# Patient Record
Sex: FEMALE | Race: BLACK OR AFRICAN AMERICAN | NOT HISPANIC OR LATINO | Employment: FULL TIME | URBAN - METROPOLITAN AREA
[De-identification: names, ages, dates, MRNs, and addresses within clinical notes are randomized per-mention and may not be internally consistent; named-entity substitution may affect disease eponyms.]

---

## 2017-12-18 ENCOUNTER — OFFICE VISIT (OUTPATIENT)
Dept: OBSTETRICS AND GYNECOLOGY | Facility: CLINIC | Age: 57
End: 2017-12-18
Payer: MEDICAID

## 2017-12-18 VITALS
SYSTOLIC BLOOD PRESSURE: 152 MMHG | DIASTOLIC BLOOD PRESSURE: 94 MMHG | HEIGHT: 67 IN | BODY MASS INDEX: 42.73 KG/M2 | WEIGHT: 272.25 LBS

## 2017-12-18 DIAGNOSIS — Z01.419 ENCOUNTER FOR GYNECOLOGICAL EXAMINATION WITHOUT ABNORMAL FINDING: Primary | ICD-10-CM

## 2017-12-18 DIAGNOSIS — L68.0 HIRSUTISM: ICD-10-CM

## 2017-12-18 DIAGNOSIS — N95.1 MENOPAUSE SYNDROME: ICD-10-CM

## 2017-12-18 PROCEDURE — 99203 OFFICE O/P NEW LOW 30 MIN: CPT | Mod: PBBFAC,PN | Performed by: OBSTETRICS & GYNECOLOGY

## 2017-12-18 PROCEDURE — 88175 CYTOPATH C/V AUTO FLUID REDO: CPT

## 2017-12-18 PROCEDURE — 99386 PREV VISIT NEW AGE 40-64: CPT | Mod: S$PBB,,, | Performed by: OBSTETRICS & GYNECOLOGY

## 2017-12-18 PROCEDURE — 99999 PR PBB SHADOW E&M-NEW PATIENT-LVL III: CPT | Mod: PBBFAC,,, | Performed by: OBSTETRICS & GYNECOLOGY

## 2017-12-18 RX ORDER — METFORMIN HYDROCHLORIDE 1000 MG/1
TABLET ORAL
Status: ON HOLD | COMMUNITY
Start: 2017-11-29 | End: 2019-09-28 | Stop reason: HOSPADM

## 2017-12-18 RX ORDER — ERGOCALCIFEROL 1.25 MG/1
CAPSULE ORAL
COMMUNITY
Start: 2017-11-28 | End: 2021-04-12 | Stop reason: SDUPTHER

## 2017-12-18 RX ORDER — GLYBURIDE 5 MG/1
TABLET ORAL
COMMUNITY
Start: 2017-11-29 | End: 2021-03-26

## 2017-12-18 RX ORDER — MELOXICAM 15 MG/1
15 TABLET ORAL DAILY
COMMUNITY
Start: 2017-11-30 | End: 2021-04-22

## 2017-12-18 RX ORDER — CARVEDILOL 12.5 MG/1
12.5 TABLET ORAL
COMMUNITY
Start: 2017-11-29 | End: 2022-01-05

## 2017-12-18 RX ORDER — PANTOPRAZOLE SODIUM 40 MG/1
TABLET, DELAYED RELEASE ORAL
COMMUNITY
Start: 2017-09-26 | End: 2019-02-10 | Stop reason: SDUPTHER

## 2017-12-18 RX ORDER — LISINOPRIL AND HYDROCHLOROTHIAZIDE 12.5; 2 MG/1; MG/1
TABLET ORAL
COMMUNITY
Start: 2017-11-29 | End: 2021-03-26

## 2017-12-18 NOTE — LETTER
December 18, 2017      Rut Morris NP  [address] [place] [address]           [facility]  [address]  [place] [address]  Phone: [phone]  Fax: [phone]          Patient: Holly Mcclelland   MR Number: [id number]   YOB: 1960   Date of Visit: 12/18/2017       Dear Rut Morris:    Thank you for referring Holly Mcclelland to me for evaluation. Attached you will find relevant portions of my assessment and plan of care.    If you have questions, please do not hesitate to call me. I look forward to following Holly Mcclelland along with you.    Sincerely,    Juan Francisco Alexis Jr., MD    Enclosure  CC:  No Recipients    If you would like to receive this communication electronically, please contact [email or web address] or [phone] to request more information on [employer] Link access.    For providers and/or their staff who would like to refer a patient to [facility], please contact us through our one-stop-shop provider referral line, [facility], at [phone].    If you feel you have received this communication in error or would no longer like to receive these types of communications, please e-mail [email or web address]

## 2017-12-18 NOTE — PROGRESS NOTES
12/18/2017    Testosteron Free 2.3       PT SEEN BY PCP AND REFERRED TO GYN FOR ANNUAL.  TOOK ESTRACE FOR 21 DAYS LAST MONTH AND HAD VAGINAL D/C AS WELL AS LBP. HAS SWEATS AT 5 AM. NOT SEVERE.  NOTED WEIGHT GAIN AND MORE HAIR GROWTH SINCE MENOPAUSE. SEEN BY DERM FOR FACIAL HAIR.    ROS:  GENERAL: No fever, chills, fatigability or weight loss.  VULVAR: No pain, no lesions and no itching.  VAGINAL: No relaxation, no itching, no discharge, no abnormal bleeding and no lesions.  ABDOMEN: No abdominal pain. Denies nausea. Denies vomiting. No diarrhea. No constipation  BREAST: Denies pain. No lumps. No discharge.  URINARY: No incontinence, no nocturia, no frequency and no dysuria.  CARDIOVASCULAR: No chest pain. No shortness of breath. No leg cramps.  NEUROLOGICAL: No headaches. No vision changes.  The remainder of the review of systems was negative.    PE:  General Appearance: obese And Well developed. Well nourished. In no acute distress.  Vulva: Lesions: No.  Urethral Meatus: Normal size. Normal location. No lesions. No prolapse.  Urethra: No masses. No tenderness. No prolapse. No scarring.  Bladder: No masses. No tenderness.  Vagina: Mucosa NI:yes discharge no, atrophy yes, cystocele no or rectocele no.  Cervix: Lesion: no  Stenotic: no Cervical motion tenderness: no  Uterus: Uterus size: 6 weeks. Support good. Uterus size: Normal  Adnexa: Masses: No Tenderness: No CDS Nodularity: No  Abdomen: obese No masses. No tenderness.  Breasts: No bilateral masses. No bilateral discharge. No bilateral tenderness. No bilateral fibrocystic changes.  Neck: No thyroid enlargement. No thyroid masses.  Skin: Rashes: No      PROCEDURES:    PLAN:     DIAGNOSIS:  1. Encounter for gynecological examination without abnormal finding    2. Menopause syndrome    3. Hirsutism        MEDICATIONS & ORDERS:  Orders Placed This Encounter    Testosterone, free    Liquid-based pap smear, screening   ESTRAVEN    Patient was counseled today on  the new ACS guidelines for cervical cytology screening as well as the current recommendations for breast cancer screening. She was counseled to follow up with her PCP for other routine health maintenance. Counseling session lasted approximately 10 minutes, and all her questions were answered.     Patient was counseled today on A.C.S. Pap guidelines and recommendations for yearly pelvic exams, mammograms and monthly self breast exams; to see her PCP for other health maintenance and the increased risks of CVD, MI, VTE, CVA , and Invasive Breast Cancer on Prempro and the increased risk of CVA with Premarin as reported by the W.H.I. studies; the benefits of HRT/ERT; her personal risks which include; alternative therapies for vasomotor symptoms (not FDA approved) including soy, black cohosh, Vit E and avoidance of triggers such as cigarette smoking, alcohol, humidity, stress and caffeine; alternative Rxs for treatment of menopause symptoms such as antidepressants or Clonidine. Counseling session lasted approximately minutes, and all her questions were completely answered.      FOLLOW-UP: With me in 12 month

## 2019-09-25 PROBLEM — E66.812 CLASS 2 OBESITY IN ADULT: Status: ACTIVE | Noted: 2019-09-25

## 2019-09-25 PROBLEM — K21.9 GERD (GASTROESOPHAGEAL REFLUX DISEASE): Status: ACTIVE | Noted: 2019-09-25

## 2019-09-25 PROBLEM — G45.9 TIA (TRANSIENT ISCHEMIC ATTACK): Status: ACTIVE | Noted: 2019-09-25

## 2019-09-25 PROBLEM — I10 HYPERTENSION, MALIGNANT: Status: ACTIVE | Noted: 2019-09-25

## 2019-09-25 PROBLEM — E66.9 CLASS 2 OBESITY IN ADULT: Status: ACTIVE | Noted: 2019-09-25

## 2019-09-25 PROBLEM — E11.49 TYPE 2 DIABETES MELLITUS WITH NEUROLOGIC COMPLICATION, WITHOUT LONG-TERM CURRENT USE OF INSULIN: Status: ACTIVE | Noted: 2019-09-25

## 2019-09-26 PROBLEM — I16.0 HYPERTENSIVE URGENCY: Status: ACTIVE | Noted: 2019-09-26

## 2019-09-26 PROBLEM — E66.01 CLASS 3 SEVERE OBESITY WITH BODY MASS INDEX (BMI) OF 40.0 TO 44.9 IN ADULT: Status: ACTIVE | Noted: 2019-09-26

## 2019-09-26 PROBLEM — E66.813 CLASS 3 SEVERE OBESITY WITH BODY MASS INDEX (BMI) OF 40.0 TO 44.9 IN ADULT: Status: ACTIVE | Noted: 2019-09-26

## 2019-10-23 ENCOUNTER — OFFICE VISIT (OUTPATIENT)
Dept: OBSTETRICS AND GYNECOLOGY | Facility: CLINIC | Age: 59
End: 2019-10-23
Payer: MEDICAID

## 2019-10-23 VITALS
HEIGHT: 67 IN | WEIGHT: 264.75 LBS | BODY MASS INDEX: 41.55 KG/M2 | SYSTOLIC BLOOD PRESSURE: 130 MMHG | DIASTOLIC BLOOD PRESSURE: 88 MMHG

## 2019-10-23 DIAGNOSIS — Z01.419 WELL WOMAN EXAM WITH ROUTINE GYNECOLOGICAL EXAM: Primary | ICD-10-CM

## 2019-10-23 PROCEDURE — 99396 PREV VISIT EST AGE 40-64: CPT | Mod: S$PBB,,, | Performed by: OBSTETRICS & GYNECOLOGY

## 2019-10-23 PROCEDURE — 99999 PR PBB SHADOW E&M-EST. PATIENT-LVL IV: CPT | Mod: PBBFAC,,, | Performed by: OBSTETRICS & GYNECOLOGY

## 2019-10-23 PROCEDURE — 99214 OFFICE O/P EST MOD 30 MIN: CPT | Mod: PBBFAC,PN | Performed by: OBSTETRICS & GYNECOLOGY

## 2019-10-23 PROCEDURE — 99396 PR PREVENTIVE VISIT,EST,40-64: ICD-10-PCS | Mod: S$PBB,,, | Performed by: OBSTETRICS & GYNECOLOGY

## 2019-10-23 PROCEDURE — 99999 PR PBB SHADOW E&M-EST. PATIENT-LVL IV: ICD-10-PCS | Mod: PBBFAC,,, | Performed by: OBSTETRICS & GYNECOLOGY

## 2019-10-23 RX ORDER — ASPIRIN 325 MG
50000 TABLET, DELAYED RELEASE (ENTERIC COATED) ORAL
Refills: 3 | COMMUNITY
Start: 2019-10-10 | End: 2022-02-28

## 2019-10-23 RX ORDER — METHOCARBAMOL 500 MG/1
500 TABLET, FILM COATED ORAL 3 TIMES DAILY PRN
Refills: 3 | COMMUNITY
Start: 2019-10-10 | End: 2021-03-26

## 2019-10-23 RX ORDER — ASPIRIN 81 MG/1
81 TABLET ORAL DAILY
Refills: 3 | Status: ON HOLD | COMMUNITY
Start: 2019-10-09 | End: 2022-02-10 | Stop reason: SDUPTHER

## 2019-10-23 RX ORDER — GABAPENTIN 100 MG/1
100 CAPSULE ORAL 2 TIMES DAILY
Refills: 3 | COMMUNITY
Start: 2019-09-29 | End: 2021-03-26

## 2019-10-23 RX ORDER — BUTALBITAL, ACETAMINOPHEN AND CAFFEINE 50; 325; 40 MG/1; MG/1; MG/1
1 TABLET ORAL 3 TIMES DAILY PRN
Refills: 1 | COMMUNITY
Start: 2019-10-10 | End: 2021-03-26

## 2019-10-23 NOTE — PROGRESS NOTES
History & Physical  Gynecology      SUBJECTIVE:     Chief Complaint: Well Woman       History of Present Illness:  Ms. Mcclelland is a 58 yr old female who presents for annual, well woman exam. Complaints: No GYN complaints. Postmenopausal. Denies vaginal bleeding. No hx abnormal paps. Last pap was . Currently not sexually active. Denies hx of STIs. Denies fam hx of breast, ovarian or colon cancer. Feels safe at home, wears seatbelts, exercises infrequently.        Review of patient's allergies indicates:  No Known Allergies    Past Medical History:   Diagnosis Date    Diabetes mellitus     Hypertension     Sleep apnea      Past Surgical History:   Procedure Laterality Date    BREAST BIOPSY Left      OB History        1    Para   1    Term   1            AB        Living           SAB        TAB        Ectopic        Multiple        Live Births                   Family History   Problem Relation Age of Onset    Diabetes Maternal Grandmother     Diabetes Mother     Diabetes Sister     Breast cancer Neg Hx     Colon cancer Neg Hx     Ovarian cancer Neg Hx      Social History     Tobacco Use    Smoking status: Never Smoker    Smokeless tobacco: Never Used   Substance Use Topics    Alcohol use: No    Drug use: No       Current Outpatient Medications   Medication Sig    amLODIPine (NORVASC) 10 MG tablet Take 1 tablet (10 mg total) by mouth once daily.    aspirin (ECOTRIN) 81 MG EC tablet Take 81 mg by mouth once daily.    atorvastatin (LIPITOR) 40 MG tablet Take 1 tablet (40 mg total) by mouth once daily.    butalbital-acetaminophen-caffeine -40 mg (FIORICET, ESGIC) -40 mg per tablet Take 1 tablet by mouth 3 (three) times daily as needed.    carvedilol (COREG) 12.5 MG tablet     cholecalciferol, vitamin D3, 50,000 unit capsule Take 50,000 Units by mouth every 7 days.    clopidogrel (PLAVIX) 75 mg tablet Take 1 tablet (75 mg total) by mouth once daily.    furosemide  (LASIX) 20 MG tablet furosemide 20 mg tablet    gabapentin (NEURONTIN) 100 MG capsule Take 100 mg by mouth 2 (two) times daily.    glyBURIDE (DIABETA) 5 MG tablet     hydrALAZINE (APRESOLINE) 25 MG tablet Take 1 tablet (25 mg total) by mouth every 12 (twelve) hours.    meloxicam (MOBIC) 15 MG tablet     metFORMIN (GLUCOPHAGE) 1000 MG tablet Take 1 tablet (1,000 mg total) by mouth 2 (two) times daily with meals.    metoclopramide HCl (REGLAN) 10 MG tablet metoclopramide 10 mg tablet    pantoprazole (PROTONIX) 40 MG tablet pantoprazole 40 mg tablet,delayed release    pregabalin (LYRICA) 50 MG capsule Take 1 capsule (50 mg total) by mouth 2 (two) times daily.    SITagliptin (JANUVIA) 100 MG Tab Take 1 tablet (100 mg total) by mouth once daily.    chlordiazepoxide-clidinium 5-2.5 mg (LIBRAX) 5-2.5 mg Cap Take 1 capsule by mouth 3 (three) times daily. (Patient not taking: Reported on 10/23/2019)    [id number]-algal oil (METANX, ALGAL OIL,) 3 mg-35 mg-2 mg -90.314 mg Cap Metanx (algal oil) 3 mg-35 mg-2 mg-90.314 mg capsule   Take 1 capsule twice a day by oral route.    lisinopril-hydrochlorothiazide (PRINZIDE,ZESTORETIC) 20-12.5 mg per tablet     methocarbamol (ROBAXIN) 500 MG Tab Take 500 mg by mouth 3 (three) times daily as needed.    ondansetron (ZOFRAN-ODT) 4 MG TbDL Take 1 tablet (4 mg total) by mouth every 6 (six) hours as needed.    ondansetron (ZOFRAN-ODT) 4 MG TbDL Take 1 tablet (4 mg total) by mouth every 6 (six) hours as needed.    VITAMIN D2 50,000 unit capsule      No current facility-administered medications for this visit.          Review of Systems:  Review of Systems   Constitutional: Negative for activity change, fatigue, fever and unexpected weight change.   Respiratory: Negative for cough and shortness of breath.    Cardiovascular: Negative for chest pain and palpitations.   Gastrointestinal: Negative for abdominal pain, constipation, diarrhea and nausea.   Endocrine: Negative  for hot flashes.   Genitourinary: Negative for dyspareunia, dysuria, menorrhagia, menstrual problem, pelvic pain and vaginal discharge.   Musculoskeletal: Negative for back pain.   Integumentary:  Negative for nipple discharge.   Neurological: Negative for headaches.   Psychiatric/Behavioral: The patient is not nervous/anxious.    Breast: Negative for nipple discharge       OBJECTIVE:     Physical Exam:  Physical Exam   Constitutional: She is oriented to person, place, and time. She appears well-developed and well-nourished.   HENT:   Head: Normocephalic and atraumatic.   Neck: Normal range of motion. Neck supple.   Cardiovascular: Normal rate, regular rhythm, normal heart sounds and intact distal pulses.   Pulmonary/Chest: Effort normal and breath sounds normal. Right breast exhibits no inverted nipple, no mass, no nipple discharge, no skin change and no tenderness. Left breast exhibits no inverted nipple, no mass, no nipple discharge, no skin change and no tenderness.   Abdominal: Soft. Bowel sounds are normal. There is no tenderness. There is no guarding.   Genitourinary: There is no rash, tenderness, lesion or injury on the right labia. There is no rash, tenderness, lesion or injury on the left labia. Uterus is not deviated, not enlarged, not fixed and not tender. Cervix exhibits no motion tenderness, no discharge and no friability. Right adnexum displays no mass, no tenderness and no fullness. Left adnexum displays no mass, no tenderness and no fullness. No erythema, tenderness or bleeding in the vagina. No foreign body in the vagina. No signs of injury around the vagina. No vaginal discharge found.   Neurological: She is alert and oriented to person, place, and time.   Skin: Skin is warm and dry.   Psychiatric: She has a normal mood and affect.   Vitals reviewed.        ASSESSMENT:       ICD-10-CM ICD-9-CM    1. Well woman exam with routine gynecological exam Z01.419 V72.31           Plan:      Holly was seen  today for well woman.    Diagnoses and all orders for this visit:    Well woman exam with routine gynecological exam    Last pap 2017. Next pap due next year  Mammogram today  Colonoscopy UTD  DEXA age 64    No orders of the defined types were placed in this encounter.      Follow up in about 1 year (around 10/23/2020), or if symptoms worsen or fail to improve, for annual.    Counseling time: 15 minutes    Jefry Dinero

## 2020-03-17 ENCOUNTER — CLINICAL SUPPORT (OUTPATIENT)
Dept: DIABETES | Facility: CLINIC | Age: 60
End: 2020-03-17
Payer: MEDICAID

## 2020-03-17 VITALS — BODY MASS INDEX: 41.44 KG/M2 | HEIGHT: 67 IN | WEIGHT: 264 LBS

## 2020-03-17 DIAGNOSIS — E11.9 DIABETES MELLITUS WITHOUT COMPLICATION: ICD-10-CM

## 2020-03-17 DIAGNOSIS — E11.40 TYPE 2 DIABETES MELLITUS WITH DIABETIC NEUROPATHY, WITHOUT LONG-TERM CURRENT USE OF INSULIN: Primary | ICD-10-CM

## 2020-03-17 PROCEDURE — G0108 DIAB MANAGE TRN  PER INDIV: HCPCS | Mod: PBBFAC,PN | Performed by: DIETITIAN, REGISTERED

## 2020-03-17 PROCEDURE — 99999 PR PBB SHADOW E&M-EST. PATIENT-LVL IV: CPT | Mod: PBBFAC,,, | Performed by: DIETITIAN, REGISTERED

## 2020-03-17 PROCEDURE — 99999 PR PBB SHADOW E&M-EST. PATIENT-LVL IV: ICD-10-PCS | Mod: PBBFAC,,, | Performed by: DIETITIAN, REGISTERED

## 2020-03-17 PROCEDURE — 99214 OFFICE O/P EST MOD 30 MIN: CPT | Mod: PBBFAC,PN | Performed by: DIETITIAN, REGISTERED

## 2020-03-17 RX ORDER — GLIPIZIDE 10 MG/1
TABLET ORAL
COMMUNITY
Start: 2020-03-08 | End: 2021-03-26 | Stop reason: SDUPTHER

## 2020-03-17 NOTE — PROGRESS NOTES
Diabetes Education  Author: Arpita Sanchez RD, CDE  Date: 3/17/2020    Diabetes Care Management Summary  Diabetes Education Record Assessment/Progress: Initial  Current Diabetes Risk Level: Moderate     Last A1c:   Lab Results   Component Value Date    HGBA1C 9.6 (H) 02/03/2020     Last visit with Diabetes Educator: Last Education Visit: Not Found      Diabetes Type  Diabetes Type : Type II    Diabetes History  Diabetes Diagnosis: 5-10 years  Current Treatment: Oral Medication, Diet  Reviewed Problem List with Patient: Yes    Health Maintenance was reviewed today with patient. Discussed with patient importance of routine eye exams, foot exams/foot care, blood work (i.e.: A1c, microalbumin, and lipid), dental visits, yearly flu vaccine, and pneumonia vaccine as indicated by PCP. Patient verbalized understanding.     Health Maintenance Topics with due status: Not Due       Topic Last Completion Date    Pap Smear with HPV Cotest 12/18/2017    High Dose Statin 10/23/2019    Mammogram 11/13/2019    Lipid Panel 02/03/2020    Hemoglobin A1c 02/03/2020     Health Maintenance Due   Topic Date Due    Hepatitis C Screening  1960    Foot Exam  11/08/1969    Eye Exam  11/08/1969    TETANUS VACCINE  11/08/1977    Pneumococcal Vaccine (Medium Risk) (1 of 1 - PPSV23) 11/08/1978    Colonoscopy  11/08/2009       Nutrition  Meal Planning: skipping meals  What type of sweetener do you use?: sugar  What type of beverages do you drink?: regular soda/tea, diet soda/tea, juice, water, sport drinks  Meal Plan 24 Hour Recall - Breakfast: 2 boiled eggs and grapefruit juice  Meal Plan 24 Hour Recall - Lunch: skipped  Meal Plan 24 Hour Recall - Dinner: meatballs and spaghetti  Meal Plan 24 Hour Recall - Snack: chips, sweets, tends to snack after dinner while relaxing in the living room    Monitoring   Self Monitoring : not checking bloodsugar, interested in a sensor but is not on insulin nor testing 4 times a day so insurance will  not cover supplies  Blood Glucose Logs: No  Do you use a personal continuous glucose monitor?: No  In the last month, how often have you had a low blood sugar reaction?: once  What are your symptoms of low blood sugar?: gets shaky  How do you treat low blood sugar?: eat something  Can you tell when your blood sugar is too high?: sometimes  How do you treat high blood sugar?: take medication    Exercise   Exercise Type: walking(at work)  Intensity: Low  Frequency: 3-5 Times per week    Current Diabetes Treatment   Current Treatment: Oral Medication, Diet    Social History  Preferred Learning Method: Face to Face  Primary Support: Self  Educational Level: High School  Occupation: Screwpulping for [employer]  Smoking Status: Never a Smoker  Alcohol Use: Never            DDS-2 Score  ( > 3 = SIGNIFICANT DISTRESS): 2                   Barriers to Change  Barriers to Change: Financial  Learning Challenges : None    Readiness to Learn   Readiness to Learn : Acceptance    Cultural Influences  Cultural Influences: No    Diabetes Education Assessment/Progress  Diabetes Disease Process (diabetes disease process and treatment options): Comprehends Key Points, Discussion, Individual Session, Written Materials Provided  Nutrition (Incorporating nutritional management into one's lifestyle): Comprehends Key Points, Discussion, Individual Session, Written Materials Provided  Physical Activity (incorporating physical activity into one's lifestyle): Comprehends Key Points, Discussion, Individual Session, Written Materials Provided  Medications (states correct name, dose, onset, peak, duration, side effects & timing of meds): Comprehends Key Points, Discussion, Individual Session, Written Materials Provided  Monitoring (monitoring blood glucose/other parameters & using results): Comprehends Key Points, Discussion, Individual Session, Written Materials Provided  Acute Complications (preventing, detecting, and treating  acute complications): Comprehends Key Points, Discussion, Individual Session, Written Materials Provided  Chronic Complications (preventing, detecting, and treating chronic complications): Comprehends Key Points, Discussion, Individual Session, Written Materials Provided  Clinical (diabetes, other pertinent medical history, and relevant comorbidities reviewed during visit): Comprehends Key Points, Discussion, Individual Session, Written Materials Provided  Cognitive (knowledge of self-management skills, functional health literacy): Comprehends Key Points, Discussion, Individual Session, Written Materials Provided  Psychosocial (emotional response to diabetes): Comprehends Key Points, Discussion, Individual Session, Written Materials Provided  Diabetes Distress and Support Systems: Comprehends Key Points, Discussion, Individual Session, Written Materials Provided  Behavioral (readiness for change, lifestyle practices, self-care behaviors): Comprehends Key Points, Discussion, Individual Session, Written Materials Provided    Patient educated on what is DM, T1DM, T2DM, risk factors, managing DM, DM diet, carbohydrate counting, meal planning, reading a food label, healthy snack options, benefits of physical activity, diabetes care schedule, foot care guidelines, diabetes and retinopathy screening, s/s hypo and hyperglycemia, long/short term complication of uncontrolled DM, importance of compliance with treatment plan, how to use a glucometer, reviewed understanding diabetes distress, medications for treating DM, their mechanism of action and possible side effects, reviewed current level and goal level for HgbA1c, blood glucose, microalbumin, and lipids. Patient provided with written literature, diabetes management resources and support, DM Management program contact information.    Goals  Patient has selected/evaluated goals during today's session: Yes, selected  Healthy Eating: Set  Start Date: 03/17/20  Target Date:  04/17/20         Diabetes Care Plan/Intervention  Education Plan/Intervention: Individual Follow-Up DSMT    Diabetes Meal Plan  Restrictions: Low Fat, Low Sodium, Restricted Carbohydrate  Calories: 1800  Carbohydrate Per Meal: 30-45g  Carbohydrate Per Snack : 15-20g  Fat: 50  Protein: 135    Today's Self-Management Care Plan was developed with the patient's input and is based on barriers identified during today's assessment.    The long and short-term goals in the care plan were written with the patient/caregiver's input. The patient has agreed to work toward these goals to improve her overall diabetes control.      The patient received a copy of today's self-management plan and verbalized understanding of the care plan, goals, and all of today's instructions.      The patient was encouraged to communicate with her physician and care team regarding her condition(s) and treatment.  I provided the patient with my contact information today and encouraged her to contact me via phone or patient portal as needed.     Education Units of Time   Time Spent: 60 min

## 2020-09-16 DIAGNOSIS — E04.1 THYROID NODULE: ICD-10-CM

## 2020-09-16 DIAGNOSIS — E11.9 DIABETES MELLITUS WITHOUT COMPLICATION: Primary | ICD-10-CM

## 2020-09-28 ENCOUNTER — NUTRITION (OUTPATIENT)
Dept: DIABETES | Facility: CLINIC | Age: 60
End: 2020-09-28
Payer: MEDICAID

## 2020-09-28 VITALS — WEIGHT: 263.88 LBS | BODY MASS INDEX: 41.42 KG/M2 | HEIGHT: 67 IN

## 2020-09-28 DIAGNOSIS — E11.9 DIABETES MELLITUS WITHOUT COMPLICATION: ICD-10-CM

## 2020-09-28 DIAGNOSIS — E11.40 TYPE 2 DIABETES MELLITUS WITH DIABETIC NEUROPATHY, WITHOUT LONG-TERM CURRENT USE OF INSULIN: Primary | ICD-10-CM

## 2020-09-28 PROCEDURE — G0108 DIAB MANAGE TRN  PER INDIV: HCPCS | Mod: PBBFAC,PN | Performed by: DIETITIAN, REGISTERED

## 2020-09-28 PROCEDURE — 99214 OFFICE O/P EST MOD 30 MIN: CPT | Mod: PBBFAC,PN | Performed by: DIETITIAN, REGISTERED

## 2020-09-28 PROCEDURE — 99999 PR PBB SHADOW E&M-EST. PATIENT-LVL IV: CPT | Mod: PBBFAC,,, | Performed by: DIETITIAN, REGISTERED

## 2020-09-28 PROCEDURE — 99999 PR PBB SHADOW E&M-EST. PATIENT-LVL IV: ICD-10-PCS | Mod: PBBFAC,,, | Performed by: DIETITIAN, REGISTERED

## 2020-09-28 RX ORDER — PEN NEEDLE, DIABETIC 31 GX5/16"
NEEDLE, DISPOSABLE MISCELLANEOUS
COMMUNITY
Start: 2020-09-16 | End: 2021-03-26

## 2020-09-28 RX ORDER — INSULIN GLARGINE 100 [IU]/ML
INJECTION, SOLUTION SUBCUTANEOUS
COMMUNITY
Start: 2020-09-16 | End: 2021-03-26

## 2020-09-28 NOTE — PATIENT INSTRUCTIONS
Diabetes: Considering an Insulin Pump     An insulin pump is about the size of a pager or cell phone. It can be easily worn beneath your clothing.     Youve been told that you have diabetes. You has to be given a hormone called insulin. Insulin helps to give your cells the energy they need. Insulin is most often given by shot, using a needle and syringe. Most people with diabetes need several shots each day. A device called an insulin pump can also be used to deliver insulin. A pump may help reduce the number of shots you need. An insulin pump can also give you more choices about your treatment plan. Many patients find that an insulin pump helps improve blood sugar management. But pumps do have some drawbacks. Your healthcare provider can help you decide if an insulin pump is a good choice.  How an insulin pump works  An insulin pump is a small device about the size of a pager or cell phone. Insulin is delivered from the pump to the body through thin plastic tubing. The tubing is attached to a soft, flexible tube called a cannula. The cannula is placed under the skin. The pump can deliver a steady dose of insulin. This is called a basal dose. It acts like the bodys natural release of insulin. The pump can also deliver a single dose either before meals or to correct high blood sugar. This is called a bolus dose. The pump is worn all the time, day and night. It is easily hidden under loose clothing or clipped to a waistband or belt. But it can be disconnected for short periods (for example, while bathing or showering).  Advantages of an insulin pump  Some of the advantages of an insulin pump include:  · Reduces the number of shots needed (good if child is afraid of needles)  · Acts more like the bodys natural release of insulin than with shots  · Allows for both quick and around-the-clock delivery of insulin  · Gives child more freedom in what or when he or she eats than with shots  · May lead to fewer extreme  highs and lows than with shots  · May improve A1C number  Disadvantages of an insulin pump  There are also some disadvantages of the pump. These include:  · May not improve blood sugar numbers.  · Meal boluses must still be remembered and given.  · Frequent blood sugar checks are still needed.  · Pump may be visible to others (puts diabetes out there for people to see).  · May be an ongoing expense and may not be covered by health insurance.  · Needs special training for patient  · May increase risk of getting infections  · May increase risk of diabetic ketoacidosis (DKA) if the pump or infusion set malfunctions or if the catheter comes out and insulin is no longer being delivered  See Understanding the Risk of Diabetic Ketoacidosis (DKA).  Deciding if your child is ready  To help determine whether a pump is right for you, consider these things:  · You must be interested in the pump and willing to wear it.  · Counting carbs will be very important (even more so than with shots).  · Blood sugar checks may need to be done more often than with shots.  · Ketones may need to be checked more often than with shots. Your healthcare provider will teach you about checking for ketones.  · Youll need to keep even more careful records of your blood sugar readings.  Understanding the risk of diabetic ketoacidosis (DKA)  Diabetic ketoacidosis (DKA) is a serious condition that can happen if you dont get needed insulin. It can result in a coma and sometimes even death. If the insulin pump stops working, DKA could develop soon after. Monitoring your blood sugar closely is the only way to be sure that you are getting needed insulin. Be sure to discuss the risks of DKA with your healthcare provider. And learn what to do if the pump fails for any reason. Always be prepared to give yourself an insulin shot right away if needed.   Resources  For more information about diabetes, visit these websites:  · American Diabetes  Association www.diabetes.org  · Children with Diabetes www.childrenwithdiabetes.com  · Juvenile Diabetes Research Foundation www.jdrf.org  · American Association of Diabetes Educators www.aadenet.org  · American Association of Clinical Endocrinologists www.aace.com  · National Diabetes Information Clearinghouse www.diabetes.niddk.nih.gov  NOTE:  This sheet does not give all the information you need to care for your diabetes. Ask your healthcare provider for more information.   Date Last Reviewed: 7/1/2016  © 4795-7489 [employer]. [address] [place] [address]. All rights reserved. This information is not intended as a substitute for professional medical care. Always follow your healthcare professional's instructions.

## 2020-09-28 NOTE — PROGRESS NOTES
Diabetes Education  Author: Arpita Sanchez RD, CDE  Date: 9/28/2020    Diabetes Care Management Summary  Diabetes Education Record Assessment/Progress: Comprehensive/Group  Current Diabetes Risk Level: Moderate     Last A1c:   Lab Results   Component Value Date    HGBA1C 9.7 (H) 09/07/2020     Last visit with Diabetes Educator: Last Education Visit: Not Found      Diabetes Type  Diabetes Type : Type II    Diabetes History  Diabetes Diagnosis: >10 years  Current Treatment: Oral Medication, Diet, Insulin(glipizide, glyburide, januvia, metformin, lantus)  Reviewed Problem List with Patient: Yes    Health Maintenance was reviewed today with patient. Discussed with patient importance of routine eye exams, foot exams/foot care, blood work (i.e.: A1c, microalbumin, and lipid), dental visits, yearly flu vaccine, and pneumonia vaccine as indicated by PCP. Patient verbalized understanding.     Health Maintenance Topics with due status: Not Due       Topic Last Completion Date    Cervical Cancer Screening 12/18/2017    Mammogram 11/13/2019    Lipid Panel 09/07/2020    Hemoglobin A1c 09/07/2020     Health Maintenance Due   Topic Date Due    Hepatitis C Screening  1960    Foot Exam  11/08/1969    Eye Exam  11/08/1969    TETANUS VACCINE  11/08/1977    Pneumococcal Vaccine (Medium Risk) (1 of 1 - PPSV23) 11/08/1978    High Dose Statin  11/08/1980    Shingles Vaccine (1 of 2) 11/08/2009    Colorectal Cancer Screening  11/08/2009    Influenza Vaccine (1) 08/01/2020       Nutrition  Meal Planning: skipping meals, water, 3 meals per day, diet drinks, eats out seldom, snacks between meal  What type of sweetener do you use?: sugar  What type of beverages do you drink?: regular soda/tea, diet soda/tea, juice, water, sport drinks  Meal Plan 24 Hour Recall - Breakfast: 2 boiled eggs and grapefruit juice  Meal Plan 24 Hour Recall - Lunch: skipped  Meal Plan 24 Hour Recall - Dinner: meatballs and spaghetti  Meal Plan 24 Hour  Recall - Snack: chips, sweets, tends to snack after dinner while relaxing in the living room    Monitoring   Self Monitoring : BS: 232, 252, 271, 276, 175, 232, 358, 323, 304, 318, 263, 436, 179, 199, 274  Blood Glucose Logs: No  Do you use a personal continuous glucose monitor?: No(interested in CGM therapy)  In the last month, how often have you had a low blood sugar reaction?: never  What are your symptoms of low blood sugar?: shaky  How do you treat low blood sugar?: juice  Can you tell when your blood sugar is too high?: sometimes  How do you treat high blood sugar?: take medication    Exercise   Exercise Type: none  Frequency: Never    Current Diabetes Treatment   Current Treatment: Oral Medication, Diet, Insulin(glipizide, glyburide, januvia, metformin, lantus)    Social History  Preferred Learning Method: Face to Face  Primary Support: Self  Educational Level: High School  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[employer]  Smoking Status: Never a Smoker  Alcohol Use: Never                                Barriers to Change  Barriers to Change: Financial  Learning Challenges : None    Readiness to Learn   Readiness to Learn : Acceptance    Cultural Influences  Cultural Influences: No    Diabetes Education Assessment/Progress  Diabetes Disease Process (diabetes disease process and treatment options): Comprehends Key Points, Discussion, Individual Session  Nutrition (Incorporating nutritional management into one's lifestyle): Comprehends Key Points, Discussion, Individual Session, Instructed  Physical Activity (incorporating physical activity into one's lifestyle): Comprehends Key Points, Discussion, Individual Session  Medications (states correct name, dose, onset, peak, duration, side effects & timing of meds): Comprehends Key Points, Discussion, Individual Session, Written Materials Provided, Demonstration, Instructed, Needs Review  Monitoring (monitoring blood glucose/other parameters & using results):  Comprehends Key Points, Discussion, Individual Session, Written Materials Provided, Demonstration, Instructed, Needs Review  Acute Complications (preventing, detecting, and treating acute complications): Comprehends Key Points, Discussion, Individual Session, Written Materials Provided  Chronic Complications (preventing, detecting, and treating chronic complications): Comprehends Key Points, Discussion, Individual Session, Written Materials Provided  Clinical (diabetes, other pertinent medical history, and relevant comorbidities reviewed during visit): Comprehends Key Points, Discussion, Individual Session, Written Materials Provided  Cognitive (knowledge of self-management skills, functional health literacy): Comprehends Key Points, Discussion, Individual Session, Written Materials Provided  Psychosocial (emotional response to diabetes): Comprehends Key Points, Discussion, Individual Session, Written Materials Provided  Diabetes Distress and Support Systems: Comprehends Key Points, Discussion, Individual Session, Written Materials Provided  Behavioral (readiness for change, lifestyle practices, self-care behaviors): Comprehends Key Points, Discussion, Individual Session, Written Materials Provided  Insulin Pump Education and CGM therapy  Discussed Medtronic 630G and 670G Pumps and the Guardian Sensor; Tandem T-Slim and possibility of connecting with Dexcom G6; and OmniPod /OmniPod Dash, and Freestyle Ashly.   Patient educated on insulin pump therapy, the purpose of insulin pump therapy, advantages and disadvantages of insulin pump therapy and/vs multiple daily injections, what a basal rate and bolus dose are, when to change infusion site and tubing, demonstrated how to prepare the syringe/cartridge and tubing (except for OmniPod) for use in the pump  Discussed ease of usage, infusion sets, communication with the sensor, basal and bolus, carbohydrate to insulin ration, correction factors, approved areas to insert  set, carbohydrate counting, error messages, how to disconnect and reconnect the cartridge and tubing   Patient instructed that based on current insulin regimen she would be changing infusion set daily.    Goals  Patient has selected/evaluated goals during today's session: Yes, selected  Healthy Eating: % Met  Met Percentage : 50%         Diabetes Care Plan/Intervention  Education Plan/Intervention: Individual Follow-Up DSMT    Diabetes Meal Plan  Restrictions: Low Fat, Low Sodium, Restricted Carbohydrate  Calories: 1800  Carbohydrate Per Meal: 30-45g  Carbohydrate Per Snack : 15-20g  Fat: 50  Protein: 135    Today's Self-Management Care Plan was developed with the patient's input and is based on barriers identified during today's assessment.    The long and short-term goals in the care plan were written with the patient/caregiver's input. The patient has agreed to work toward these goals to improve her overall diabetes control.      The patient received a copy of today's self-management plan and verbalized understanding of the care plan, goals, and all of today's instructions.      The patient was encouraged to communicate with her physician and care team regarding her condition(s) and treatment.  I provided the patient with my contact information today and encouraged her to contact me via phone or patient portal as needed.     Education Units of Time   Time Spent: 60 min

## 2021-01-21 ENCOUNTER — OFFICE VISIT (OUTPATIENT)
Dept: OBSTETRICS AND GYNECOLOGY | Facility: CLINIC | Age: 61
End: 2021-01-21
Payer: MEDICAID

## 2021-01-21 VITALS
SYSTOLIC BLOOD PRESSURE: 138 MMHG | WEIGHT: 261.56 LBS | BODY MASS INDEX: 40.97 KG/M2 | DIASTOLIC BLOOD PRESSURE: 88 MMHG

## 2021-01-21 DIAGNOSIS — N95.0 POSTMENOPAUSAL BLEEDING: ICD-10-CM

## 2021-01-21 DIAGNOSIS — Z01.419 ENCOUNTER FOR WELL WOMAN EXAM WITH ROUTINE GYNECOLOGICAL EXAM: Primary | ICD-10-CM

## 2021-01-21 PROCEDURE — 99214 OFFICE O/P EST MOD 30 MIN: CPT | Mod: PBBFAC,PN | Performed by: NURSE PRACTITIONER

## 2021-01-21 PROCEDURE — 87624 HPV HI-RISK TYP POOLED RSLT: CPT

## 2021-01-21 PROCEDURE — 88175 CYTOPATH C/V AUTO FLUID REDO: CPT

## 2021-01-21 PROCEDURE — 99396 PREV VISIT EST AGE 40-64: CPT | Mod: S$PBB,,, | Performed by: NURSE PRACTITIONER

## 2021-01-21 PROCEDURE — 99396 PR PREVENTIVE VISIT,EST,40-64: ICD-10-PCS | Mod: S$PBB,,, | Performed by: NURSE PRACTITIONER

## 2021-01-21 PROCEDURE — 99999 PR PBB SHADOW E&M-EST. PATIENT-LVL IV: ICD-10-PCS | Mod: PBBFAC,,, | Performed by: NURSE PRACTITIONER

## 2021-01-21 PROCEDURE — 99999 PR PBB SHADOW E&M-EST. PATIENT-LVL IV: CPT | Mod: PBBFAC,,, | Performed by: NURSE PRACTITIONER

## 2021-01-28 LAB
HPV HR 12 DNA SPEC QL NAA+PROBE: NEGATIVE
HPV16 AG SPEC QL: NEGATIVE
HPV18 DNA SPEC QL NAA+PROBE: NEGATIVE

## 2021-02-03 ENCOUNTER — OFFICE VISIT (OUTPATIENT)
Dept: PODIATRY | Facility: CLINIC | Age: 61
End: 2021-02-03
Payer: MEDICAID

## 2021-02-03 VITALS
HEART RATE: 76 BPM | HEIGHT: 67 IN | SYSTOLIC BLOOD PRESSURE: 156 MMHG | DIASTOLIC BLOOD PRESSURE: 70 MMHG | BODY MASS INDEX: 41.02 KG/M2 | WEIGHT: 261.38 LBS

## 2021-02-03 DIAGNOSIS — E66.01 CLASS 3 SEVERE OBESITY WITH SERIOUS COMORBIDITY AND BODY MASS INDEX (BMI) OF 40.0 TO 44.9 IN ADULT, UNSPECIFIED OBESITY TYPE: ICD-10-CM

## 2021-02-03 DIAGNOSIS — B35.1 ONYCHOMYCOSIS DUE TO DERMATOPHYTE: ICD-10-CM

## 2021-02-03 DIAGNOSIS — L85.3 DRY SKIN: ICD-10-CM

## 2021-02-03 DIAGNOSIS — E11.43 TYPE 2 DIABETES MELLITUS WITH DIABETIC AUTONOMIC NEUROPATHY, WITHOUT LONG-TERM CURRENT USE OF INSULIN: Primary | ICD-10-CM

## 2021-02-03 DIAGNOSIS — L84 CORN OR CALLUS: ICD-10-CM

## 2021-02-03 DIAGNOSIS — L60.2 ONYCHOGRYPHOSIS: ICD-10-CM

## 2021-02-03 DIAGNOSIS — L60.0 ONYCHOCRYPTOSIS: ICD-10-CM

## 2021-02-03 DIAGNOSIS — E11.9 DIABETES MELLITUS WITHOUT COMPLICATION: ICD-10-CM

## 2021-02-03 PROCEDURE — 99215 OFFICE O/P EST HI 40 MIN: CPT | Mod: PBBFAC,PN,25 | Performed by: PODIATRIST

## 2021-02-03 PROCEDURE — 99203 PR OFFICE/OUTPT VISIT, NEW, LEVL III, 30-44 MIN: ICD-10-PCS | Mod: 25,S$PBB,, | Performed by: PODIATRIST

## 2021-02-03 PROCEDURE — 11721 DEBRIDE NAIL 6 OR MORE: CPT | Mod: Q9,PBBFAC,PN | Performed by: PODIATRIST

## 2021-02-03 PROCEDURE — 99203 OFFICE O/P NEW LOW 30 MIN: CPT | Mod: 25,S$PBB,, | Performed by: PODIATRIST

## 2021-02-03 PROCEDURE — 11721: ICD-10-PCS | Mod: S$PBB,,, | Performed by: PODIATRIST

## 2021-02-03 PROCEDURE — 99999 PR PBB SHADOW E&M-EST. PATIENT-LVL V: CPT | Mod: PBBFAC,,, | Performed by: PODIATRIST

## 2021-02-03 PROCEDURE — 99999 PR PBB SHADOW E&M-EST. PATIENT-LVL V: ICD-10-PCS | Mod: PBBFAC,,, | Performed by: PODIATRIST

## 2021-02-14 PROBLEM — L60.2 ONYCHOGRYPHOSIS: Status: ACTIVE | Noted: 2021-02-14

## 2021-02-14 PROBLEM — L60.0 ONYCHOCRYPTOSIS: Status: ACTIVE | Noted: 2021-02-14

## 2021-02-14 PROBLEM — B35.1 ONYCHOMYCOSIS DUE TO DERMATOPHYTE: Status: ACTIVE | Noted: 2021-02-14

## 2021-02-18 ENCOUNTER — PROCEDURE VISIT (OUTPATIENT)
Dept: OBSTETRICS AND GYNECOLOGY | Facility: CLINIC | Age: 61
End: 2021-02-18
Payer: MEDICAID

## 2021-02-18 VITALS — DIASTOLIC BLOOD PRESSURE: 84 MMHG | SYSTOLIC BLOOD PRESSURE: 142 MMHG

## 2021-02-18 DIAGNOSIS — N95.0 POSTMENOPAUSAL BLEEDING: Primary | ICD-10-CM

## 2021-02-18 LAB
FINAL PATHOLOGIC DIAGNOSIS: NORMAL
Lab: NORMAL

## 2021-02-18 PROCEDURE — 58100 BIOPSY OF UTERUS LINING: CPT | Mod: PBBFAC,PN | Performed by: NURSE PRACTITIONER

## 2021-02-18 PROCEDURE — 88305 TISSUE EXAM BY PATHOLOGIST: ICD-10-PCS | Mod: 26,,, | Performed by: PATHOLOGY

## 2021-02-18 PROCEDURE — 58100 BIOPSY (GYNECOLOGICAL): ICD-10-PCS | Mod: S$PBB,,, | Performed by: NURSE PRACTITIONER

## 2021-02-18 PROCEDURE — 58100 BIOPSY OF UTERUS LINING: CPT | Mod: S$PBB,,, | Performed by: NURSE PRACTITIONER

## 2021-02-18 PROCEDURE — 88305 TISSUE EXAM BY PATHOLOGIST: CPT | Mod: 26,,, | Performed by: PATHOLOGY

## 2021-02-18 PROCEDURE — 88305 TISSUE EXAM BY PATHOLOGIST: CPT | Performed by: PATHOLOGY

## 2021-02-22 LAB
FINAL PATHOLOGIC DIAGNOSIS: NORMAL
GROSS: NORMAL

## 2021-02-25 ENCOUNTER — TELEPHONE (OUTPATIENT)
Dept: OBSTETRICS AND GYNECOLOGY | Facility: CLINIC | Age: 61
End: 2021-02-25

## 2021-02-25 DIAGNOSIS — N95.0 POSTMENOPAUSAL BLEEDING: Primary | ICD-10-CM

## 2021-02-25 RX ORDER — MEDROXYPROGESTERONE ACETATE 5 MG/1
5 TABLET ORAL DAILY
Qty: 30 TABLET | Refills: 11 | Status: SHIPPED | OUTPATIENT
Start: 2021-02-25 | End: 2022-06-02

## 2021-03-24 ENCOUNTER — TELEPHONE (OUTPATIENT)
Dept: OBSTETRICS AND GYNECOLOGY | Facility: CLINIC | Age: 61
End: 2021-03-24

## 2021-03-26 ENCOUNTER — OFFICE VISIT (OUTPATIENT)
Dept: OBSTETRICS AND GYNECOLOGY | Facility: CLINIC | Age: 61
End: 2021-03-26
Payer: MEDICAID

## 2021-03-26 ENCOUNTER — OFFICE VISIT (OUTPATIENT)
Dept: DIABETES | Facility: CLINIC | Age: 61
End: 2021-03-26
Payer: MEDICAID

## 2021-03-26 VITALS
BODY MASS INDEX: 40.7 KG/M2 | DIASTOLIC BLOOD PRESSURE: 72 MMHG | HEART RATE: 73 BPM | WEIGHT: 259.31 LBS | SYSTOLIC BLOOD PRESSURE: 144 MMHG | OXYGEN SATURATION: 97 % | HEIGHT: 67 IN

## 2021-03-26 VITALS — DIASTOLIC BLOOD PRESSURE: 78 MMHG | BODY MASS INDEX: 40.64 KG/M2 | SYSTOLIC BLOOD PRESSURE: 144 MMHG | WEIGHT: 259.5 LBS

## 2021-03-26 DIAGNOSIS — I10 ESSENTIAL HYPERTENSION: ICD-10-CM

## 2021-03-26 DIAGNOSIS — E66.01 CLASS 3 SEVERE OBESITY DUE TO EXCESS CALORIES WITH SERIOUS COMORBIDITY AND BODY MASS INDEX (BMI) OF 40.0 TO 44.9 IN ADULT: ICD-10-CM

## 2021-03-26 DIAGNOSIS — G45.9 TIA (TRANSIENT ISCHEMIC ATTACK): ICD-10-CM

## 2021-03-26 DIAGNOSIS — N95.0 PMB (POSTMENOPAUSAL BLEEDING): ICD-10-CM

## 2021-03-26 DIAGNOSIS — E78.5 DYSLIPIDEMIA, GOAL LDL BELOW 70: ICD-10-CM

## 2021-03-26 DIAGNOSIS — Z79.4 TYPE 2 DIABETES MELLITUS WITH DIABETIC POLYNEUROPATHY, WITH LONG-TERM CURRENT USE OF INSULIN: ICD-10-CM

## 2021-03-26 DIAGNOSIS — E11.649 TYPE 2 DIABETES MELLITUS WITH HYPOGLYCEMIA WITHOUT COMA, WITH LONG-TERM CURRENT USE OF INSULIN: ICD-10-CM

## 2021-03-26 DIAGNOSIS — Z79.4 TYPE 2 DIABETES MELLITUS WITH HYPERGLYCEMIA, WITH LONG-TERM CURRENT USE OF INSULIN: Primary | ICD-10-CM

## 2021-03-26 DIAGNOSIS — Z71.9 HEALTH EDUCATION/COUNSELING: ICD-10-CM

## 2021-03-26 DIAGNOSIS — Z91.199 NONCOMPLIANCE WITH DIABETES TREATMENT: ICD-10-CM

## 2021-03-26 DIAGNOSIS — E11.42 TYPE 2 DIABETES MELLITUS WITH DIABETIC POLYNEUROPATHY, WITH LONG-TERM CURRENT USE OF INSULIN: ICD-10-CM

## 2021-03-26 DIAGNOSIS — Z79.4 TYPE 2 DIABETES MELLITUS WITH HYPOGLYCEMIA WITHOUT COMA, WITH LONG-TERM CURRENT USE OF INSULIN: ICD-10-CM

## 2021-03-26 DIAGNOSIS — N76.0 ACUTE VAGINITIS: Primary | ICD-10-CM

## 2021-03-26 DIAGNOSIS — E11.65 TYPE 2 DIABETES MELLITUS WITH HYPERGLYCEMIA, WITH LONG-TERM CURRENT USE OF INSULIN: Primary | ICD-10-CM

## 2021-03-26 PROBLEM — I16.0 HYPERTENSIVE URGENCY: Status: RESOLVED | Noted: 2019-09-26 | Resolved: 2021-03-26

## 2021-03-26 PROBLEM — E66.812 CLASS 2 OBESITY IN ADULT: Status: RESOLVED | Noted: 2019-09-25 | Resolved: 2021-03-26

## 2021-03-26 PROBLEM — E66.9 CLASS 2 OBESITY IN ADULT: Status: RESOLVED | Noted: 2019-09-25 | Resolved: 2021-03-26

## 2021-03-26 PROCEDURE — 99213 OFFICE O/P EST LOW 20 MIN: CPT | Mod: S$PBB,,, | Performed by: NURSE PRACTITIONER

## 2021-03-26 PROCEDURE — 99215 OFFICE O/P EST HI 40 MIN: CPT | Mod: PBBFAC,PN | Performed by: NURSE PRACTITIONER

## 2021-03-26 PROCEDURE — 99215 PR OFFICE/OUTPT VISIT, EST, LEVL V, 40-54 MIN: ICD-10-PCS | Mod: S$PBB,,, | Performed by: NURSE PRACTITIONER

## 2021-03-26 PROCEDURE — 99213 PR OFFICE/OUTPT VISIT, EST, LEVL III, 20-29 MIN: ICD-10-PCS | Mod: S$PBB,,, | Performed by: NURSE PRACTITIONER

## 2021-03-26 PROCEDURE — 99215 OFFICE O/P EST HI 40 MIN: CPT | Mod: S$PBB,,, | Performed by: NURSE PRACTITIONER

## 2021-03-26 PROCEDURE — 99999 PR PBB SHADOW E&M-EST. PATIENT-LVL II: ICD-10-PCS | Mod: PBBFAC,,, | Performed by: NURSE PRACTITIONER

## 2021-03-26 PROCEDURE — 99999 PR PBB SHADOW E&M-EST. PATIENT-LVL II: CPT | Mod: PBBFAC,,, | Performed by: NURSE PRACTITIONER

## 2021-03-26 PROCEDURE — 87481 CANDIDA DNA AMP PROBE: CPT | Mod: 59 | Performed by: NURSE PRACTITIONER

## 2021-03-26 PROCEDURE — 87661 TRICHOMONAS VAGINALIS AMPLIF: CPT | Performed by: NURSE PRACTITIONER

## 2021-03-26 PROCEDURE — 99999 PR PBB SHADOW E&M-EST. PATIENT-LVL V: CPT | Mod: PBBFAC,,, | Performed by: NURSE PRACTITIONER

## 2021-03-26 PROCEDURE — 99999 PR PBB SHADOW E&M-EST. PATIENT-LVL V: ICD-10-PCS | Mod: PBBFAC,,, | Performed by: NURSE PRACTITIONER

## 2021-03-26 PROCEDURE — 99212 OFFICE O/P EST SF 10 MIN: CPT | Mod: PBBFAC,27,PN | Performed by: NURSE PRACTITIONER

## 2021-03-26 RX ORDER — FLUCONAZOLE 150 MG/1
150 TABLET ORAL ONCE
Qty: 1 TABLET | Refills: 1 | Status: SHIPPED | OUTPATIENT
Start: 2021-03-26 | End: 2021-03-26

## 2021-03-26 RX ORDER — GLIPIZIDE 10 MG/1
5 TABLET ORAL
Qty: 90 TABLET | Refills: 1
Start: 2021-03-26 | End: 2021-06-21 | Stop reason: SDUPTHER

## 2021-03-26 RX ORDER — DULAGLUTIDE 0.75 MG/.5ML
0.75 INJECTION, SOLUTION SUBCUTANEOUS WEEKLY
Qty: 4 PEN | Refills: 0 | Status: SHIPPED | OUTPATIENT
Start: 2021-03-26 | End: 2021-05-31

## 2021-03-26 RX ORDER — METRONIDAZOLE 500 MG/1
500 TABLET ORAL 2 TIMES DAILY
Qty: 14 TABLET | Refills: 0 | Status: SHIPPED | OUTPATIENT
Start: 2021-03-26 | End: 2021-04-02

## 2021-03-26 RX ORDER — LISINOPRIL AND HYDROCHLOROTHIAZIDE 20; 25 MG/1; MG/1
1 TABLET ORAL DAILY
Qty: 90 TABLET | Refills: 1 | Status: SHIPPED | OUTPATIENT
Start: 2021-03-26 | End: 2021-09-26

## 2021-03-27 ENCOUNTER — PATIENT MESSAGE (OUTPATIENT)
Dept: ADMINISTRATIVE | Facility: OTHER | Age: 61
End: 2021-03-27

## 2021-03-29 LAB
BACTERIAL VAGINOSIS DNA: NEGATIVE
CANDIDA GLABRATA DNA: NEGATIVE
CANDIDA KRUSEI DNA: NEGATIVE
CANDIDA RRNA VAG QL PROBE: NEGATIVE
T VAGINALIS RRNA GENITAL QL PROBE: NEGATIVE

## 2021-03-31 ENCOUNTER — PATIENT MESSAGE (OUTPATIENT)
Dept: ADMINISTRATIVE | Facility: OTHER | Age: 61
End: 2021-03-31

## 2021-03-31 ENCOUNTER — TELEPHONE (OUTPATIENT)
Dept: OBSTETRICS AND GYNECOLOGY | Facility: CLINIC | Age: 61
End: 2021-03-31

## 2021-04-12 ENCOUNTER — OFFICE VISIT (OUTPATIENT)
Dept: CARDIOLOGY | Facility: CLINIC | Age: 61
End: 2021-04-12
Payer: MEDICAID

## 2021-04-12 VITALS
HEART RATE: 82 BPM | HEIGHT: 67 IN | SYSTOLIC BLOOD PRESSURE: 134 MMHG | OXYGEN SATURATION: 92 % | WEIGHT: 252.88 LBS | BODY MASS INDEX: 39.69 KG/M2 | DIASTOLIC BLOOD PRESSURE: 60 MMHG

## 2021-04-12 DIAGNOSIS — I50.32 HYPERTENSIVE HEART DISEASE WITH CHRONIC DIASTOLIC CONGESTIVE HEART FAILURE: Primary | ICD-10-CM

## 2021-04-12 DIAGNOSIS — R00.2 PALPITATIONS: ICD-10-CM

## 2021-04-12 DIAGNOSIS — R01.1 HEART MURMUR: ICD-10-CM

## 2021-04-12 DIAGNOSIS — E11.42 TYPE 2 DIABETES MELLITUS WITH DIABETIC POLYNEUROPATHY, WITH LONG-TERM CURRENT USE OF INSULIN: ICD-10-CM

## 2021-04-12 DIAGNOSIS — G45.9 TIA (TRANSIENT ISCHEMIC ATTACK): ICD-10-CM

## 2021-04-12 DIAGNOSIS — I10 ESSENTIAL HYPERTENSION: ICD-10-CM

## 2021-04-12 DIAGNOSIS — I11.0 HYPERTENSIVE HEART DISEASE WITH CHRONIC DIASTOLIC CONGESTIVE HEART FAILURE: Primary | ICD-10-CM

## 2021-04-12 DIAGNOSIS — E78.5 DYSLIPIDEMIA, GOAL LDL BELOW 70: ICD-10-CM

## 2021-04-12 DIAGNOSIS — Z79.4 TYPE 2 DIABETES MELLITUS WITH DIABETIC POLYNEUROPATHY, WITH LONG-TERM CURRENT USE OF INSULIN: ICD-10-CM

## 2021-04-12 DIAGNOSIS — K21.9 GASTROESOPHAGEAL REFLUX DISEASE, UNSPECIFIED WHETHER ESOPHAGITIS PRESENT: ICD-10-CM

## 2021-04-12 PROCEDURE — 93010 ELECTROCARDIOGRAM REPORT: CPT | Mod: S$PBB,,, | Performed by: INTERNAL MEDICINE

## 2021-04-12 PROCEDURE — 93010 EKG 12-LEAD: ICD-10-PCS | Mod: S$PBB,,, | Performed by: INTERNAL MEDICINE

## 2021-04-12 PROCEDURE — 99205 PR OFFICE/OUTPT VISIT, NEW, LEVL V, 60-74 MIN: ICD-10-PCS | Mod: S$PBB,25,, | Performed by: INTERNAL MEDICINE

## 2021-04-12 PROCEDURE — 99999 PR PBB SHADOW E&M-EST. PATIENT-LVL IV: CPT | Mod: PBBFAC,,, | Performed by: INTERNAL MEDICINE

## 2021-04-12 PROCEDURE — 99999 PR PBB SHADOW E&M-EST. PATIENT-LVL IV: ICD-10-PCS | Mod: PBBFAC,,, | Performed by: INTERNAL MEDICINE

## 2021-04-12 PROCEDURE — 93005 ELECTROCARDIOGRAM TRACING: CPT | Mod: PBBFAC,PN | Performed by: INTERNAL MEDICINE

## 2021-04-12 PROCEDURE — 99214 OFFICE O/P EST MOD 30 MIN: CPT | Mod: PBBFAC,PN,25 | Performed by: INTERNAL MEDICINE

## 2021-04-12 PROCEDURE — 99205 OFFICE O/P NEW HI 60 MIN: CPT | Mod: S$PBB,25,, | Performed by: INTERNAL MEDICINE

## 2021-04-12 RX ORDER — HYDRALAZINE HYDROCHLORIDE 50 MG/1
50 TABLET, FILM COATED ORAL 2 TIMES DAILY
COMMUNITY
Start: 2021-03-24 | End: 2021-08-16 | Stop reason: DRUGHIGH

## 2021-04-12 RX ORDER — IBUPROFEN 800 MG/1
TABLET ORAL
COMMUNITY
End: 2021-04-22

## 2021-04-19 ENCOUNTER — CLINICAL SUPPORT (OUTPATIENT)
Dept: DIABETES | Facility: CLINIC | Age: 61
End: 2021-04-19
Payer: MEDICAID

## 2021-04-19 VITALS — WEIGHT: 252.88 LBS | HEIGHT: 67 IN | BODY MASS INDEX: 39.69 KG/M2

## 2021-04-19 DIAGNOSIS — E11.65 TYPE 2 DIABETES MELLITUS WITH HYPERGLYCEMIA, WITH LONG-TERM CURRENT USE OF INSULIN: ICD-10-CM

## 2021-04-19 DIAGNOSIS — Z79.4 TYPE 2 DIABETES MELLITUS WITH HYPERGLYCEMIA, WITH LONG-TERM CURRENT USE OF INSULIN: ICD-10-CM

## 2021-04-19 PROCEDURE — 99999 PR PBB SHADOW E&M-EST. PATIENT-LVL III: ICD-10-PCS | Mod: PBBFAC,,, | Performed by: DIETITIAN, REGISTERED

## 2021-04-19 PROCEDURE — 99213 OFFICE O/P EST LOW 20 MIN: CPT | Mod: PBBFAC,PN | Performed by: DIETITIAN, REGISTERED

## 2021-04-19 PROCEDURE — G0108 DIAB MANAGE TRN  PER INDIV: HCPCS | Mod: PBBFAC,PN | Performed by: DIETITIAN, REGISTERED

## 2021-04-19 PROCEDURE — 99999 PR PBB SHADOW E&M-EST. PATIENT-LVL III: CPT | Mod: PBBFAC,,, | Performed by: DIETITIAN, REGISTERED

## 2021-04-22 ENCOUNTER — TELEPHONE (OUTPATIENT)
Dept: CARDIOLOGY | Facility: CLINIC | Age: 61
End: 2021-04-22

## 2021-04-22 ENCOUNTER — OFFICE VISIT (OUTPATIENT)
Dept: OBSTETRICS AND GYNECOLOGY | Facility: CLINIC | Age: 61
End: 2021-04-22
Payer: MEDICAID

## 2021-04-22 VITALS
SYSTOLIC BLOOD PRESSURE: 132 MMHG | WEIGHT: 253.75 LBS | DIASTOLIC BLOOD PRESSURE: 62 MMHG | BODY MASS INDEX: 39.83 KG/M2 | HEIGHT: 67 IN

## 2021-04-22 DIAGNOSIS — E11.42 TYPE 2 DIABETES MELLITUS WITH DIABETIC POLYNEUROPATHY, WITH LONG-TERM CURRENT USE OF INSULIN: ICD-10-CM

## 2021-04-22 DIAGNOSIS — Z79.4 TYPE 2 DIABETES MELLITUS WITH DIABETIC POLYNEUROPATHY, WITH LONG-TERM CURRENT USE OF INSULIN: ICD-10-CM

## 2021-04-22 DIAGNOSIS — N95.0 POSTMENOPAUSAL BLEEDING: Primary | ICD-10-CM

## 2021-04-22 DIAGNOSIS — G45.9 TIA (TRANSIENT ISCHEMIC ATTACK): ICD-10-CM

## 2021-04-22 PROCEDURE — 99213 OFFICE O/P EST LOW 20 MIN: CPT | Mod: S$PBB,,, | Performed by: STUDENT IN AN ORGANIZED HEALTH CARE EDUCATION/TRAINING PROGRAM

## 2021-04-22 PROCEDURE — 99999 PR PBB SHADOW E&M-EST. PATIENT-LVL III: ICD-10-PCS | Mod: PBBFAC,,, | Performed by: STUDENT IN AN ORGANIZED HEALTH CARE EDUCATION/TRAINING PROGRAM

## 2021-04-22 PROCEDURE — 99999 PR PBB SHADOW E&M-EST. PATIENT-LVL III: CPT | Mod: PBBFAC,,, | Performed by: STUDENT IN AN ORGANIZED HEALTH CARE EDUCATION/TRAINING PROGRAM

## 2021-04-22 PROCEDURE — 99213 OFFICE O/P EST LOW 20 MIN: CPT | Mod: PBBFAC,PN | Performed by: STUDENT IN AN ORGANIZED HEALTH CARE EDUCATION/TRAINING PROGRAM

## 2021-04-22 PROCEDURE — 99213 PR OFFICE/OUTPT VISIT, EST, LEVL III, 20-29 MIN: ICD-10-PCS | Mod: S$PBB,,, | Performed by: STUDENT IN AN ORGANIZED HEALTH CARE EDUCATION/TRAINING PROGRAM

## 2021-04-26 ENCOUNTER — PATIENT MESSAGE (OUTPATIENT)
Dept: RESEARCH | Facility: HOSPITAL | Age: 61
End: 2021-04-26

## 2021-04-26 ENCOUNTER — TELEPHONE (OUTPATIENT)
Dept: CARDIOLOGY | Facility: CLINIC | Age: 61
End: 2021-04-26

## 2021-05-03 ENCOUNTER — OFFICE VISIT (OUTPATIENT)
Dept: PODIATRY | Facility: CLINIC | Age: 61
End: 2021-05-03
Payer: MEDICAID

## 2021-05-03 VITALS — BODY MASS INDEX: 39.74 KG/M2 | HEIGHT: 67 IN

## 2021-05-03 DIAGNOSIS — E11.42 TYPE 2 DIABETES MELLITUS WITH DIABETIC POLYNEUROPATHY, WITH LONG-TERM CURRENT USE OF INSULIN: ICD-10-CM

## 2021-05-03 DIAGNOSIS — L60.0 ONYCHOCRYPTOSIS: Primary | ICD-10-CM

## 2021-05-03 DIAGNOSIS — Z79.4 TYPE 2 DIABETES MELLITUS WITH DIABETIC POLYNEUROPATHY, WITH LONG-TERM CURRENT USE OF INSULIN: ICD-10-CM

## 2021-05-03 DIAGNOSIS — E66.01 MORBID OBESITY: ICD-10-CM

## 2021-05-03 DIAGNOSIS — B35.1 ONYCHOMYCOSIS DUE TO DERMATOPHYTE: ICD-10-CM

## 2021-05-03 PROCEDURE — 11721 DM NAIL DEBRIDEMENT: ICD-10-PCS | Mod: S$PBB,,, | Performed by: PODIATRIST

## 2021-05-03 PROCEDURE — 99213 OFFICE O/P EST LOW 20 MIN: CPT | Mod: 25,S$PBB,, | Performed by: PODIATRIST

## 2021-05-03 PROCEDURE — 11721 DEBRIDE NAIL 6 OR MORE: CPT | Mod: PBBFAC,PN | Performed by: PODIATRIST

## 2021-05-03 PROCEDURE — 99999 PR PBB SHADOW E&M-EST. PATIENT-LVL III: CPT | Mod: PBBFAC,,, | Performed by: PODIATRIST

## 2021-05-03 PROCEDURE — 99999 PR PBB SHADOW E&M-EST. PATIENT-LVL III: ICD-10-PCS | Mod: PBBFAC,,, | Performed by: PODIATRIST

## 2021-05-03 PROCEDURE — 99213 OFFICE O/P EST LOW 20 MIN: CPT | Mod: PBBFAC,PN,25 | Performed by: PODIATRIST

## 2021-05-03 PROCEDURE — 99213 PR OFFICE/OUTPT VISIT, EST, LEVL III, 20-29 MIN: ICD-10-PCS | Mod: 25,S$PBB,, | Performed by: PODIATRIST

## 2021-05-04 PROBLEM — E66.01 MORBID OBESITY: Status: ACTIVE | Noted: 2021-05-04

## 2021-05-30 DIAGNOSIS — E11.65 TYPE 2 DIABETES MELLITUS WITH HYPERGLYCEMIA, WITH LONG-TERM CURRENT USE OF INSULIN: ICD-10-CM

## 2021-05-30 DIAGNOSIS — Z79.4 TYPE 2 DIABETES MELLITUS WITH HYPERGLYCEMIA, WITH LONG-TERM CURRENT USE OF INSULIN: ICD-10-CM

## 2021-05-31 RX ORDER — DULAGLUTIDE 0.75 MG/.5ML
INJECTION, SOLUTION SUBCUTANEOUS
Refills: 0 | OUTPATIENT
Start: 2021-05-31

## 2021-05-31 RX ORDER — DULAGLUTIDE 1.5 MG/.5ML
1.5 INJECTION, SOLUTION SUBCUTANEOUS
Qty: 4 PEN | Refills: 4 | Status: SHIPPED | OUTPATIENT
Start: 2021-05-31 | End: 2021-06-21

## 2021-06-14 ENCOUNTER — NUTRITION (OUTPATIENT)
Dept: DIABETES | Facility: CLINIC | Age: 61
End: 2021-06-14
Payer: MEDICAID

## 2021-06-14 DIAGNOSIS — Z79.4 TYPE 2 DIABETES MELLITUS WITH HYPERGLYCEMIA, WITH LONG-TERM CURRENT USE OF INSULIN: Primary | ICD-10-CM

## 2021-06-14 DIAGNOSIS — E11.65 TYPE 2 DIABETES MELLITUS WITH HYPERGLYCEMIA, WITH LONG-TERM CURRENT USE OF INSULIN: Primary | ICD-10-CM

## 2021-06-14 PROCEDURE — G0108 DIAB MANAGE TRN  PER INDIV: HCPCS | Mod: PBBFAC,PN | Performed by: DIETITIAN, REGISTERED

## 2021-06-15 ENCOUNTER — TELEPHONE (OUTPATIENT)
Dept: DIABETES | Facility: CLINIC | Age: 61
End: 2021-06-15

## 2021-06-16 DIAGNOSIS — M54.2 NECK PAIN: Primary | ICD-10-CM

## 2021-06-21 ENCOUNTER — OFFICE VISIT (OUTPATIENT)
Dept: DIABETES | Facility: CLINIC | Age: 61
End: 2021-06-21
Payer: MEDICAID

## 2021-06-21 VITALS
HEIGHT: 67 IN | WEIGHT: 249.63 LBS | SYSTOLIC BLOOD PRESSURE: 121 MMHG | BODY MASS INDEX: 39.18 KG/M2 | TEMPERATURE: 99 F | HEART RATE: 96 BPM | OXYGEN SATURATION: 98 % | DIASTOLIC BLOOD PRESSURE: 68 MMHG

## 2021-06-21 DIAGNOSIS — Z79.4 TYPE 2 DIABETES MELLITUS WITH DIABETIC POLYNEUROPATHY, WITH LONG-TERM CURRENT USE OF INSULIN: ICD-10-CM

## 2021-06-21 DIAGNOSIS — I10 ESSENTIAL HYPERTENSION: ICD-10-CM

## 2021-06-21 DIAGNOSIS — E11.42 TYPE 2 DIABETES MELLITUS WITH DIABETIC POLYNEUROPATHY, WITH LONG-TERM CURRENT USE OF INSULIN: ICD-10-CM

## 2021-06-21 DIAGNOSIS — E11.65 TYPE 2 DIABETES MELLITUS WITH HYPERGLYCEMIA, WITH LONG-TERM CURRENT USE OF INSULIN: Primary | ICD-10-CM

## 2021-06-21 DIAGNOSIS — Z79.4 TYPE 2 DIABETES MELLITUS WITH HYPERGLYCEMIA, WITH LONG-TERM CURRENT USE OF INSULIN: Primary | ICD-10-CM

## 2021-06-21 DIAGNOSIS — G45.9 TIA (TRANSIENT ISCHEMIC ATTACK): ICD-10-CM

## 2021-06-21 DIAGNOSIS — Z71.9 HEALTH EDUCATION/COUNSELING: ICD-10-CM

## 2021-06-21 DIAGNOSIS — E66.01 CLASS 2 SEVERE OBESITY DUE TO EXCESS CALORIES WITH SERIOUS COMORBIDITY AND BODY MASS INDEX (BMI) OF 39.0 TO 39.9 IN ADULT: ICD-10-CM

## 2021-06-21 DIAGNOSIS — E78.5 DYSLIPIDEMIA, GOAL LDL BELOW 70: ICD-10-CM

## 2021-06-21 PROBLEM — E66.812 CLASS 2 SEVERE OBESITY DUE TO EXCESS CALORIES WITH SERIOUS COMORBIDITY AND BODY MASS INDEX (BMI) OF 39.0 TO 39.9 IN ADULT: Status: ACTIVE | Noted: 2019-09-26

## 2021-06-21 PROCEDURE — 99214 PR OFFICE/OUTPT VISIT, EST, LEVL IV, 30-39 MIN: ICD-10-PCS | Mod: S$PBB,,, | Performed by: NURSE PRACTITIONER

## 2021-06-21 PROCEDURE — 99999 PR PBB SHADOW E&M-EST. PATIENT-LVL IV: ICD-10-PCS | Mod: PBBFAC,,, | Performed by: NURSE PRACTITIONER

## 2021-06-21 PROCEDURE — 99999 PR PBB SHADOW E&M-EST. PATIENT-LVL IV: CPT | Mod: PBBFAC,,, | Performed by: NURSE PRACTITIONER

## 2021-06-21 PROCEDURE — 99214 OFFICE O/P EST MOD 30 MIN: CPT | Mod: S$PBB,,, | Performed by: NURSE PRACTITIONER

## 2021-06-21 PROCEDURE — 99214 OFFICE O/P EST MOD 30 MIN: CPT | Mod: PBBFAC,PN | Performed by: NURSE PRACTITIONER

## 2021-06-21 RX ORDER — METFORMIN HYDROCHLORIDE 1000 MG/1
1000 TABLET ORAL 2 TIMES DAILY WITH MEALS
Qty: 180 TABLET | Refills: 3 | Status: SHIPPED | OUTPATIENT
Start: 2021-06-21 | End: 2021-11-10 | Stop reason: SDUPTHER

## 2021-06-21 RX ORDER — DULAGLUTIDE 4.5 MG/.5ML
4.5 INJECTION, SOLUTION SUBCUTANEOUS
Qty: 4 PEN | Refills: 4 | Status: SHIPPED | OUTPATIENT
Start: 2021-06-21 | End: 2021-11-10 | Stop reason: SDUPTHER

## 2021-06-21 RX ORDER — DAPAGLIFLOZIN 5 MG/1
5 TABLET, FILM COATED ORAL DAILY
Qty: 30 TABLET | Refills: 1 | Status: SHIPPED | OUTPATIENT
Start: 2021-06-21 | End: 2021-08-18

## 2021-06-21 RX ORDER — GLIPIZIDE 10 MG/1
5 TABLET ORAL 2 TIMES DAILY WITH MEALS
Start: 2021-06-21 | End: 2021-11-10

## 2021-06-21 RX ORDER — DULAGLUTIDE 3 MG/.5ML
3 INJECTION, SOLUTION SUBCUTANEOUS
Qty: 4 PEN | Refills: 0 | Status: SHIPPED | OUTPATIENT
Start: 2021-06-21 | End: 2021-08-16 | Stop reason: DRUGHIGH

## 2021-06-21 RX ORDER — DIAZEPAM 5 MG/1
5 TABLET ORAL EVERY 6 HOURS PRN
Status: ON HOLD | COMMUNITY
End: 2021-11-18 | Stop reason: SDUPTHER

## 2021-06-21 RX ORDER — TRAMADOL HYDROCHLORIDE 100 MG/1
100 TABLET, EXTENDED RELEASE ORAL DAILY
COMMUNITY
End: 2022-01-05

## 2021-08-02 ENCOUNTER — TELEPHONE (OUTPATIENT)
Dept: PODIATRY | Facility: CLINIC | Age: 61
End: 2021-08-02

## 2021-08-04 ENCOUNTER — TELEPHONE (OUTPATIENT)
Dept: DIABETES | Facility: CLINIC | Age: 61
End: 2021-08-04

## 2021-08-09 ENCOUNTER — PROCEDURE VISIT (OUTPATIENT)
Dept: DIABETES | Facility: CLINIC | Age: 61
End: 2021-08-09
Payer: MEDICAID

## 2021-08-09 DIAGNOSIS — Z79.4 TYPE 2 DIABETES MELLITUS WITH HYPERGLYCEMIA, WITH LONG-TERM CURRENT USE OF INSULIN: Primary | ICD-10-CM

## 2021-08-09 DIAGNOSIS — E11.65 TYPE 2 DIABETES MELLITUS WITH HYPERGLYCEMIA, WITH LONG-TERM CURRENT USE OF INSULIN: Primary | ICD-10-CM

## 2021-08-16 ENCOUNTER — OFFICE VISIT (OUTPATIENT)
Dept: CARDIOLOGY | Facility: CLINIC | Age: 61
End: 2021-08-16
Payer: MEDICAID

## 2021-08-16 VITALS
HEART RATE: 94 BPM | DIASTOLIC BLOOD PRESSURE: 59 MMHG | OXYGEN SATURATION: 98 % | HEIGHT: 67 IN | WEIGHT: 237.88 LBS | SYSTOLIC BLOOD PRESSURE: 126 MMHG | BODY MASS INDEX: 37.34 KG/M2

## 2021-08-16 DIAGNOSIS — I50.32 HYPERTENSIVE HEART DISEASE WITH CHRONIC DIASTOLIC CONGESTIVE HEART FAILURE: ICD-10-CM

## 2021-08-16 DIAGNOSIS — I11.0 HYPERTENSIVE HEART DISEASE WITH CHRONIC DIASTOLIC CONGESTIVE HEART FAILURE: ICD-10-CM

## 2021-08-16 DIAGNOSIS — I10 ESSENTIAL HYPERTENSION: Primary | ICD-10-CM

## 2021-08-16 DIAGNOSIS — E78.5 DYSLIPIDEMIA, GOAL LDL BELOW 70: ICD-10-CM

## 2021-08-16 DIAGNOSIS — G45.9 TIA (TRANSIENT ISCHEMIC ATTACK): ICD-10-CM

## 2021-08-16 DIAGNOSIS — E11.42 TYPE 2 DIABETES MELLITUS WITH DIABETIC POLYNEUROPATHY, WITH LONG-TERM CURRENT USE OF INSULIN: ICD-10-CM

## 2021-08-16 DIAGNOSIS — R00.2 PALPITATIONS: ICD-10-CM

## 2021-08-16 DIAGNOSIS — Z79.4 TYPE 2 DIABETES MELLITUS WITH DIABETIC POLYNEUROPATHY, WITH LONG-TERM CURRENT USE OF INSULIN: ICD-10-CM

## 2021-08-16 PROCEDURE — 99214 PR OFFICE/OUTPT VISIT, EST, LEVL IV, 30-39 MIN: ICD-10-PCS | Mod: S$PBB,25,, | Performed by: INTERNAL MEDICINE

## 2021-08-16 PROCEDURE — 99214 OFFICE O/P EST MOD 30 MIN: CPT | Mod: PBBFAC,PN | Performed by: INTERNAL MEDICINE

## 2021-08-16 PROCEDURE — 93010 ELECTROCARDIOGRAM REPORT: CPT | Mod: S$PBB,,, | Performed by: INTERNAL MEDICINE

## 2021-08-16 PROCEDURE — 93005 ELECTROCARDIOGRAM TRACING: CPT | Mod: PBBFAC,PN | Performed by: INTERNAL MEDICINE

## 2021-08-16 PROCEDURE — 99999 PR PBB SHADOW E&M-EST. PATIENT-LVL IV: CPT | Mod: PBBFAC,,, | Performed by: INTERNAL MEDICINE

## 2021-08-16 PROCEDURE — 99999 PR PBB SHADOW E&M-EST. PATIENT-LVL IV: ICD-10-PCS | Mod: PBBFAC,,, | Performed by: INTERNAL MEDICINE

## 2021-08-16 PROCEDURE — 93010 EKG 12-LEAD: ICD-10-PCS | Mod: S$PBB,,, | Performed by: INTERNAL MEDICINE

## 2021-08-16 PROCEDURE — 99214 OFFICE O/P EST MOD 30 MIN: CPT | Mod: S$PBB,25,, | Performed by: INTERNAL MEDICINE

## 2021-08-16 RX ORDER — HYDRALAZINE HYDROCHLORIDE 25 MG/1
25 TABLET, FILM COATED ORAL 3 TIMES DAILY
Qty: 90 TABLET | Refills: 11 | Status: SHIPPED | OUTPATIENT
Start: 2021-08-16 | End: 2022-05-04 | Stop reason: SDUPTHER

## 2021-08-17 ENCOUNTER — NUTRITION (OUTPATIENT)
Dept: DIABETES | Facility: CLINIC | Age: 61
End: 2021-08-17
Payer: MEDICAID

## 2021-08-17 DIAGNOSIS — Z79.4 TYPE 2 DIABETES MELLITUS WITH DIABETIC POLYNEUROPATHY, WITH LONG-TERM CURRENT USE OF INSULIN: ICD-10-CM

## 2021-08-17 DIAGNOSIS — E11.65 TYPE 2 DIABETES MELLITUS WITH HYPERGLYCEMIA, WITH LONG-TERM CURRENT USE OF INSULIN: Primary | ICD-10-CM

## 2021-08-17 DIAGNOSIS — E11.42 TYPE 2 DIABETES MELLITUS WITH DIABETIC POLYNEUROPATHY, WITH LONG-TERM CURRENT USE OF INSULIN: ICD-10-CM

## 2021-08-17 DIAGNOSIS — Z79.4 TYPE 2 DIABETES MELLITUS WITH HYPERGLYCEMIA, WITH LONG-TERM CURRENT USE OF INSULIN: Primary | ICD-10-CM

## 2021-08-17 PROCEDURE — G0108 DIAB MANAGE TRN  PER INDIV: HCPCS | Mod: PBBFAC,PN | Performed by: DIETITIAN, REGISTERED

## 2021-08-17 PROCEDURE — 95251 PR GLUCOSE MONITOR, 72 HOUR, PHYS INTERP: ICD-10-PCS | Mod: ,,, | Performed by: NURSE PRACTITIONER

## 2021-08-17 PROCEDURE — 95251 CONT GLUC MNTR ANALYSIS I&R: CPT | Mod: ,,, | Performed by: NURSE PRACTITIONER

## 2021-08-17 PROCEDURE — 95250 CONT GLUC MNTR PHYS/QHP EQP: CPT | Mod: PBBFAC,PN | Performed by: DIETITIAN, REGISTERED

## 2021-08-18 ENCOUNTER — TELEPHONE (OUTPATIENT)
Dept: DIABETES | Facility: CLINIC | Age: 61
End: 2021-08-18

## 2021-08-18 DIAGNOSIS — Z79.4 TYPE 2 DIABETES MELLITUS WITH HYPERGLYCEMIA, WITH LONG-TERM CURRENT USE OF INSULIN: Primary | ICD-10-CM

## 2021-08-18 DIAGNOSIS — E11.65 TYPE 2 DIABETES MELLITUS WITH HYPERGLYCEMIA, WITH LONG-TERM CURRENT USE OF INSULIN: Primary | ICD-10-CM

## 2021-08-18 RX ORDER — DAPAGLIFLOZIN 10 MG/1
10 TABLET, FILM COATED ORAL DAILY
Qty: 30 TABLET | Refills: 5 | Status: SHIPPED | OUTPATIENT
Start: 2021-08-18 | End: 2021-11-10

## 2021-09-24 ENCOUNTER — TELEPHONE (OUTPATIENT)
Dept: DIABETES | Facility: CLINIC | Age: 61
End: 2021-09-24

## 2021-11-10 ENCOUNTER — OFFICE VISIT (OUTPATIENT)
Dept: DIABETES | Facility: CLINIC | Age: 61
End: 2021-11-10
Payer: MEDICAID

## 2021-11-10 VITALS
OXYGEN SATURATION: 97 % | WEIGHT: 227 LBS | SYSTOLIC BLOOD PRESSURE: 132 MMHG | BODY MASS INDEX: 35.63 KG/M2 | TEMPERATURE: 99 F | HEIGHT: 67 IN | HEART RATE: 84 BPM | DIASTOLIC BLOOD PRESSURE: 76 MMHG

## 2021-11-10 DIAGNOSIS — E11.649 TYPE 2 DIABETES MELLITUS WITH HYPOGLYCEMIA WITHOUT COMA, WITHOUT LONG-TERM CURRENT USE OF INSULIN: ICD-10-CM

## 2021-11-10 DIAGNOSIS — Z79.4 TYPE 2 DIABETES MELLITUS WITH DIABETIC POLYNEUROPATHY, WITH LONG-TERM CURRENT USE OF INSULIN: ICD-10-CM

## 2021-11-10 DIAGNOSIS — I10 ESSENTIAL HYPERTENSION: ICD-10-CM

## 2021-11-10 DIAGNOSIS — B37.31 YEAST VAGINITIS: ICD-10-CM

## 2021-11-10 DIAGNOSIS — Z79.4 TYPE 2 DIABETES MELLITUS WITH HYPERGLYCEMIA, WITH LONG-TERM CURRENT USE OF INSULIN: Primary | ICD-10-CM

## 2021-11-10 DIAGNOSIS — Z71.9 HEALTH EDUCATION/COUNSELING: ICD-10-CM

## 2021-11-10 DIAGNOSIS — E11.65 TYPE 2 DIABETES MELLITUS WITH HYPERGLYCEMIA, WITH LONG-TERM CURRENT USE OF INSULIN: Primary | ICD-10-CM

## 2021-11-10 DIAGNOSIS — G45.9 TIA (TRANSIENT ISCHEMIC ATTACK): ICD-10-CM

## 2021-11-10 DIAGNOSIS — E78.5 DYSLIPIDEMIA, GOAL LDL BELOW 70: ICD-10-CM

## 2021-11-10 DIAGNOSIS — E11.42 TYPE 2 DIABETES MELLITUS WITH DIABETIC POLYNEUROPATHY, WITH LONG-TERM CURRENT USE OF INSULIN: ICD-10-CM

## 2021-11-10 DIAGNOSIS — E66.01 CLASS 2 SEVERE OBESITY DUE TO EXCESS CALORIES WITH SERIOUS COMORBIDITY AND BODY MASS INDEX (BMI) OF 35.0 TO 35.9 IN ADULT: ICD-10-CM

## 2021-11-10 PROCEDURE — 99214 PR OFFICE/OUTPT VISIT, EST, LEVL IV, 30-39 MIN: ICD-10-PCS | Mod: S$PBB,,, | Performed by: NURSE PRACTITIONER

## 2021-11-10 PROCEDURE — 99214 OFFICE O/P EST MOD 30 MIN: CPT | Mod: PBBFAC,PN | Performed by: NURSE PRACTITIONER

## 2021-11-10 PROCEDURE — 99214 OFFICE O/P EST MOD 30 MIN: CPT | Mod: S$PBB,,, | Performed by: NURSE PRACTITIONER

## 2021-11-10 PROCEDURE — 99999 PR PBB SHADOW E&M-EST. PATIENT-LVL IV: CPT | Mod: PBBFAC,,, | Performed by: NURSE PRACTITIONER

## 2021-11-10 PROCEDURE — 99999 PR PBB SHADOW E&M-EST. PATIENT-LVL IV: ICD-10-PCS | Mod: PBBFAC,,, | Performed by: NURSE PRACTITIONER

## 2021-11-10 RX ORDER — METFORMIN HYDROCHLORIDE 1000 MG/1
1000 TABLET ORAL 2 TIMES DAILY WITH MEALS
Qty: 180 TABLET | Refills: 1 | Status: SHIPPED | OUTPATIENT
Start: 2021-11-10 | End: 2022-03-10 | Stop reason: SDUPTHER

## 2021-11-10 RX ORDER — ATORVASTATIN CALCIUM 20 MG/1
20 TABLET, FILM COATED ORAL NIGHTLY
Qty: 90 TABLET | Refills: 1 | Status: SHIPPED | OUTPATIENT
Start: 2021-11-10 | End: 2022-01-05

## 2021-11-10 RX ORDER — DULAGLUTIDE 4.5 MG/.5ML
4.5 INJECTION, SOLUTION SUBCUTANEOUS
Qty: 4 PEN | Refills: 6 | Status: SHIPPED | OUTPATIENT
Start: 2021-11-10 | End: 2021-11-23

## 2021-11-10 RX ORDER — FLUCONAZOLE 150 MG/1
150 TABLET ORAL ONCE
Qty: 2 TABLET | Refills: 1 | Status: SHIPPED | OUTPATIENT
Start: 2021-11-10 | End: 2021-11-10

## 2021-11-10 RX ORDER — DAPAGLIFLOZIN 5 MG/1
5 TABLET, FILM COATED ORAL DAILY
Qty: 90 TABLET | Refills: 1 | Status: SHIPPED | OUTPATIENT
Start: 2021-11-10 | End: 2022-03-10 | Stop reason: SDUPTHER

## 2021-11-15 ENCOUNTER — OFFICE VISIT (OUTPATIENT)
Dept: PODIATRY | Facility: CLINIC | Age: 61
End: 2021-11-15
Payer: MEDICAID

## 2021-11-15 VITALS
DIASTOLIC BLOOD PRESSURE: 72 MMHG | BODY MASS INDEX: 34.46 KG/M2 | HEART RATE: 96 BPM | SYSTOLIC BLOOD PRESSURE: 152 MMHG | WEIGHT: 220 LBS

## 2021-11-15 DIAGNOSIS — B35.1 ONYCHOMYCOSIS DUE TO DERMATOPHYTE: ICD-10-CM

## 2021-11-15 DIAGNOSIS — R29.898 RIGHT LEG WEAKNESS: ICD-10-CM

## 2021-11-15 DIAGNOSIS — L60.0 ONYCHOCRYPTOSIS: ICD-10-CM

## 2021-11-15 DIAGNOSIS — Z79.4 TYPE 2 DIABETES MELLITUS WITH DIABETIC POLYNEUROPATHY, WITH LONG-TERM CURRENT USE OF INSULIN: Primary | ICD-10-CM

## 2021-11-15 DIAGNOSIS — E11.42 TYPE 2 DIABETES MELLITUS WITH DIABETIC POLYNEUROPATHY, WITH LONG-TERM CURRENT USE OF INSULIN: Primary | ICD-10-CM

## 2021-11-15 DIAGNOSIS — R25.2 CRAMPS OF LEFT LOWER EXTREMITY: ICD-10-CM

## 2021-11-15 DIAGNOSIS — M79.674 PAIN OF TOES OF BOTH FEET: ICD-10-CM

## 2021-11-15 DIAGNOSIS — M79.675 PAIN OF TOES OF BOTH FEET: ICD-10-CM

## 2021-11-15 PROCEDURE — 99214 OFFICE O/P EST MOD 30 MIN: CPT | Mod: 25,S$PBB,, | Performed by: PODIATRIST

## 2021-11-15 PROCEDURE — 99999 PR PBB SHADOW E&M-EST. PATIENT-LVL IV: CPT | Mod: PBBFAC,,, | Performed by: PODIATRIST

## 2021-11-15 PROCEDURE — 11719 TRIM NAIL(S) ANY NUMBER: CPT | Mod: Q9,PBBFAC,PN | Performed by: PODIATRIST

## 2021-11-15 PROCEDURE — 99214 PR OFFICE/OUTPT VISIT, EST, LEVL IV, 30-39 MIN: ICD-10-PCS | Mod: 25,S$PBB,, | Performed by: PODIATRIST

## 2021-11-15 PROCEDURE — 99214 OFFICE O/P EST MOD 30 MIN: CPT | Mod: PBBFAC,PN | Performed by: PODIATRIST

## 2021-11-15 PROCEDURE — 11721 DIABETIC NAIL DEBRIDEMENT: ICD-10-PCS | Mod: S$PBB,,, | Performed by: PODIATRIST

## 2021-11-15 PROCEDURE — 11721 DEBRIDE NAIL 6 OR MORE: CPT | Mod: PBBFAC,PN | Performed by: PODIATRIST

## 2021-11-15 PROCEDURE — 99999 PR PBB SHADOW E&M-EST. PATIENT-LVL IV: ICD-10-PCS | Mod: PBBFAC,,, | Performed by: PODIATRIST

## 2021-11-16 PROBLEM — R29.898 RIGHT LEG WEAKNESS: Status: ACTIVE | Noted: 2021-11-16

## 2021-11-26 ENCOUNTER — NUTRITION (OUTPATIENT)
Dept: DIABETES | Facility: CLINIC | Age: 61
End: 2021-11-26
Payer: MEDICAID

## 2021-11-26 DIAGNOSIS — Z79.4 TYPE 2 DIABETES MELLITUS WITH HYPERGLYCEMIA, WITH LONG-TERM CURRENT USE OF INSULIN: Primary | ICD-10-CM

## 2021-11-26 DIAGNOSIS — E11.65 TYPE 2 DIABETES MELLITUS WITH HYPERGLYCEMIA, WITH LONG-TERM CURRENT USE OF INSULIN: Primary | ICD-10-CM

## 2021-11-26 PROCEDURE — G0108 DIAB MANAGE TRN  PER INDIV: HCPCS | Mod: PBBFAC,PN | Performed by: DIETITIAN, REGISTERED

## 2021-11-30 LAB
LEFT EYE DM RETINOPATHY: NEGATIVE
RIGHT EYE DM RETINOPATHY: NEGATIVE

## 2021-12-02 PROBLEM — E11.49 TYPE 2 DIABETES MELLITUS WITH NEUROLOGIC COMPLICATION, WITHOUT LONG-TERM CURRENT USE OF INSULIN: Status: ACTIVE | Noted: 2021-03-26

## 2021-12-02 PROBLEM — G47.33 OBSTRUCTIVE SLEEP APNEA: Status: ACTIVE | Noted: 2021-12-02

## 2021-12-02 PROBLEM — Z86.73 HISTORY OF CEREBROVASCULAR ACCIDENT (CVA) GREATER THAN EIGHT WEEKS IN THE PAST: Status: ACTIVE | Noted: 2021-12-02

## 2021-12-23 ENCOUNTER — PATIENT OUTREACH (OUTPATIENT)
Dept: ADMINISTRATIVE | Facility: HOSPITAL | Age: 61
End: 2021-12-23
Payer: MEDICAID

## 2022-01-05 PROBLEM — M48.02 SPINAL STENOSIS OF CERVICAL REGION: Status: ACTIVE | Noted: 2022-01-05

## 2022-01-06 ENCOUNTER — TELEPHONE (OUTPATIENT)
Dept: NEUROSURGERY | Facility: CLINIC | Age: 62
End: 2022-01-06
Payer: MEDICAID

## 2022-01-06 NOTE — TELEPHONE ENCOUNTER
S/w PT. oFFERED APPT. aCCEPTED DATE/TIME/PLACE      ----- Message from Colleen Rockwell sent at 1/6/2022  9:52 AM CST -----  Regarding: appt  Contact: pt @ [phone]  Pt calling to schedule an appt with Dr. Arroyo, has a referral in epic. Please call.

## 2022-01-11 ENCOUNTER — TELEPHONE (OUTPATIENT)
Dept: NEUROSURGERY | Facility: CLINIC | Age: 62
End: 2022-01-11
Payer: MEDICAID

## 2022-01-19 ENCOUNTER — OFFICE VISIT (OUTPATIENT)
Dept: NEUROSURGERY | Facility: CLINIC | Age: 62
End: 2022-01-19
Payer: MEDICAID

## 2022-01-19 VITALS
SYSTOLIC BLOOD PRESSURE: 129 MMHG | DIASTOLIC BLOOD PRESSURE: 66 MMHG | HEART RATE: 101 BPM | WEIGHT: 207.88 LBS | BODY MASS INDEX: 32.56 KG/M2

## 2022-01-19 DIAGNOSIS — M48.02 SPINAL STENOSIS OF CERVICAL REGION: ICD-10-CM

## 2022-01-19 DIAGNOSIS — M54.2 CERVICALGIA: ICD-10-CM

## 2022-01-19 PROCEDURE — 3078F PR MOST RECENT DIASTOLIC BLOOD PRESSURE < 80 MM HG: ICD-10-PCS | Mod: CPTII,,, | Performed by: NEUROLOGICAL SURGERY

## 2022-01-19 PROCEDURE — 99214 OFFICE O/P EST MOD 30 MIN: CPT | Mod: PBBFAC | Performed by: NEUROLOGICAL SURGERY

## 2022-01-19 PROCEDURE — 1160F RVW MEDS BY RX/DR IN RCRD: CPT | Mod: CPTII,,, | Performed by: NEUROLOGICAL SURGERY

## 2022-01-19 PROCEDURE — 3074F SYST BP LT 130 MM HG: CPT | Mod: CPTII,,, | Performed by: NEUROLOGICAL SURGERY

## 2022-01-19 PROCEDURE — 99204 PR OFFICE/OUTPT VISIT, NEW, LEVL IV, 45-59 MIN: ICD-10-PCS | Mod: S$PBB,,, | Performed by: NEUROLOGICAL SURGERY

## 2022-01-19 PROCEDURE — 4010F ACE/ARB THERAPY RXD/TAKEN: CPT | Mod: CPTII,,, | Performed by: NEUROLOGICAL SURGERY

## 2022-01-19 PROCEDURE — 1159F MED LIST DOCD IN RCRD: CPT | Mod: CPTII,,, | Performed by: NEUROLOGICAL SURGERY

## 2022-01-19 PROCEDURE — 3008F PR BODY MASS INDEX (BMI) DOCUMENTED: ICD-10-PCS | Mod: CPTII,,, | Performed by: NEUROLOGICAL SURGERY

## 2022-01-19 PROCEDURE — 4010F PR ACE/ARB THEARPY RXD/TAKEN: ICD-10-PCS | Mod: CPTII,,, | Performed by: NEUROLOGICAL SURGERY

## 2022-01-19 PROCEDURE — 3074F PR MOST RECENT SYSTOLIC BLOOD PRESSURE < 130 MM HG: ICD-10-PCS | Mod: CPTII,,, | Performed by: NEUROLOGICAL SURGERY

## 2022-01-19 PROCEDURE — 1160F PR REVIEW ALL MEDS BY PRESCRIBER/CLIN PHARMACIST DOCUMENTED: ICD-10-PCS | Mod: CPTII,,, | Performed by: NEUROLOGICAL SURGERY

## 2022-01-19 PROCEDURE — 1159F PR MEDICATION LIST DOCUMENTED IN MEDICAL RECORD: ICD-10-PCS | Mod: CPTII,,, | Performed by: NEUROLOGICAL SURGERY

## 2022-01-19 PROCEDURE — 99999 PR PBB SHADOW E&M-EST. PATIENT-LVL IV: ICD-10-PCS | Mod: PBBFAC,,, | Performed by: NEUROLOGICAL SURGERY

## 2022-01-19 PROCEDURE — 3008F BODY MASS INDEX DOCD: CPT | Mod: CPTII,,, | Performed by: NEUROLOGICAL SURGERY

## 2022-01-19 PROCEDURE — 3078F DIAST BP <80 MM HG: CPT | Mod: CPTII,,, | Performed by: NEUROLOGICAL SURGERY

## 2022-01-19 PROCEDURE — 99999 PR PBB SHADOW E&M-EST. PATIENT-LVL IV: CPT | Mod: PBBFAC,,, | Performed by: NEUROLOGICAL SURGERY

## 2022-01-19 PROCEDURE — 99204 OFFICE O/P NEW MOD 45 MIN: CPT | Mod: S$PBB,,, | Performed by: NEUROLOGICAL SURGERY

## 2022-01-19 NOTE — PATIENT INSTRUCTIONS
I have personally reviewed the MRI cervical with the pt which shows cervical spondylosis, most prominent at C3-4, as above. This results in severe left-sided spinal canal stenosis and increased cord signal, concerning for myelomalacia and/or cord edema.    I recommend C3-4 ACDF. I have discussed the risks/benefits, indications, and alternatives for the proposed procedure in detail. I have answered all of their questions and the pt wishes to proceed with surgery. We will schedule the pt on the next available date.

## 2022-01-19 NOTE — H&P (VIEW-ONLY)
Subjective:   I, Emmie Armstrong, attest that this documentation has been prepared under the direction and in the presence of Damian Arroyo MD.     Patient ID: Holly Mcclelland is a 61 y.o. female     Chief Complaint: No chief complaint on file.        HPI  Ms. Holly Mcclelland is a 61 y.o. woman with DM, CHF, HLD, HTN, and hx of stroke, who was referred to me by Dr. Braga, presenting today for establishing care. This is a pt who developed right leg weakness last month and was seen in the ED to r/o stroke. Work up was negative for stroke. She had MRI lumbar spine done and found to have mild central canal spinal stenosis, bulging at L3-L4. The pt reports of chronic neck pain that has gradually worsened in the last few weeks. The pain is so severe that it is interfering with her work. 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 States her right foot tends to drag when she walks. The pt also reports of pain and spasms to her bilateral hands.      Review of Systems   Constitutional: Positive for activity change. Negative for appetite change, fatigue, fever and unexpected weight change.   HENT: Negative for facial swelling.    Eyes: Negative.    Respiratory: Negative.    Cardiovascular: Negative.    Gastrointestinal: Negative for diarrhea, nausea and vomiting.   Endocrine: Negative.    Genitourinary: Negative.    Musculoskeletal: Positive for arthralgias, myalgias and neck pain. Negative for back pain and joint swelling.   Neurological: Positive for weakness. Negative for dizziness, seizures, numbness and headaches.   Psychiatric/Behavioral: Negative.       Past Medical History:   Diagnosis Date    CHF (congestive heart failure)     Diabetes mellitus     Heart murmur     Hyperlipidemia     Hypertension     Sleep apnea     wears CPAP    Stroke        Objective:      Vitals:    01/19/22 0905   BP: 129/66   Pulse: 101      Physical  Exam  Constitutional:       General: She is not in acute distress.     Appearance: Normal appearance.   HENT:      Head: Normocephalic and atraumatic.   Pulmonary:      Effort: Pulmonary effort is normal.   Musculoskeletal:      Cervical back: Neck supple.   Neurological:      Mental Status: She is alert and oriented to person, place, and time.      GCS: GCS eye subscore is 4. GCS verbal subscore is 5. GCS motor subscore is 6.      Cranial Nerves: No cranial nerve deficit.      Comments: 3-4/5 strength in the BLE            IMAGING:  MRI Cervical Spine WO Contrast (12/27/2021): Cervical spondylosis, most prominent at C3-4, as above. This results in severe left-sided spinal canal stenosis and increased cord signal, concerning for myelomalacia and/or cord edema.      I have personally reviewed the images with the pt.      I, Dr. Damian Arroyo, personally performed the services described in this documentation. All medical record entries made by the scribe, Emmie Armstrong, were at my direction and in my presence.  I have reviewed the chart and agree that the record reflects my personal performance and is accurate and complete. Damian Arroyo MD. 01/19/2022    Assessment:       1. Spinal stenosis of cervical region    2. Cervicalgia         Plan:   I have personally reviewed the MRI cervical with the pt which shows cervical spondylosis, most prominent at C3-4, as above. This results in severe left-sided spinal canal stenosis and increased cord signal, concerning for myelomalacia and/or cord edema.    I recommend C3-4 ACDF. I have discussed the risks/benefits, indications, and alternatives for the proposed procedure in detail. I have answered all of their questions and the pt wishes to proceed with surgery. We will schedule the pt on the next available date.

## 2022-01-20 ENCOUNTER — TELEPHONE (OUTPATIENT)
Dept: NEUROSURGERY | Facility: CLINIC | Age: 62
End: 2022-01-20
Payer: MEDICAID

## 2022-01-20 DIAGNOSIS — M54.2 CERVICALGIA: ICD-10-CM

## 2022-01-20 DIAGNOSIS — M48.02 SPINAL STENOSIS OF CERVICAL REGION: Primary | ICD-10-CM

## 2022-01-26 ENCOUNTER — TELEPHONE (OUTPATIENT)
Dept: NEUROSURGERY | Facility: CLINIC | Age: 62
End: 2022-01-26
Payer: MEDICAID

## 2022-01-26 NOTE — TELEPHONE ENCOUNTER
Called pt back. Needs a letter for work. Will write it and send thru the portal. Also gave her Pre-op appt info. She v/u & thx.

## 2022-01-27 ENCOUNTER — ANESTHESIA EVENT (OUTPATIENT)
Dept: SURGERY | Facility: HOSPITAL | Age: 62
End: 2022-01-27
Payer: MEDICAID

## 2022-01-27 ENCOUNTER — TELEPHONE (OUTPATIENT)
Dept: CARDIOLOGY | Facility: CLINIC | Age: 62
End: 2022-01-27
Payer: MEDICAID

## 2022-01-27 NOTE — PRE-PROCEDURE INSTRUCTIONS
Spoke with Ms. Barrera, looks like patient will be due for follow up with Dr. Kahn. Explained to patient she should see him prior to surgery to make sure it is safe to proceed. Patient states she understands and is agreeable. Will see her Monday for pre-op

## 2022-01-27 NOTE — TELEPHONE ENCOUNTER
Returned call to patient. Scheduled appointment with Dr Kahn.    ----- Message from Dayana Zambrano sent at 1/27/2022 12:55 PM CST -----  Regarding: Pt inquiry  Pt was told she needed to schedule and Appt with Dr Kahn before upcoming procedure with  Dr. Arroyo     [phone] (home)

## 2022-01-28 ENCOUNTER — PATIENT MESSAGE (OUTPATIENT)
Dept: NEUROSURGERY | Facility: CLINIC | Age: 62
End: 2022-01-28
Payer: MEDICAID

## 2022-01-31 ENCOUNTER — HOSPITAL ENCOUNTER (OUTPATIENT)
Dept: PREADMISSION TESTING | Facility: HOSPITAL | Age: 62
Discharge: HOME | End: 2022-01-31
Attending: INTERNAL MEDICINE
Payer: MEDICAID

## 2022-01-31 ENCOUNTER — TELEPHONE (OUTPATIENT)
Dept: NEUROSURGERY | Facility: HOSPITAL | Age: 62
End: 2022-01-31
Payer: MEDICAID

## 2022-01-31 ENCOUNTER — OFFICE VISIT (OUTPATIENT)
Dept: CARDIOLOGY | Facility: CLINIC | Age: 62
End: 2022-01-31
Payer: MEDICAID

## 2022-01-31 VITALS
TEMPERATURE: 99 F | RESPIRATION RATE: 18 BRPM | DIASTOLIC BLOOD PRESSURE: 73 MMHG | HEIGHT: 67 IN | WEIGHT: 206.38 LBS | SYSTOLIC BLOOD PRESSURE: 119 MMHG | HEART RATE: 90 BPM | OXYGEN SATURATION: 100 % | BODY MASS INDEX: 32.39 KG/M2

## 2022-01-31 VITALS
HEART RATE: 95 BPM | BODY MASS INDEX: 32.3 KG/M2 | OXYGEN SATURATION: 98 % | DIASTOLIC BLOOD PRESSURE: 59 MMHG | SYSTOLIC BLOOD PRESSURE: 114 MMHG | HEIGHT: 67 IN | WEIGHT: 205.81 LBS

## 2022-01-31 DIAGNOSIS — R29.898 RIGHT LEG WEAKNESS: ICD-10-CM

## 2022-01-31 DIAGNOSIS — L60.2 ONYCHOGRYPHOSIS: ICD-10-CM

## 2022-01-31 DIAGNOSIS — I10 ESSENTIAL HYPERTENSION: ICD-10-CM

## 2022-01-31 DIAGNOSIS — E11.40 TYPE 2 DIABETES MELLITUS WITH DIABETIC NEUROPATHY, WITHOUT LONG-TERM CURRENT USE OF INSULIN: ICD-10-CM

## 2022-01-31 DIAGNOSIS — Z01.812 PRE-OPERATIVE LABORATORY EXAMINATION: ICD-10-CM

## 2022-01-31 DIAGNOSIS — E78.5 DYSLIPIDEMIA, GOAL LDL BELOW 70: ICD-10-CM

## 2022-01-31 DIAGNOSIS — Z01.818 PREOPERATIVE TESTING: Primary | ICD-10-CM

## 2022-01-31 DIAGNOSIS — M48.02 SPINAL STENOSIS OF CERVICAL REGION: Primary | ICD-10-CM

## 2022-01-31 DIAGNOSIS — G47.33 OBSTRUCTIVE SLEEP APNEA: ICD-10-CM

## 2022-01-31 LAB
APTT BLDCRRT: 25.6 SEC (ref 21–32)
INR PPP: 1 (ref 0.8–1.2)
PROTHROMBIN TIME: 10.5 SEC (ref 9–12.5)

## 2022-01-31 PROCEDURE — 1160F RVW MEDS BY RX/DR IN RCRD: CPT | Mod: CPTII,,, | Performed by: INTERNAL MEDICINE

## 2022-01-31 PROCEDURE — 3074F PR MOST RECENT SYSTOLIC BLOOD PRESSURE < 130 MM HG: ICD-10-PCS | Mod: CPTII,,, | Performed by: INTERNAL MEDICINE

## 2022-01-31 PROCEDURE — 4010F PR ACE/ARB THEARPY RXD/TAKEN: ICD-10-PCS | Mod: CPTII,,, | Performed by: INTERNAL MEDICINE

## 2022-01-31 PROCEDURE — 1160F PR REVIEW ALL MEDS BY PRESCRIBER/CLIN PHARMACIST DOCUMENTED: ICD-10-PCS | Mod: CPTII,,, | Performed by: INTERNAL MEDICINE

## 2022-01-31 PROCEDURE — 3008F PR BODY MASS INDEX (BMI) DOCUMENTED: ICD-10-PCS | Mod: CPTII,,, | Performed by: INTERNAL MEDICINE

## 2022-01-31 PROCEDURE — 99214 OFFICE O/P EST MOD 30 MIN: CPT | Mod: PBBFAC,PN | Performed by: INTERNAL MEDICINE

## 2022-01-31 PROCEDURE — 3078F PR MOST RECENT DIASTOLIC BLOOD PRESSURE < 80 MM HG: ICD-10-PCS | Mod: CPTII,,, | Performed by: INTERNAL MEDICINE

## 2022-01-31 PROCEDURE — 99999 PR PBB SHADOW E&M-EST. PATIENT-LVL IV: CPT | Mod: PBBFAC,,, | Performed by: INTERNAL MEDICINE

## 2022-01-31 PROCEDURE — 99999 PR PBB SHADOW E&M-EST. PATIENT-LVL IV: ICD-10-PCS | Mod: PBBFAC,,, | Performed by: INTERNAL MEDICINE

## 2022-01-31 PROCEDURE — 85730 THROMBOPLASTIN TIME PARTIAL: CPT | Performed by: INTERNAL MEDICINE

## 2022-01-31 PROCEDURE — 36415 COLL VENOUS BLD VENIPUNCTURE: CPT | Performed by: INTERNAL MEDICINE

## 2022-01-31 PROCEDURE — 1159F PR MEDICATION LIST DOCUMENTED IN MEDICAL RECORD: ICD-10-PCS | Mod: CPTII,,, | Performed by: INTERNAL MEDICINE

## 2022-01-31 PROCEDURE — 1159F MED LIST DOCD IN RCRD: CPT | Mod: CPTII,,, | Performed by: INTERNAL MEDICINE

## 2022-01-31 PROCEDURE — 3008F BODY MASS INDEX DOCD: CPT | Mod: CPTII,,, | Performed by: INTERNAL MEDICINE

## 2022-01-31 PROCEDURE — 3074F SYST BP LT 130 MM HG: CPT | Mod: CPTII,,, | Performed by: INTERNAL MEDICINE

## 2022-01-31 PROCEDURE — 85610 PROTHROMBIN TIME: CPT | Performed by: INTERNAL MEDICINE

## 2022-01-31 PROCEDURE — 99214 PR OFFICE/OUTPT VISIT, EST, LEVL IV, 30-39 MIN: ICD-10-PCS | Mod: S$PBB,,, | Performed by: INTERNAL MEDICINE

## 2022-01-31 PROCEDURE — 3078F DIAST BP <80 MM HG: CPT | Mod: CPTII,,, | Performed by: INTERNAL MEDICINE

## 2022-01-31 PROCEDURE — 4010F ACE/ARB THERAPY RXD/TAKEN: CPT | Mod: CPTII,,, | Performed by: INTERNAL MEDICINE

## 2022-01-31 PROCEDURE — 99214 OFFICE O/P EST MOD 30 MIN: CPT | Mod: S$PBB,,, | Performed by: INTERNAL MEDICINE

## 2022-01-31 RX ORDER — DIAZEPAM 10 MG/1
10 TABLET ORAL 2 TIMES DAILY PRN
Status: ON HOLD | COMMUNITY
Start: 2022-01-07 | End: 2022-02-10 | Stop reason: HOSPADM

## 2022-01-31 RX ORDER — FLUCONAZOLE 150 MG/1
150 TABLET ORAL ONCE
COMMUNITY
Start: 2022-01-18 | End: 2022-08-08

## 2022-01-31 NOTE — DISCHARGE INSTRUCTIONS
Your procedure  is scheduled for __2/7/22________.    Call [phone] between 2pm and 5pm on 2/4/22_______to find out your arrival time for the day of surgery.    You may use the main entrance to the hospital on the [facility] side, or the entrance that is next to the garage.     You may have one visitors.  Visiting hours for non-COVID-19 patients expanded to 24/7 (still restricted to one visitor)   Youth visitation changed from age 18 to age 12.      You will be going to the Same Day Surgery Unit on the 2nd floor of the hospital.    Important instructions:   Do not eat or drink after 12 midnight, including water.  It is okay to brush your teeth.  Do not have gum, candy or mints.    SEE MEDICATION SHEET.   TAKE MEDICATIONS AS DIRECTED WITH SIPS OF WATER.      Do not take any diabetic medication on the morning of surgery unless instructed to do so by your doctor or pre op nurse.    STOP taking Aspirin, Ibuprofen,  Advil, Motrin, Mobic(meloxicam), Aleve (naproxen), Fish oil, and Vitamin E for at least 7 days before your surgery.     You may take Tylenol if needed which is not a blood thinner.     Please shower the night before and the morning of your surgery.       Follow any Prep Instructions given by your surgeon.      Use Hibiclens soap as instructed by your pre op nurse.   Please place clean linens on your bed the night before surgery. Please wear fresh clean clothing after each shower.     No shaving of procedural area at least 4-5 days before surgery due to increased risk of skin irritation and/or possible infection.     Contact lenses and removable denture work may not be worn during your procedure.     You may wear deodorant only. If you are having breast surgery, do not wear deodorant on the operative side.     Do not wear powder, body lotion, perfume/cologne or make-up.     Do not wear any jewelry or have any metal on your body.     You will be asked to remove any dentures or partials for the  procedure.     If you are going home on the same day of surgery, you must arrange for a family member or a friend to drive you home.  Public transportation is prohibited.  You will not be able to drive home if you were given anesthesia or sedation.     Patients who want to have their Post-op prescriptions filled from our in-house [facility], bring a Credit/Debit Card   or cash with you. A co-pay may be required.  The pharmacy closes at 5:30 pm.     Children under 18 years of age require a parent/guardian present the entire time that they are here.     Wear loose fitting clothes allowing for bandages.     Please leave money and valuables home.       You may bring your cell phone.     Call the doctor if fever or illness should occur before your surgery.    Call [phone] to contact us here if needed.

## 2022-01-31 NOTE — PROGRESS NOTES
Subjective:      Patient ID: Holly Mcclelland is a 61 y.o. female.    Chief Complaint: Pre-op Exam (Cervical spine surgery - Discectomy with fusion C3 - 4 )    HPI:  Pt is scheduled for anterior cervical fusion.    Pt has weakness in right leg and chronic neck pain.    Pt is able to walk around the house and in stores.    Pt is a  and has been having neck pain with work and is having to walk slower and has trouble getting up.      Review of Systems   Cardiovascular: Negative for chest pain, claudication, dyspnea on exertion, irregular heartbeat, leg swelling, near-syncope, orthopnea, palpitations and syncope.        Past Medical History:   Diagnosis Date    CHF (congestive heart failure)     Diabetes mellitus     Heart murmur     Hyperlipidemia     Hypertension     Sleep apnea     wears CPAP    Stroke         Past Surgical History:   Procedure Laterality Date    BREAST BIOPSY Left 2015       Family History   Problem Relation Age of Onset    Diabetes Maternal Grandmother     Diabetes Mother     Hypertension Mother     Heart disease Mother     Diabetes Sister     Diabetes Father     Breast cancer Neg Hx     Colon cancer Neg Hx     Ovarian cancer Neg Hx        Social History     Socioeconomic History    Marital status:    Tobacco Use    Smoking status: Never Smoker    Smokeless tobacco: Never Used   Substance and Sexual Activity    Alcohol use: No    Drug use: No    Sexual activity: Not Currently       Current Outpatient Medications on File Prior to Visit   Medication Sig Dispense Refill    amLODIPine (NORVASC) 10 MG tablet Take 1 tablet (10 mg total) by mouth once daily. 30 tablet 11    aspirin (ECOTRIN) 81 MG EC tablet Take 81 mg by mouth once daily.  3    cholecalciferol, vitamin D3, 50,000 unit capsule Take 50,000 Units by mouth every 7 days.  3    clopidogrel (PLAVIX) 75 mg tablet Take 1 tablet (75 mg total) by mouth once daily. 30 tablet 11    dapagliflozin  "(FARXIGA) 5 mg Tab tablet Take 1 tablet (5 mg total) by mouth once daily. 90 tablet 1    diazePAM (VALIUM) 10 MG Tab Take 10 mg by mouth 2 (two) times daily as needed.      fluconazole (DIFLUCAN) 150 MG Tab Take 150 mg by mouth once.      furosemide (LASIX) 20 MG tablet Take 20 mg by mouth daily as needed.       hydrALAZINE (APRESOLINE) 25 MG tablet Take 1 tablet (25 mg total) by mouth 3 (three) times daily. 90 tablet 11    lisinopriL-hydrochlorothiazide (PRINZIDE,ZESTORETIC) 20-25 mg Tab Take 1 tablet by mouth once daily 90 tablet 2    medroxyPROGESTERone (PROVERA) 5 MG tablet Take 1 tablet (5 mg total) by mouth once daily. 30 tablet 11    metFORMIN (GLUCOPHAGE) 1000 MG tablet Take 1 tablet (1,000 mg total) by mouth 2 (two) times daily with meals. 180 tablet 1    ONETOUCH DELICA PLUS LANCET 33 gauge Misc USE 1  TO CHECK GLUCOSE 4 TIMES DAILY 100 each 0    ONETOUCH ULTRA TEST Strp USE 1 STRIP TO CHECK GLUCOSE 4 TIMES DAILY 100 each 0    TRULICITY 4.5 mg/0.5 mL pen injector INJECT 4.5 MG SUBCUTANEOUSLY ONCE A WEEK 4 pen 6    [DISCONTINUED] diazePAM (VALIUM) 5 MG tablet Take 1 tablet (5 mg total) by mouth every 12 (twelve) hours as needed for Anxiety. (Patient taking differently: Take 10 mg by mouth every 12 (twelve) hours as needed for Anxiety.) 60 tablet 0     No current facility-administered medications on file prior to visit.       Review of patient's allergies indicates:   Allergen Reactions    Victoza [liraglutide] Other (See Comments)     Throat closed ? But not seen in the ER      Objective:     Vitals:    01/31/22 1328   BP: (!) 114/59   BP Location: Right arm   Patient Position: Sitting   BP Method: Large (Automatic)   Pulse: 95   SpO2: 98%   Weight: 93.3 kg (205 lb 12.8 oz)   Height: 5' 7" (1.702 m)        Physical Exam  Vitals reviewed.   Constitutional:       Appearance: She is well-developed and well-nourished.   Eyes:      General: No scleral icterus.  Neck:      Vascular: No carotid bruit " or JVD.   Cardiovascular:      Rate and Rhythm: Normal rate and regular rhythm.      Heart sounds: No murmur heard.  No gallop.    Pulmonary:      Breath sounds: Normal breath sounds.   Musculoskeletal:         General: No edema.      Right lower leg: No edema.      Left lower leg: No edema.   Skin:     General: Skin is warm and dry.   Neurological:      Mental Status: She is alert and oriented to person, place, and time.   Psychiatric:         Mood and Affect: Mood and affect normal.         Behavior: Behavior normal.         Thought Content: Thought content normal.         Judgment: Judgment normal.              Note pt has lost 51 lbs over the past 9 months since beginning Farxiga        ECG 1/28/22: NSR, WNL, reviewed by [facility] Outpatient Visit on 01/31/2022   Component Date Value Ref Range Status    Prothrombin Time 01/31/2022 10.5  9.0 - 12.5 sec Final    INR 01/31/2022 1.0  0.8 - 1.2 Final    aPTT 01/31/2022 25.6  21.0 - 32.0 sec Final   Lab Visit on 01/28/2022   Component Date Value Ref Range Status    WBC 01/28/2022 7.39  3.90 - 12.70 K/uL Final    RBC 01/28/2022 4.22  4.00 - 5.40 M/uL Final    Hemoglobin 01/28/2022 11.6* 12.0 - 16.0 g/dL Final    Hematocrit 01/28/2022 38.3  37.0 - 48.5 % Final    MCV 01/28/2022 91  82 - 98 fL Final    MCH 01/28/2022 27.5  27.0 - 31.0 pg Final    MCHC 01/28/2022 30.3* 32.0 - 36.0 g/dL Final    RDW 01/28/2022 11.9  11.5 - 14.5 % Final    Platelets 01/28/2022 306  150 - 450 K/uL Final    MPV 01/28/2022 10.3  9.2 - 12.9 fL Final    Immature Granulocytes 01/28/2022 0.4  0.0 - 0.5 % Final    Gran # (ANC) 01/28/2022 5.0  1.8 - 7.7 K/uL Final    Immature Grans (Abs) 01/28/2022 0.03  0.00 - 0.04 K/uL Final    Lymph # 01/28/2022 1.8  1.0 - 4.8 K/uL Final    Mono # 01/28/2022 0.5  0.3 - 1.0 K/uL Final    Eos # 01/28/2022 0.1  0.0 - 0.5 K/uL Final    Baso # 01/28/2022 0.01  0.00 - 0.20 K/uL Final    nRBC 01/28/2022 0  0 /100 WBC Final    Gran %  01/28/2022 67.5  38.0 - 73.0 % Final    Lymph % 01/28/2022 24.2  18.0 - 48.0 % Final    Mono % 01/28/2022 7.0  4.0 - 15.0 % Final    Eosinophil % 01/28/2022 0.8  0.0 - 8.0 % Final    Basophil % 01/28/2022 0.1  0.0 - 1.9 % Final    Differential Method 01/28/2022 Automated   Final    Sodium 01/28/2022 141  136 - 145 mmol/L Final    Potassium 01/28/2022 3.8  3.5 - 5.1 mmol/L Final    Chloride 01/28/2022 100  95 - 110 mmol/L Final    CO2 01/28/2022 29  23 - 29 mmol/L Final    Glucose 01/28/2022 145* 70 - 110 mg/dL Final    BUN 01/28/2022 18  8 - 23 mg/dL Final    Creatinine 01/28/2022 1.3  0.5 - 1.4 mg/dL Final    Calcium 01/28/2022 10.5  8.7 - 10.5 mg/dL Final    Total Protein 01/28/2022 7.7  6.0 - 8.4 g/dL Final    Albumin 01/28/2022 3.6  3.5 - 5.2 g/dL Final    Total Bilirubin 01/28/2022 0.6  0.1 - 1.0 mg/dL Final    Alkaline Phosphatase 01/28/2022 52* 55 - 135 U/L Final    AST 01/28/2022 9* 10 - 40 U/L Final    ALT 01/28/2022 14  10 - 44 U/L Final    Anion Gap 01/28/2022 12  8 - 16 mmol/L Final    eGFR if  01/28/2022 50.8* >60 mL/min/1.73 m^2 Final    eGFR if non  01/28/2022 44.1* >60 mL/min/1.73 m^2 Final    Specimen UA 01/28/2022 Urine, Clean Catch   Final    Color, UA 01/28/2022 Yellow  Yellow, Straw, Priti Final    Appearance, UA 01/28/2022 Clear  Clear Final    pH, UA 01/28/2022 6.0  5.0 - 8.0 Final    Specific Gravity, UA 01/28/2022 1.025  1.005 - 1.030 Final    Protein, UA 01/28/2022 Negative  Negative Final    Glucose, UA 01/28/2022 3+* Negative Final    Ketones, UA 01/28/2022 Negative  Negative Final    Bilirubin (UA) 01/28/2022 Negative  Negative Final    Occult Blood UA 01/28/2022 Negative  Negative Final    Nitrite, UA 01/28/2022 Negative  Negative Final    Urobilinogen, UA 01/28/2022 Negative  Negative EU/dL Final    Leukocytes, UA 01/28/2022 Trace* Negative Final    WBC, UA 01/28/2022 5  0 - 5 /hpf Final    Bacteria 01/28/2022 Few*  None-Occ /hpf Final    Yeast, UA 01/28/2022 None  None Final    Squam Epithel, UA 01/28/2022 5  /hpf Final    Microscopic Comment 01/28/2022 SEE COMMENT   Final   Patient Outreach on 12/23/2021   Component Date Value Ref Range Status    Left Eye DM Retinopathy 11/30/2021 Negative   Final    Right Eye DM Retinopathy 11/30/2021 Negative   Final   Admission on 11/15/2021, Discharged on 11/18/2021   Component Date Value Ref Range Status    WBC 11/15/2021 8.34  3.90 - 12.70 K/uL Final    RBC 11/15/2021 4.58  4.00 - 5.40 M/uL Final    Hemoglobin 11/15/2021 13.2  12.0 - 16.0 g/dL Final    Hematocrit 11/15/2021 42.2  37.0 - 48.5 % Final    MCV 11/15/2021 92  82 - 98 fL Final    MCH 11/15/2021 28.8  27.0 - 31.0 pg Final    MCHC 11/15/2021 31.3* 32.0 - 36.0 g/dL Final    RDW 11/15/2021 12.1  11.5 - 14.5 % Final    Platelets 11/15/2021 293  150 - 450 K/uL Final    MPV 11/15/2021 10.4  9.2 - 12.9 fL Final    Immature Granulocytes 11/15/2021 0.1  0.0 - 0.5 % Final    Gran # (ANC) 11/15/2021 5.4  1.8 - 7.7 K/uL Final    Immature Grans (Abs) 11/15/2021 0.01  0.00 - 0.04 K/uL Final    Lymph # 11/15/2021 2.4  1.0 - 4.8 K/uL Final    Mono # 11/15/2021 0.5  0.3 - 1.0 K/uL Final    Eos # 11/15/2021 0.1  0.0 - 0.5 K/uL Final    Baso # 11/15/2021 0.01  0.00 - 0.20 K/uL Final    nRBC 11/15/2021 0  0 /100 WBC Final    Gran % 11/15/2021 64.7  38.0 - 73.0 % Final    Lymph % 11/15/2021 28.7  18.0 - 48.0 % Final    Mono % 11/15/2021 5.6  4.0 - 15.0 % Final    Eosinophil % 11/15/2021 0.8  0.0 - 8.0 % Final    Basophil % 11/15/2021 0.1  0.0 - 1.9 % Final    Differential Method 11/15/2021 Automated   Final    Sodium 11/15/2021 142  136 - 145 mmol/L Final    Potassium 11/15/2021 3.7  3.5 - 5.1 mmol/L Final    Chloride 11/15/2021 101  95 - 110 mmol/L Final    CO2 11/15/2021 29  23 - 29 mmol/L Final    Glucose 11/15/2021 162* 70 - 110 mg/dL Final    BUN 11/15/2021 19  8 - 23 mg/dL Final    Creatinine 11/15/2021  1.3  0.5 - 1.4 mg/dL Final    Calcium 11/15/2021 11.0* 8.7 - 10.5 mg/dL Final    Total Protein 11/15/2021 7.8  6.0 - 8.4 g/dL Final    Albumin 11/15/2021 3.8  3.5 - 5.2 g/dL Final    Total Bilirubin 11/15/2021 0.4  0.1 - 1.0 mg/dL Final    Alkaline Phosphatase 11/15/2021 48* 55 - 135 U/L Final    AST 11/15/2021 15  10 - 40 U/L Final    ALT 11/15/2021 13  10 - 44 U/L Final    Anion Gap 11/15/2021 12  8 - 16 mmol/L Final    eGFR if  11/15/2021 50.8* >60 mL/min/1.73 m^2 Final    eGFR if non  11/15/2021 44.1* >60 mL/min/1.73 m^2 Final    TSH 11/15/2021 0.863  0.400 - 4.000 uIU/mL Final    Cholesterol 11/15/2021 99* 120 - 199 mg/dL Final    Triglycerides 11/15/2021 80  30 - 150 mg/dL Final    HDL 11/15/2021 35* 40 - 75 mg/dL Final    LDL Cholesterol 11/15/2021 48.0* 63.0 - 159.0 mg/dL Final    HDL/Cholesterol Ratio 11/15/2021 35.4  20.0 - 50.0 % Final    Total Cholesterol/HDL Ratio 11/15/2021 2.8  2.0 - 5.0 Final    Non-HDL Cholesterol 11/15/2021 64  mg/dL Final    POC Rapid COVID 11/15/2021 Negative  Negative Final     Acceptable 11/15/2021 Yes   Final    POCT Glucose 11/15/2021 113* 70 - 110 mg/dL Final    POCT Glucose 11/16/2021 111* 70 - 110 mg/dL Final    BSA 11/16/2021 2.17  m2 Final    AORTIC VALVE CUSP SEPERATION 11/16/2021 1.91  cm Final    PV PEAK VELOCITY 11/16/2021 1.54  cm/s Final    LVIDd 11/16/2021 4.05  3.5 - 6.0 cm Final    IVS 11/16/2021 1.36* 0.6 - 1.1 cm Final    Posterior Wall 11/16/2021 1.18* 0.6 - 1.1 cm Final    Ao root annulus 11/16/2021 2.89  cm Final    LVIDs 11/16/2021 2.27  2.1 - 4.0 cm Final    FS 11/16/2021 44  28 - 44 % Final    LV mass 11/16/2021 183.44  g Final    LA size 11/16/2021 3.12  cm Final    RVDD 11/16/2021 2.27  cm Final    Left Ventricle Relative Wall Thick* 11/16/2021 0.58  cm Final    AV mean gradient 11/16/2021 10  mmHg Final    AV valve area 11/16/2021 2.18  cm2 Final    AV Velocity Ratio  11/16/2021 0.81   Final    AV index (prosthetic) 11/16/2021 0.69   Final    MV valve area p 1/2 method 11/16/2021 3.34  cm2 Final    E/A ratio 11/16/2021 0.80   Final    E wave deceleration time 11/16/2021 211.02  msec Final    LVOT diameter 11/16/2021 2.01  cm Final    LVOT area 11/16/2021 3.2  cm2 Final    LVOT peak deniz 11/16/2021 1.72  m/s Final    LVOT peak VTI 11/16/2021 29.49  cm Final    Ao peak deniz 11/16/2021 2.12  m/s Final    Ao VTI 11/16/2021 42.95  cm Final    LVOT stroke volume 11/16/2021 93.53  cm3 Final    AV peak gradient 11/16/2021 18  mmHg Final    MV Peak E Deniz 11/16/2021 0.70  m/s Final    TR Max Deniz 11/16/2021 2.84  m/s Final    MV stenosis pressure 1/2 time 11/16/2021 65.82  ms Final    MV Peak A Deniz 11/16/2021 0.88  m/s Final    LV Systolic Volume 11/16/2021 17.52  mL Final    LV Systolic Volume Index 11/16/2021 8.3  mL/m2 Final    LV Diastolic Volume 11/16/2021 72.09  mL Final    LV Diastolic Volume Index 11/16/2021 34.33  mL/m2 Final    LV Mass Index 11/16/2021 87  g/m2 Final    Triscuspid Valve Regurgitation Pea* 11/16/2021 32  mmHg Final    Right Atrial Pressure (from IVC) 11/16/2021 3  mmHg Final    EF 11/16/2021 70  % Final    TV rest pulmonary artery pressure 11/16/2021 35  mmHg Final    POCT Glucose 11/16/2021 137* 70 - 110 mg/dL Final    POCT Glucose 11/16/2021 125* 70 - 110 mg/dL Final    POCT Glucose 11/16/2021 168* 70 - 110 mg/dL Final    WBC 11/17/2021 5.54  3.90 - 12.70 K/uL Final    RBC 11/17/2021 4.29  4.00 - 5.40 M/uL Final    Hemoglobin 11/17/2021 12.0  12.0 - 16.0 g/dL Final    Hematocrit 11/17/2021 38.8  37.0 - 48.5 % Final    MCV 11/17/2021 90  82 - 98 fL Final    MCH 11/17/2021 28.0  27.0 - 31.0 pg Final    MCHC 11/17/2021 30.9* 32.0 - 36.0 g/dL Final    RDW 11/17/2021 11.8  11.5 - 14.5 % Final    Platelets 11/17/2021 235  150 - 450 K/uL Final    MPV 11/17/2021 10.7  9.2 - 12.9 fL Final    Immature Granulocytes 11/17/2021 0.2  0.0 -  0.5 % Final    Gran # (ANC) 11/17/2021 3.2  1.8 - 7.7 K/uL Final    Immature Grans (Abs) 11/17/2021 0.01  0.00 - 0.04 K/uL Final    Lymph # 11/17/2021 1.7  1.0 - 4.8 K/uL Final    Mono # 11/17/2021 0.5  0.3 - 1.0 K/uL Final    Eos # 11/17/2021 0.1  0.0 - 0.5 K/uL Final    Baso # 11/17/2021 0.01  0.00 - 0.20 K/uL Final    nRBC 11/17/2021 0  0 /100 WBC Final    Gran % 11/17/2021 58.5  38.0 - 73.0 % Final    Lymph % 11/17/2021 31.2  18.0 - 48.0 % Final    Mono % 11/17/2021 8.5  4.0 - 15.0 % Final    Eosinophil % 11/17/2021 1.4  0.0 - 8.0 % Final    Basophil % 11/17/2021 0.2  0.0 - 1.9 % Final    Differential Method 11/17/2021 Automated   Final    Sodium 11/17/2021 141  136 - 145 mmol/L Final    Potassium 11/17/2021 4.3  3.5 - 5.1 mmol/L Final    Chloride 11/17/2021 105  95 - 110 mmol/L Final    CO2 11/17/2021 26  23 - 29 mmol/L Final    Glucose 11/17/2021 108  70 - 110 mg/dL Final    BUN 11/17/2021 19  8 - 23 mg/dL Final    Creatinine 11/17/2021 1.0  0.5 - 1.4 mg/dL Final    Calcium 11/17/2021 10.3  8.7 - 10.5 mg/dL Final    Total Protein 11/17/2021 6.5  6.0 - 8.4 g/dL Final    Albumin 11/17/2021 3.4* 3.5 - 5.2 g/dL Final    Total Bilirubin 11/17/2021 0.7  0.1 - 1.0 mg/dL Final    Alkaline Phosphatase 11/17/2021 42* 55 - 135 U/L Final    AST 11/17/2021 15  10 - 40 U/L Final    ALT 11/17/2021 12  10 - 44 U/L Final    Anion Gap 11/17/2021 10  8 - 16 mmol/L Final    eGFR if African American 11/17/2021 >60.0  >60 mL/min/1.73 m^2 Final    eGFR if non African American 11/17/2021 >60.0  >60 mL/min/1.73 m^2 Final    POCT Glucose 11/17/2021 103  70 - 110 mg/dL Final    POCT Glucose 11/17/2021 139* 70 - 110 mg/dL Final    POCT Glucose 11/17/2021 191* 70 - 110 mg/dL Final    POCT Glucose 11/17/2021 199* 70 - 110 mg/dL Final    WBC 11/18/2021 5.66  3.90 - 12.70 K/uL Final    RBC 11/18/2021 3.96* 4.00 - 5.40 M/uL Final    Hemoglobin 11/18/2021 11.2* 12.0 - 16.0 g/dL Final    Hematocrit  11/18/2021 36.0* 37.0 - 48.5 % Final    MCV 11/18/2021 91  82 - 98 fL Final    MCH 11/18/2021 28.3  27.0 - 31.0 pg Final    MCHC 11/18/2021 31.1* 32.0 - 36.0 g/dL Final    RDW 11/18/2021 11.8  11.5 - 14.5 % Final    Platelets 11/18/2021 226  150 - 450 K/uL Final    MPV 11/18/2021 10.3  9.2 - 12.9 fL Final    Immature Granulocytes 11/18/2021 0.4  0.0 - 0.5 % Final    Gran # (ANC) 11/18/2021 3.4  1.8 - 7.7 K/uL Final    Immature Grans (Abs) 11/18/2021 0.02  0.00 - 0.04 K/uL Final    Lymph # 11/18/2021 1.8  1.0 - 4.8 K/uL Final    Mono # 11/18/2021 0.4  0.3 - 1.0 K/uL Final    Eos # 11/18/2021 0.1  0.0 - 0.5 K/uL Final    Baso # 11/18/2021 0.01  0.00 - 0.20 K/uL Final    nRBC 11/18/2021 0  0 /100 WBC Final    Gran % 11/18/2021 59.1  38.0 - 73.0 % Final    Lymph % 11/18/2021 31.6  18.0 - 48.0 % Final    Mono % 11/18/2021 7.6  4.0 - 15.0 % Final    Eosinophil % 11/18/2021 1.1  0.0 - 8.0 % Final    Basophil % 11/18/2021 0.2  0.0 - 1.9 % Final    Differential Method 11/18/2021 Automated   Final    Sodium 11/18/2021 140  136 - 145 mmol/L Final    Potassium 11/18/2021 4.3  3.5 - 5.1 mmol/L Final    Chloride 11/18/2021 106  95 - 110 mmol/L Final    CO2 11/18/2021 27  23 - 29 mmol/L Final    Glucose 11/18/2021 127* 70 - 110 mg/dL Final    BUN 11/18/2021 19  8 - 23 mg/dL Final    Creatinine 11/18/2021 1.1  0.5 - 1.4 mg/dL Final    Calcium 11/18/2021 9.9  8.7 - 10.5 mg/dL Final    Total Protein 11/18/2021 6.4  6.0 - 8.4 g/dL Final    Albumin 11/18/2021 3.4* 3.5 - 5.2 g/dL Final    Total Bilirubin 11/18/2021 0.6  0.1 - 1.0 mg/dL Final    Alkaline Phosphatase 11/18/2021 43* 55 - 135 U/L Final    AST 11/18/2021 11  10 - 40 U/L Final    ALT 11/18/2021 10  10 - 44 U/L Final    Anion Gap 11/18/2021 7* 8 - 16 mmol/L Final    eGFR if African American 11/18/2021 >60.0  >60 mL/min/1.73 m^2 Final    eGFR if non  11/18/2021 53.9* >60 mL/min/1.73 m^2 Final    POCT Glucose 11/18/2021 114*  70 - 110 mg/dL Final    POCT Glucose 11/18/2021 125* 70 - 110 mg/dL Final    POCT Glucose 11/18/2021 127* 70 - 110 mg/dL Final   Lab Visit on 11/09/2021   Component Date Value Ref Range Status    Sodium 11/09/2021 145  136 - 145 mmol/L Final    Potassium 11/09/2021 3.9  3.5 - 5.1 mmol/L Final    Chloride 11/09/2021 107  95 - 110 mmol/L Final    CO2 11/09/2021 26  23 - 29 mmol/L Final    Glucose 11/09/2021 74  70 - 110 mg/dL Final    BUN 11/09/2021 16  8 - 23 mg/dL Final    Creatinine 11/09/2021 1.1  0.5 - 1.4 mg/dL Final    Calcium 11/09/2021 10.5  8.7 - 10.5 mg/dL Final    Total Protein 11/09/2021 7.0  6.0 - 8.4 g/dL Final    Albumin 11/09/2021 3.6  3.5 - 5.2 g/dL Final    Total Bilirubin 11/09/2021 0.4  0.1 - 1.0 mg/dL Final    Alkaline Phosphatase 11/09/2021 47* 55 - 135 U/L Final    AST 11/09/2021 16  10 - 40 U/L Final    ALT 11/09/2021 16  10 - 44 U/L Final    Anion Gap 11/09/2021 12  8 - 16 mmol/L Final    eGFR if African American 11/09/2021 >60.0  >60 mL/min/1.73 m^2 Final    eGFR if non African American 11/09/2021 53.9* >60 mL/min/1.73 m^2 Final    Cholesterol 11/09/2021 118* 120 - 199 mg/dL Final    Triglycerides 11/09/2021 59  30 - 150 mg/dL Final    HDL 11/09/2021 38* 40 - 75 mg/dL Final    LDL Cholesterol 11/09/2021 68.2  63.0 - 159.0 mg/dL Final    HDL/Cholesterol Ratio 11/09/2021 32.2  20.0 - 50.0 % Final    Total Cholesterol/HDL Ratio 11/09/2021 3.1  2.0 - 5.0 Final    Non-HDL Cholesterol 11/09/2021 80  mg/dL Final    Hemoglobin A1C 11/09/2021 6.0* 4.0 - 5.6 % Final    Estimated Avg Glucose 11/09/2021 126  68 - 131 mg/dL Final   Lab Visit on 08/02/2021   Component Date Value Ref Range Status    Sodium 08/02/2021 140  136 - 145 mmol/L Final    Potassium 08/02/2021 4.4  3.5 - 5.1 mmol/L Final    Chloride 08/02/2021 104  95 - 110 mmol/L Final    CO2 08/02/2021 28  23 - 29 mmol/L Final    Glucose 08/02/2021 260* 70 - 110 mg/dL Final    BUN 08/02/2021 18  8 - 23 mg/dL  Final    Creatinine 08/02/2021 1.2  0.5 - 1.4 mg/dL Final    Calcium 08/02/2021 10.6* 8.7 - 10.5 mg/dL Final    Anion Gap 08/02/2021 8  8 - 16 mmol/L Final    eGFR if  08/02/2021 56.4* >60 mL/min/1.73 m^2 Final    eGFR if non African American 08/02/2021 48.9* >60 mL/min/1.73 m^2 Final   (      Details    Reading Physician Reading Date Result Priority   Jan Baez MD  [phone]  [phone] 6/2/2020 Routine     Narrative & Impression  EXAMINATION:  NM MYOCARDIAL PERFUSION SPECT MULTI PHARM     CLINICAL HISTORY:  r07.9;e71.51;  Chest pain, unspecified     TECHNIQUE:  SPECT images were acquired after the injection of 10.2 mCi of Tc-99m tetrofosmin at rest and 29 mCi during a cardiac stress. The clinical stress and ECG portion of the study are to be read separately.     COMPARISON:  None.     FINDINGS:  The quality of the study is degraded by the patient's body habitus including significant anterior wall attenuation, more apparent on the rest component of the exam.     Stress SPECT images demonstrate homogenous distribution of the tracer throughout the left ventricle.  Diminished counts in the anterior walls likely related to attenuation artifact.  On the resting images, there is matched homogenous distribution of the tracer throughout the left ventricle.     The gated post-stress images reveal normal wall motion and thickening with an estimated LVEF of 63 %. The LV cavity is not dilated with an end-diastolic volume of 139 ml (normal is less than 140 mL) and an end-systolic volume of 52 ml (normal is less than 70 mL).   Volume measurements may be underestimated and ejection fractions overestimated in patients with small hearts.     Impression:     1. No definite evidence of ischemia.  2. The global left ventricular systolic function is normal with an LV ejection fraction of 63 % and no evidence of LV dilatation. Wall motion is normal.        Electronically signed by: Jan Baez  Date:                                             06/02/2020  Time:                                           15:47             Exam Ended: 06/02/20 13:38             Status: Final result       MyChart Results Release    [facility] Status: Active  Results Release       PACS Images for [facility] Viewer     Show images for CT Head Without Contrast    CT Head Without Contrast  Order: [id number]  · Status: Final result     · Visible to patient: Yes (seen)      · Next appt: 02/04/2022 at 08:20 AM in Pre-Admission Testing (COVID TESTING, [facility] PRE-ADMIT)      · 0 Result Notes      Details    Reading Physician Reading Date Result Priority   Martín Manzano MD  [phone]  [phone] 11/15/2021 STAT     Narrative & Impression  EXAMINATION:  CT HEAD WITHOUT CONTRAST     CLINICAL HISTORY:  Neuro deficit, acute, stroke suspected;     TECHNIQUE:  Low dose axial images were obtained through the head.  Coronal and sagittal reformations were also performed. Contrast was not administered.     COMPARISON:  MRI 09/25/2019     FINDINGS:  There is generalized cerebral volume loss.  There is hypoattenuation in a periventricular fashion, likely sequela of chronic microvascular ischemic change.There are scattered punctate foci of low attenuation within the bilateral basal ganglia, may reflect sequela of remote infarcts.  There is no evidence of acute major vascular territory infarct, hemorrhage, or mass.  There is no hydrocephalus.  There are no abnormal extra-axial fluid collections.  The paranasal sinuses and mastoid air cells are clear, and there is no evidence of calvarial fracture.  The visualized soft tissues are unremarkable.     Impression:     1. Sequela of chronic microvascular ischemic change and senescent change.  Several punctate foci of low attenuation are noted within the basal ganglia bilaterally suggesting remote infarcts however could mask focus of acute ischemia.  Correlation with current symptomatology is recommended,  further evaluation if warranted.        Electronically signed by: Martín Manzano MD  Date:                                            11/15/2021  Time:                                           16:45               Exam Ended: 11/15/21 16:38 Last Resulted: 11/15/21 16:45      ·  Order Details      ·  View Encounter      ·  Lab and Collection Details      ·  Routing     ·  Result History              Result Care Coordination        Patient Communication     Add Comments                Details    Reading Physician Reading Date Result Priority   Aaron Brewster MD  [phone]  [phone] 11/16/2021 Routine     Narrative & Impression  EXAMINATION:  US CAROTID BILATERAL     CLINICAL HISTORY:  TIA;     TECHNIQUE:  Grayscale and color Doppler ultrasound examination of the carotid and vertebral artery systems bilaterally.  Stenosis estimates are per the NASCET measurement criteria.  Note technologist notes a tortuous vessels and patient inability to hold still limits evaluation.     COMPARISON:  None.     FINDINGS:  Right:     Internal Carotid Artery (ICA):     Peak systolic velocity 58 cm/sec     End diastolic velocity 18 cm/sec     ICA/CCA peak systolic ratio: 0.7     ICA/CCA end diastolic ratio: 1.0     Plaque formation: No significant     Vertebral artery: Antegrade flow and normal waveform.     Left:     Internal Carotid Artery (ICA):     Peak systolic velocity 57 cm/sec     End diastolic velocity 20.4 cm/sec     ICA/CCA peak systolic ratio: 0.8     ICA/CCA end diastolic ratio: 1.0     Plaque formation: No significant     Vertebral artery: Antegrade flow and normal waveform.     Impression:     No evidence of a hemodynamically significant carotid bifurcation stenosis.  Limited examination.        Electronically signed by: Aaron Brewster MD  Date:                                            11/16/2021  Time:                                           08:39             Exam Ended: 11/16/21 08:23 Last Resulted:  11/16/21 08:39    Steve as an Unsuccessful Attempt      ·  Order Details    ·  View Encounter    ·  Lab and Collection Details    ·  Routing   ·  Result History            Result Care Coordination                ECG 12 lead  Order: [id number]  · Status: Final result   · Visible to patient: Yes (seen)    · Next appt: 02/04/2022 at 08:20 AM in Pre-Admission Testing (COVID TESTING, [facility] PRE-ADMIT)    · Dx: Stroke    · 0 Result Notes           Narrative  Performed by: GEMUSE  Test Reason : I63.9,     Vent. Rate : 097 BPM     Atrial Rate : 097 BPM      P-R Int : 132 ms          QRS Dur : 082 ms       QT Int : 354 ms       P-R-T Axes : 060 052 025 degrees      QTc Int : 449 ms     Normal sinus rhythm   Possible Left atrial enlargement   Borderline Abnormal ECG   When compared with ECG of 16-AUG-2021 10:22,   Nonspecific T wave abnormality, improved in Inferior leads   Nonspecific T wave abnormality no longer evident in Lateral leads   Confirmed by KRISTOFER ORO MD ([id number]) on 11/16/2021 9:53:55 AM     Referred By: AAAREFERR    SELF           Confirmed By:KRISTOFER ORO MD           TTE) complete  Order# [id number]  Reading physician: Aguilar Fox MD Ordering physician: Rut Morris NP Study date: 11/16/21       Reason for Exam  Priority: Routine  Dx: Stroke [I63.9 (ICD-10-CM)]     Result Image Hyperlink     Show images for Echo    Summary    · The left ventricle is normal in size with concentric hypertrophy and normal systolic function.  · The estimated ejection fraction is 70%.  · Grade I left ventricular diastolic dysfunction.  · Normal right ventricular size with normal right ventricular systolic function.  · Normal central venous pressure (3 mmHg).  · The estimated PA systolic pressure is 35 mmHg.         Vitals    Height Weight               Assessment:     1. Spinal stenosis of cervical region    2. Dyslipidemia, goal LDL below 70    3. Obstructive sleep apnea    4. Onychogryphosis    5. Essential hypertension    6. Type  2 diabetes mellitus with diabetic neuropathy, without long-term current use of insulin    7. Right leg weakness    8. BMI 32.0-32.9,adult      Plan:   Holly was seen today for pre-op exam.    Diagnoses and all orders for this visit:    Spinal stenosis of cervical region    Dyslipidemia, goal LDL below 70    Obstructive sleep apnea    Onychogryphosis    Essential hypertension    Type 2 diabetes mellitus with diabetic neuropathy, without long-term current use of insulin    Right leg weakness    BMI 32.0-32.9,adult     Pt is medically stable for cervical spine surgery.    OK to hold the ASA and Plavix for a week prior to surgery    Same meds    RTC 6 months    Follow up in about 6 months (around 7/31/2022).

## 2022-01-31 NOTE — PRE-PROCEDURE INSTRUCTIONS
PT called to inform me that her surgery was pushed back to 2/9/22 and we reviewed her medications to be held and pushed back the dates on her aspirin to 2/2 and Plavix to 2/4/22

## 2022-01-31 NOTE — TELEPHONE ENCOUNTER
Spoke with patient. Informed of new surgery date: 2/9/22. Patient verbalized understanding. All questions answered.         Nell Marshall PA-C  [facility]  Department of Neurosurgery  [phone]

## 2022-01-31 NOTE — ANESTHESIA PREPROCEDURE EVALUATION
01/31/2022  Holly Mcclelland is a 61 y.o., female   To undergo Procedure(s) (LRB):  DISCECTOMY, SPINE, CERVICAL, ANTERIOR APPROACH, WITH FUSION C3-4 Marcus PRADHAN notified (N/A)     Denies CP/SOB/GERD/MI/CVA/URI symptoms.  METS > 4  NPO > 8    Past Medical History:  Past Medical History:   Diagnosis Date    CHF (congestive heart failure)     Diabetes mellitus     Heart murmur     Hyperlipidemia     Hypertension     Sleep apnea     wears CPAP    Stroke        Past Surgical History:  Past Surgical History:   Procedure Laterality Date    BREAST BIOPSY Left 2015       Social History:  Social History     Socioeconomic History    Marital status:    Tobacco Use    Smoking status: Never Smoker    Smokeless tobacco: Never Used   Substance and Sexual Activity    Alcohol use: No    Drug use: No    Sexual activity: Not Currently       Medications:  No current facility-administered medications on file prior to encounter.     Current Outpatient Medications on File Prior to Encounter   Medication Sig Dispense Refill    hydrALAZINE (APRESOLINE) 25 MG tablet Take 1 tablet (25 mg total) by mouth 3 (three) times daily. 90 tablet 11    medroxyPROGESTERone (PROVERA) 5 MG tablet Take 1 tablet (5 mg total) by mouth once daily. 30 tablet 11    metFORMIN (GLUCOPHAGE) 1000 MG tablet Take 1 tablet (1,000 mg total) by mouth 2 (two) times daily with meals. 180 tablet 1    amLODIPine (NORVASC) 10 MG tablet Take 1 tablet (10 mg total) by mouth once daily. 30 tablet 11    aspirin (ECOTRIN) 81 MG EC tablet Take 81 mg by mouth once daily.  3    cholecalciferol, vitamin D3, 50,000 unit capsule Take 50,000 Units by mouth every 7 days.  3    clopidogrel (PLAVIX) 75 mg tablet Take 1 tablet (75 mg total) by mouth once daily. 30 tablet 11    dapagliflozin (FARXIGA) 5 mg Tab tablet Take 1 tablet (5 mg total) by mouth once  daily. 90 tablet 1    furosemide (LASIX) 20 MG tablet Take 20 mg by mouth daily as needed.       lisinopriL-hydrochlorothiazide (PRINZIDE,ZESTORETIC) 20-25 mg Tab Take 1 tablet by mouth once daily 90 tablet 2    ONETOUCH DELICA PLUS LANCET 33 gauge Misc USE 1  TO CHECK GLUCOSE 4 TIMES DAILY 100 each 0    ONETOUCH ULTRA TEST Strp USE 1 STRIP TO CHECK GLUCOSE 4 TIMES DAILY 100 each 0    TRULICITY 4.5 mg/0.5 mL pen injector INJECT 4.5 MG SUBCUTANEOUSLY ONCE A WEEK 4 pen 6       Allergies:  Review of patient's allergies indicates:   Allergen Reactions    Victoza [liraglutide] Other (See Comments)     Throat closed ? But not seen in the ER        Active Problems:  Patient Active Problem List   Diagnosis    TIA (transient ischemic attack)    GERD (gastroesophageal reflux disease)    Class 2 severe obesity due to excess calories with serious comorbidity and body mass index (BMI) of 35.0 to 35.9 in adult    Onychogryphosis    Onychocryptosis    Onychomycosis due to dermatophyte    Type 2 diabetes mellitus with neurologic complication, without long-term current use of insulin    Essential hypertension    Dyslipidemia, goal LDL below 70    Hypertensive heart disease with chronic diastolic congestive heart failure    Palpitations    Heart murmur    BMI 37.0-37.9, adult    Right leg weakness    History of cerebrovascular accident (CVA) greater than eight weeks in the past    Obstructive sleep apnea    Spinal stenosis of cervical region    BMI 32.0-32.9,adult       Diagnostic Studies:  Results for SARANYA WEST ESTEVAN (MRN [id number]) as of 2/9/2022 11:02   Ref. Range 1/28/2022 08:00   WBC Latest Ref Range: 3.90 - 12.70 K/uL 7.39   RBC Latest Ref Range: 4.00 - 5.40 M/uL 4.22   Hemoglobin Latest Ref Range: 12.0 - 16.0 g/dL 11.6 (L)   Hematocrit Latest Ref Range: 37.0 - 48.5 % 38.3   MCV Latest Ref Range: 82 - 98 fL 91   MCH Latest Ref Range: 27.0 - 31.0 pg 27.5   MCHC Latest Ref Range: 32.0 - 36.0 g/dL 30.3 (L)    RDW Latest Ref Range: 11.5 - 14.5 % 11.9   Platelets Latest Ref Range: 150 - 450 K/uL 306   MPV Latest Ref Range: 9.2 - 12.9 fL 10.3   Gran % Latest Ref Range: 38.0 - 73.0 % 67.5   Lymph % Latest Ref Range: 18.0 - 48.0 % 24.2   Mono % Latest Ref Range: 4.0 - 15.0 % 7.0   Eosinophil % Latest Ref Range: 0.0 - 8.0 % 0.8   Basophil % Latest Ref Range: 0.0 - 1.9 % 0.1   Immature Granulocytes Latest Ref Range: 0.0 - 0.5 % 0.4   Gran # (ANC) Latest Ref Range: 1.8 - 7.7 K/uL 5.0   Lymph # Latest Ref Range: 1.0 - 4.8 K/uL 1.8   Mono # Latest Ref Range: 0.3 - 1.0 K/uL 0.5   Eos # Latest Ref Range: 0.0 - 0.5 K/uL 0.1   Baso # Latest Ref Range: 0.00 - 0.20 K/uL 0.01   Immature Grans (Abs) Latest Ref Range: 0.00 - 0.04 K/uL 0.03   nRBC Latest Ref Range: 0 /100 WBC 0   Differential Method Unknown Automated   Results for SARANYA WEST (MRN [id number]) as of 2/9/2022 11:02   Ref. Range 1/28/2022 08:00   Sodium Latest Ref Range: 136 - 145 mmol/L 141   Potassium Latest Ref Range: 3.5 - 5.1 mmol/L 3.8   Chloride Latest Ref Range: 95 - 110 mmol/L 100   CO2 Latest Ref Range: 23 - 29 mmol/L 29   Anion Gap Latest Ref Range: 8 - 16 mmol/L 12   BUN Latest Ref Range: 8 - 23 mg/dL 18   Creatinine Latest Ref Range: 0.5 - 1.4 mg/dL 1.3   eGFR if non African American Latest Ref Range: >60 mL/min/1.73 m^2 44.1 (A)   eGFR if African American Latest Ref Range: >60 mL/min/1.73 m^2 50.8 (A)   Glucose Latest Ref Range: 70 - 110 mg/dL 145 (H)   Calcium Latest Ref Range: 8.7 - 10.5 mg/dL 10.5   Alkaline Phosphatase Latest Ref Range: 55 - 135 U/L 52 (L)   PROTEIN TOTAL Latest Ref Range: 6.0 - 8.4 g/dL 7.7   Albumin Latest Ref Range: 3.5 - 5.2 g/dL 3.6   BILIRUBIN TOTAL Latest Ref Range: 0.1 - 1.0 mg/dL 0.6   AST Latest Ref Range: 10 - 40 U/L 9 (L)   ALT Latest Ref Range: 10 - 44 U/L 14     EKG (1/28/22):  NSR    TTE (11/16/21):  · The left ventricle is normal in size with concentric hypertrophy and normal systolic function.  · The estimated ejection  fraction is 70%.  · Grade I left ventricular diastolic dysfunction.  · Normal right ventricular size with normal right ventricular systolic function.  · Normal central venous pressure (3 mmHg).  · The estimated PA systolic pressure is 35 mmHg.    24 Hour Vitals:  Temp:  [36.7 °C (98.1 °F)] 36.7 °C (98.1 °F)  Pulse:  [88] 88  Resp:  [18] 18  SpO2:  [97 %] 97 %  BP: (148)/(76) 148/76   See Nursing Charting For Additional Vitals    Anesthesia Evaluation    I have reviewed the Patient Summary Reports.    I have reviewed the Nursing Notes. I have reviewed the NPO Status.   I have reviewed the Medications.     Review of Systems  Anesthesia Hx:  No problems with previous Anesthesia  Denies Family Hx of Anesthesia complications.   Denies Personal Hx of Anesthesia complications.   Social:  No Alcohol Use, Non-Smoker    Hematology/Oncology:     Oncology Normal    -- Anemia:   EENT/Dental:EENT/Dental Normal   Cardiovascular:   Exercise tolerance: good Hypertension Valvular problems/Murmurs  Denies Angina. CHF hyperlipidemia        Functional Capacity good / => 4 METS    Pulmonary:   Sleep Apnea Not using due to recall   Renal/:  Renal/ Normal     Hepatic/GI:   Denies GERD.    Musculoskeletal:   Spinal stenosis of cervical region Spine Disorders: cervical    Neurological:   TIA, Denies CVA.    Endocrine:   Diabetes, type 2    Dermatological:  Skin Normal    Psych:  Psychiatric Normal           Physical Exam  General:  Obesity    Airway/Jaw/Neck:   MP2, TMD > 3FB, intact     Chest/Lungs:  Chest/Lungs Clear    Heart/Vascular:  Heart Findings: Normal            Anesthesia Plan  Type of Anesthesia, risks & benefits discussed:  Anesthesia Type:  general    Patient's Preference:   Plan Factors:          Intra-op Monitoring Plan: standard ASA monitors and arterial line  Intra-op Monitoring Plan Comments:   Post Op Pain Control Plan: multimodal analgesia, IV/PO Opioids PRN and per primary service following discharge from PACU  Post  "Op Pain Control Plan Comments:     Induction:   IV  Beta Blocker:  Patient is not currently on a Beta-Blocker (No further documentation required).       Informed Consent: Patient understands risks and agrees with Anesthesia plan.  Questions answered. Anesthesia consent signed with patient.  ASA Score: 3     Day of Surgery Review of History & Physical:        Anesthesia Plan Notes: Followed by Dr. Kahn- [facility] Dyslipidemia, TAWNYA, HTN, DM  " Pt is medically stable for cervical spine surgery."    GA with OETT  Standard ASA monitors, A-line  Recovery in PACU  PONV: 3          Ready For Surgery From Anesthesia Perspective.       "

## 2022-02-07 ENCOUNTER — ANESTHESIA (OUTPATIENT)
Dept: SURGERY | Facility: HOSPITAL | Age: 62
End: 2022-02-07
Payer: MEDICAID

## 2022-02-08 ENCOUNTER — HOSPITAL ENCOUNTER (OUTPATIENT)
Dept: PREADMISSION TESTING | Facility: HOSPITAL | Age: 62
Discharge: HOME | End: 2022-02-08
Attending: NEUROLOGICAL SURGERY
Payer: MEDICAID

## 2022-02-08 DIAGNOSIS — Z11.52 ENCOUNTER FOR PREOPERATIVE SCREENING LABORATORY TESTING FOR COVID-19 VIRUS: ICD-10-CM

## 2022-02-08 DIAGNOSIS — Z01.818 PREOPERATIVE TESTING: Primary | ICD-10-CM

## 2022-02-08 DIAGNOSIS — Z01.812 ENCOUNTER FOR PREOPERATIVE SCREENING LABORATORY TESTING FOR COVID-19 VIRUS: ICD-10-CM

## 2022-02-08 LAB
ABO + RH BLD: NORMAL
BLD GP AB SCN CELLS X3 SERPL QL: NORMAL
SARS-COV-2 RDRP RESP QL NAA+PROBE: NEGATIVE

## 2022-02-08 PROCEDURE — U0002 COVID-19 LAB TEST NON-CDC: HCPCS | Performed by: NEUROLOGICAL SURGERY

## 2022-02-08 PROCEDURE — 86920 COMPATIBILITY TEST SPIN: CPT | Performed by: NEUROLOGICAL SURGERY

## 2022-02-08 PROCEDURE — 36415 COLL VENOUS BLD VENIPUNCTURE: CPT | Performed by: NEUROLOGICAL SURGERY

## 2022-02-08 PROCEDURE — 86901 BLOOD TYPING SEROLOGIC RH(D): CPT | Performed by: NEUROLOGICAL SURGERY

## 2022-02-09 ENCOUNTER — HOSPITAL ENCOUNTER (OUTPATIENT)
Facility: HOSPITAL | Age: 62
Discharge: HOME | End: 2022-02-10
Attending: NEUROLOGICAL SURGERY | Admitting: NEUROLOGICAL SURGERY
Payer: MEDICAID

## 2022-02-09 DIAGNOSIS — Z98.1 S/P CERVICAL SPINAL FUSION: Primary | ICD-10-CM

## 2022-02-09 DIAGNOSIS — M48.02 SPINAL STENOSIS OF CERVICAL REGION: ICD-10-CM

## 2022-02-09 DIAGNOSIS — Z11.52 ENCOUNTER FOR PREOPERATIVE SCREENING LABORATORY TESTING FOR COVID-19 VIRUS: ICD-10-CM

## 2022-02-09 DIAGNOSIS — Z01.812 ENCOUNTER FOR PREOPERATIVE SCREENING LABORATORY TESTING FOR COVID-19 VIRUS: ICD-10-CM

## 2022-02-09 DIAGNOSIS — E11.40 TYPE 2 DIABETES MELLITUS WITH DIABETIC NEUROPATHY, WITHOUT LONG-TERM CURRENT USE OF INSULIN: ICD-10-CM

## 2022-02-09 DIAGNOSIS — M54.12 RADICULOPATHY, CERVICAL: ICD-10-CM

## 2022-02-09 LAB — POCT GLUCOSE: 108 MG/DL (ref 70–110)

## 2022-02-09 PROCEDURE — 63600175 PHARM REV CODE 636 W HCPCS: Performed by: ANESTHESIOLOGY

## 2022-02-09 PROCEDURE — 82962 GLUCOSE BLOOD TEST: CPT | Performed by: NEUROLOGICAL SURGERY

## 2022-02-09 PROCEDURE — 20930 SP BONE ALGRFT MORSEL ADD-ON: CPT | Mod: ,,, | Performed by: NEUROLOGICAL SURGERY

## 2022-02-09 PROCEDURE — 22551 PR ARTHRODESIS ANT INTERBODY INC DISCECTOMY, CERVICAL BELOW C2: ICD-10-PCS | Mod: ,,, | Performed by: NEUROLOGICAL SURGERY

## 2022-02-09 PROCEDURE — 37000009 HC ANESTHESIA EA ADD 15 MINS: Performed by: NEUROLOGICAL SURGERY

## 2022-02-09 PROCEDURE — 71000033 HC RECOVERY, INTIAL HOUR: Performed by: NEUROLOGICAL SURGERY

## 2022-02-09 PROCEDURE — 36620 ARTERIAL: ICD-10-PCS | Mod: 59,,, | Performed by: ANESTHESIOLOGY

## 2022-02-09 PROCEDURE — C1713 ANCHOR/SCREW BN/BN,TIS/BN: HCPCS | Performed by: NEUROLOGICAL SURGERY

## 2022-02-09 PROCEDURE — 36620 INSERTION CATHETER ARTERY: CPT | Performed by: ANESTHESIOLOGY

## 2022-02-09 PROCEDURE — 22551 ARTHRD ANT NTRBDY CERVICAL: CPT | Mod: ,,, | Performed by: NEUROLOGICAL SURGERY

## 2022-02-09 PROCEDURE — 63600175 PHARM REV CODE 636 W HCPCS: Performed by: STUDENT IN AN ORGANIZED HEALTH CARE EDUCATION/TRAINING PROGRAM

## 2022-02-09 PROCEDURE — D9220A PRA ANESTHESIA: ICD-10-PCS | Mod: CRNA,,, | Performed by: STUDENT IN AN ORGANIZED HEALTH CARE EDUCATION/TRAINING PROGRAM

## 2022-02-09 PROCEDURE — 00670 ANES XTNSV SP&SPI CORD PX: CPT | Performed by: NEUROLOGICAL SURGERY

## 2022-02-09 PROCEDURE — 22845 INSERT SPINE FIXATION DEVICE: CPT | Mod: AS,59,, | Performed by: PHYSICIAN ASSISTANT

## 2022-02-09 PROCEDURE — 22845 PR ANTERIOR INSTRUMENTATION 2-3 VERTEBRAL SEGMENTS: ICD-10-PCS | Mod: 59,,, | Performed by: NEUROLOGICAL SURGERY

## 2022-02-09 PROCEDURE — 22551 PR ARTHRODESIS ANT INTERBODY INC DISCECTOMY, CERVICAL BELOW C2: ICD-10-PCS | Mod: AS,,, | Performed by: PHYSICIAN ASSISTANT

## 2022-02-09 PROCEDURE — 22551 ARTHRD ANT NTRBDY CERVICAL: CPT | Mod: AS,,, | Performed by: PHYSICIAN ASSISTANT

## 2022-02-09 PROCEDURE — 71000039 HC RECOVERY, EACH ADD'L HOUR: Performed by: NEUROLOGICAL SURGERY

## 2022-02-09 PROCEDURE — D9220A PRA ANESTHESIA: ICD-10-PCS | Mod: ANES,,, | Performed by: ANESTHESIOLOGY

## 2022-02-09 PROCEDURE — C1889 IMPLANT/INSERT DEVICE, NOC: HCPCS | Performed by: NEUROLOGICAL SURGERY

## 2022-02-09 PROCEDURE — 25000003 PHARM REV CODE 250: Performed by: ANESTHESIOLOGY

## 2022-02-09 PROCEDURE — 22845 INSERT SPINE FIXATION DEVICE: CPT | Mod: 59,,, | Performed by: NEUROLOGICAL SURGERY

## 2022-02-09 PROCEDURE — C1751 CATH, INF, PER/CENT/MIDLINE: HCPCS | Performed by: ANESTHESIOLOGY

## 2022-02-09 PROCEDURE — 36000710: Performed by: NEUROLOGICAL SURGERY

## 2022-02-09 PROCEDURE — 25000003 PHARM REV CODE 250: Performed by: NURSE ANESTHETIST, CERTIFIED REGISTERED

## 2022-02-09 PROCEDURE — 25000003 PHARM REV CODE 250: Performed by: STUDENT IN AN ORGANIZED HEALTH CARE EDUCATION/TRAINING PROGRAM

## 2022-02-09 PROCEDURE — 25000003 PHARM REV CODE 250: Performed by: NEUROLOGICAL SURGERY

## 2022-02-09 PROCEDURE — 22853 PR INSERT BIOMECH DEV W/INTERBODY ARTHRODESIS, EA CONTIGUOUS DEFECT: ICD-10-PCS | Mod: AS,,, | Performed by: PHYSICIAN ASSISTANT

## 2022-02-09 PROCEDURE — 27201423 OPTIME MED/SURG SUP & DEVICES STERILE SUPPLY: Performed by: NEUROLOGICAL SURGERY

## 2022-02-09 PROCEDURE — 36000711: Performed by: NEUROLOGICAL SURGERY

## 2022-02-09 PROCEDURE — D9220A PRA ANESTHESIA: Mod: CRNA,,, | Performed by: STUDENT IN AN ORGANIZED HEALTH CARE EDUCATION/TRAINING PROGRAM

## 2022-02-09 PROCEDURE — 63600175 PHARM REV CODE 636 W HCPCS: Performed by: NEUROLOGICAL SURGERY

## 2022-02-09 PROCEDURE — 22845 PR ANTERIOR INSTRUMENTATION 2-3 VERTEBRAL SEGMENTS: ICD-10-PCS | Mod: AS,59,, | Performed by: PHYSICIAN ASSISTANT

## 2022-02-09 PROCEDURE — 20930 PR ALLOGRAFT FOR SPINE SURGERY ONLY MORSELIZED: ICD-10-PCS | Mod: ,,, | Performed by: NEUROLOGICAL SURGERY

## 2022-02-09 PROCEDURE — 76937 US GUIDE VASCULAR ACCESS: CPT | Mod: 26,,, | Performed by: ANESTHESIOLOGY

## 2022-02-09 PROCEDURE — 25000003 PHARM REV CODE 250: Performed by: PHYSICIAN ASSISTANT

## 2022-02-09 PROCEDURE — 22853 PR INSERT BIOMECH DEV W/INTERBODY ARTHRODESIS, EA CONTIGUOUS DEFECT: ICD-10-PCS | Mod: ,,, | Performed by: NEUROLOGICAL SURGERY

## 2022-02-09 PROCEDURE — 37000008 HC ANESTHESIA 1ST 15 MINUTES: Performed by: NEUROLOGICAL SURGERY

## 2022-02-09 PROCEDURE — 63600175 PHARM REV CODE 636 W HCPCS: Performed by: PHYSICIAN ASSISTANT

## 2022-02-09 PROCEDURE — 76937 ARTERIAL: ICD-10-PCS | Mod: 26,,, | Performed by: ANESTHESIOLOGY

## 2022-02-09 PROCEDURE — 22853 INSJ BIOMECHANICAL DEVICE: CPT | Mod: ,,, | Performed by: NEUROLOGICAL SURGERY

## 2022-02-09 PROCEDURE — 22853 INSJ BIOMECHANICAL DEVICE: CPT | Mod: AS,,, | Performed by: PHYSICIAN ASSISTANT

## 2022-02-09 PROCEDURE — D9220A PRA ANESTHESIA: Mod: ANES,,, | Performed by: ANESTHESIOLOGY

## 2022-02-09 DEVICE — MATRIX TEMPEST ALLOGRAFT 1CC: Type: IMPLANTABLE DEVICE | Site: SPINE CERVICAL | Status: FUNCTIONAL

## 2022-02-09 DEVICE — SCREW ZAVATION SD VAR 4.0X16MM: Type: IMPLANTABLE DEVICE | Site: SPINE CERVICAL | Status: FUNCTIONAL

## 2022-02-09 DEVICE — IMPLANTABLE DEVICE: Type: IMPLANTABLE DEVICE | Site: SPINE CERVICAL | Status: FUNCTIONAL

## 2022-02-09 DEVICE — PLATE ZAVATION CERV LVL 1 12MM: Type: IMPLANTABLE DEVICE | Site: SPINE CERVICAL | Status: FUNCTIONAL

## 2022-02-09 RX ORDER — PROCHLORPERAZINE EDISYLATE 5 MG/ML
5 INJECTION INTRAMUSCULAR; INTRAVENOUS EVERY 6 HOURS PRN
Status: DISCONTINUED | OUTPATIENT
Start: 2022-02-09 | End: 2022-02-10 | Stop reason: HOSPADM

## 2022-02-09 RX ORDER — ACETAMINOPHEN 325 MG/1
650 TABLET ORAL EVERY 6 HOURS PRN
Status: DISCONTINUED | OUTPATIENT
Start: 2022-02-09 | End: 2022-02-10 | Stop reason: HOSPADM

## 2022-02-09 RX ORDER — BUPIVACAINE HYDROCHLORIDE 5 MG/ML
INJECTION, SOLUTION PERINEURAL
Status: DISCONTINUED | OUTPATIENT
Start: 2022-02-09 | End: 2022-02-09 | Stop reason: HOSPADM

## 2022-02-09 RX ORDER — KETAMINE HYDROCHLORIDE 100 MG/ML
INJECTION, SOLUTION INTRAMUSCULAR; INTRAVENOUS
Status: DISCONTINUED | OUTPATIENT
Start: 2022-02-09 | End: 2022-02-09

## 2022-02-09 RX ORDER — SODIUM CHLORIDE 9 MG/ML
INJECTION, SOLUTION INTRAVENOUS CONTINUOUS
Status: DISCONTINUED | OUTPATIENT
Start: 2022-02-09 | End: 2022-02-09

## 2022-02-09 RX ORDER — METHOCARBAMOL 750 MG/1
750 TABLET, FILM COATED ORAL 3 TIMES DAILY PRN
Status: DISCONTINUED | OUTPATIENT
Start: 2022-02-09 | End: 2022-02-10 | Stop reason: HOSPADM

## 2022-02-09 RX ORDER — MUPIROCIN 20 MG/G
OINTMENT TOPICAL
Status: DISCONTINUED | OUTPATIENT
Start: 2022-02-09 | End: 2022-02-09 | Stop reason: SDUPTHER

## 2022-02-09 RX ORDER — LABETALOL HYDROCHLORIDE 5 MG/ML
10 INJECTION, SOLUTION INTRAVENOUS EVERY 4 HOURS PRN
Status: DISCONTINUED | OUTPATIENT
Start: 2022-02-09 | End: 2022-02-10 | Stop reason: HOSPADM

## 2022-02-09 RX ORDER — MEDROXYPROGESTERONE ACETATE 2.5 MG/1
5 TABLET ORAL DAILY
Status: DISCONTINUED | OUTPATIENT
Start: 2022-02-10 | End: 2022-02-10 | Stop reason: HOSPADM

## 2022-02-09 RX ORDER — MIDAZOLAM HYDROCHLORIDE 1 MG/ML
INJECTION, SOLUTION INTRAMUSCULAR; INTRAVENOUS
Status: DISCONTINUED | OUTPATIENT
Start: 2022-02-09 | End: 2022-02-09

## 2022-02-09 RX ORDER — SODIUM CHLORIDE 0.9 % (FLUSH) 0.9 %
10 SYRINGE (ML) INJECTION
Status: DISCONTINUED | OUTPATIENT
Start: 2022-02-09 | End: 2022-02-09 | Stop reason: HOSPADM

## 2022-02-09 RX ORDER — IBUPROFEN 200 MG
16 TABLET ORAL
Status: DISCONTINUED | OUTPATIENT
Start: 2022-02-09 | End: 2022-02-10 | Stop reason: HOSPADM

## 2022-02-09 RX ORDER — HYDROMORPHONE HYDROCHLORIDE 2 MG/ML
0.2 INJECTION, SOLUTION INTRAMUSCULAR; INTRAVENOUS; SUBCUTANEOUS EVERY 5 MIN PRN
Status: DISCONTINUED | OUTPATIENT
Start: 2022-02-09 | End: 2022-02-09 | Stop reason: HOSPADM

## 2022-02-09 RX ORDER — ACETAMINOPHEN 500 MG
1000 TABLET ORAL
Status: COMPLETED | OUTPATIENT
Start: 2022-02-09 | End: 2022-02-09

## 2022-02-09 RX ORDER — CEFAZOLIN SODIUM 2 G/50ML
2 SOLUTION INTRAVENOUS
Status: COMPLETED | OUTPATIENT
Start: 2022-02-09 | End: 2022-02-09

## 2022-02-09 RX ORDER — SODIUM CHLORIDE 9 MG/ML
INJECTION, SOLUTION INTRAVENOUS CONTINUOUS
Status: DISCONTINUED | OUTPATIENT
Start: 2022-02-10 | End: 2022-02-10 | Stop reason: HOSPADM

## 2022-02-09 RX ORDER — IBUPROFEN 200 MG
24 TABLET ORAL
Status: DISCONTINUED | OUTPATIENT
Start: 2022-02-09 | End: 2022-02-10 | Stop reason: HOSPADM

## 2022-02-09 RX ORDER — PROPOFOL 10 MG/ML
VIAL (ML) INTRAVENOUS CONTINUOUS PRN
Status: DISCONTINUED | OUTPATIENT
Start: 2022-02-09 | End: 2022-02-09

## 2022-02-09 RX ORDER — SCOLOPAMINE TRANSDERMAL SYSTEM 1 MG/1
PATCH, EXTENDED RELEASE TRANSDERMAL
Status: DISCONTINUED | OUTPATIENT
Start: 2022-02-09 | End: 2022-02-09

## 2022-02-09 RX ORDER — AMLODIPINE BESYLATE 5 MG/1
10 TABLET ORAL DAILY
Status: DISCONTINUED | OUTPATIENT
Start: 2022-02-10 | End: 2022-02-10 | Stop reason: HOSPADM

## 2022-02-09 RX ORDER — DEXAMETHASONE SODIUM PHOSPHATE 4 MG/ML
INJECTION, SOLUTION INTRA-ARTICULAR; INTRALESIONAL; INTRAMUSCULAR; INTRAVENOUS; SOFT TISSUE
Status: DISCONTINUED | OUTPATIENT
Start: 2022-02-09 | End: 2022-02-09

## 2022-02-09 RX ORDER — ONDANSETRON 2 MG/ML
4 INJECTION INTRAMUSCULAR; INTRAVENOUS DAILY PRN
Status: DISCONTINUED | OUTPATIENT
Start: 2022-02-09 | End: 2022-02-09 | Stop reason: HOSPADM

## 2022-02-09 RX ORDER — HYDRALAZINE HYDROCHLORIDE 20 MG/ML
10 INJECTION INTRAMUSCULAR; INTRAVENOUS EVERY 4 HOURS PRN
Status: DISCONTINUED | OUTPATIENT
Start: 2022-02-09 | End: 2022-02-10 | Stop reason: HOSPADM

## 2022-02-09 RX ORDER — VANCOMYCIN HYDROCHLORIDE 1 G/20ML
INJECTION, POWDER, LYOPHILIZED, FOR SOLUTION INTRAVENOUS
Status: DISCONTINUED | OUTPATIENT
Start: 2022-02-09 | End: 2022-02-09 | Stop reason: HOSPADM

## 2022-02-09 RX ORDER — BISACODYL 10 MG
10 SUPPOSITORY, RECTAL RECTAL DAILY PRN
Status: DISCONTINUED | OUTPATIENT
Start: 2022-02-09 | End: 2022-02-10 | Stop reason: HOSPADM

## 2022-02-09 RX ORDER — MAG HYDROX/ALUMINUM HYD/SIMETH 200-200-20
30 SUSPENSION, ORAL (FINAL DOSE FORM) ORAL EVERY 4 HOURS PRN
Status: DISCONTINUED | OUTPATIENT
Start: 2022-02-09 | End: 2022-02-10 | Stop reason: HOSPADM

## 2022-02-09 RX ORDER — MUPIROCIN 20 MG/G
1 OINTMENT TOPICAL
Status: COMPLETED | OUTPATIENT
Start: 2022-02-09 | End: 2022-02-09

## 2022-02-09 RX ORDER — LISINOPRIL 20 MG/1
20 TABLET ORAL DAILY
Status: DISCONTINUED | OUTPATIENT
Start: 2022-02-10 | End: 2022-02-10 | Stop reason: HOSPADM

## 2022-02-09 RX ORDER — PROPOFOL 10 MG/ML
VIAL (ML) INTRAVENOUS
Status: DISCONTINUED | OUTPATIENT
Start: 2022-02-09 | End: 2022-02-09

## 2022-02-09 RX ORDER — HALOPERIDOL 5 MG/ML
0.5 INJECTION INTRAMUSCULAR EVERY 10 MIN PRN
Status: DISCONTINUED | OUTPATIENT
Start: 2022-02-09 | End: 2022-02-09 | Stop reason: HOSPADM

## 2022-02-09 RX ORDER — LIDOCAINE HYDROCHLORIDE AND EPINEPHRINE 20; 10 MG/ML; UG/ML
INJECTION, SOLUTION INFILTRATION; PERINEURAL
Status: DISCONTINUED | OUTPATIENT
Start: 2022-02-09 | End: 2022-02-09 | Stop reason: HOSPADM

## 2022-02-09 RX ORDER — HYDRALAZINE HYDROCHLORIDE 25 MG/1
25 TABLET, FILM COATED ORAL 3 TIMES DAILY
Status: DISCONTINUED | OUTPATIENT
Start: 2022-02-10 | End: 2022-02-10 | Stop reason: HOSPADM

## 2022-02-09 RX ORDER — FENTANYL CITRATE 50 UG/ML
INJECTION, SOLUTION INTRAMUSCULAR; INTRAVENOUS
Status: DISCONTINUED | OUTPATIENT
Start: 2022-02-09 | End: 2022-02-09

## 2022-02-09 RX ORDER — HYDROCODONE BITARTRATE AND ACETAMINOPHEN 10; 325 MG/1; MG/1
1 TABLET ORAL EVERY 4 HOURS PRN
Status: DISCONTINUED | OUTPATIENT
Start: 2022-02-09 | End: 2022-02-10 | Stop reason: HOSPADM

## 2022-02-09 RX ORDER — INSULIN ASPART 100 [IU]/ML
1-10 INJECTION, SOLUTION INTRAVENOUS; SUBCUTANEOUS
Status: DISCONTINUED | OUTPATIENT
Start: 2022-02-09 | End: 2022-02-10 | Stop reason: HOSPADM

## 2022-02-09 RX ORDER — HYDROMORPHONE HYDROCHLORIDE 2 MG/ML
0.5 INJECTION, SOLUTION INTRAMUSCULAR; INTRAVENOUS; SUBCUTANEOUS
Status: DISCONTINUED | OUTPATIENT
Start: 2022-02-09 | End: 2022-02-10 | Stop reason: HOSPADM

## 2022-02-09 RX ORDER — SODIUM CHLORIDE, SODIUM LACTATE, POTASSIUM CHLORIDE, CALCIUM CHLORIDE 600; 310; 30; 20 MG/100ML; MG/100ML; MG/100ML; MG/100ML
INJECTION, SOLUTION INTRAVENOUS CONTINUOUS
Status: DISCONTINUED | OUTPATIENT
Start: 2022-02-09 | End: 2022-02-09

## 2022-02-09 RX ORDER — AMOXICILLIN 250 MG
2 CAPSULE ORAL NIGHTLY PRN
Status: DISCONTINUED | OUTPATIENT
Start: 2022-02-09 | End: 2022-02-10 | Stop reason: HOSPADM

## 2022-02-09 RX ORDER — POLYETHYLENE GLYCOL 3350 17 G/17G
17 POWDER, FOR SOLUTION ORAL DAILY
Status: DISCONTINUED | OUTPATIENT
Start: 2022-02-10 | End: 2022-02-10 | Stop reason: HOSPADM

## 2022-02-09 RX ORDER — METOPROLOL TARTRATE 1 MG/ML
INJECTION, SOLUTION INTRAVENOUS
Status: DISCONTINUED | OUTPATIENT
Start: 2022-02-09 | End: 2022-02-09

## 2022-02-09 RX ORDER — GLUCAGON 1 MG
1 KIT INJECTION
Status: DISCONTINUED | OUTPATIENT
Start: 2022-02-09 | End: 2022-02-10 | Stop reason: HOSPADM

## 2022-02-09 RX ORDER — CEFAZOLIN SODIUM 2 G/50ML
2 SOLUTION INTRAVENOUS
Status: COMPLETED | OUTPATIENT
Start: 2022-02-10 | End: 2022-02-10

## 2022-02-09 RX ORDER — SUCCINYLCHOLINE CHLORIDE 20 MG/ML
INJECTION INTRAMUSCULAR; INTRAVENOUS
Status: DISCONTINUED | OUTPATIENT
Start: 2022-02-09 | End: 2022-02-09

## 2022-02-09 RX ORDER — LIDOCAINE HYDROCHLORIDE 20 MG/ML
INJECTION INTRAVENOUS
Status: DISCONTINUED | OUTPATIENT
Start: 2022-02-09 | End: 2022-02-09

## 2022-02-09 RX ORDER — FUROSEMIDE 20 MG/1
20 TABLET ORAL DAILY PRN
Status: DISCONTINUED | OUTPATIENT
Start: 2022-02-09 | End: 2022-02-10 | Stop reason: HOSPADM

## 2022-02-09 RX ORDER — HEPARIN SODIUM 5000 [USP'U]/ML
5000 INJECTION, SOLUTION INTRAVENOUS; SUBCUTANEOUS EVERY 8 HOURS
Status: DISCONTINUED | OUTPATIENT
Start: 2022-02-10 | End: 2022-02-10 | Stop reason: HOSPADM

## 2022-02-09 RX ORDER — METHYLPREDNISOLONE ACETATE 40 MG/ML
INJECTION, SUSPENSION INTRA-ARTICULAR; INTRALESIONAL; INTRAMUSCULAR; SOFT TISSUE
Status: DISCONTINUED | OUTPATIENT
Start: 2022-02-09 | End: 2022-02-09 | Stop reason: HOSPADM

## 2022-02-09 RX ORDER — HYDROCODONE BITARTRATE AND ACETAMINOPHEN 5; 325 MG/1; MG/1
1 TABLET ORAL EVERY 4 HOURS PRN
Status: DISCONTINUED | OUTPATIENT
Start: 2022-02-09 | End: 2022-02-10 | Stop reason: HOSPADM

## 2022-02-09 RX ORDER — HYDROCHLOROTHIAZIDE 25 MG/1
25 TABLET ORAL DAILY
Status: DISCONTINUED | OUTPATIENT
Start: 2022-02-10 | End: 2022-02-10 | Stop reason: HOSPADM

## 2022-02-09 RX ORDER — ONDANSETRON 2 MG/ML
8 INJECTION INTRAMUSCULAR; INTRAVENOUS EVERY 6 HOURS PRN
Status: DISCONTINUED | OUTPATIENT
Start: 2022-02-09 | End: 2022-02-10 | Stop reason: HOSPADM

## 2022-02-09 RX ORDER — MUPIROCIN 20 MG/G
OINTMENT TOPICAL 2 TIMES DAILY
Status: DISCONTINUED | OUTPATIENT
Start: 2022-02-10 | End: 2022-02-10 | Stop reason: HOSPADM

## 2022-02-09 RX ORDER — HYDRALAZINE HYDROCHLORIDE 20 MG/ML
10 INJECTION INTRAMUSCULAR; INTRAVENOUS ONCE
Status: COMPLETED | OUTPATIENT
Start: 2022-02-09 | End: 2022-02-09

## 2022-02-09 RX ORDER — DOCUSATE SODIUM 100 MG/1
100 CAPSULE, LIQUID FILLED ORAL DAILY
Status: DISCONTINUED | OUTPATIENT
Start: 2022-02-10 | End: 2022-02-10 | Stop reason: HOSPADM

## 2022-02-09 RX ADMIN — SODIUM CHLORIDE, SODIUM LACTATE, POTASSIUM CHLORIDE, AND CALCIUM CHLORIDE: .6; .31; .03; .02 INJECTION, SOLUTION INTRAVENOUS at 10:02

## 2022-02-09 RX ADMIN — MUPIROCIN 1 G: 20 OINTMENT TOPICAL at 11:02

## 2022-02-09 RX ADMIN — HYDROMORPHONE HYDROCHLORIDE 0.2 MG: 2 INJECTION INTRAMUSCULAR; INTRAVENOUS; SUBCUTANEOUS at 05:02

## 2022-02-09 RX ADMIN — FENTANYL CITRATE 50 MCG: 50 INJECTION, SOLUTION INTRAMUSCULAR; INTRAVENOUS at 01:02

## 2022-02-09 RX ADMIN — HYDROMORPHONE HYDROCHLORIDE 0.2 MG: 2 INJECTION INTRAMUSCULAR; INTRAVENOUS; SUBCUTANEOUS at 08:02

## 2022-02-09 RX ADMIN — LIDOCAINE HYDROCHLORIDE 100 MG: 20 INJECTION, SOLUTION INTRAVENOUS at 01:02

## 2022-02-09 RX ADMIN — GLYCOPYRROLATE 0.2 MG: 0.2 INJECTION, SOLUTION INTRAMUSCULAR; INTRAVITREAL at 01:02

## 2022-02-09 RX ADMIN — PROPOFOL 50 MG: 10 INJECTION, EMULSION INTRAVENOUS at 02:02

## 2022-02-09 RX ADMIN — MIDAZOLAM HYDROCHLORIDE 2 MG: 1 INJECTION, SOLUTION INTRAMUSCULAR; INTRAVENOUS at 01:02

## 2022-02-09 RX ADMIN — PROPOFOL 175 MCG/KG/MIN: 10 INJECTION, EMULSION INTRAVENOUS at 02:02

## 2022-02-09 RX ADMIN — PROPOFOL 40 MG: 10 INJECTION, EMULSION INTRAVENOUS at 02:02

## 2022-02-09 RX ADMIN — SCOPOLAMINE 1 PATCH: 1 PATCH, EXTENDED RELEASE TRANSDERMAL at 12:02

## 2022-02-09 RX ADMIN — METOPROLOL TARTRATE 5 MG: 5 INJECTION, SOLUTION INTRAVENOUS at 04:02

## 2022-02-09 RX ADMIN — ACETAMINOPHEN 1000 MG: 500 TABLET ORAL at 11:02

## 2022-02-09 RX ADMIN — PROPOFOL 125 MCG/KG/MIN: 10 INJECTION, EMULSION INTRAVENOUS at 02:02

## 2022-02-09 RX ADMIN — PROPOFOL 150 MG: 10 INJECTION, EMULSION INTRAVENOUS at 01:02

## 2022-02-09 RX ADMIN — HYDROMORPHONE HYDROCHLORIDE 0.2 MG: 2 INJECTION INTRAMUSCULAR; INTRAVENOUS; SUBCUTANEOUS at 06:02

## 2022-02-09 RX ADMIN — PHENYLEPHRINE HYDROCHLORIDE 20 MCG/MIN: 10 INJECTION INTRAVENOUS at 02:02

## 2022-02-09 RX ADMIN — HYDRALAZINE HYDROCHLORIDE 10 MG: 20 INJECTION, SOLUTION INTRAMUSCULAR; INTRAVENOUS at 05:02

## 2022-02-09 RX ADMIN — SUCCINYLCHOLINE CHLORIDE 100 MG: 20 INJECTION, SOLUTION INTRAMUSCULAR; INTRAVENOUS at 01:02

## 2022-02-09 RX ADMIN — KETAMINE HYDROCHLORIDE 20 MG: 100 INJECTION, SOLUTION, CONCENTRATE INTRAMUSCULAR; INTRAVENOUS at 02:02

## 2022-02-09 RX ADMIN — SODIUM CHLORIDE, SODIUM LACTATE, POTASSIUM CHLORIDE, AND CALCIUM CHLORIDE: .6; .31; .03; .02 INJECTION, SOLUTION INTRAVENOUS at 02:02

## 2022-02-09 RX ADMIN — KETAMINE HYDROCHLORIDE 20 MG: 100 INJECTION, SOLUTION, CONCENTRATE INTRAMUSCULAR; INTRAVENOUS at 01:02

## 2022-02-09 RX ADMIN — KETAMINE HYDROCHLORIDE 30 MG: 100 INJECTION, SOLUTION, CONCENTRATE INTRAMUSCULAR; INTRAVENOUS at 02:02

## 2022-02-09 RX ADMIN — DEXAMETHASONE SODIUM PHOSPHATE 12 MG: 4 INJECTION, SOLUTION INTRAMUSCULAR; INTRAVENOUS at 02:02

## 2022-02-09 RX ADMIN — REMIFENTANIL HYDROCHLORIDE 0.2 MCG/KG/MIN: 1 INJECTION, POWDER, LYOPHILIZED, FOR SOLUTION INTRAVENOUS at 02:02

## 2022-02-09 RX ADMIN — CEFAZOLIN SODIUM 2 G: 2 SOLUTION INTRAVENOUS at 02:02

## 2022-02-09 NOTE — OP NOTE
DATE OF PROCEDURE:  2/9/2022     SURGEON:  Damian Arroyo M.D.,Ph.D.     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  Cervical myelopathy.      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  Cervical myelopathy.      PROCEDURES:  1.  Open anterior cervical approach.  2.  C3-4 diskectomy.  3.  Placement of interbody PEEK cage at C3-4 measuring 8 mm in height.  4.  Allograft at C3-4  5.  Placement of anterior cervical plate with associated fusion.  6.  Neuromonitoring with SSEPs, MEPs and EMG.     INDICATIONS IN DETAIL:  Ms. Holly Mcclelland is a 61 y.o. woman with C3-4 HNP and cervical myelopathy with a history of  DM, CHF, HLD, HTN, and stroke, who was the presents today for a C3-4 ACDF. This is a pt who developed right leg weakness last month and was seen in the ED to r/o stroke. Work up was negative for stroke. She had MRI lumbar spine done and found to have mild central canal spinal stenosis, bulging at L3-L4. The pt reports of chronic neck pain that has gradually worsened in the last few weeks. The pain is so severe that it is interfering with her work. 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 States her right foot tends to drag when she walks. The pt also reports of pain and spasms to her bilateral hands.  MRI Cervical Spine WO Contrast (12/27/2021): Cervical spondylosis, most prominent at C3-4, as above. This results in severe left-sided spinal canal stenosis and increased cord signal, concerning for myelomalacia and/or cord edema. I have recommended C3-4 ACDF. I have discussed the risks/benefits, indications, and alternatives for the proposed procedure in detail. I have answered all of their questions and the pt wishes to proceed with surgery.      PROCEDURE IN DETAIL:    The patient was seen in the pretreatment area and the risks, benefits and alternatives  were discussed.  The patient wished to proceed. The patient was brought to the Operating Room and general anesthetic was administered.  All proper lines were placed.  The patient was placed in the supine position with a bolster between her shoulder blades and her neck in slight extension.  The AP and lateral fluoroscopy was performed and the C3-4 levels were marked on the patient's neck.  The patient's neck was cleaned, prepped and draped in the usual fashion.  A line was drawn from approximately within a crease on in the neck on the right side.  After the patient was prepped, a lidocaine-bupivacaine mix was infiltrated under the skin and an incision of approximately 4 cm was made.  This was carried down to the level of platysma using Bovie cautery.  The platysma was then dissected and cut sharply using Metzenbaum scissors.  We then dissected the subplatysmal plane and placed a Weitlaner retractor.  We then dissected along the medial border of the sternocleidomastoid and cut sharply the anterior cervical.  Once   this was performed, we dissected more medially and were able to see the carotid sheath.  We dissected medial to this and was able to palpate anterior aspect of the cervical column.  A [employer] handheld retractor was then used to retract the esophagus and trachea medially and allow exposure of the anterior cervical area.  Then, using a Kittner, blunt dissection was then made to expose the anterior cervical column.  The C3-4 was identified.  This was verified by placing a bayoneted needle in the interspace and performing a lateral fluoroscopy.  Once this area was verified, the deep fascia was cut and we were able to see from C3-4  Trimline retractors were placed.  The osteophytes were drilled off the anterior aspect of C3 and C4.  A distracting pin was placed in either one and distraction was made across the level.  The annulus was cut and diskectomy was performed using curettes and rongeurs in the usual  fashion.  Once we removed the majority of disk, we then drilled the osteophytes posteriorly, dissected the PLL and removed it decompressing the thecal sac.  Once this was complete, we had completely removed the disc entirely.  A  PEEK cage was then placed with demineralized bone matrix at its center.  Once this was in place, the wound was irrigated copiously.  All bleeding points were coagulated.  The Trimline retractors were then removed. An anterior cervical plate measuring 12 was then obtained and placed over C3-4 . We placed 2 screws in each level at to secure this in place.  AP and lateral fluoroscopy was performed verifying excellent placement of the hardware.  The wound was irrigated copiously.  All bleeding points were coagulated.  The platysmal layer was closed.  The skin was then closed using a 4-0 Monocryl suture in a subcuticular fashion.  A clean dressing was placed.  The patient was then awakened by the   Anesthesia staff.  At the point, the patient was awake and following commands,   she was extubated.     EBL was less than 25 mL.     No specimen was sent from the surgery.     There were no intraprocedural complications.     All counts were correct at the end of surgery.     Dr. Damian Arroyo was present during the entire procedure.

## 2022-02-09 NOTE — ANESTHESIA PROCEDURE NOTES
Intubation    Date/Time: 2/9/2022 2:01 PM  Performed by: Gavi Winchester CRNA  Authorized by: Jan Hernandez MD     Intubation:     Induction:  Intravenous    Intubated:  Postinduction    Mask Ventilation:  Easy with oral airway    Attempts:  1    Attempted By:  Student (SRNA)    Method of Intubation:  Video laryngoscopy    Blade:  Ricketts 3    Laryngeal View Grade: Grade I - full view of cords      Difficult Airway Encountered?: No      Complications:  None    Airway Device:  Oral endotracheal tube    Airway Device Size:  7.0    Style/Cuff Inflation:  Cuffed (inflated to minimal occlusive pressure)    Tube secured:  22    Secured at:  The lips    Placement Verified By:  Capnometry    Complicating Factors:  None    Findings Post-Intubation:  BS equal bilateral and atraumatic/condition of teeth unchanged

## 2022-02-09 NOTE — BRIEF OP NOTE
[facility] - Surgery  Brief Operative Note    SUMMARY     Surgery Date: 2/9/2022     Surgeon(s) and Role:     * Damian Arroyo MD - Primary    Assisting Surgeon: Nell Marshall PA-C     Pre-op Diagnosis:  Spinal stenosis of cervical region [M48.02]  Cervicalgia [M54.2]    Post-op Diagnosis:  Post-Op Diagnosis Codes:     * Spinal stenosis of cervical region [M48.02]     * Cervicalgia [M54.2]    Procedure(s) (LRB):  DISCECTOMY, SPINE, CERVICAL, ANTERIOR APPROACH, WITH FUSION C3-4 Marcus PRADHAN notified (N/A)    Anesthesia: General    Operative Findings: C3-4 ACDF     Estimated Blood Loss: 50cc           Specimens:   Specimen (24h ago, onward)            None          [id number]

## 2022-02-09 NOTE — TRANSFER OF CARE
Anesthesia Transfer of Care Note    Patient: Holly Mcclelland    Procedure(s) Performed: Procedure(s) (LRB):  DISCECTOMY, SPINE, CERVICAL, ANTERIOR APPROACH, WITH FUSION C3-4 Marcus PRADHAN notified (N/A)    Patient location: PACU    Anesthesia Type: general    Transport from OR: Transported from OR on room air with adequate spontaneous ventilation    Post pain: adequate analgesia    Post assessment: no apparent anesthetic complications and tolerated procedure well    Post vital signs: stable    Level of consciousness: awake, alert and oriented    Nausea/Vomiting: no nausea/vomiting    Complications: none    Transfer of care protocol was followed      Last vitals:   Visit Vitals  BP (!) 173/95   Pulse 96   Temp 36.7 °C (98.1 °F) (Oral)   Resp (!) 23   Wt 93.3 kg (205 lb 12 oz)   SpO2 100%   Breastfeeding No   BMI 32.23 kg/m²

## 2022-02-09 NOTE — PROGRESS NOTES
Certification of Assistant at Surgery       Surgery Date: 2/9/2022     Participating Surgeons:  Surgeon(s) and Role:     * Damian Arroyo MD - Primary       Nell Marshall PA-C - Assisting       Procedures:  Procedure(s) (LRB):  DISCECTOMY, SPINE, CERVICAL, ANTERIOR APPROACH, WITH FUSION C3-4 Marcus PRADHAN notified (N/A)    Assistant Surgeon's Certification of Necessity:  I understand that section 1842 (b) (6) (d) of the Social Security Act generally prohibits Medicare Part B reasonable charge payment for the services of assistants at surgery in teaching hospitals when qualified residents are available to furnish such services. I certify that the services for which payment is claimed were medically necessary, and that no qualified resident was available to perform the services. I further understand that these services are subject to post-payment review by the Medicare carrier.      Nell Marshall PA-C    02/09/2022  4:32 PM

## 2022-02-09 NOTE — ANESTHESIA PROCEDURE NOTES
Arterial    Diagnosis: Cervical Stenosis  Doctor requesting consult: Cruz    Patient location during procedure: pre-op  Procedure start time: 2/9/2022 1:06 PM  Timeout: 2/9/2022 1:05 PM  Procedure end time: 2/9/2022 1:12 PM    Staffing  Authorizing Provider: Jan Hernandez MD  Performing Provider: Jan Hernandez MD    Anesthesiologist was present at the time of the procedure.    Preanesthetic Checklist  Completed: patient identified, IV checked, site marked, risks and benefits discussed, surgical consent, monitors and equipment checked, pre-op evaluation, timeout performed and anesthesia consent givenArterial  Skin Prep: chlorhexidine gluconate  Local Infiltration: lidocaine  Orientation: right  Location: radial    Catheter Size: 20 G  Catheter placement by Ultrasound guidance. Heme positive aspiration all ports.  Vessel Caliber: medium, patent, compressibility normal  Vascular Doppler:  not done  Needle advanced into vessel with real time Ultrasound guidance.  Guidewire confirmed in vessel.  Sterile sheath used.  Image recorded and saved.Insertion Attempts: 1  Assessment  Dressing: secured with tape and tegaderm

## 2022-02-10 VITALS
RESPIRATION RATE: 18 BRPM | WEIGHT: 205.81 LBS | TEMPERATURE: 99 F | DIASTOLIC BLOOD PRESSURE: 82 MMHG | OXYGEN SATURATION: 99 % | HEART RATE: 114 BPM | SYSTOLIC BLOOD PRESSURE: 174 MMHG | HEIGHT: 67 IN | BODY MASS INDEX: 32.3 KG/M2

## 2022-02-10 LAB
ESTIMATED AVG GLUCOSE: 143 MG/DL (ref 68–131)
HBA1C MFR BLD: 6.6 % (ref 4–5.6)
POCT GLUCOSE: 169 MG/DL (ref 70–110)

## 2022-02-10 PROCEDURE — 83036 HEMOGLOBIN GLYCOSYLATED A1C: CPT | Performed by: PHYSICIAN ASSISTANT

## 2022-02-10 PROCEDURE — 36415 COLL VENOUS BLD VENIPUNCTURE: CPT | Performed by: PHYSICIAN ASSISTANT

## 2022-02-10 PROCEDURE — 97165 OT EVAL LOW COMPLEX 30 MIN: CPT

## 2022-02-10 PROCEDURE — 63600175 PHARM REV CODE 636 W HCPCS: Performed by: PHYSICIAN ASSISTANT

## 2022-02-10 PROCEDURE — 25000003 PHARM REV CODE 250: Performed by: PHYSICIAN ASSISTANT

## 2022-02-10 RX ORDER — HYDROCODONE BITARTRATE AND ACETAMINOPHEN 10; 325 MG/1; MG/1
1 TABLET ORAL EVERY 4 HOURS PRN
Qty: 42 TABLET | Refills: 0 | Status: SHIPPED | OUTPATIENT
Start: 2022-02-10 | End: 2022-02-10

## 2022-02-10 RX ORDER — CLOPIDOGREL BISULFATE 75 MG/1
75 TABLET ORAL DAILY
Qty: 30 TABLET | Refills: 0
Start: 2022-02-14 | End: 2022-03-23

## 2022-02-10 RX ORDER — HYDROCODONE BITARTRATE AND ACETAMINOPHEN 10; 325 MG/1; MG/1
1 TABLET ORAL EVERY 4 HOURS PRN
Qty: 42 TABLET | Refills: 0 | Status: SHIPPED | OUTPATIENT
Start: 2022-02-10 | End: 2023-03-01

## 2022-02-10 RX ORDER — METHOCARBAMOL 750 MG/1
750 TABLET, FILM COATED ORAL EVERY 8 HOURS PRN
Qty: 60 TABLET | Refills: 0 | Status: SHIPPED | OUTPATIENT
Start: 2022-02-10 | End: 2022-11-09

## 2022-02-10 RX ORDER — ASPIRIN 81 MG/1
81 TABLET ORAL DAILY
Refills: 3
Start: 2022-02-14 | End: 2022-02-28

## 2022-02-10 RX ORDER — METHOCARBAMOL 750 MG/1
750 TABLET, FILM COATED ORAL EVERY 8 HOURS PRN
Qty: 60 TABLET | Refills: 0 | Status: SHIPPED | OUTPATIENT
Start: 2022-02-10 | End: 2022-02-10

## 2022-02-10 RX ADMIN — SODIUM CHLORIDE: 0.9 INJECTION, SOLUTION INTRAVENOUS at 12:02

## 2022-02-10 RX ADMIN — HYDRALAZINE HYDROCHLORIDE 25 MG: 25 TABLET, FILM COATED ORAL at 08:02

## 2022-02-10 RX ADMIN — DOCUSATE SODIUM 100 MG: 100 CAPSULE ORAL at 08:02

## 2022-02-10 RX ADMIN — HYDROCHLOROTHIAZIDE 25 MG: 25 TABLET ORAL at 08:02

## 2022-02-10 RX ADMIN — HYDRALAZINE HYDROCHLORIDE 10 MG: 20 INJECTION, SOLUTION INTRAMUSCULAR; INTRAVENOUS at 06:02

## 2022-02-10 RX ADMIN — AMLODIPINE BESYLATE 10 MG: 5 TABLET ORAL at 08:02

## 2022-02-10 RX ADMIN — CEFAZOLIN SODIUM 2 G: 2 SOLUTION INTRAVENOUS at 12:02

## 2022-02-10 RX ADMIN — POLYETHYLENE GLYCOL 3350 17 G: 17 POWDER, FOR SOLUTION ORAL at 08:02

## 2022-02-10 RX ADMIN — LISINOPRIL 20 MG: 20 TABLET ORAL at 08:02

## 2022-02-10 RX ADMIN — MEDROXYPROGESTERONE ACETATE 5 MG: 2.5 TABLET ORAL at 08:02

## 2022-02-10 RX ADMIN — MUPIROCIN: 20 OINTMENT TOPICAL at 12:02

## 2022-02-10 RX ADMIN — CEFAZOLIN SODIUM 2 G: 2 SOLUTION INTRAVENOUS at 08:02

## 2022-02-10 RX ADMIN — HYDROCODONE BITARTRATE AND ACETAMINOPHEN 1 TABLET: 10; 325 TABLET ORAL at 12:02

## 2022-02-10 RX ADMIN — HYDROCODONE BITARTRATE AND ACETAMINOPHEN 1 TABLET: 10; 325 TABLET ORAL at 06:02

## 2022-02-10 RX ADMIN — MUPIROCIN: 20 OINTMENT TOPICAL at 08:02

## 2022-02-10 NOTE — PLAN OF CARE
Chart check complete, pt AAOx4, able to verbalize needs.  Pt back from surgery, dressing to anterior neck intact, clean and dry, no drainage noted.  Pt able to ambulate to the restroom and void without difficulty, no dizziness noted while standing/ambulating.  No difficulty swallowing or eating, pt denies N/V.  Prn pain medication given for pain to face.  IV infusing fluids and antibiotics as ordered.  No acute distress noted, bed in low position, wheels locked, call light in reach for assistance, will continue to monitor.        Problem: Adult Inpatient Plan of Care  Goal: Plan of Care Review  Outcome: Ongoing, Progressing     Problem: Adult Inpatient Plan of Care  Goal: Patient-Specific Goal (Individualized)  Outcome: Ongoing, Progressing     Problem: Adult Inpatient Plan of Care  Goal: Absence of Hospital-Acquired Illness or Injury  Outcome: Ongoing, Progressing     Problem: Adult Inpatient Plan of Care  Goal: Optimal Comfort and Wellbeing  Outcome: Ongoing, Progressing     Problem: Diabetes Comorbidity  Goal: Blood Glucose Level Within Targeted Range  Outcome: Ongoing, Progressing

## 2022-02-10 NOTE — PLAN OF CARE
Problem: Occupational Therapy Goal  Goal: Occupational Therapy Goal  Outcome: Adequate for Care Transition     Initial OT eval completed. Pt completed dressing, grooming, and toileting ADLs with MOD I and ~500 ft with MOD I using no AD. No further OT services needed. OT to sign off. Thank you.

## 2022-02-10 NOTE — PLAN OF CARE
[facility] - Med Surg [place]  Discharge Final Note    Patient clear for discharge from case management stand point. Follow up appointments added to avs. Patient stated her brother in law will be helping her home at discharge.     Primary Care Provider: Rut Morris NP    Expected Discharge Date: 2/10/2022    Final Discharge Note (most recent)       Final Note - 02/10/22 1140          Final Note    Assessment Type Final Discharge Note (P)      Anticipated Discharge Disposition Home or Self Care (P)      What phone number can be called within the next 1-3 days to see how you are doing after discharge? [phone] (P)      Hospital Resources/Appts/Education Provided Provided patient/caregiver with written discharge plan information;Appointments scheduled and added to AVS (P)         Post-Acute Status    Discharge Delays None known at this time (P)                      Important Message from Medicare             Contact Info       Nell Marshall PA-C   Specialty: Neurosurgery    [address]  [address]  [place] [address]   Phone: [phone]       Next Steps: Follow up on 2/22/2022    Instructions: wound check at 11am. Can be virtual if desired

## 2022-02-10 NOTE — PT/OT/SLP EVAL
Occupational Therapy   Evaluation and Discharge Note    Name: Holly Mcclelland  MRN: [id number]  Recent Surgery: Procedure(s) (LRB):  DISCECTOMY, SPINE, CERVICAL, ANTERIOR APPROACH, WITH FUSION C3-4 Marcus PRADHAN notified (N/A) 1 Day Post-Op    Recommendations:     Discharge Recommendations: home  Discharge Equipment Recommendations:  none  Barriers to discharge:  None    Assessment:     Holly Mcclelland is a 61 y.o. female post op day #1 cervical fusion. Pt completed dressing, grooming, and toileting ADLs with MOD I and ~500 ft with MOD I using no AD.     Plan:     During this hospitalization, patient does not require further acute OT services.  Please re-consult if situation changes.    · Plan of Care Reviewed with: patient    Subjective     Chief Complaint: none reported   Patient/Family Comments/goals: agreeable to end of POC     Occupational Profile:  Living Environment: pt lives alone in a raised SS home with 7 DAVIN and B/L HR.   Previous level of function: independent   Roles and Routines: drives, likes to garden   Equipment Used at home:  none  Assistance upon Discharge: daughter and 2 sisters live few blocks away     Pain/Comfort:  · Pain Rating 1:  (c/o some neck pain)    Patients cultural, spiritual, Confucianist conflicts given the current situation: no    Objective:     Communicated with: ROSALBA Camejo, prior to session.  Patient found transferring from w/c with transporter to bed independently with peripheral IV upon OT entry to room.    General Precautions: Standard, fall,diabetic   Orthopedic Precautions:spinal precautions   Braces: N/A  Respiratory Status: Room air     Occupational Performance:    Bed Mobility:    · Patient completed Scooting with independence    Functional Mobility/Transfers:  · Patient completed Sit <> Stand Transfer with independence  with  no assistive device   · Patient completed Bed <> Chair Transfer using Step Transfer technique with independence with no assistive device  · Patient  completed Toilet Transfer Step Transfer technique with independence with  no AD  · Functional Mobility: pt completed in-room, bathroom, and ~500 ft in the hallway with MOD I using no AD.     Activities of Daily Living:  · Grooming: modified independence standing at the sink 2x: first for brushing teeth, mouthwash, and washing face; 2nd to wash hands after toileting  · Upper Body Dressing: independence to don back gown while seated EOB  · Lower Body Dressing: independence to adjust B/L socks  · Toileting: independence on the toilet    Cognitive/Visual Perceptual:  Cognitive/Psychosocial Skills:     -       Oriented to: Person, Place, Time and Situation   -       Follows Commands/attention:Follows multistep  commands  -       Communication: clear/fluent  -       Memory: No Deficits noted  -       Safety awareness/insight to disability: intact   -       Mood/Affect/Coping skills/emotional control: Cooperative and Pleasant  Visual/Perceptual:      -Intact  R/L discrimination      Physical Exam:  Balance:    -       seated: MOD I; standing: MOD I  Postural examination/scapula alignment:    -       No postural abnormalities identified  Skin integrity: dressing to neck intact  Edema:  no BUE edema noted  Dominant hand:    -       R  Upper Extremity Range of Motion:     -       Right Upper Extremity: WFL  -       Left Upper Extremity: WFL  Upper Extremity Strength:    -       Right Upper Extremity: WFL  -       Left Upper Extremity: WFL   Strength:    -       Right Upper Extremity: WFL  -       Left Upper Extremity: WFL  Fine Motor Coordination:    -       Intact  Left hand, manipulation of objects and Right hand, manipulation of objects  Gross motor coordination:   WFL    AMPAC 6 Click ADL:  AMPAC Total Score: 24    Treatment & Education:  · Pt educated on OT role/end of POC.   · Importance of OOB activity daily; encouraged OOB to the chair for a goal of 6 hours/day but broken up into ~2 hour increments  · Edu to avoid  bending/twisting of the neck, lifting >10 lbs, and overhead reaching; handout provided    · Safety during functional mobility   · Edu to inform nurse to check before ambulating in the hallway with either staff or family assistance   · Multiple self-care tasks/functional mobility completed- assistance level noted above   · All questions/concerns answered within OT scope of practice     Education:    Patient left up in chair with all lines intact, call button in reach and all needs met/within reach    GOALS:   Multidisciplinary Problems     Occupational Therapy Goals        Problem: Occupational Therapy Goal    Goal Priority Disciplines Outcome Interventions   Occupational Therapy Goal     OT, PT/OT Adequate for Care Transition                    History:     Past Medical History:   Diagnosis Date    CHF (congestive heart failure)     Diabetes mellitus     Heart murmur     Hyperlipidemia     Hypertension     Sleep apnea     wears CPAP    Stroke        Past Surgical History:   Procedure Laterality Date    BREAST BIOPSY Left 2015       Time Tracking:     OT Date of Treatment: 02/10/22  OT Start Time: 1017  OT Stop Time: 1033  OT Total Time (min): 16 min    Billable Minutes:Evaluation 16 min    2/10/2022

## 2022-02-10 NOTE — PROGRESS NOTES
[facility] [place]  Neurosurgery  Progress Note    Subjective:     History of Present Illness: Ms. Holly Mcclelland is a 61 y.o. woman with DM, CHF, HLD, HTN, and hx of stroke, who was referred to me by Dr. Braga, presenting today for establishing care. This is a pt who developed right leg weakness last month and was seen in the ED to r/o stroke. Work up was negative for stroke. She had MRI lumbar spine done and found to have mild central canal spinal stenosis, bulging at L3-L4. The pt reports of chronic neck pain that has gradually worsened in the last few weeks. The pain is so severe that it is interfering with her work. 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 States her right foot tends to drag when she walks. The pt also reports of pain and spasms to her bilateral hands. Admitted to [facility] on 2/9/22 for C3-4 ACDF with Dr. Arroyo.       Post-Op Info:  Procedure(s) (LRB):  DISCECTOMY, SPINE, CERVICAL, ANTERIOR APPROACH, WITH FUSION C3-4 Marcus PRADHAN notified (N/A)   1 Day Post-Op     Interval History: She feels like her right leg is stronger compared to preop. ambulating, tolerating solid diet, pain well controlled, urinating, and agreeable with discharge home. Patient has not passed gas since surgery. Long discussion about the importance of a proper bowel regimen at home and when to call neurosurgery for prescription assistance with bowel movements.     Medications:  Continuous Infusions:   sodium chloride 0.9% 75 mL/hr at 02/10/22 0014     Scheduled Meds:   amLODIPine  10 mg Oral Daily    docusate sodium  100 mg Oral Daily    heparin (porcine)  5,000 Units Subcutaneous Q8H    hydrALAZINE  25 mg Oral TID    hydroCHLOROthiazide  25 mg Oral Daily    lisinopriL  20 mg Oral Daily    medroxyPROGESTERone  5 mg Oral Daily    mupirocin   Nasal BID    polyethylene glycol  17 g Oral Daily     PRN Meds:acetaminophen,  aluminum-magnesium hydroxide-simethicone, bisacodyL, dextrose 10%, dextrose 10%, furosemide, glucagon (human recombinant), glucose, glucose, hydrALAZINE, HYDROcodone-acetaminophen, HYDROcodone-acetaminophen, HYDROmorphone, insulin aspart U-100, labetalol, methocarbamoL, ondansetron, prochlorperazine, senna-docusate 8.6-50 mg     Review of Systems  Objective:     Weight: 93.3 kg (205 lb 12.8 oz)  Body mass index is 32.23 kg/m².  Vital Signs (Most Recent):  Temp: 98.9 °F (37.2 °C) (02/10/22 0659)  Pulse: (!) 114 (02/10/22 0659)  Resp: 16 (02/10/22 0659)  BP: (!) 174/82 (02/10/22 0659)  SpO2: 98 % (02/10/22 0659) Vital Signs (24h Range):  Temp:  [97.6 °F (36.4 °C)-98.9 °F (37.2 °C)] 98.9 °F (37.2 °C)  Pulse:  [] 114  Resp:  [16-23] 16  SpO2:  [96 %-100 %] 98 %  BP: (159-190)/(81-95) 174/82  Arterial Line BP: (174-186)/(72-90) 180/72     Date 02/10/22 0700 - 02/11/22 0659   Shift 8581-9555 6924-5589 8415-7893 24 Hour Total   INTAKE   P.O. 240   240   Shift Total(mL/kg) 240(2.6)   240(2.6)   OUTPUT   Shift Total(mL/kg)       Weight (kg) 93.3 93.3 93.3 93.3       Physical Exam    Neurosurgery Physical Exam   General: well developed, well nourished, no distress  Neurologic: Alert and oriented. Thought content appropriate.  GCS: Motor: 6/Verbal: 5/Eyes: 4 GCS Total: 15  Cranial nerves: II-XII grossly intact  Neck: supple, without obvious masses or lesions  Skin: grossly intact in all 4 extremities without obvious rashes or lesions    Motor Strength: No focal numbness or weakness  Strength  Deltoids Triceps Biceps   Hand    Upper: R 5/5 5/5 5/5   5/5    L 5/5 5/5 5/5 5/5     Iliopsoas   Tibialis  anterior Gastro- cnemius EHL   Lower: R 5/5 5/5 5/5 5/5    L 5/5 5/5 5/5 5/5   Sensory: intact to light touch B/L UE and LE    Incision: closed with steri strips and covered in telfa and tegaderm. No staining to dressing. Soft to palpation. No obvious swelling.       Significant Labs:  No results for input(s): GLU,  NA, K, CL, CO2, BUN, CREATININE, CALCIUM, MG in the last 48 hours.  No results for input(s): WBC, HGB, HCT, PLT in the last 48 hours.  No results for input(s): LABPT, INR, APTT in the last 48 hours.  Microbiology Results (last 7 days)     ** No results found for the last 168 hours. **            Significant Diagnostics:   I have personally reviewed imaging and agree with the findings.     Cervical xrays  Satisfactory placement of hardware       Assessment/Plan:     S/P cervical spinal fusion  Holly Mcclelland is a  61 y.o.  female who presented to neurosurgery with cervical radiculopathy. She is s/p C3-4 ACDF with Dr. Arroyo, POD#1.      -Post-op xrays show satisfactory placement of hardware  -Neurologically improved compared to pre-op   -OT/OOB x 6hours per day; can be divided into 2 hour intervals in the chair  ---OT recommending home  -TEDs/SCDs/IS  -Diabetic diet  -Norco prn for pain with IV dilaudid for breakthrough   -Robaxin for muscle spasms  -No cervical collar needed. Avoid extreme bending/twisting of the neck and no lifting >10lbs     Dispo: Discharge instructions printed and reviewed with patient. All questions answered. Prescriptions sent to home pharmacy at patient's request. Discharged home.              Nell Marshall PA-C  Neurosurgery  [facility] - Med Surg [place]

## 2022-02-10 NOTE — HOSPITAL COURSE
2/9: C3-4 ACDF with Dr. Arroyo. Patient tolerated procedure well and was kept overnight for observation. No collar needed.   2/10: ambulating, tolerating solid diet, pain well controlled, urinating, and agreeable with discharge home. Patient has not passed gas since surgery. Long discussion about the importance of a proper bowel regimen at home and when to call neurosurgery for prescription assistance with bowel movements. OT will see patient prior to discharge. Xrays show satisfactory placement of hardware.   Discharge instructions printed and reviewed with patient. All questions answered. Prescriptions sent to home pharmacy at patient's request.. Discharged home.

## 2022-02-10 NOTE — NURSING
Pt arrived to the floor via bed to room 427, transferred to bed with assist x3.  Pt AAOx4, verbalized pain to neck and right cheek 8/10.  Dressing to anterior neck intact, no drainage noted.  IV to left forearm infusing fluids and antibiotics as ordered.  Diet ordered, pt tolerated liquids and pudding without difficulty.  PRN pain medication given for pain.  Pt able to ambulate to the restroom and void without difficulty.  Pt oriented to room and staff, all questions answered. No acute distress noted, pt free from falls or injury.  Bed in low position, wheels locked, call light in reach for assistance, will continue to monitor.

## 2022-02-10 NOTE — ASSESSMENT & PLAN NOTE
Holly Mcclelland is a  61 y.o.  female who presented to neurosurgery with cervical radiculopathy. She is s/p C3-4 ACDF with Dr. Arroyo, POD#1.      -Post-op xrays show satisfactory placement of hardware  -Neurologically improved compared to pre-op   -OT/OOB x 6hours per day; can be divided into 2 hour intervals in the chair  ---OT recommending home  -TEDs/SCDs/IS  -Diabetic diet  -Norco prn for pain with IV dilaudid for breakthrough   -Robaxin for muscle spasms  -No cervical collar needed. Avoid extreme bending/twisting of the neck and no lifting >10lbs     Dispo: Discharge instructions printed and reviewed with patient. All questions answered. Prescriptions sent to home pharmacy at patient's request. Discharged home.

## 2022-02-10 NOTE — HPI
Ms. Holly Mcclelland is a 61 y.o. woman with DM, CHF, HLD, HTN, and hx of stroke, who was referred to me by Dr. Braga, presenting today for establishing care. This is a pt who developed right leg weakness last month and was seen in the ED to r/o stroke. Work up was negative for stroke. She had MRI lumbar spine done and found to have mild central canal spinal stenosis, bulging at L3-L4. The pt reports of chronic neck pain that has gradually worsened in the last few weeks. The pain is so severe that it is interfering with her work. 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 States her right foot tends to drag when she walks. The pt also reports of pain and spasms to her bilateral hands. Admitted to [facility] on 2/9/22 for C3-4 ACDF with Dr. Arroyo.

## 2022-02-10 NOTE — SUBJECTIVE & OBJECTIVE
Interval History: ambulating, tolerating solid diet, pain well controlled, urinating, and agreeable with discharge home. Patient has not passed gas since surgery. Long discussion about the importance of a proper bowel regimen at home and when to call neurosurgery for prescription assistance with bowel movements.     Medications:  Continuous Infusions:   sodium chloride 0.9% 75 mL/hr at 02/10/22 0014     Scheduled Meds:   amLODIPine  10 mg Oral Daily    docusate sodium  100 mg Oral Daily    heparin (porcine)  5,000 Units Subcutaneous Q8H    hydrALAZINE  25 mg Oral TID    hydroCHLOROthiazide  25 mg Oral Daily    lisinopriL  20 mg Oral Daily    medroxyPROGESTERone  5 mg Oral Daily    mupirocin   Nasal BID    polyethylene glycol  17 g Oral Daily     PRN Meds:acetaminophen, aluminum-magnesium hydroxide-simethicone, bisacodyL, dextrose 10%, dextrose 10%, furosemide, glucagon (human recombinant), glucose, glucose, hydrALAZINE, HYDROcodone-acetaminophen, HYDROcodone-acetaminophen, HYDROmorphone, insulin aspart U-100, labetalol, methocarbamoL, ondansetron, prochlorperazine, senna-docusate 8.6-50 mg     Review of Systems  Objective:     Weight: 93.3 kg (205 lb 12.8 oz)  Body mass index is 32.23 kg/m².  Vital Signs (Most Recent):  Temp: 98.9 °F (37.2 °C) (02/10/22 0659)  Pulse: (!) 114 (02/10/22 0659)  Resp: 16 (02/10/22 0659)  BP: (!) 174/82 (02/10/22 0659)  SpO2: 98 % (02/10/22 0659) Vital Signs (24h Range):  Temp:  [97.6 °F (36.4 °C)-98.9 °F (37.2 °C)] 98.9 °F (37.2 °C)  Pulse:  [] 114  Resp:  [16-23] 16  SpO2:  [96 %-100 %] 98 %  BP: (159-190)/(81-95) 174/82  Arterial Line BP: (174-186)/(72-90) 180/72     Date 02/10/22 0700 - 02/11/22 0659   Shift 0243-7309 2465-4838 9839-4568 24 Hour Total   INTAKE   P.O. 240   240   Shift Total(mL/kg) 240(2.6)   240(2.6)   OUTPUT   Shift Total(mL/kg)       Weight (kg) 93.3 93.3 93.3 93.3       Physical Exam    Neurosurgery Physical Exam   General: well developed, well  nourished, no distress  Neurologic: Alert and oriented. Thought content appropriate.  GCS: Motor: 6/Verbal: 5/Eyes: 4 GCS Total: 15  Cranial nerves: II-XII grossly intact  Neck: supple, without obvious masses or lesions  Skin: grossly intact in all 4 extremities without obvious rashes or lesions    Motor Strength: No focal numbness or weakness  Strength  Deltoids Triceps Biceps   Hand    Upper: R 5/5 5/5 5/5   5/5    L 5/5 5/5 5/5   5/5     Iliopsoas   Tibialis  anterior Gastro- cnemius EHL   Lower: R 5/5   5/5 5/5 5/5    L 5/5   5/5 5/5 5/5   Sensory: intact to light touch B/L UE and LE    Incision: closed with steri strips and covered in telfa and tegaderm. No staining to dressing. Soft to palpation. No obvious swelling.       Significant Labs:  No results for input(s): GLU, NA, K, CL, CO2, BUN, CREATININE, CALCIUM, MG in the last 48 hours.  No results for input(s): WBC, HGB, HCT, PLT in the last 48 hours.  No results for input(s): LABPT, INR, APTT in the last 48 hours.  Microbiology Results (last 7 days)     ** No results found for the last 168 hours. **            Significant Diagnostics:   I have personally reviewed imaging and agree with the findings.     Cervical xrays  Satisfactory placement of hardware

## 2022-02-10 NOTE — PLAN OF CARE
[facility] - Med Surg [place]  Initial Discharge Assessment       Primary Care Provider: Rut Morris NP    Admission Diagnosis: Spinal stenosis of cervical region [M48.02]  Cervicalgia [M54.2]  Radiculopathy, cervical [M54.12]    Admission Date: 2/9/2022  Expected Discharge Date: 2/10/2022    Discharge Barriers Identified: (P) None    Payor: MEDICAID / Plan: [employer] [facility] [employer] / Product Type: Managed Medicaid /     Extended Emergency Contact Information  Primary Emergency Contact: Francisco Cardenas  Address: [address]           [place] [address] [facility]  Home Phone: [phone]  Mobile Phone: [phone]  Relation: Sister  Secondary Emergency Contact: Dee Mcclelland  Address: [address]           [address] [address] [facility]  Home Phone: [phone]  Mobile Phone: [phone]  Relation: Daughter  Preferred language: English   needed? No    Discharge Plan A: (P) Home  Discharge Plan B: (P) Home      [address] [place] [address] [facility]  [address]  [facility] [address]  Phone: [phone] Fax: [phone]      Initial Assessment (most recent)       Adult Discharge Assessment - 02/10/22 1137          Discharge Assessment    Assessment Type Discharge Planning Assessment (P)      Confirmed/corrected address, phone number and insurance Yes (P)      Confirmed Demographics Correct on Facesheet (P)      Source of Information patient (P)      Does patient/caregiver understand observation status No (P)      Was observation education provided? No (P)      Communicated YINKA with patient/caregiver Yes (P)      Reason For Admission Cervical spinal fusion (P)      Lives With alone (P)      Facility Arrived From: Home (P)      Do you expect to return to your current living situation? Yes (P)      Do you have help at home or someone to help you manage your care at home? Yes (P)      Who are  your caregiver(s) and their phone number(s)? Francisco Cardenas (Sister)   [phone] (P)      Prior to hospitilization cognitive status: Alert/Oriented (P)      Current cognitive status: Alert/Oriented (P)      Walking or Climbing Stairs Difficulty none (P)      Dressing/Bathing Difficulty none (P)      Equipment Currently Used at Home none (P)      Readmission within 30 days? No (P)      Patient currently being followed by outpatient case management? No (P)      Do you currently have service(s) that help you manage your care at home? No (P)      Do you take prescription medications? Yes (P)      Do you have prescription coverage? Yes (P)      Coverage YISEL (P)      Do you have any problems affording any of your prescribed medications? No (P)      Is the patient taking medications as prescribed? yes (P)      Who is going to help you get home at discharge? Pt stated her brother in law will be helping home (P)      How do you get to doctors appointments? family or friend will provide (P)      Are you on dialysis? No (P)      Do you take coumadin? No (P)      Discharge Plan A Home (P)      Discharge Plan B Home (P)      DME Needed Upon Discharge  none (P)      Discharge Plan discussed with: Patient (P)      Discharge Barriers Identified None (P)

## 2022-02-11 NOTE — DISCHARGE SUMMARY
[facility] [place] [facility] Surg [place]  Neurosurgery  Discharge Summary      Patient Name: Holly Mcclelland  MRN: [id number]  Admission Date: 2/9/2022  Hospital Length of Stay: 0 days  Discharge Date and Time: 2/10/2022  1:13 PM  Attending Physician: Damian Arroyo MD   Discharging Provider: Nell Marshall PA-C  Primary Care Provider: Rut Morris NP    HPI:   Ms. Holly Mcclelland is a 61 y.o. woman with DM, CHF, HLD, HTN, and hx of stroke, who was referred to me by Dr. Braga, presenting today for establishing care. This is a pt who developed right leg weakness last month and was seen in the ED to r/o stroke. Work up was negative for stroke. She had MRI lumbar spine done and found to have mild central canal spinal stenosis, bulging at L3-L4. The pt reports of chronic neck pain that has gradually worsened in the last few weeks. The pain is so severe that it is interfering with her work. 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 States her right foot tends to drag when she walks. The pt also reports of pain and spasms to her bilateral hands. Admitted to [facility] on 2/9/22 for C3-4 ACDF with Dr. Arroyo.       Procedure(s) (LRB):  DISCECTOMY, SPINE, CERVICAL, ANTERIOR APPROACH, WITH FUSION C3-4 Marcus PRADHAN notified (N/A)     Hospital Course: 2/9: C3-4 ACDF with Dr. Arroyo. Patient tolerated procedure well and was kept overnight for observation. No collar needed.   2/10: ambulating, tolerating solid diet, pain well controlled, urinating, and agreeable with discharge home. Patient has not passed gas since surgery. Long discussion about the importance of a proper bowel regimen at home and when to call neurosurgery for prescription assistance with bowel movements. OT will see patient prior to discharge. Xrays show satisfactory placement of hardware.   Discharge instructions printed and reviewed with patient. All questions answered. Prescriptions sent to  home pharmacy at patient's request.. Discharged home.         Goals of Care Treatment Preferences:  Code Status: Full Code        Pending Diagnostic Studies:     None        Final Active Diagnoses:    Diagnosis Date Noted POA    S/P cervical spinal fusion [Z98.1] 02/09/2022 Not Applicable      Problems Resolved During this Admission:      Discharged Condition: stable     Disposition: Home or Self Care    Follow Up:   Follow-up Information     Nell Marshall PA-C On 2/22/2022.    Specialty: Neurosurgery  Why: wound check at 11am. Can be virtual if desired   Contact information:  [address] [facility] [address]  [address] [place] [id number]  [phone]                       Patient Instructions:      Diet Adult Regular     Lifting restrictions     Other restrictions (specify):     No driving until:     Notify your health care provider if you experience any of the following:  temperature >100.4     Notify your health care provider if you experience any of the following:  persistent nausea and vomiting or diarrhea     Notify your health care provider if you experience any of the following:  severe uncontrolled pain     Notify your health care provider if you experience any of the following:  redness, tenderness, or signs of infection (pain, swelling, redness, odor or green/yellow discharge around incision site)     Notify your health care provider if you experience any of the following:  difficulty breathing or increased cough     Notify your health care provider if you experience any of the following:  severe persistent headache     Notify your health care provider if you experience any of the following:  worsening rash     Notify your health care provider if you experience any of the following:  persistent dizziness, light-headedness, or visual disturbances     Notify your health care provider if you experience any of the following:  increased confusion or weakness     Remove dressing in 48 hours     Shower on day dressing  removed (No bath)     Medications:  Reconciled Home Medications:      Medication List      START taking these medications    HYDROcodone-acetaminophen  mg per tablet  Commonly known as: NORCO  Take 1 tablet by mouth every 4 (four) hours as needed (7-10/10 pain).     methocarbamoL 750 MG Tab  Commonly known as: ROBAXIN  Take 1 tablet (750 mg total) by mouth every 8 (eight) hours as needed (muscle spasms).        CHANGE how you take these medications    aspirin 81 MG EC tablet  Commonly known as: ECOTRIN  Take 1 tablet (81 mg total) by mouth once daily.  Start taking on: February 14, 2022  What changed: These instructions start on February 14, 2022. If you are unsure what to do until then, ask your doctor or other care provider.     clopidogreL 75 mg tablet  Commonly known as: PLAVIX  Take 1 tablet (75 mg total) by mouth once daily.  Start taking on: February 14, 2022  What changed: These instructions start on February 14, 2022. If you are unsure what to do until then, ask your doctor or other care provider.        CONTINUE taking these medications    amLODIPine 10 MG tablet  Commonly known as: NORVASC  Take 1 tablet (10 mg total) by mouth once daily.     cholecalciferol (vitamin D3) 1,250 mcg (50,000 unit) capsule  Take 50,000 Units by mouth every 7 days.     dapagliflozin 5 mg Tab tablet  Commonly known as: FARXIGA  Take 1 tablet (5 mg total) by mouth once daily.     fluconazole 150 MG Tab  Commonly known as: DIFLUCAN  Take 150 mg by mouth once.     furosemide 20 MG tablet  Commonly known as: LASIX  Take 20 mg by mouth daily as needed.     hydrALAZINE 25 MG tablet  Commonly known as: APRESOLINE  Take 1 tablet (25 mg total) by mouth 3 (three) times daily.     lisinopriL-hydrochlorothiazide 20-25 mg Tab  Commonly known as: PRINZIDE,ZESTORETIC  Take 1 tablet by mouth once daily     medroxyPROGESTERone 5 MG tablet  Commonly known as: PROVERA  Take 1 tablet (5 mg total) by mouth once daily.     metFORMIN 1000 MG  tablet  Commonly known as: GLUCOPHAGE  Take 1 tablet (1,000 mg total) by mouth 2 (two) times daily with meals.     ONETOUCH DELICA PLUS LANCET 33 gauge Misc  Generic drug: lancets  USE 1  TO CHECK GLUCOSE 4 TIMES DAILY     ONETOUCH ULTRA TEST Strp  Generic drug: blood sugar diagnostic  USE 1 STRIP TO CHECK GLUCOSE 4 TIMES DAILY     TRULICITY 4.5 mg/0.5 mL pen injector  Generic drug: dulaglutide  INJECT 4.5 MG SUBCUTANEOUSLY ONCE A WEEK        STOP taking these medications    diazePAM 10 MG Tab  Commonly known as: VALIUM            Nell Marshall PA-C  Neurosurgery  [facility] - [place] - Med Surg [place]

## 2022-02-12 LAB
BLD PROD TYP BPU: NORMAL
BLD PROD TYP BPU: NORMAL
BLOOD UNIT EXPIRATION DATE: NORMAL
BLOOD UNIT EXPIRATION DATE: NORMAL
BLOOD UNIT TYPE CODE: 600
BLOOD UNIT TYPE CODE: 600
BLOOD UNIT TYPE: NORMAL
BLOOD UNIT TYPE: NORMAL
CODING SYSTEM: NORMAL
CODING SYSTEM: NORMAL
DISPENSE STATUS: NORMAL
DISPENSE STATUS: NORMAL
NUM UNITS TRANS PACKED RBC: NORMAL
NUM UNITS TRANS PACKED RBC: NORMAL

## 2022-02-14 NOTE — ANESTHESIA POSTPROCEDURE EVALUATION
Anesthesia Post Evaluation    Patient: Holly Mcclelland    Procedure(s) Performed: Procedure(s) (LRB):  DISCECTOMY, SPINE, CERVICAL, ANTERIOR APPROACH, WITH FUSION C3-4 Marcus PRADHAN notified (N/A)    Final Anesthesia Type: general      Patient location during evaluation: PACU  Patient participation: Yes- Able to Participate  Level of consciousness: awake and alert, oriented and awake  Post-procedure vital signs: reviewed and stable  Airway patency: patent    PONV status at discharge: No PONV  Anesthetic complications: no      Cardiovascular status: blood pressure returned to baseline  Respiratory status: unassisted, spontaneous ventilation and room air  Hydration status: euvolemic  Follow-up not needed.          Vitals Value Taken Time   /82 02/10/22 0701   Temp 37.2 °C (98.9 °F) 02/10/22 0701   Pulse 114 02/10/22 0701   Resp 18 02/10/22 0701   SpO2 99 % 02/10/22 0701         Event Time   Out of Recovery 23:47:05         Pain/Mary Score: No data recorded

## 2022-02-22 ENCOUNTER — OFFICE VISIT (OUTPATIENT)
Dept: NEUROSURGERY | Facility: CLINIC | Age: 62
End: 2022-02-22
Payer: MEDICAID

## 2022-02-22 VITALS
WEIGHT: 198.63 LBS | HEIGHT: 67 IN | DIASTOLIC BLOOD PRESSURE: 68 MMHG | BODY MASS INDEX: 31.18 KG/M2 | SYSTOLIC BLOOD PRESSURE: 139 MMHG | HEART RATE: 107 BPM | TEMPERATURE: 98 F

## 2022-02-22 DIAGNOSIS — Z98.1 S/P CERVICAL SPINAL FUSION: Primary | ICD-10-CM

## 2022-02-22 PROCEDURE — 1160F PR REVIEW ALL MEDS BY PRESCRIBER/CLIN PHARMACIST DOCUMENTED: ICD-10-PCS | Mod: CPTII,,, | Performed by: PHYSICIAN ASSISTANT

## 2022-02-22 PROCEDURE — 1160F RVW MEDS BY RX/DR IN RCRD: CPT | Mod: CPTII,,, | Performed by: PHYSICIAN ASSISTANT

## 2022-02-22 PROCEDURE — 4010F ACE/ARB THERAPY RXD/TAKEN: CPT | Mod: CPTII,,, | Performed by: PHYSICIAN ASSISTANT

## 2022-02-22 PROCEDURE — 99024 PR POST-OP FOLLOW-UP VISIT: ICD-10-PCS | Mod: ,,, | Performed by: PHYSICIAN ASSISTANT

## 2022-02-22 PROCEDURE — 99999 PR PBB SHADOW E&M-EST. PATIENT-LVL V: CPT | Mod: PBBFAC,,, | Performed by: PHYSICIAN ASSISTANT

## 2022-02-22 PROCEDURE — 99024 POSTOP FOLLOW-UP VISIT: CPT | Mod: ,,, | Performed by: PHYSICIAN ASSISTANT

## 2022-02-22 PROCEDURE — 1159F MED LIST DOCD IN RCRD: CPT | Mod: CPTII,,, | Performed by: PHYSICIAN ASSISTANT

## 2022-02-22 PROCEDURE — 3075F SYST BP GE 130 - 139MM HG: CPT | Mod: CPTII,,, | Performed by: PHYSICIAN ASSISTANT

## 2022-02-22 PROCEDURE — 3075F PR MOST RECENT SYSTOLIC BLOOD PRESS GE 130-139MM HG: ICD-10-PCS | Mod: CPTII,,, | Performed by: PHYSICIAN ASSISTANT

## 2022-02-22 PROCEDURE — 3008F PR BODY MASS INDEX (BMI) DOCUMENTED: ICD-10-PCS | Mod: CPTII,,, | Performed by: PHYSICIAN ASSISTANT

## 2022-02-22 PROCEDURE — 99215 OFFICE O/P EST HI 40 MIN: CPT | Mod: PBBFAC | Performed by: PHYSICIAN ASSISTANT

## 2022-02-22 PROCEDURE — 3044F HG A1C LEVEL LT 7.0%: CPT | Mod: CPTII,,, | Performed by: PHYSICIAN ASSISTANT

## 2022-02-22 PROCEDURE — 1159F PR MEDICATION LIST DOCUMENTED IN MEDICAL RECORD: ICD-10-PCS | Mod: CPTII,,, | Performed by: PHYSICIAN ASSISTANT

## 2022-02-22 PROCEDURE — 99999 PR PBB SHADOW E&M-EST. PATIENT-LVL V: ICD-10-PCS | Mod: PBBFAC,,, | Performed by: PHYSICIAN ASSISTANT

## 2022-02-22 PROCEDURE — 3078F DIAST BP <80 MM HG: CPT | Mod: CPTII,,, | Performed by: PHYSICIAN ASSISTANT

## 2022-02-22 PROCEDURE — 4010F PR ACE/ARB THEARPY RXD/TAKEN: ICD-10-PCS | Mod: CPTII,,, | Performed by: PHYSICIAN ASSISTANT

## 2022-02-22 PROCEDURE — 3078F PR MOST RECENT DIASTOLIC BLOOD PRESSURE < 80 MM HG: ICD-10-PCS | Mod: CPTII,,, | Performed by: PHYSICIAN ASSISTANT

## 2022-02-22 PROCEDURE — 3008F BODY MASS INDEX DOCD: CPT | Mod: CPTII,,, | Performed by: PHYSICIAN ASSISTANT

## 2022-02-22 PROCEDURE — 3044F PR MOST RECENT HEMOGLOBIN A1C LEVEL <7.0%: ICD-10-PCS | Mod: CPTII,,, | Performed by: PHYSICIAN ASSISTANT

## 2022-02-22 NOTE — PROGRESS NOTES
Wound Check   Neurosurgery     Holly Mcclelland is a 61 y.o. female who presents to clinic today for 2 week wound check, s/p C3-4 ACDF with Dr. Arroyo.  Denies shortness of breath, leg swelling, fevers, chills, night sweats or N/V. Further denies wound drainage or swelling. Pt no longer requires pain medication. She is recovering well and reports much fewer spasms in her left hand, improving numbness in her bilateral hands, and less dragging of her right leg. She removed her steri strips prematurely due to confusion over post-op instructions. Her incision is healing very well but 1mm of the lateral edge has failed to fully heal at the skin edge. She reports minor pain at this location. Denies drainage and fevers.       Physical Exam:   General: well developed, well nourished, no distress  Neurologic: Alert and oriented. Thought content appropriate.   GCS: Motor: 6/Verbal: 5/Eyes: 4 GCS Total: 15   Mental Status: Awake, Alert, Oriented x3   Cranial nerves: face symmetric, tongue midline, pupils equal, round, reactive to light with accomodation, EOMI.   Motor Strength: moves all extremities with good strength and tone   Sensation: response to light touch throughout  No gait disturbances     Incision is clean, dry and intact with no signs of erythema, swelling or purulent drainage. All skin edges are completely approximated with the exception of 1mm at the lateral edge of the incision. No obvious dehiscence, but is not fully approximated.       Vitals:    02/22/22 1056   BP: 139/68   Pulse: 107   Temp: 98.1 °F (36.7 °C)             Assessment/Plan:   Holly Mcclelland is a 61 y.o. female who presents for 2 week wound check, s/p C3-4 ACDF with Dr. Arroyo. She is very happy with the surgery and reports improvement in all pre-op symptoms.     -Incision is healing very well over all but shows 1mm of delayed healing, likely due to premature removal of steri strips. No signs of infection. I will schedule a virtual visit in one week  to ensure the wound is healing properly. The patient is asked to call sooner if she notices any negative changes to the wound so we can initiate antibiotics.     -Keep incision open to air   -Avoid anti-inflammatories for anther 6 weeks. These include ibuprofen, excedrin, mobic, celebrex, aleve, naproxen, etc. Tylenol may be taken for mild-moderate pain.   -OK to resume home blood thinner, if applicable.   -Call if you need a refill of pain medication  -Can shower and get incision wet, just pat dry and no vigorous scrubbing. Do not submerge incision for another 4 weeks.   -No lifting more than 10 lbs or excessive bending/twisting.   -Can drive after 4 weeks when no longer taking narcotics   -FU in 1 week for virtual wound check   -Follow up with Dr. Arroyo in 4 weeks with new xrays  -Encouraged patient to call if they have any questions or concerns prior to next follow up appt        Nell Marshall PA-C  [facility]  Department of Neurosurgery  [phone]

## 2022-02-22 NOTE — PATIENT INSTRUCTIONS
-Keep incision open to air   -Avoid anti-inflammatories for anther 6 weeks. These include ibuprofen, excedrin, mobic, celebrex, aleve, naproxen, etc. Tylenol may be taken for mild-moderate pain.   -OK to resume home blood thinner, if applicable.   -Call if you need a refill of pain medication  -Can shower and get incision wet, just pat dry and no vigorous scrubbing. Do not submerge incision for another 4 weeks.   -No lifting more than 10 lbs or excessive bending/twisting.   -Can drive after 4 weeks when no longer taking narcotics   -Follow up with Dr. Arroyo in 4 weeks with new xrays  -Please call with any questions or concerns prior to your next appointment.

## 2022-03-03 ENCOUNTER — PATIENT MESSAGE (OUTPATIENT)
Dept: NEUROSURGERY | Facility: CLINIC | Age: 62
End: 2022-03-03

## 2022-03-03 ENCOUNTER — OFFICE VISIT (OUTPATIENT)
Dept: NEUROSURGERY | Facility: CLINIC | Age: 62
End: 2022-03-03
Payer: MEDICAID

## 2022-03-03 DIAGNOSIS — Z98.1 S/P CERVICAL SPINAL FUSION: Primary | ICD-10-CM

## 2022-03-03 PROCEDURE — 3044F PR MOST RECENT HEMOGLOBIN A1C LEVEL <7.0%: ICD-10-PCS | Mod: CPTII,95,, | Performed by: PHYSICIAN ASSISTANT

## 2022-03-03 PROCEDURE — 4010F ACE/ARB THERAPY RXD/TAKEN: CPT | Mod: CPTII,95,, | Performed by: PHYSICIAN ASSISTANT

## 2022-03-03 PROCEDURE — 4010F PR ACE/ARB THEARPY RXD/TAKEN: ICD-10-PCS | Mod: CPTII,95,, | Performed by: PHYSICIAN ASSISTANT

## 2022-03-03 PROCEDURE — 99024 PR POST-OP FOLLOW-UP VISIT: ICD-10-PCS | Mod: 95,,, | Performed by: PHYSICIAN ASSISTANT

## 2022-03-03 PROCEDURE — 99024 POSTOP FOLLOW-UP VISIT: CPT | Mod: 95,,, | Performed by: PHYSICIAN ASSISTANT

## 2022-03-03 PROCEDURE — 1160F PR REVIEW ALL MEDS BY PRESCRIBER/CLIN PHARMACIST DOCUMENTED: ICD-10-PCS | Mod: CPTII,95,, | Performed by: PHYSICIAN ASSISTANT

## 2022-03-03 PROCEDURE — 3044F HG A1C LEVEL LT 7.0%: CPT | Mod: CPTII,95,, | Performed by: PHYSICIAN ASSISTANT

## 2022-03-03 PROCEDURE — 1159F PR MEDICATION LIST DOCUMENTED IN MEDICAL RECORD: ICD-10-PCS | Mod: CPTII,95,, | Performed by: PHYSICIAN ASSISTANT

## 2022-03-03 PROCEDURE — 1160F RVW MEDS BY RX/DR IN RCRD: CPT | Mod: CPTII,95,, | Performed by: PHYSICIAN ASSISTANT

## 2022-03-03 PROCEDURE — 1159F MED LIST DOCD IN RCRD: CPT | Mod: CPTII,95,, | Performed by: PHYSICIAN ASSISTANT

## 2022-03-03 NOTE — PATIENT INSTRUCTIONS
Send picture of your incision through the patient portal     -Keep incision open to air   -Avoid anti-inflammatories for anther 5 weeks. These include ibuprofen, excedrin, mobic, celebrex, aleve, naproxen, etc. Tylenol may be taken for mild-moderate pain.   -OK to resume home blood thinner, if applicable.   -Call if you need a refill of pain medication  -Can shower and get incision wet, just pat dry and no vigorous scrubbing. Do not submerge incision for another 4 weeks.   -No lifting more than 10 lbs or excessive bending/twisting.   -Can drive after 3 weeks when no longer taking narcotics   -Follow up with Dr. Arroyo in 3 weeks with new xrays   What Is The Reason For Today's Visit?: History of Melanoma Year Excised?: 1998

## 2022-03-03 NOTE — PROGRESS NOTES
The patient location is: home  The chief complaint leading to consultation is: wound check    Visit type: audio only    Face to Face time with patient: 5 min  10 minutes of total time spent on the encounter, which includes face to face time and non-face to face time preparing to see the patient (eg, review of tests), Obtaining and/or reviewing separately obtained history, Documenting clinical information in the electronic or other health record, Independently interpreting results (not separately reported) and communicating results to the patient/family/caregiver, or Care coordination (not separately reported).         Each patient to whom he or she provides medical services by telemedicine is:  (1) informed of the relationship between the physician and patient and the respective role of any other health care provider with respect to management of the patient; and (2) notified that he or she may decline to receive medical services by telemedicine and may withdraw from such care at any time.    Notes:   Wound Check   Neurosurgery     Interval history 3/3/22  Patient presents via virtual visit for repeat wound check. Technical errors prohibited the use of a video visit. Audio visit completed and patient will send a picture of the incision after the visit.     She reports that the 1mm area of separation is resolving. Skin edges are no longer white and are similar to her skin color. Skin edges are approximated. Denies drainage, redness, fever, and swelling. The area is mildly tender.     She continues to report improved leg function since surgery but notes some waxing and waning of leg strength, though never below pre-op strength. Advised that this is within the range of normal after surgery.       HPI 2/22/22  Holly Mcclelland is a 61 y.o. female who presents to clinic today for 2 week wound check, s/p C3-4 ACDF with Dr. Arroyo.  Denies shortness of breath, leg swelling, fevers, chills, night sweats or N/V. Further denies  wound drainage or swelling. Pt no longer requires pain medication. She is recovering well and reports much fewer spasms in her left hand, improving numbness in her bilateral hands, and less dragging of her right leg. She removed her steri strips prematurely due to confusion over post-op instructions. Her incision is healing very well but 1mm of the lateral edge has failed to fully heal at the skin edge. She reports minor pain at this location. Denies drainage and fevers.       Physical Exam:   General: no distress  Neurologic: Alert and oriented. Thought content appropriate.   Mental Status: Awake, Alert, Oriented x3       There were no vitals filed for this visit.          Assessment/Plan:   Holly Mcclelland is a 61 y.o. female who presents via virtual visit for 3 week wound check, s/p C3-4 ACDF with Dr. Arroyo. She is very happy with the surgery and reports continued improvement in all pre-op symptoms.     -Patient reports the 1mm area of delayed healing is now healing as expected. Denies separation of the skin edges, redness, swelling, drainage, and fever. She will send a picture of the incision through the portal since video portion of the visit was malfunctioning.     -Keep incision open to air   -Avoid anti-inflammatories for anther 5 weeks. These include ibuprofen, excedrin, mobic, celebrex, aleve, naproxen, etc. Tylenol may be taken for mild-moderate pain.   -OK to resume home blood thinner, if applicable.   -Call if you need a refill of pain medication  -Can shower and get incision wet, just pat dry and no vigorous scrubbing. Do not submerge incision for another 4 weeks.   -No lifting more than 10 lbs or excessive bending/twisting.   -Can drive after 3 weeks when no longer taking narcotics   -Follow up with Dr. Arroyo in 3 weeks with new xrays  -Encouraged patient to call if they have any questions or concerns prior to next follow up appt        Nell Marshall PA-C  [facility] Department of  Neurosurgery  [phone]

## 2022-03-04 ENCOUNTER — TELEPHONE (OUTPATIENT)
Dept: DIABETES | Facility: CLINIC | Age: 62
End: 2022-03-04
Payer: MEDICAID

## 2022-03-04 NOTE — TELEPHONE ENCOUNTER
----- Message from Em Diamond NP sent at 3/4/2022  8:15 AM CST -----  Please call lab results to patient.  Please let her know that her A1c resulted at 6.4%.  This is excellent diabetes control.  Keep up the good work I am very proud of her.  Her thyroid levels within normal limits  Blood sugar was 105 on lab work.  Kidney and liver function within normal limits.  Her cholesterol is at goal  Her CBC was unremarkable.  We will review all of her lab work at her upcoming visit next week.  Please let me know if she has any questions or concerns

## 2022-03-10 ENCOUNTER — OFFICE VISIT (OUTPATIENT)
Dept: DIABETES | Facility: CLINIC | Age: 62
End: 2022-03-10
Payer: MEDICAID

## 2022-03-10 VITALS
BODY MASS INDEX: 31.94 KG/M2 | HEIGHT: 67 IN | TEMPERATURE: 98 F | SYSTOLIC BLOOD PRESSURE: 118 MMHG | DIASTOLIC BLOOD PRESSURE: 70 MMHG | HEART RATE: 88 BPM | OXYGEN SATURATION: 98 % | WEIGHT: 203.5 LBS

## 2022-03-10 DIAGNOSIS — Z79.4 TYPE 2 DIABETES MELLITUS WITH HYPERGLYCEMIA, WITH LONG-TERM CURRENT USE OF INSULIN: ICD-10-CM

## 2022-03-10 DIAGNOSIS — G45.9 TIA (TRANSIENT ISCHEMIC ATTACK): ICD-10-CM

## 2022-03-10 DIAGNOSIS — E66.09 CLASS 1 OBESITY DUE TO EXCESS CALORIES WITH SERIOUS COMORBIDITY AND BODY MASS INDEX (BMI) OF 31.0 TO 31.9 IN ADULT: ICD-10-CM

## 2022-03-10 DIAGNOSIS — E11.65 TYPE 2 DIABETES MELLITUS WITH HYPERGLYCEMIA, WITH LONG-TERM CURRENT USE OF INSULIN: ICD-10-CM

## 2022-03-10 DIAGNOSIS — E11.42 TYPE 2 DIABETES MELLITUS WITH DIABETIC POLYNEUROPATHY, WITHOUT LONG-TERM CURRENT USE OF INSULIN: Primary | ICD-10-CM

## 2022-03-10 DIAGNOSIS — E78.5 DYSLIPIDEMIA, GOAL LDL BELOW 70: ICD-10-CM

## 2022-03-10 DIAGNOSIS — I10 ESSENTIAL HYPERTENSION: ICD-10-CM

## 2022-03-10 PROBLEM — E66.811 CLASS 1 OBESITY DUE TO EXCESS CALORIES WITH SERIOUS COMORBIDITY AND BODY MASS INDEX (BMI) OF 31.0 TO 31.9 IN ADULT: Status: ACTIVE | Noted: 2019-09-26

## 2022-03-10 PROCEDURE — 3008F BODY MASS INDEX DOCD: CPT | Mod: CPTII,,, | Performed by: NURSE PRACTITIONER

## 2022-03-10 PROCEDURE — 99214 OFFICE O/P EST MOD 30 MIN: CPT | Mod: PBBFAC,PN | Performed by: NURSE PRACTITIONER

## 2022-03-10 PROCEDURE — 99214 OFFICE O/P EST MOD 30 MIN: CPT | Mod: S$PBB,,, | Performed by: NURSE PRACTITIONER

## 2022-03-10 PROCEDURE — 3061F NEG MICROALBUMINURIA REV: CPT | Mod: CPTII,,, | Performed by: NURSE PRACTITIONER

## 2022-03-10 PROCEDURE — 1160F RVW MEDS BY RX/DR IN RCRD: CPT | Mod: CPTII,,, | Performed by: NURSE PRACTITIONER

## 2022-03-10 PROCEDURE — 1159F PR MEDICATION LIST DOCUMENTED IN MEDICAL RECORD: ICD-10-PCS | Mod: CPTII,,, | Performed by: NURSE PRACTITIONER

## 2022-03-10 PROCEDURE — 3066F NEPHROPATHY DOC TX: CPT | Mod: CPTII,,, | Performed by: NURSE PRACTITIONER

## 2022-03-10 PROCEDURE — 3074F SYST BP LT 130 MM HG: CPT | Mod: CPTII,,, | Performed by: NURSE PRACTITIONER

## 2022-03-10 PROCEDURE — 1159F MED LIST DOCD IN RCRD: CPT | Mod: CPTII,,, | Performed by: NURSE PRACTITIONER

## 2022-03-10 PROCEDURE — 3061F PR NEG MICROALBUMINURIA RESULT DOCUMENTED/REVIEW: ICD-10-PCS | Mod: CPTII,,, | Performed by: NURSE PRACTITIONER

## 2022-03-10 PROCEDURE — 3044F HG A1C LEVEL LT 7.0%: CPT | Mod: CPTII,,, | Performed by: NURSE PRACTITIONER

## 2022-03-10 PROCEDURE — 3008F PR BODY MASS INDEX (BMI) DOCUMENTED: ICD-10-PCS | Mod: CPTII,,, | Performed by: NURSE PRACTITIONER

## 2022-03-10 PROCEDURE — 3078F PR MOST RECENT DIASTOLIC BLOOD PRESSURE < 80 MM HG: ICD-10-PCS | Mod: CPTII,,, | Performed by: NURSE PRACTITIONER

## 2022-03-10 PROCEDURE — 3074F PR MOST RECENT SYSTOLIC BLOOD PRESSURE < 130 MM HG: ICD-10-PCS | Mod: CPTII,,, | Performed by: NURSE PRACTITIONER

## 2022-03-10 PROCEDURE — 3044F PR MOST RECENT HEMOGLOBIN A1C LEVEL <7.0%: ICD-10-PCS | Mod: CPTII,,, | Performed by: NURSE PRACTITIONER

## 2022-03-10 PROCEDURE — 3066F PR DOCUMENTATION OF TREATMENT FOR NEPHROPATHY: ICD-10-PCS | Mod: CPTII,,, | Performed by: NURSE PRACTITIONER

## 2022-03-10 PROCEDURE — 99214 PR OFFICE/OUTPT VISIT, EST, LEVL IV, 30-39 MIN: ICD-10-PCS | Mod: S$PBB,,, | Performed by: NURSE PRACTITIONER

## 2022-03-10 PROCEDURE — 4010F ACE/ARB THERAPY RXD/TAKEN: CPT | Mod: CPTII,,, | Performed by: NURSE PRACTITIONER

## 2022-03-10 PROCEDURE — 4010F PR ACE/ARB THEARPY RXD/TAKEN: ICD-10-PCS | Mod: CPTII,,, | Performed by: NURSE PRACTITIONER

## 2022-03-10 PROCEDURE — 1160F PR REVIEW ALL MEDS BY PRESCRIBER/CLIN PHARMACIST DOCUMENTED: ICD-10-PCS | Mod: CPTII,,, | Performed by: NURSE PRACTITIONER

## 2022-03-10 PROCEDURE — 99999 PR PBB SHADOW E&M-EST. PATIENT-LVL IV: CPT | Mod: PBBFAC,,, | Performed by: NURSE PRACTITIONER

## 2022-03-10 PROCEDURE — 3078F DIAST BP <80 MM HG: CPT | Mod: CPTII,,, | Performed by: NURSE PRACTITIONER

## 2022-03-10 PROCEDURE — 99999 PR PBB SHADOW E&M-EST. PATIENT-LVL IV: ICD-10-PCS | Mod: PBBFAC,,, | Performed by: NURSE PRACTITIONER

## 2022-03-10 RX ORDER — METFORMIN HYDROCHLORIDE 1000 MG/1
1000 TABLET ORAL 2 TIMES DAILY WITH MEALS
Qty: 180 TABLET | Refills: 2 | Status: SHIPPED | OUTPATIENT
Start: 2022-03-10 | End: 2022-05-04 | Stop reason: SDUPTHER

## 2022-03-10 RX ORDER — ROSUVASTATIN CALCIUM 10 MG/1
10 TABLET, COATED ORAL NIGHTLY
Qty: 90 TABLET | Refills: 2 | Status: SHIPPED | OUTPATIENT
Start: 2022-03-10 | End: 2022-05-04 | Stop reason: SDUPTHER

## 2022-03-10 RX ORDER — DULAGLUTIDE 4.5 MG/.5ML
INJECTION, SOLUTION SUBCUTANEOUS
Qty: 4 PEN | Refills: 9 | Status: SHIPPED | OUTPATIENT
Start: 2022-03-10 | End: 2022-05-04 | Stop reason: SDUPTHER

## 2022-03-10 RX ORDER — DAPAGLIFLOZIN 5 MG/1
5 TABLET, FILM COATED ORAL DAILY
Qty: 90 TABLET | Refills: 2 | Status: SHIPPED | OUTPATIENT
Start: 2022-03-10 | End: 2022-05-04 | Stop reason: SDUPTHER

## 2022-03-10 NOTE — ASSESSMENT & PLAN NOTE
Body mass index is 31.87 kg/m².  Increases insulin resistance.   Discussed DM diet and exercise.   Encouraged continued weight loss

## 2022-03-10 NOTE — ASSESSMENT & PLAN NOTE
Controlled   Hypoglycemia has resolved  Encouraged patient to continue to lose weight        -- Medication Changes:     Continue Farxiga 5 mg daily     Continue Trulicity 4.5 mg weekly     Continue Metformin XR 1000 mg 2 times per day         -- Reviewed goals of therapy are to get the best control we can without hypoglycemia.  -- Reviewed patient's current insulin regimen. Clarified proper insulin dose and timing in relation to meals, etc. Insulin injection sites and proper rotation instructed.    -- Advised frequent self blood glucose monitoring.  Patient encouraged to document glucose results and bring them to every clinic visit.- daily at alternating times   -- Hypoglycemia precautions discussed. Instructed on precautions before driving.    -- Call for Bg repeatedly < 70 or > 180.   -- Close adherence to lifestyle changes recommended.   -- Periodic follow ups for eye evaluations, foot care and dental care suggested.      Regarding polyneuropathy:  Optimize BG readings.   See above.     Educated patient to check feet daily for any foreign objects and/or wounds. Discussed with patient the importance of wearing appropriate footwear at all times, not to walk barefoot ever, and to check shoes before putting them on feet. Instructed patient to keep feet dry by regularly changing shoes and socks and drying feet after baths and exercises. Also, instructed patient to report any new lesions, discolorations, or swelling to a healthcare professional.

## 2022-03-10 NOTE — PROGRESS NOTES
CC:   Chief Complaint   Patient presents with    Diabetes Mellitus       HPI: Holly Mcclelland is a 61 y.o. female presents for a follow up visit today for the management of T2DM.     She was diagnosed with Type 2 diabetes around [id number] on routine lab work. She was initially started on Metformin. She started insulin therapy in 2020.     Family hx of diabetes: mother, grandmother, aunts, uncles - strong family hx, sister who is blind and has amputations.   Hospitalized for diabetes: denies      No personal or FH of thyroid cancer or personal of pancreatic cancer or pancreatitis.     Works in retail     She also reports that she has been off for CPAP machine due to a recall--she is not sleeping well and does report fatigue--this may also be contributing to her poor glycemic control    Our last visit was in November of 2021  At that visit we stop the glipizide as it was causing her hypoglycemia--glycemia has resolved  We cut back on her Farxiga to 5 mg daily to see if that would help with her yeast  Infection symptoms.  --yeast infections have improved  We continued her Trulicity 4.5 mg weekly and continued her metformin a 1000 mg twice a day  Her A1c is down to 6.4%  15# weight loss since last visit   She had neck surgery in February and is doing well  She discontinued the Lipitor due to extreme nausea  She knows that the Lipitor was the source of the nausea  We did discuss that she needs be on statin therapy due to her history of TIA-- LDL needs to be less than 70 - closer to 50    DIABETES COMPLICATIONS: peripheral neuropathy and cerebrovascular disease   Hx of TIA in 2019       Diabetes Management Status    ASA:  Yes - 81 mg ASA + plavix     Statin:not taking  -- she stopped the Lipitor-- she reports that it was causing her nausea    ACE/ARB: Taking-Lisinopril    Screening or Prevention Patient's value Goal Complete/Controlled?   HgA1C Testing and Control   Lab Results   Component Value Date    HGBA1C 6.4 (H)  03/03/2022      Annually/Less than 8% No   Lipid profile : 03/03/2022 Annually Yes   LDL control Lab Results   Component Value Date    LDLCALC 88.2 03/03/2022    Annually/Less than 100 mg/dl  Yes   Nephropathy screening Lab Results   Component Value Date    LABMICR 15.0 03/08/2022     Lab Results   Component Value Date    PROTEINUA Negative 01/28/2022    Annually Yes   Blood pressure BP Readings from Last 1 Encounters:   03/10/22 118/70    Less than 140/90 No   Dilated retinal exam : 11/30/2021-external- Dr. Ball  Annually No   Foot exam   : 11/15/2021 Annually No       CURRENT A1C:    Hemoglobin A1C   Date Value Ref Range Status   03/03/2022 6.4 (H) 4.0 - 5.6 % Final     Comment:     ADA Screening Guidelines:  5.7-6.4%  Consistent with prediabetes  >or=6.5%  Consistent with diabetes    High levels of fetal hemoglobin interfere with the HbA1C  assay. Heterozygous hemoglobin variants (HbS, HgC, etc)do  not significantly interfere with this assay.   However, presence of multiple variants may affect accuracy.     02/10/2022 6.6 (H) 4.0 - 5.6 % Final     Comment:     ADA Screening Guidelines:  5.7-6.4%  Consistent with prediabetes  >or=6.5%  Consistent with diabetes    High levels of fetal hemoglobin interfere with the HbA1C  assay. Heterozygous hemoglobin variants (HbS, HgC, etc)do  not significantly interfere with this assay.   However, presence of multiple variants may affect accuracy.     11/09/2021 6.0 (H) 4.0 - 5.6 % Final     Comment:     ADA Screening Guidelines:  5.7-6.4%  Consistent with prediabetes  >or=6.5%  Consistent with diabetes    High levels of fetal hemoglobin interfere with the HbA1C  assay. Heterozygous hemoglobin variants (HbS, HgC, etc)do  not significantly interfere with this assay.   However, presence of multiple variants may affect accuracy.         GOAL A1C: 7% without hypoglycemia    DM MEDICATIONS USED IN THE PAST:  Metformin, Lantus, glyburide, glipizide, Januvia  Victoza- throat closed    Claytonulicity   Farxiga       CURRENT DIABETES MEDICATIONS:  Metformin 1000 mg b.i.d.,  Trulicity 4.5 mg weekly  (Thursdays), Farxiga 5 mg daily (yeast infections have improved)   Insulin: N/A   Missed doses: rarely        BLOOD GLUCOSE MONITORING:  She checks her BG 1-2 times per day   Per oral recall: 100-110's   107, 105   BG was 100 this AM   High for her would be 150       HYPOGLYCEMIA: denies   Carries mints         MEALS: eating 2 meals per day   Avoids bread   Trying to stay away from potatoes   BF:  Banana and yogurt or bagel with peanut butter or she skips   Lunch: leftovers usually- trying to stay away from fast food - sometimes skipping   Dinner: home cooked   1 banana a couple times per week   Apple    Oranges   Snack: yogurt   Drinks: water   Green tea- it has sugar   occ juice   occ coke when BG is low       CURRENT EXERCISE:  None         Review of Systems  Review of Systems   Constitutional: Negative for appetite change, fatigue and unexpected weight change.   HENT: Negative for trouble swallowing.    Eyes: Negative for visual disturbance.   Respiratory: Negative for shortness of breath.         + sleep apnea- off CPAP machine due to machine being recalled    Cardiovascular: Negative for chest pain.   Gastrointestinal: Negative for nausea.   Endocrine: Negative for polydipsia, polyphagia and polyuria.   Genitourinary:        No Nocturia    Musculoskeletal: Positive for neck pain (  recent surgery ).   Skin: Negative for wound.   Neurological: Negative for numbness.       Physical Exam   Physical Exam  Vitals and nursing note reviewed.   Constitutional:       Appearance: She is well-developed. She is obese.   HENT:      Head: Normocephalic and atraumatic.      Right Ear: External ear normal.      Left Ear: External ear normal.      Nose: Nose normal.   Neck:      Thyroid: No thyromegaly.      Trachea: No tracheal deviation.   Cardiovascular:      Rate and Rhythm: Normal rate and regular rhythm.       Heart sounds: No murmur heard.  Pulmonary:      Effort: Pulmonary effort is normal. No respiratory distress.      Breath sounds: Normal breath sounds.   Abdominal:      Palpations: Abdomen is soft.      Tenderness: There is no abdominal tenderness.      Hernia: No hernia is present.   Musculoskeletal:      Cervical back: Normal range of motion and neck supple.   Skin:     General: Skin is warm and dry.      Capillary Refill: Capillary refill takes less than 2 seconds.      Findings: No rash.      Comments: Injection sites are normal appearing. No lipo hypertropthy or atrophy     Neurological:      Mental Status: She is alert and oriented to person, place, and time.      Cranial Nerves: No cranial nerve deficit.   Psychiatric:         Behavior: Behavior normal.         Judgment: Judgment normal.         FOOT EXAMINATION: appropriate footwear         Lab Results   Component Value Date    TSH 0.760 03/03/2022           Type 2 diabetes mellitus with diabetic polyneuropathy, without long-term current use of insulin  Controlled   Hypoglycemia has resolved  Encouraged patient to continue to lose weight        -- Medication Changes:     Continue Farxiga 5 mg daily     Continue Trulicity 4.5 mg weekly     Continue Metformin XR 1000 mg 2 times per day         -- Reviewed goals of therapy are to get the best control we can without hypoglycemia.  -- Reviewed patient's current insulin regimen. Clarified proper insulin dose and timing in relation to meals, etc. Insulin injection sites and proper rotation instructed.    -- Advised frequent self blood glucose monitoring.  Patient encouraged to document glucose results and bring them to every clinic visit.- daily at alternating times   -- Hypoglycemia precautions discussed. Instructed on precautions before driving.    -- Call for Bg repeatedly < 70 or > 180.   -- Close adherence to lifestyle changes recommended.   -- Periodic follow ups for eye evaluations, foot care and dental care  suggested.      Regarding polyneuropathy:  Optimize BG readings.   See above.     Educated patient to check feet daily for any foreign objects and/or wounds. Discussed with patient the importance of wearing appropriate footwear at all times, not to walk barefoot ever, and to check shoes before putting them on feet. Instructed patient to keep feet dry by regularly changing shoes and socks and drying feet after baths and exercises. Also, instructed patient to report any new lesions, discolorations, or swelling to a healthcare professional.        Essential hypertension  BP goal is < 140/90.   Tolerating ACEi  Controlled   Blood pressure goals discussed with patient      Class 1 obesity due to excess calories with serious comorbidity and body mass index (BMI) of 31.0 to 31.9 in adult  Body mass index is 31.87 kg/m².  Increases insulin resistance.   Discussed DM diet and exercise.   Encouraged continued weight loss    TIA (transient ischemic attack)  Optimize BG readings.   See above.       Dyslipidemia, goal LDL below 70  LDL goal is <70  She stopped the Lipitor due to nausea  Start Crestor 10 mg nightly  Discussed that she needs to be on a statin therapy if possible to reduce CV risks--given her history  Lipids with RTC         Follow up in about 6 months (around 9/10/2022).  With labs prior       Orders Placed This Encounter   Procedures    Hemoglobin A1C     Standing Status:   Future     Standing Expiration Date:   9/10/2023    Comprehensive Metabolic Panel     Standing Status:   Future     Standing Expiration Date:   9/10/2023    Lipid Panel     Standing Status:   Future     Standing Expiration Date:   9/10/2023       Recommendations were discussed with the patient in detail  The patient verbalized understanding and agrees with the plan outlined as above.     This note was partly generated with [employer] voice recognition software. I apologize for any possible typographical errors.

## 2022-03-23 ENCOUNTER — OFFICE VISIT (OUTPATIENT)
Dept: NEUROSURGERY | Facility: CLINIC | Age: 62
End: 2022-03-23
Payer: MEDICAID

## 2022-03-23 ENCOUNTER — HOSPITAL ENCOUNTER (OUTPATIENT)
Dept: RADIOLOGY | Facility: HOSPITAL | Age: 62
Discharge: HOME | End: 2022-03-23
Attending: PHYSICIAN ASSISTANT
Payer: MEDICAID

## 2022-03-23 VITALS
OXYGEN SATURATION: 99 % | DIASTOLIC BLOOD PRESSURE: 70 MMHG | WEIGHT: 199.5 LBS | SYSTOLIC BLOOD PRESSURE: 123 MMHG | HEART RATE: 93 BPM | HEIGHT: 67 IN | BODY MASS INDEX: 31.31 KG/M2 | TEMPERATURE: 99 F

## 2022-03-23 DIAGNOSIS — Z98.1 S/P CERVICAL SPINAL FUSION: ICD-10-CM

## 2022-03-23 DIAGNOSIS — M54.2 CERVICALGIA: ICD-10-CM

## 2022-03-23 DIAGNOSIS — M48.02 SPINAL STENOSIS OF CERVICAL REGION: ICD-10-CM

## 2022-03-23 DIAGNOSIS — Z98.1 S/P CERVICAL SPINAL FUSION: Primary | ICD-10-CM

## 2022-03-23 PROCEDURE — 3078F DIAST BP <80 MM HG: CPT | Mod: CPTII,,, | Performed by: NEUROLOGICAL SURGERY

## 2022-03-23 PROCEDURE — 72040 XR CERVICAL SPINE AP LATERAL: ICD-10-PCS | Mod: 26,,, | Performed by: RADIOLOGY

## 2022-03-23 PROCEDURE — 3074F SYST BP LT 130 MM HG: CPT | Mod: CPTII,,, | Performed by: NEUROLOGICAL SURGERY

## 2022-03-23 PROCEDURE — 4010F PR ACE/ARB THEARPY RXD/TAKEN: ICD-10-PCS | Mod: CPTII,,, | Performed by: NEUROLOGICAL SURGERY

## 2022-03-23 PROCEDURE — 3044F HG A1C LEVEL LT 7.0%: CPT | Mod: CPTII,,, | Performed by: NEUROLOGICAL SURGERY

## 2022-03-23 PROCEDURE — 99215 OFFICE O/P EST HI 40 MIN: CPT | Mod: PBBFAC | Performed by: NEUROLOGICAL SURGERY

## 2022-03-23 PROCEDURE — 99024 POSTOP FOLLOW-UP VISIT: CPT | Mod: ,,, | Performed by: NEUROLOGICAL SURGERY

## 2022-03-23 PROCEDURE — 72040 X-RAY EXAM NECK SPINE 2-3 VW: CPT | Mod: 26,,, | Performed by: RADIOLOGY

## 2022-03-23 PROCEDURE — 3008F BODY MASS INDEX DOCD: CPT | Mod: CPTII,,, | Performed by: NEUROLOGICAL SURGERY

## 2022-03-23 PROCEDURE — 3066F PR DOCUMENTATION OF TREATMENT FOR NEPHROPATHY: ICD-10-PCS | Mod: CPTII,,, | Performed by: NEUROLOGICAL SURGERY

## 2022-03-23 PROCEDURE — 4010F ACE/ARB THERAPY RXD/TAKEN: CPT | Mod: CPTII,,, | Performed by: NEUROLOGICAL SURGERY

## 2022-03-23 PROCEDURE — 3008F PR BODY MASS INDEX (BMI) DOCUMENTED: ICD-10-PCS | Mod: CPTII,,, | Performed by: NEUROLOGICAL SURGERY

## 2022-03-23 PROCEDURE — 99999 PR PBB SHADOW E&M-EST. PATIENT-LVL V: CPT | Mod: PBBFAC,,, | Performed by: NEUROLOGICAL SURGERY

## 2022-03-23 PROCEDURE — 72040 X-RAY EXAM NECK SPINE 2-3 VW: CPT | Mod: TC,FY

## 2022-03-23 PROCEDURE — 3061F NEG MICROALBUMINURIA REV: CPT | Mod: CPTII,,, | Performed by: NEUROLOGICAL SURGERY

## 2022-03-23 PROCEDURE — 3074F PR MOST RECENT SYSTOLIC BLOOD PRESSURE < 130 MM HG: ICD-10-PCS | Mod: CPTII,,, | Performed by: NEUROLOGICAL SURGERY

## 2022-03-23 PROCEDURE — 1160F RVW MEDS BY RX/DR IN RCRD: CPT | Mod: CPTII,,, | Performed by: NEUROLOGICAL SURGERY

## 2022-03-23 PROCEDURE — 1160F PR REVIEW ALL MEDS BY PRESCRIBER/CLIN PHARMACIST DOCUMENTED: ICD-10-PCS | Mod: CPTII,,, | Performed by: NEUROLOGICAL SURGERY

## 2022-03-23 PROCEDURE — 3061F PR NEG MICROALBUMINURIA RESULT DOCUMENTED/REVIEW: ICD-10-PCS | Mod: CPTII,,, | Performed by: NEUROLOGICAL SURGERY

## 2022-03-23 PROCEDURE — 3078F PR MOST RECENT DIASTOLIC BLOOD PRESSURE < 80 MM HG: ICD-10-PCS | Mod: CPTII,,, | Performed by: NEUROLOGICAL SURGERY

## 2022-03-23 PROCEDURE — 3066F NEPHROPATHY DOC TX: CPT | Mod: CPTII,,, | Performed by: NEUROLOGICAL SURGERY

## 2022-03-23 PROCEDURE — 3044F PR MOST RECENT HEMOGLOBIN A1C LEVEL <7.0%: ICD-10-PCS | Mod: CPTII,,, | Performed by: NEUROLOGICAL SURGERY

## 2022-03-23 PROCEDURE — 99999 PR PBB SHADOW E&M-EST. PATIENT-LVL V: ICD-10-PCS | Mod: PBBFAC,,, | Performed by: NEUROLOGICAL SURGERY

## 2022-03-23 PROCEDURE — 1159F PR MEDICATION LIST DOCUMENTED IN MEDICAL RECORD: ICD-10-PCS | Mod: CPTII,,, | Performed by: NEUROLOGICAL SURGERY

## 2022-03-23 PROCEDURE — 1159F MED LIST DOCD IN RCRD: CPT | Mod: CPTII,,, | Performed by: NEUROLOGICAL SURGERY

## 2022-03-23 PROCEDURE — 99024 PR POST-OP FOLLOW-UP VISIT: ICD-10-PCS | Mod: ,,, | Performed by: NEUROLOGICAL SURGERY

## 2022-03-23 NOTE — PATIENT INSTRUCTIONS
I have personally reviewed the XR cervical with the pt which shows clearing of prevertebral soft tissue swelling. Postop anterior body fusion and disc spacer placement C3-C4 stable. No hardware failure. Rotoscoliosis cervicothoracic junction. Degenerative disc spondylosis remainder cervical spine stable. Straightening usual lordotic curve.    I will refer to physical therapy.     I will schedule the patient for 6 month follow up with CT cervical spine.

## 2022-03-23 NOTE — PROGRESS NOTES
Subjective:   I, Emmie Armstrong, attest that this documentation has been prepared under the direction and in the presence of Damian Arroyo MD.     Patient ID: Holly Mcclelland is a 61 y.o. female     Chief Complaint: No chief complaint on file.        HPI  Ms. Holly Mcclelland is a 61 y.o. woman with C3-4 HNP and cervical myelopathy with a history of  DM, CHF, HLD, HTN, and stroke, who presents today for 6 week post-op C3-4 ACDF done on 2/9/2022.   This is a pt who developed right leg weakness last month and was seen in the ED to r/o stroke. Work up was negative for stroke. She had MRI lumbar spine done and found to have mild central canal spinal stenosis, bulging at L3-L4. The pt reported of chronic neck pain that had gradually worsened in the last few weeks. The pain was so severe that it was interfering with her work. 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 States her right foot tends to drag when she walks. The pt also reports of pain and spasms to her bilateral hands.      Today the pt reports that every now and then she would have some dragging to her right foot. Otherwise pt has been doing well in the interim and denies any new medical problems or concerns at this time.     Review of Systems   Constitutional: Negative for activity change, appetite change, fatigue, fever and unexpected weight change.   HENT: Negative for facial swelling.    Eyes: Negative.    Respiratory: Negative.    Cardiovascular: Negative.    Gastrointestinal: Negative for diarrhea, nausea and vomiting.   Endocrine: Negative.    Genitourinary: Negative.    Musculoskeletal: Negative for back pain, joint swelling, myalgias and neck pain.   Neurological: Positive for weakness (R leg). Negative for dizziness, seizures, numbness and headaches.   Psychiatric/Behavioral: Negative.       Past Medical History:   Diagnosis Date    CHF (congestive heart failure)      Diabetes mellitus     Heart murmur     Hyperlipidemia     Hypertension     Sleep apnea     wears CPAP    Stroke        Objective:      Vitals:    03/23/22 1301   BP: 123/70   Pulse: 93   Temp: 99.1 °F (37.3 °C)      Physical Exam  Constitutional:       General: She is not in acute distress.     Appearance: Normal appearance.   HENT:      Head: Normocephalic and atraumatic.   Pulmonary:      Effort: Pulmonary effort is normal.   Musculoskeletal:      Cervical back: Neck supple.   Neurological:      Mental Status: She is alert and oriented to person, place, and time.      GCS: GCS eye subscore is 4. GCS verbal subscore is 5. GCS motor subscore is 6.      Cranial Nerves: No cranial nerve deficit.            IMAGING:  X-Ray Cervical Spine AP And Lateral (3/23/2022): Clearing of prevertebral soft tissue swelling.  Postop anterior body fusion and disc spacer placement C3-C4 stable. No hardware failure. Rotoscoliosis cervicothoracic junction. Degenerative disc spondylosis remainder cervical spine stable. Straightening usual lordotic curve.      I have personally reviewed the images with the pt.      I, Dr. Damian Arroyo, personally performed the services described in this documentation. All medical record entries made by the scribe, Emmie Armstrong, were at my direction and in my presence.  I have reviewed the chart and agree that the record reflects my personal performance and is accurate and complete. Damian Arroyo MD. 03/23/2022    Assessment:       1. S/P cervical spinal fusion    2. Spinal stenosis of cervical region    3. Cervicalgia         Plan:   I have personally reviewed the XR cervical with the pt which shows clearing of prevertebral soft tissue swelling. Postop anterior body fusion and disc spacer placement C3-C4 stable. No hardware failure. Rotoscoliosis cervicothoracic junction. Degenerative disc spondylosis remainder cervical spine stable. Straightening usual lordotic curve.    I will refer to physical  therapy.     I will schedule the patient for 6 month follow up with CT cervical spine.

## 2022-03-25 PROBLEM — M54.2 NECK PAIN: Status: ACTIVE | Noted: 2022-03-25

## 2022-03-28 ENCOUNTER — OFFICE VISIT (OUTPATIENT)
Dept: PODIATRY | Facility: CLINIC | Age: 62
End: 2022-03-28
Payer: MEDICAID

## 2022-03-28 VITALS
WEIGHT: 198 LBS | HEART RATE: 79 BPM | BODY MASS INDEX: 31.01 KG/M2 | DIASTOLIC BLOOD PRESSURE: 78 MMHG | SYSTOLIC BLOOD PRESSURE: 134 MMHG

## 2022-03-28 DIAGNOSIS — L60.0 ONYCHOCRYPTOSIS: Primary | ICD-10-CM

## 2022-03-28 DIAGNOSIS — E11.42 TYPE 2 DIABETES MELLITUS WITH DIABETIC POLYNEUROPATHY, WITHOUT LONG-TERM CURRENT USE OF INSULIN: ICD-10-CM

## 2022-03-28 DIAGNOSIS — I87.2 EDEMA OF BOTH LOWER EXTREMITIES DUE TO PERIPHERAL VENOUS INSUFFICIENCY: ICD-10-CM

## 2022-03-28 DIAGNOSIS — B35.1 ONYCHOMYCOSIS: ICD-10-CM

## 2022-03-28 PROCEDURE — 99213 PR OFFICE/OUTPT VISIT, EST, LEVL III, 20-29 MIN: ICD-10-PCS | Mod: 25,S$PBB,, | Performed by: PODIATRIST

## 2022-03-28 PROCEDURE — 3075F PR MOST RECENT SYSTOLIC BLOOD PRESS GE 130-139MM HG: ICD-10-PCS | Mod: CPTII,,, | Performed by: PODIATRIST

## 2022-03-28 PROCEDURE — 3066F PR DOCUMENTATION OF TREATMENT FOR NEPHROPATHY: ICD-10-PCS | Mod: CPTII,,, | Performed by: PODIATRIST

## 2022-03-28 PROCEDURE — 1159F PR MEDICATION LIST DOCUMENTED IN MEDICAL RECORD: ICD-10-PCS | Mod: CPTII,,, | Performed by: PODIATRIST

## 2022-03-28 PROCEDURE — 3044F HG A1C LEVEL LT 7.0%: CPT | Mod: CPTII,,, | Performed by: PODIATRIST

## 2022-03-28 PROCEDURE — 99999 PR PBB SHADOW E&M-EST. PATIENT-LVL III: ICD-10-PCS | Mod: PBBFAC,,, | Performed by: PODIATRIST

## 2022-03-28 PROCEDURE — 3061F PR NEG MICROALBUMINURIA RESULT DOCUMENTED/REVIEW: ICD-10-PCS | Mod: CPTII,,, | Performed by: PODIATRIST

## 2022-03-28 PROCEDURE — 11721 DEBRIDE NAIL 6 OR MORE: CPT | Mod: Q9,PBBFAC,PN | Performed by: PODIATRIST

## 2022-03-28 PROCEDURE — 99213 OFFICE O/P EST LOW 20 MIN: CPT | Mod: PBBFAC,PN | Performed by: PODIATRIST

## 2022-03-28 PROCEDURE — 11719 TRIM NAIL(S) ANY NUMBER: CPT | Mod: Q9,PBBFAC,PN | Performed by: PODIATRIST

## 2022-03-28 PROCEDURE — 3078F PR MOST RECENT DIASTOLIC BLOOD PRESSURE < 80 MM HG: ICD-10-PCS | Mod: CPTII,,, | Performed by: PODIATRIST

## 2022-03-28 PROCEDURE — 3066F NEPHROPATHY DOC TX: CPT | Mod: CPTII,,, | Performed by: PODIATRIST

## 2022-03-28 PROCEDURE — 3008F BODY MASS INDEX DOCD: CPT | Mod: CPTII,,, | Performed by: PODIATRIST

## 2022-03-28 PROCEDURE — 4010F PR ACE/ARB THEARPY RXD/TAKEN: ICD-10-PCS | Mod: CPTII,,, | Performed by: PODIATRIST

## 2022-03-28 PROCEDURE — 11721 DIABETIC NAIL DEBRIDEMENT: ICD-10-PCS | Mod: S$PBB,,, | Performed by: PODIATRIST

## 2022-03-28 PROCEDURE — 3061F NEG MICROALBUMINURIA REV: CPT | Mod: CPTII,,, | Performed by: PODIATRIST

## 2022-03-28 PROCEDURE — 4010F ACE/ARB THERAPY RXD/TAKEN: CPT | Mod: CPTII,,, | Performed by: PODIATRIST

## 2022-03-28 PROCEDURE — 1159F MED LIST DOCD IN RCRD: CPT | Mod: CPTII,,, | Performed by: PODIATRIST

## 2022-03-28 PROCEDURE — 99999 PR PBB SHADOW E&M-EST. PATIENT-LVL III: CPT | Mod: PBBFAC,,, | Performed by: PODIATRIST

## 2022-03-28 PROCEDURE — 3008F PR BODY MASS INDEX (BMI) DOCUMENTED: ICD-10-PCS | Mod: CPTII,,, | Performed by: PODIATRIST

## 2022-03-28 PROCEDURE — 3078F DIAST BP <80 MM HG: CPT | Mod: CPTII,,, | Performed by: PODIATRIST

## 2022-03-28 PROCEDURE — 99213 OFFICE O/P EST LOW 20 MIN: CPT | Mod: 25,S$PBB,, | Performed by: PODIATRIST

## 2022-03-28 PROCEDURE — 3075F SYST BP GE 130 - 139MM HG: CPT | Mod: CPTII,,, | Performed by: PODIATRIST

## 2022-03-28 PROCEDURE — 3044F PR MOST RECENT HEMOGLOBIN A1C LEVEL <7.0%: ICD-10-PCS | Mod: CPTII,,, | Performed by: PODIATRIST

## 2022-03-28 NOTE — PROGRESS NOTES
Subjective:      Patient ID: Holly Mcclelland is a 61 y.o. female.    Chief Complaint: Diabetes Mellitus (Last visit Dr Braga 1/5/22)    Holly is a 61 y.o. female who presents to the clinic for follow-up of high risk feet.  Has been over 4 months since she was last here. Missed because could not drive for 6 weeks - neck right disc replacement for full pain relief.  States that she has also lost 50 lb so is feeling whole lot better.     Diabetes management: Jose Diamond NP 3/10/22  Cardiology:  Sriram Kahn MD 1/31/22  PCP: Lasha Braga/ 1/5/22 Rut Morris NP    Well women visit w/ OB-GYN: Nella Marte MD/Maricarmen Chapman NP 4/22/21    Patient Active Problem List   Diagnosis    TIA (transient ischemic attack)    GERD (gastroesophageal reflux disease)    Class 1 obesity due to excess calories with serious comorbidity and body mass index (BMI) of 31.0 to 31.9 in adult    Onychogryphosis    Onychocryptosis    Onychomycosis due to dermatophyte    Type 2 diabetes mellitus with diabetic polyneuropathy, without long-term current use of insulin    Essential hypertension    Dyslipidemia, goal LDL below 70    Hypertensive heart disease with chronic diastolic congestive heart failure    Palpitations    Heart murmur    Right leg weakness    History of cerebrovascular accident (CVA) greater than eight weeks in the past    Obstructive sleep apnea    Spinal stenosis of cervical region    S/P cervical spinal fusion    Neck pain     The patient's chief complaint is long, thick & ingrown toenails. No other c/o re: feet but states that she has been dealing with weakness to her right leg and cramping to her left side more recently.  Does not think it is due to her back but states that does seem to radiate leg and she does have back problems.  Has not been to see her PCP in quite some time, and is advised to do so.  Has had low blood pressure and her medications have been changed by diabetes management  recently.  This patient has documented high risk feet requiring routine maintenance secondary to diabetes mellitis and those secondary complications of diabetes, as mentioned..    Hemoglobin A1C   Date Value Ref Range Status   03/03/2022 6.4 (H) 4.0 - 5.6 % Final     Comment:     ADA Screening Guidelines:  5.7-6.4%  Consistent with prediabetes  >or=6.5%  Consistent with diabetes    High levels of fetal hemoglobin interfere with the HbA1C  assay. Heterozygous hemoglobin variants (HbS, HgC, etc)do  not significantly interfere with this assay.   However, presence of multiple variants may affect accuracy.     02/10/2022 6.6 (H) 4.0 - 5.6 % Final     Comment:     ADA Screening Guidelines:  5.7-6.4%  Consistent with prediabetes  >or=6.5%  Consistent with diabetes    High levels of fetal hemoglobin interfere with the HbA1C  assay. Heterozygous hemoglobin variants (HbS, HgC, etc)do  not significantly interfere with this assay.   However, presence of multiple variants may affect accuracy.     11/09/2021 6.0 (H) 4.0 - 5.6 % Final     Comment:     ADA Screening Guidelines:  5.7-6.4%  Consistent with prediabetes  >or=6.5%  Consistent with diabetes    High levels of fetal hemoglobin interfere with the HbA1C  assay. Heterozygous hemoglobin variants (HbS, HgC, etc)do  not significantly interfere with this assay.   However, presence of multiple variants may affect accuracy.        Objective:      Physical Exam  Vitals reviewed.   Constitutional:       Appearance: She is well-developed. She is obese.   Cardiovascular:      Pulses:           Dorsalis pedis pulses are 2+ on the right side and 2+ on the left side.   Musculoskeletal:      Right lower leg: No tenderness. Edema present.      Left lower leg: No tenderness. Edema present.      Comments: No reproducible discomfort nor any tenderness to the calf bilaterally.   Feet:      Right foot:      Skin integrity: Callus (diffuse WB hyperkeratosis B/L ) and dry skin present. No fissure.       Toenail Condition: Right toenails are ingrown. Fungal disease present.     Left foot:      Skin integrity: Callus and dry skin present. No fissure.      Toenail Condition: Left toenails are ingrown. Fungal disease present.     Comments: Nails are all cryptotic, dystrophic and thickened, discolored sparing 3rd and 4th bilaterally.  No acute impingement inflammation  Skin:     General: Skin is warm and dry.      Capillary Refill: Capillary refill takes 2 to 3 seconds.      Findings: No lesion or rash.      Comments: Weight bearing diffuse hyperkeratotic tissue bilateral without fissuring   Neurological:      Mental Status: She is alert and oriented to person, place, and time.      Sensory: Sensation is intact.      Motor: Motor function is intact.      Gait: Gait is intact.   Psychiatric:         Mood and Affect: Mood and affect normal.         Behavior: Behavior normal. Behavior is cooperative.        Assessment:      Encounter Diagnoses   Name Primary?    Onychocryptosis Yes    Onychomycosis     Type 2 diabetes mellitus with diabetic polyneuropathy, without long-term current use of insulin     Edema of both lower extremities due to peripheral venous insufficiency      Problem List Items Addressed This Visit        Derm    Onychocryptosis - Primary    Relevant Orders    Diabetic nail trimming       Endocrine    Type 2 diabetes mellitus with diabetic polyneuropathy, without long-term current use of insulin    Relevant Orders    Diabetic nail debridement    Diabetic nail trimming      Other Visit Diagnoses     Onychomycosis        Relevant Orders    Diabetic nail debridement    Diabetic nail trimming    Edema of both lower extremities due to peripheral venous insufficiency              Plan:      Holly was seen today for diabetes mellitus.    Diagnoses and all orders for this visit:    Onychocryptosis  -     Diabetic nail trimming    Onychomycosis  -     Diabetic nail debridement  -     Diabetic nail  trimming    Type 2 diabetes mellitus with diabetic polyneuropathy, without long-term current use of insulin  -     Diabetic nail debridement  -     Diabetic nail trimming    Edema of both lower extremities due to peripheral venous insufficiency    I counseled the patient on her conditions, their implications and medical management.    - Shoe inspection. Diabetic Foot Education. Patient reminded of the importance of good nutrition and blood sugar control to help prevent podiatric complications of diabetes. We discussed wearing proper shoe gear, daily foot inspections, never walking without protective shoe gear, routine podiatric nail visits every 3 -4 months, sooner prn.      - With patient's permission, nails were aggressively reduced and debrided x 10 to their soft tissue attachment mechanically, removing all offending nail and debris.  Crypt ptotic nail borders cut back at an angle to aggressively excised the offending nails in toto.  Patient relates relief following the procedure.    Instruction on use OTC topical antifungal treatment options provided.

## 2022-03-28 NOTE — PATIENT INSTRUCTIONS
For your fungal(mycotic) nails, pick one & use for at least 3-6 months:    1. Listerine mouthwash (yellow/gold) - soak for 5-10minutes daily (may re-use solution up to a week)    2. Vicks Vaporub to nails daily after a bath/end of day/bedtime.    3. Lamisil (terbanafine) 1% antifungal cream daily to nails after shower.

## 2022-04-05 NOTE — PROCEDURES
Diabetic nail debridement    Date/Time: 3/28/2022 10:30 AM  Performed by: Mayelin Lovett DPM  Authorized by: Mayelin Lovett DPM     Consent Done?:  Yes (Verbal)    Nail Care Type:  Debride(Left 1st Toe, Left 2nd Toe, Left 3rd Toe, Right 1st Toe, Right 2nd Toe and Right 3rd Toe)  Patient tolerance:  Patient tolerated the procedure well with no immediate complications  Diabetic nail trimming    Date/Time: 3/28/2022 10:30 AM  Performed by: Mayelin Lovett DPM  Authorized by: Mayelin Lovett DPM     Consent Done?:  Yes (Verbal)    Nail Care Type:  Trim(Left 4th Toe, Left 5th Toe, Right 4th Toe and Right 5th Toe)  Patient tolerance:  Patient tolerated the procedure well with no immediate complications

## 2022-06-20 ENCOUNTER — OFFICE VISIT (OUTPATIENT)
Dept: PODIATRY | Facility: CLINIC | Age: 62
End: 2022-06-20
Payer: MEDICAID

## 2022-06-20 VITALS
WEIGHT: 203 LBS | DIASTOLIC BLOOD PRESSURE: 68 MMHG | BODY MASS INDEX: 31.79 KG/M2 | SYSTOLIC BLOOD PRESSURE: 131 MMHG | HEART RATE: 85 BPM

## 2022-06-20 DIAGNOSIS — L60.0 ONYCHOMYCOSIS WITH INGROWN TOENAIL: Primary | ICD-10-CM

## 2022-06-20 DIAGNOSIS — L84 PLANTAR CALLUS: ICD-10-CM

## 2022-06-20 DIAGNOSIS — M54.16 LUMBAR RADICULOPATHY: ICD-10-CM

## 2022-06-20 DIAGNOSIS — R29.898 RIGHT LEG WEAKNESS: ICD-10-CM

## 2022-06-20 DIAGNOSIS — Z79.01 LONG TERM CURRENT USE OF ANTICOAGULANT: ICD-10-CM

## 2022-06-20 DIAGNOSIS — B35.1 ONYCHOMYCOSIS WITH INGROWN TOENAIL: Primary | ICD-10-CM

## 2022-06-20 DIAGNOSIS — E11.42 TYPE 2 DIABETES MELLITUS WITH DIABETIC POLYNEUROPATHY, WITHOUT LONG-TERM CURRENT USE OF INSULIN: ICD-10-CM

## 2022-06-20 DIAGNOSIS — Z86.73 HISTORY OF CEREBROVASCULAR ACCIDENT (CVA) GREATER THAN EIGHT WEEKS IN THE PAST: ICD-10-CM

## 2022-06-20 DIAGNOSIS — R60.9 DEPENDENT EDEMA: ICD-10-CM

## 2022-06-20 PROCEDURE — 3078F DIAST BP <80 MM HG: CPT | Mod: CPTII,,, | Performed by: PODIATRIST

## 2022-06-20 PROCEDURE — 11721 DEBRIDE NAIL 6 OR MORE: CPT | Mod: S$PBB,,, | Performed by: PODIATRIST

## 2022-06-20 PROCEDURE — 3066F NEPHROPATHY DOC TX: CPT | Mod: CPTII,,, | Performed by: PODIATRIST

## 2022-06-20 PROCEDURE — 3008F BODY MASS INDEX DOCD: CPT | Mod: CPTII,,, | Performed by: PODIATRIST

## 2022-06-20 PROCEDURE — 11721 PR DEBRIDEMENT OF NAILS, 6 OR MORE: ICD-10-PCS | Mod: S$PBB,,, | Performed by: PODIATRIST

## 2022-06-20 PROCEDURE — 99213 OFFICE O/P EST LOW 20 MIN: CPT | Mod: PBBFAC,PN | Performed by: PODIATRIST

## 2022-06-20 PROCEDURE — 3044F HG A1C LEVEL LT 7.0%: CPT | Mod: CPTII,,, | Performed by: PODIATRIST

## 2022-06-20 PROCEDURE — 99214 OFFICE O/P EST MOD 30 MIN: CPT | Mod: 25,S$PBB,, | Performed by: PODIATRIST

## 2022-06-20 PROCEDURE — 3008F PR BODY MASS INDEX (BMI) DOCUMENTED: ICD-10-PCS | Mod: CPTII,,, | Performed by: PODIATRIST

## 2022-06-20 PROCEDURE — 4010F ACE/ARB THERAPY RXD/TAKEN: CPT | Mod: CPTII,,, | Performed by: PODIATRIST

## 2022-06-20 PROCEDURE — 4010F PR ACE/ARB THEARPY RXD/TAKEN: ICD-10-PCS | Mod: CPTII,,, | Performed by: PODIATRIST

## 2022-06-20 PROCEDURE — 99999 PR PBB SHADOW E&M-EST. PATIENT-LVL III: CPT | Mod: PBBFAC,,, | Performed by: PODIATRIST

## 2022-06-20 PROCEDURE — 3044F PR MOST RECENT HEMOGLOBIN A1C LEVEL <7.0%: ICD-10-PCS | Mod: CPTII,,, | Performed by: PODIATRIST

## 2022-06-20 PROCEDURE — 3066F PR DOCUMENTATION OF TREATMENT FOR NEPHROPATHY: ICD-10-PCS | Mod: CPTII,,, | Performed by: PODIATRIST

## 2022-06-20 PROCEDURE — 3061F NEG MICROALBUMINURIA REV: CPT | Mod: CPTII,,, | Performed by: PODIATRIST

## 2022-06-20 PROCEDURE — 3075F SYST BP GE 130 - 139MM HG: CPT | Mod: CPTII,,, | Performed by: PODIATRIST

## 2022-06-20 PROCEDURE — 11721 DEBRIDE NAIL 6 OR MORE: CPT | Mod: Q9,PBBFAC,PN | Performed by: PODIATRIST

## 2022-06-20 PROCEDURE — 3078F PR MOST RECENT DIASTOLIC BLOOD PRESSURE < 80 MM HG: ICD-10-PCS | Mod: CPTII,,, | Performed by: PODIATRIST

## 2022-06-20 PROCEDURE — 3061F PR NEG MICROALBUMINURIA RESULT DOCUMENTED/REVIEW: ICD-10-PCS | Mod: CPTII,,, | Performed by: PODIATRIST

## 2022-06-20 PROCEDURE — 99999 PR PBB SHADOW E&M-EST. PATIENT-LVL III: ICD-10-PCS | Mod: PBBFAC,,, | Performed by: PODIATRIST

## 2022-06-20 PROCEDURE — 99214 PR OFFICE/OUTPT VISIT, EST, LEVL IV, 30-39 MIN: ICD-10-PCS | Mod: 25,S$PBB,, | Performed by: PODIATRIST

## 2022-06-20 PROCEDURE — 3075F PR MOST RECENT SYSTOLIC BLOOD PRESS GE 130-139MM HG: ICD-10-PCS | Mod: CPTII,,, | Performed by: PODIATRIST

## 2022-06-21 ENCOUNTER — TELEPHONE (OUTPATIENT)
Dept: NEUROSURGERY | Facility: CLINIC | Age: 62
End: 2022-06-21
Payer: MEDICAID

## 2022-06-21 DIAGNOSIS — R29.898 WEAKNESS OF LOWER EXTREMITY, UNSPECIFIED LATERALITY: Primary | ICD-10-CM

## 2022-06-21 NOTE — TELEPHONE ENCOUNTER
Returned pt's call  States she is doing great with her neck but still has weakness / dragging in right leg.    Appt made for Nell peterson/ JEANETTE before.    ----- Message from Dayana Zambrano sent at 6/21/2022 11:40 AM CDT -----  Regarding: call back  Pt says she has been going to therapy for her arm but the issue is not her arm  her leg is still pain  asking to speak with staff  [phone] (home)

## 2022-06-28 ENCOUNTER — TELEPHONE (OUTPATIENT)
Dept: NEUROSURGERY | Facility: CLINIC | Age: 62
End: 2022-06-28
Payer: MEDICAID

## 2022-06-28 ENCOUNTER — HOSPITAL ENCOUNTER (OUTPATIENT)
Dept: RADIOLOGY | Facility: HOSPITAL | Age: 62
Discharge: HOME | End: 2022-06-28
Attending: PHYSICIAN ASSISTANT
Payer: MEDICAID

## 2022-06-28 ENCOUNTER — OFFICE VISIT (OUTPATIENT)
Dept: NEUROSURGERY | Facility: CLINIC | Age: 62
End: 2022-06-28
Payer: MEDICAID

## 2022-06-28 VITALS
HEIGHT: 67 IN | DIASTOLIC BLOOD PRESSURE: 76 MMHG | HEART RATE: 88 BPM | OXYGEN SATURATION: 99 % | SYSTOLIC BLOOD PRESSURE: 126 MMHG | WEIGHT: 200.81 LBS | BODY MASS INDEX: 31.52 KG/M2

## 2022-06-28 DIAGNOSIS — R29.898 RIGHT LEG WEAKNESS: Primary | ICD-10-CM

## 2022-06-28 DIAGNOSIS — M54.16 LUMBAR RADICULOPATHY: ICD-10-CM

## 2022-06-28 DIAGNOSIS — R29.898 WEAKNESS OF LOWER EXTREMITY, UNSPECIFIED LATERALITY: ICD-10-CM

## 2022-06-28 PROCEDURE — 99213 PR OFFICE/OUTPT VISIT, EST, LEVL III, 20-29 MIN: ICD-10-PCS | Mod: S$PBB,,, | Performed by: PHYSICIAN ASSISTANT

## 2022-06-28 PROCEDURE — 3008F PR BODY MASS INDEX (BMI) DOCUMENTED: ICD-10-PCS | Mod: CPTII,,, | Performed by: PHYSICIAN ASSISTANT

## 2022-06-28 PROCEDURE — 3066F NEPHROPATHY DOC TX: CPT | Mod: CPTII,,, | Performed by: PHYSICIAN ASSISTANT

## 2022-06-28 PROCEDURE — 72114 X-RAY EXAM L-S SPINE BENDING: CPT | Mod: TC,FY

## 2022-06-28 PROCEDURE — 3044F HG A1C LEVEL LT 7.0%: CPT | Mod: CPTII,,, | Performed by: PHYSICIAN ASSISTANT

## 2022-06-28 PROCEDURE — 72114 XR LUMBAR SPINE 5 VIEW WITH FLEX AND EXT: ICD-10-PCS | Mod: 26,,, | Performed by: RADIOLOGY

## 2022-06-28 PROCEDURE — 4010F PR ACE/ARB THEARPY RXD/TAKEN: ICD-10-PCS | Mod: CPTII,,, | Performed by: PHYSICIAN ASSISTANT

## 2022-06-28 PROCEDURE — 99999 PR PBB SHADOW E&M-EST. PATIENT-LVL V: CPT | Mod: PBBFAC,,, | Performed by: PHYSICIAN ASSISTANT

## 2022-06-28 PROCEDURE — 3008F BODY MASS INDEX DOCD: CPT | Mod: CPTII,,, | Performed by: PHYSICIAN ASSISTANT

## 2022-06-28 PROCEDURE — 99213 OFFICE O/P EST LOW 20 MIN: CPT | Mod: S$PBB,,, | Performed by: PHYSICIAN ASSISTANT

## 2022-06-28 PROCEDURE — 99999 PR PBB SHADOW E&M-EST. PATIENT-LVL V: ICD-10-PCS | Mod: PBBFAC,,, | Performed by: PHYSICIAN ASSISTANT

## 2022-06-28 PROCEDURE — 4010F ACE/ARB THERAPY RXD/TAKEN: CPT | Mod: CPTII,,, | Performed by: PHYSICIAN ASSISTANT

## 2022-06-28 PROCEDURE — 99215 OFFICE O/P EST HI 40 MIN: CPT | Mod: PBBFAC | Performed by: PHYSICIAN ASSISTANT

## 2022-06-28 PROCEDURE — 3061F PR NEG MICROALBUMINURIA RESULT DOCUMENTED/REVIEW: ICD-10-PCS | Mod: CPTII,,, | Performed by: PHYSICIAN ASSISTANT

## 2022-06-28 PROCEDURE — 3078F PR MOST RECENT DIASTOLIC BLOOD PRESSURE < 80 MM HG: ICD-10-PCS | Mod: CPTII,,, | Performed by: PHYSICIAN ASSISTANT

## 2022-06-28 PROCEDURE — 1160F PR REVIEW ALL MEDS BY PRESCRIBER/CLIN PHARMACIST DOCUMENTED: ICD-10-PCS | Mod: CPTII,,, | Performed by: PHYSICIAN ASSISTANT

## 2022-06-28 PROCEDURE — 3044F PR MOST RECENT HEMOGLOBIN A1C LEVEL <7.0%: ICD-10-PCS | Mod: CPTII,,, | Performed by: PHYSICIAN ASSISTANT

## 2022-06-28 PROCEDURE — 72114 X-RAY EXAM L-S SPINE BENDING: CPT | Mod: 26,,, | Performed by: RADIOLOGY

## 2022-06-28 PROCEDURE — 1159F PR MEDICATION LIST DOCUMENTED IN MEDICAL RECORD: ICD-10-PCS | Mod: CPTII,,, | Performed by: PHYSICIAN ASSISTANT

## 2022-06-28 PROCEDURE — 3074F PR MOST RECENT SYSTOLIC BLOOD PRESSURE < 130 MM HG: ICD-10-PCS | Mod: CPTII,,, | Performed by: PHYSICIAN ASSISTANT

## 2022-06-28 PROCEDURE — 3066F PR DOCUMENTATION OF TREATMENT FOR NEPHROPATHY: ICD-10-PCS | Mod: CPTII,,, | Performed by: PHYSICIAN ASSISTANT

## 2022-06-28 PROCEDURE — 3078F DIAST BP <80 MM HG: CPT | Mod: CPTII,,, | Performed by: PHYSICIAN ASSISTANT

## 2022-06-28 PROCEDURE — 1160F RVW MEDS BY RX/DR IN RCRD: CPT | Mod: CPTII,,, | Performed by: PHYSICIAN ASSISTANT

## 2022-06-28 PROCEDURE — 3061F NEG MICROALBUMINURIA REV: CPT | Mod: CPTII,,, | Performed by: PHYSICIAN ASSISTANT

## 2022-06-28 PROCEDURE — 1159F MED LIST DOCD IN RCRD: CPT | Mod: CPTII,,, | Performed by: PHYSICIAN ASSISTANT

## 2022-06-28 PROCEDURE — 3074F SYST BP LT 130 MM HG: CPT | Mod: CPTII,,, | Performed by: PHYSICIAN ASSISTANT

## 2022-06-28 NOTE — TELEPHONE ENCOUNTER
Called and spoke to pt. Offered earlier appt time today- pt accepted. R/s xray @ [facility] for 10:30am & appt for 11am. Pt verbally understood and stated she will be here for the earlier times.

## 2022-06-28 NOTE — PROGRESS NOTES
[facility]    Chief Complaint: Extremity Weakness    Interval history 06/28/2022:  Patient returns to clinic with complaints of unchanged right leg weakness along with intermittent pain in her lateral right leg.      HPI from Dr. Arroyo 03/23/2022:  Ms. Holly Mcclelland is a 61 y.o. woman with C3-4 HNP and cervical myelopathy with a history of  DM, CHF, HLD, HTN, and stroke, who presents today for 6 week post-op C3-4 ACDF done on 2/9/2022.   This is a pt who developed right leg weakness last month and was seen in the ED to r/o stroke. Work up was negative for stroke. She had MRI lumbar spine done and found to have mild central canal spinal stenosis, bulging at L3-L4. The pt reported of chronic neck pain that had gradually worsened in the last few weeks. The pain was so severe that it was interfering with her work. 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 States her right foot tends to drag when she walks. The pt also reports of pain and spasms to her bilateral hands.      Today the pt reports that every now and then she would have some dragging to her right foot. Otherwise pt has been doing well in the interim and denies any new medical problems or concerns at this time.     Review of Systems   Constitutional: Negative for activity change, appetite change, fatigue, fever and unexpected weight change.   HENT: Negative for facial swelling.    Eyes: Negative.    Respiratory: Negative.    Cardiovascular: Negative.    Gastrointestinal: Negative for diarrhea, nausea and vomiting.   Endocrine: Negative.    Genitourinary: Negative.    Musculoskeletal: Negative for back pain, joint swelling, myalgias and neck pain.   Neurological: Positive for weakness (R leg). Negative for dizziness, seizures, numbness and headaches.   Psychiatric/Behavioral: Negative.       Past Medical History:   Diagnosis Date    CHF (congestive heart failure)      Diabetes mellitus     Heart murmur     Hyperlipidemia     Hypertension     Sleep apnea     wears CPAP    Stroke        Objective:      Vitals:    06/28/22 1037   BP: 126/76   Pulse: 88      Physical Exam  General: well developed, well nourished, no distress  Neurologic: Alert and oriented. Thought content appropriate.   GCS: Motor: 6/Verbal: 5/Eyes: 4 GCS Total: 15   Mental Status: Awake, Alert, Oriented x3   Cranial nerves: face symmetric, tongue midline, pupils equal, round, reactive to light with accomodation, EOMI.   Motor Strength: moves all extremities with good strength and tone   Sensation: response to light touch throughout  No gait disturbances        IMAGING:  X-Ray Cervical Spine AP And Lateral (3/23/2022): Clearing of prevertebral soft tissue swelling.  Postop anterior body fusion and disc spacer placement C3-C4 stable. No hardware failure. Rotoscoliosis cervicothoracic junction. Degenerative disc spondylosis remainder cervical spine stable. Straightening usual lordotic curve.    X-ray lumbar spinal flexion extension views 06/28/2022  Advanced degenerative changes at L4-5 and L5-S1  No instability or spondylolisthesis  Assessment:       1. Right leg weakness    2. Lumbar radiculopathy         Plan:   Patient is status post C3-4 ACDF on 02/09/2022 with Dr. Arroyo.  She presents to clinic today with concerns of right leg weakness that is unchanged from before surgery and intermittent right lateral leg pain.  I recommend a trial of physical therapy before additional workup of the lumbar spine.  Follow up with Dr. Arroyo as scheduled with CT cervical spine to assess fusion status.      Nell Marshall PA-C  [facility]  Department of Neurosurgery  [phone]

## 2022-07-06 NOTE — PROGRESS NOTES
Subjective:      Patient ID: Holly Mcclelland is a 61 y.o. female.    Chief Complaint: Diabetes Mellitus (Last visit Dr Braga 5/4/22)    Holly is a 61 y.o. female who presents for DM nail care. States doing PT s/p neck pain/Cspine fusion but 'R leg is dragging' also - due for evaluation per neuro. Had previously mentioned this here & stated that she had back problems. Not having L cramping leg anymore though.  This patient has documented high risk feet requiring routine maintenance secondary to diabetes mellitis and those secondary complications of diabetes, as mentioned.     Diabetes management: Em Diamond NP 3/10/22  PCP: Lasha Braga 5/4/22 Rut Morris NP    Shoe gear: slippers      Patient Active Problem List   Diagnosis    TIA (transient ischemic attack)    GERD (gastroesophageal reflux disease)    Class 1 obesity due to excess calories with serious comorbidity and body mass index (BMI) of 31.0 to 31.9 in adult    Onychogryphosis    Onychocryptosis    Onychomycosis due to dermatophyte    Type 2 diabetes mellitus with diabetic polyneuropathy, without long-term current use of insulin    Essential hypertension    Dyslipidemia, goal LDL below 70    Hypertensive heart disease with chronic diastolic congestive heart failure    Palpitations    Heart murmur    Right leg weakness    History of cerebrovascular accident (CVA) greater than eight weeks in the past    Obstructive sleep apnea    Spinal stenosis of cervical region    S/P cervical spinal fusion    Neck pain      This patient has documented high risk feet requiring routine maintenance secondary to diabetes mellitis and those secondary complications of diabetes, as mentioned.    Hemoglobin A1C   Date Value Ref Range Status   03/03/2022 6.4 (H) 4.0 - 5.6 % Final     Comment:     ADA Screening Guidelines:  5.7-6.4%  Consistent with prediabetes  >or=6.5%  Consistent with diabetes    High levels of fetal hemoglobin interfere with the  HbA1C  assay. Heterozygous hemoglobin variants (HbS, HgC, etc)do  not significantly interfere with this assay.   However, presence of multiple variants may affect accuracy.     02/10/2022 6.6 (H) 4.0 - 5.6 % Final     Comment:     ADA Screening Guidelines:  5.7-6.4%  Consistent with prediabetes  >or=6.5%  Consistent with diabetes    High levels of fetal hemoglobin interfere with the HbA1C  assay. Heterozygous hemoglobin variants (HbS, HgC, etc)do  not significantly interfere with this assay.   However, presence of multiple variants may affect accuracy.     11/09/2021 6.0 (H) 4.0 - 5.6 % Final     Comment:     ADA Screening Guidelines:  5.7-6.4%  Consistent with prediabetes  >or=6.5%  Consistent with diabetes    High levels of fetal hemoglobin interfere with the HbA1C  assay. Heterozygous hemoglobin variants (HbS, HgC, etc)do  not significantly interfere with this assay.   However, presence of multiple variants may affect accuracy.        Objective:      Physical Exam  Vitals reviewed.   Constitutional:       Appearance: She is well-developed. She is obese.   Cardiovascular:      Pulses:           Dorsalis pedis pulses are 2+ on the right side and 2+ on the left side.   Musculoskeletal:      Right lower leg: No tenderness. Edema present.      Left lower leg: No tenderness. Edema present.      Comments: No reproducible discomfort nor any tenderness to the calf bilaterally.   Feet:      Right foot:      Skin integrity: Callus (diffuse WB hyperkeratosis B/L ) and dry skin present. No fissure.      Toenail Condition: Right toenails are ingrown. Fungal disease present.     Left foot:      Skin integrity: Callus and dry skin present. No fissure.      Toenail Condition: Left toenails are ingrown. Fungal disease present.     Comments: Nails are all cryptotic, dystrophic and thickened, discolored sparing 3rd and 4th B/L. No acute  Inflammation.  Skin:     General: Skin is warm and dry.      Capillary Refill: Capillary refill  takes 2 to 3 seconds.      Comments: WB diffuse hyperkeratotic tissue B/L without fissuring   Neurological:      Mental Status: She is alert and oriented to person, place, and time.      Sensory: Sensation is intact.      Motor: Motor function is intact.      Gait: Gait is intact.      Comments: Paresthesias including tingling and weakness RLE with no clearly identified trigger or source; no hyperemia.   Psychiatric:         Mood and Affect: Mood and affect normal.         Behavior: Behavior normal. Behavior is cooperative.        Assessment:      Encounter Diagnoses   Name Primary?    Long term current use of anticoagulant     Type 2 diabetes mellitus with diabetic polyneuropathy, without long-term current use of insulin     Right leg weakness     Dependent edema     Lumbar radiculopathy     Plantar callus     Onychomycosis with ingrown toenail Yes    History of cerebrovascular accident (CVA) greater than eight weeks in the past      Problem List Items Addressed This Visit        Neuro    History of cerebrovascular accident (CVA) greater than eight weeks in the past       Endocrine    Type 2 diabetes mellitus with diabetic polyneuropathy, without long-term current use of insulin    Relevant Orders    Routine Foot Care       Orthopedic    Right leg weakness      Other Visit Diagnoses     Onychomycosis with ingrown toenail    -  Primary    Relevant Orders    Routine Foot Care    Long term current use of anticoagulant        Dependent edema        Lumbar radiculopathy        Plantar callus              Plan:      Holly was seen today for diabetes mellitus.    Diagnoses and all orders for this visit:    Onychomycosis with ingrown toenail  -     Routine Foot Care    Long term current use of anticoagulant    Type 2 diabetes mellitus with diabetic polyneuropathy, without long-term current use of insulin  -     Routine Foot Care    Right leg weakness    Dependent edema    Lumbar radiculopathy    Plantar  callus    History of cerebrovascular accident (CVA) greater than eight weeks in the past    I counseled the patient on her conditions, their implications and medical management.    - Shoe inspection. Diabetic Foot Education. Patient reminded of the importance of good nutrition and blood sugar control to help prevent podiatric complications of diabetes. We discussed wearing proper shoe gear, daily foot inspections, never walking without protective shoe gear, routine podiatric nail visits every 3 months, sooner prn.      - With patient's permission, nails were aggressively reduced and debrided x 10 to their soft tissue attachment mechanically, removing all offending nail and debris.  Cryptotic nail borders were cut back at an angle to aggressively excise the offending nails in toto.  Patient relates relief following the procedure.    Continue topical antifungal nail treatment if desired.

## 2022-07-06 NOTE — PROCEDURES
Routine Foot Care    Date/Time: 6/20/2022 10:30 AM  Performed by: Mayelin Lovett DPM  Authorized by: Mayelin Lovett DPM     Consent Done?:  Yes (Verbal)    Nail Care Type:  Debride  Location(s): All  (Left 1st Toe, Left 3rd Toe, Left 2nd Toe, Left 4th Toe, Left 5th Toe, Right 1st Toe, Right 2nd Toe, Right 3rd Toe, Right 4th Toe and Right 5th Toe)  Patient tolerance:  Patient tolerated the procedure well with no immediate complications

## 2022-07-12 PROBLEM — M54.16 LUMBAR RADICULOPATHY: Status: ACTIVE | Noted: 2022-07-12

## 2022-08-15 ENCOUNTER — OFFICE VISIT (OUTPATIENT)
Dept: CARDIOLOGY | Facility: CLINIC | Age: 62
End: 2022-08-15
Payer: MEDICAID

## 2022-08-15 VITALS
DIASTOLIC BLOOD PRESSURE: 62 MMHG | SYSTOLIC BLOOD PRESSURE: 112 MMHG | HEART RATE: 77 BPM | WEIGHT: 204.38 LBS | OXYGEN SATURATION: 100 % | HEIGHT: 67 IN | BODY MASS INDEX: 32.08 KG/M2

## 2022-08-15 DIAGNOSIS — E78.5 DYSLIPIDEMIA, GOAL LDL BELOW 70: ICD-10-CM

## 2022-08-15 DIAGNOSIS — E11.42 TYPE 2 DIABETES MELLITUS WITH DIABETIC POLYNEUROPATHY, WITHOUT LONG-TERM CURRENT USE OF INSULIN: ICD-10-CM

## 2022-08-15 DIAGNOSIS — I11.0 HYPERTENSIVE HEART DISEASE WITH CHRONIC DIASTOLIC CONGESTIVE HEART FAILURE: ICD-10-CM

## 2022-08-15 DIAGNOSIS — I50.32 HYPERTENSIVE HEART DISEASE WITH CHRONIC DIASTOLIC CONGESTIVE HEART FAILURE: ICD-10-CM

## 2022-08-15 DIAGNOSIS — I95.1 ORTHOSTATIC HYPOTENSION: ICD-10-CM

## 2022-08-15 DIAGNOSIS — I10 ESSENTIAL HYPERTENSION: Primary | ICD-10-CM

## 2022-08-15 DIAGNOSIS — Z98.1 S/P CERVICAL SPINAL FUSION: ICD-10-CM

## 2022-08-15 PROCEDURE — 1160F PR REVIEW ALL MEDS BY PRESCRIBER/CLIN PHARMACIST DOCUMENTED: ICD-10-PCS | Mod: CPTII,,, | Performed by: INTERNAL MEDICINE

## 2022-08-15 PROCEDURE — 99213 OFFICE O/P EST LOW 20 MIN: CPT | Mod: PBBFAC,PN | Performed by: INTERNAL MEDICINE

## 2022-08-15 PROCEDURE — 3066F PR DOCUMENTATION OF TREATMENT FOR NEPHROPATHY: ICD-10-PCS | Mod: CPTII,,, | Performed by: INTERNAL MEDICINE

## 2022-08-15 PROCEDURE — 1159F PR MEDICATION LIST DOCUMENTED IN MEDICAL RECORD: ICD-10-PCS | Mod: CPTII,,, | Performed by: INTERNAL MEDICINE

## 2022-08-15 PROCEDURE — 1160F RVW MEDS BY RX/DR IN RCRD: CPT | Mod: CPTII,,, | Performed by: INTERNAL MEDICINE

## 2022-08-15 PROCEDURE — 3044F PR MOST RECENT HEMOGLOBIN A1C LEVEL <7.0%: ICD-10-PCS | Mod: CPTII,,, | Performed by: INTERNAL MEDICINE

## 2022-08-15 PROCEDURE — 3074F SYST BP LT 130 MM HG: CPT | Mod: CPTII,,, | Performed by: INTERNAL MEDICINE

## 2022-08-15 PROCEDURE — 93010 ELECTROCARDIOGRAM REPORT: CPT | Mod: S$PBB,,, | Performed by: INTERNAL MEDICINE

## 2022-08-15 PROCEDURE — 3044F HG A1C LEVEL LT 7.0%: CPT | Mod: CPTII,,, | Performed by: INTERNAL MEDICINE

## 2022-08-15 PROCEDURE — 99214 PR OFFICE/OUTPT VISIT, EST, LEVL IV, 30-39 MIN: ICD-10-PCS | Mod: S$PBB,25,, | Performed by: INTERNAL MEDICINE

## 2022-08-15 PROCEDURE — 99999 PR PBB SHADOW E&M-EST. PATIENT-LVL III: CPT | Mod: PBBFAC,,, | Performed by: INTERNAL MEDICINE

## 2022-08-15 PROCEDURE — 3008F PR BODY MASS INDEX (BMI) DOCUMENTED: ICD-10-PCS | Mod: CPTII,,, | Performed by: INTERNAL MEDICINE

## 2022-08-15 PROCEDURE — 3074F PR MOST RECENT SYSTOLIC BLOOD PRESSURE < 130 MM HG: ICD-10-PCS | Mod: CPTII,,, | Performed by: INTERNAL MEDICINE

## 2022-08-15 PROCEDURE — 4010F PR ACE/ARB THEARPY RXD/TAKEN: ICD-10-PCS | Mod: CPTII,,, | Performed by: INTERNAL MEDICINE

## 2022-08-15 PROCEDURE — 3061F PR NEG MICROALBUMINURIA RESULT DOCUMENTED/REVIEW: ICD-10-PCS | Mod: CPTII,,, | Performed by: INTERNAL MEDICINE

## 2022-08-15 PROCEDURE — 99214 OFFICE O/P EST MOD 30 MIN: CPT | Mod: S$PBB,25,, | Performed by: INTERNAL MEDICINE

## 2022-08-15 PROCEDURE — 1159F MED LIST DOCD IN RCRD: CPT | Mod: CPTII,,, | Performed by: INTERNAL MEDICINE

## 2022-08-15 PROCEDURE — 3061F NEG MICROALBUMINURIA REV: CPT | Mod: CPTII,,, | Performed by: INTERNAL MEDICINE

## 2022-08-15 PROCEDURE — 99999 PR PBB SHADOW E&M-EST. PATIENT-LVL III: ICD-10-PCS | Mod: PBBFAC,,, | Performed by: INTERNAL MEDICINE

## 2022-08-15 PROCEDURE — 3078F DIAST BP <80 MM HG: CPT | Mod: CPTII,,, | Performed by: INTERNAL MEDICINE

## 2022-08-15 PROCEDURE — 3078F PR MOST RECENT DIASTOLIC BLOOD PRESSURE < 80 MM HG: ICD-10-PCS | Mod: CPTII,,, | Performed by: INTERNAL MEDICINE

## 2022-08-15 PROCEDURE — 3008F BODY MASS INDEX DOCD: CPT | Mod: CPTII,,, | Performed by: INTERNAL MEDICINE

## 2022-08-15 PROCEDURE — 93005 ELECTROCARDIOGRAM TRACING: CPT | Mod: PBBFAC,PN | Performed by: INTERNAL MEDICINE

## 2022-08-15 PROCEDURE — 3066F NEPHROPATHY DOC TX: CPT | Mod: CPTII,,, | Performed by: INTERNAL MEDICINE

## 2022-08-15 PROCEDURE — 4010F ACE/ARB THERAPY RXD/TAKEN: CPT | Mod: CPTII,,, | Performed by: INTERNAL MEDICINE

## 2022-08-15 PROCEDURE — 93010 EKG 12-LEAD: ICD-10-PCS | Mod: S$PBB,,, | Performed by: INTERNAL MEDICINE

## 2022-08-15 RX ORDER — EZETIMIBE 10 MG/1
10 TABLET ORAL DAILY
Qty: 90 TABLET | Refills: 3 | Status: SHIPPED | OUTPATIENT
Start: 2022-08-15 | End: 2022-11-09

## 2022-08-15 RX ORDER — DIAZEPAM 10 MG/1
10 TABLET ORAL DAILY PRN
COMMUNITY
Start: 2022-07-16

## 2022-08-15 NOTE — PROGRESS NOTES
"  Subjective:      Patient ID: Holly Mcclelland is a 61 y.o. female.    Chief Complaint: Hypertension    HPI:  Had neck surgery in February due to right leg weakness.    Pt is going to physical therapy.    Pt does housework.    Pt no longer works.    Review of Systems   Cardiovascular: Positive for leg swelling. Negative for chest pain, claudication, dyspnea on exertion, irregular heartbeat, near-syncope, orthopnea, palpitations and syncope.      Very rare "weak spell".    Yesterday after arising from stooping under cabinet pt felt unstable for a few minutes.    Pt takes furosemide qhs for edema    Pt still has right leg weakness    Pt has episodic pain left leg    Note pt had vomiting from both atorvastatin and rosuvasatin.    Past Medical History:   Diagnosis Date    CHF (congestive heart failure)     Diabetes mellitus     Heart murmur     Hyperlipidemia     Hypertension     Sleep apnea     wears CPAP    Stroke         Past Surgical History:   Procedure Laterality Date    ANTERIOR CERVICAL DISCECTOMY W/ FUSION N/A 2/9/2022    Procedure: DISCECTOMY, SPINE, CERVICAL, ANTERIOR APPROACH, WITH FUSION C3-4 Gareth PRADHAN notified;  Surgeon: Damian Arroyo MD;  Location: [facility];  Service: Neurosurgery;  Laterality: N/A;  ASA1  TOR1  T&Cross x 2 units  EMG  SEP  MEP  RN PREOP ON 1/31/22,COVID---NEGATIVE- on 2/8-- T/S  done  ON 2/8  [facility] GARETH RUIZ  [phone] NOTIFIED (CALLED) GARETH ON 1/31/2022 @ 3:11PM-LO  NEURO MONIT    BREAST BIOPSY Left 2015       Family History   Problem Relation Age of Onset    Diabetes Maternal Grandmother     Diabetes Mother     Hypertension Mother     Heart disease Mother     Diabetes Sister     Diabetes Father     Breast cancer Neg Hx     Colon cancer Neg Hx     Ovarian cancer Neg Hx        Social History     Socioeconomic History    Marital status:    Tobacco Use    Smoking status: Never Smoker    Smokeless tobacco: Never Used   Substance and Sexual Activity "    Alcohol use: No    Drug use: No    Sexual activity: Not Currently       Current Outpatient Medications on File Prior to Visit   Medication Sig Dispense Refill    aspirin (ECOTRIN) 81 MG EC tablet Take 1 tablet by mouth once daily 30 tablet 0    blood sugar diagnostic (ONETOUCH ULTRA TEST) Strp 1 strip by Misc.(Non-Drug; Combo Route) route once daily. 100 each 3    cholecalciferol, vitamin D3, 1,250 mcg (50,000 unit) capsule Take 1 capsule by mouth once a week 4 capsule 0    clopidogreL (PLAVIX) 75 mg tablet Take 1 tablet (75 mg total) by mouth once daily. 30 tablet 3    dapagliflozin (FARXIGA) 5 mg Tab tablet Take 1 tablet (5 mg total) by mouth once daily. 90 tablet 2    diazePAM (VALIUM) 10 MG Tab Take 10 mg by mouth daily as needed.      dulaglutide (TRULICITY) 4.5 mg/0.5 mL pen injector INJECT 4.5 MG SUBCUTANEOUSLY ONCE A WEEK 4 pen 9    furosemide (LASIX) 20 MG tablet TAKE 1 TABLET BY MOUTH ONCE DAILY AS NEEDED FOR  SWELLING 15 tablet 3    hydrALAZINE (APRESOLINE) 25 MG tablet Take 1 tablet (25 mg total) by mouth 3 (three) times daily. 90 tablet 11    HYDROcodone-acetaminophen (NORCO)  mg per tablet Take 1 tablet by mouth every 4 (four) hours as needed (7-10/10 pain). 42 tablet 0    lisinopriL-hydrochlorothiazide (PRINZIDE,ZESTORETIC) 20-25 mg Tab Take 1 tablet by mouth once daily. 90 tablet 2    medroxyPROGESTERone (PROVERA) 5 MG tablet Take 1 tablet by mouth once daily 30 tablet 0    metFORMIN (GLUCOPHAGE) 1000 MG tablet Take 1 tablet (1,000 mg total) by mouth 2 (two) times daily with meals. 180 tablet 2    methocarbamoL (ROBAXIN) 750 MG Tab Take 1 tablet (750 mg total) by mouth every 8 (eight) hours as needed (muscle spasms). 60 tablet 0    ONETOUCH DELICA PLUS LANCET 33 gauge Misc USE 1  TO CHECK GLUCOSE 4 TIMES DAILY 100 each 0     No current facility-administered medications on file prior to visit.       Review of patient's allergies indicates:   Allergen Reactions    Victoza  "[liraglutide] Other (See Comments)     Throat closed ? But not seen in the ER      Objective:     Vitals:    08/15/22 0915   BP: 112/62   BP Location: Left arm   Patient Position: Sitting   BP Method: Large (Automatic)   Pulse: 77   SpO2: 100%   Weight: 92.7 kg (204 lb 5.9 oz)   Height: 5' 7" (1.702 m)        Physical Exam  Vitals reviewed.   Constitutional:       Appearance: She is well-developed.   Eyes:      General: No scleral icterus.  Neck:      Vascular: No carotid bruit or JVD.   Cardiovascular:      Rate and Rhythm: Normal rate and regular rhythm.      Heart sounds: No murmur heard.    No gallop.   Pulmonary:      Breath sounds: Normal breath sounds.   Musculoskeletal:      Right lower leg: No edema.      Left lower leg: No edema.   Skin:     General: Skin is warm and dry.   Neurological:      Mental Status: She is alert and oriented to person, place, and time.   Psychiatric:         Behavior: Behavior normal.         Thought Content: Thought content normal.         Judgment: Judgment normal.      ECG today: NSR, slightly low T wave V6, WNL    Lab Visit on 03/03/2022   Component Date Value Ref Range Status    Hemoglobin A1C 03/03/2022 6.4 (A) 4.0 - 5.6 % Final    Estimated Avg Glucose 03/03/2022 137 (A) 68 - 131 mg/dL Final    Sodium 03/03/2022 144  136 - 145 mmol/L Final    Potassium 03/03/2022 3.6  3.5 - 5.1 mmol/L Final    Chloride 03/03/2022 106  95 - 110 mmol/L Final    CO2 03/03/2022 25  23 - 29 mmol/L Final    Glucose 03/03/2022 105  70 - 110 mg/dL Final    BUN 03/03/2022 18  8 - 23 mg/dL Final    Creatinine 03/03/2022 1.1  0.5 - 1.4 mg/dL Final    Calcium 03/03/2022 10.7 (A) 8.7 - 10.5 mg/dL Final    Total Protein 03/03/2022 7.7  6.0 - 8.4 g/dL Final    Albumin 03/03/2022 4.0  3.5 - 5.2 g/dL Final    Total Bilirubin 03/03/2022 0.5  0.1 - 1.0 mg/dL Final    Alkaline Phosphatase 03/03/2022 54 (A) 55 - 135 U/L Final    AST 03/03/2022 10  10 - 40 U/L Final    ALT 03/03/2022 13  10 - 44 " U/L Final    Anion Gap 03/03/2022 13  8 - 16 mmol/L Final    eGFR if African American 03/03/2022 >60.0  >60 mL/min/1.73 m^2 Final    eGFR if non African American 03/03/2022 53.9 (A) >60 mL/min/1.73 m^2 Final    Cholesterol 03/03/2022 147  120 - 199 mg/dL Final    Triglycerides 03/03/2022 79  30 - 150 mg/dL Final    HDL 03/03/2022 43  40 - 75 mg/dL Final    LDL Cholesterol 03/03/2022 88.2  63.0 - 159.0 mg/dL Final    HDL/Cholesterol Ratio 03/03/2022 29.3  20.0 - 50.0 % Final    Total Cholesterol/HDL Ratio 03/03/2022 3.4  2.0 - 5.0 Final    Non-HDL Cholesterol 03/03/2022 104  mg/dL Final    TSH 03/03/2022 0.760  0.400 - 4.000 uIU/mL Final    WBC 03/03/2022 7.21  3.90 - 12.70 K/uL Final    RBC 03/03/2022 4.20  4.00 - 5.40 M/uL Final    Hemoglobin 03/03/2022 11.6 (A) 12.0 - 16.0 g/dL Final    Hematocrit 03/03/2022 38.1  37.0 - 48.5 % Final    MCV 03/03/2022 91  82 - 98 fL Final    MCH 03/03/2022 27.6  27.0 - 31.0 pg Final    MCHC 03/03/2022 30.4 (A) 32.0 - 36.0 g/dL Final    RDW 03/03/2022 12.7  11.5 - 14.5 % Final    Platelets 03/03/2022 286  150 - 450 K/uL Final    MPV 03/03/2022 10.7  9.2 - 12.9 fL Final    Immature Granulocytes 03/03/2022 0.3  0.0 - 0.5 % Final    Gran # (ANC) 03/03/2022 5.0  1.8 - 7.7 K/uL Final    Immature Grans (Abs) 03/03/2022 0.02  0.00 - 0.04 K/uL Final    Lymph # 03/03/2022 1.7  1.0 - 4.8 K/uL Final    Mono # 03/03/2022 0.4  0.3 - 1.0 K/uL Final    Eos # 03/03/2022 0.1  0.0 - 0.5 K/uL Final    Baso # 03/03/2022 0.01  0.00 - 0.20 K/uL Final    nRBC 03/03/2022 0  0 /100 WBC Final    Gran % 03/03/2022 69.3  38.0 - 73.0 % Final    Lymph % 03/03/2022 23.3  18.0 - 48.0 % Final    Mono % 03/03/2022 6.0  4.0 - 15.0 % Final    Eosinophil % 03/03/2022 1.0  0.0 - 8.0 % Final    Basophil % 03/03/2022 0.1  0.0 - 1.9 % Final    Differential Method 03/03/2022 Automated   Final    Microalbumin, Urine 03/08/2022 15.0  ug/mL Final    Creatinine, Urine 03/08/2022 163.0  15.0  - 325.0 mg/dL Final    Microalb/Creat Ratio 03/08/2022 9.2  0.0 - 30.0 ug/mg Final   (      Assessment:     1. Essential hypertension    2. Dyslipidemia, goal LDL below 70    3. S/P cervical spinal fusion    4. Hypertensive heart disease with chronic diastolic congestive heart failure    5. Orthostatic hypotension    6. Type 2 diabetes mellitus with diabetic polyneuropathy, without long-term current use of insulin      Plan:   Holly was seen today for hypertension.    Diagnoses and all orders for this visit:    Essential hypertension  -     IN OFFICE EKG 12-LEAD (to Muse)  -     CBC Auto Differential; Future  -     Comprehensive Metabolic Panel; Future  -     Lipid Panel; Future    Dyslipidemia, goal LDL below 70  -     IN OFFICE EKG 12-LEAD (to Muse)  -     CBC Auto Differential; Future  -     Comprehensive Metabolic Panel; Future  -     Lipid Panel; Future    S/P cervical spinal fusion  -     IN OFFICE EKG 12-LEAD (to Muse)  -     CBC Auto Differential; Future  -     Comprehensive Metabolic Panel; Future  -     Lipid Panel; Future    Hypertensive heart disease with chronic diastolic congestive heart failure  -     IN OFFICE EKG 12-LEAD (to Muse)  -     CBC Auto Differential; Future  -     Comprehensive Metabolic Panel; Future  -     Lipid Panel; Future    Orthostatic hypotension  -     IN OFFICE EKG 12-LEAD (to Muse)  -     CBC Auto Differential; Future  -     Comprehensive Metabolic Panel; Future  -     Lipid Panel; Future    Type 2 diabetes mellitus with diabetic polyneuropathy, without long-term current use of insulin  -     IN OFFICE EKG 12-LEAD (to Muse)  -     CBC Auto Differential; Future  -     Comprehensive Metabolic Panel; Future  -     Lipid Panel; Future    Other orders  -     ezetimibe (ZETIA) 10 mg tablet; Take 1 tablet (10 mg total) by mouth once daily.     No sugar, low carb diet discussed    Pt instructed to take the furosemide no more than twice a month (for swelling of ankles) due to  orthostatic hypotension    Will try Zetia 10 mg daily since pt is intolerant to statins    RTC 3 months with lab    Follow up in about 3 months (around 11/15/2022).

## 2022-08-30 ENCOUNTER — OFFICE VISIT (OUTPATIENT)
Dept: PODIATRY | Facility: CLINIC | Age: 62
End: 2022-08-30
Payer: MEDICAID

## 2022-08-30 VITALS
WEIGHT: 206 LBS | BODY MASS INDEX: 32.26 KG/M2 | HEART RATE: 82 BPM | SYSTOLIC BLOOD PRESSURE: 152 MMHG | DIASTOLIC BLOOD PRESSURE: 70 MMHG

## 2022-08-30 DIAGNOSIS — Z79.01 LONG TERM CURRENT USE OF ANTICOAGULANT: ICD-10-CM

## 2022-08-30 DIAGNOSIS — E11.42 TYPE 2 DIABETES MELLITUS WITH DIABETIC POLYNEUROPATHY, WITHOUT LONG-TERM CURRENT USE OF INSULIN: Primary | ICD-10-CM

## 2022-08-30 DIAGNOSIS — R22.42 LOCALIZED SWELLING, MASS AND LUMP, LEFT LOWER LIMB: ICD-10-CM

## 2022-08-30 DIAGNOSIS — M77.8 ENTHESOPATHY OF LEFT FOOT: ICD-10-CM

## 2022-08-30 PROCEDURE — 3061F PR NEG MICROALBUMINURIA RESULT DOCUMENTED/REVIEW: ICD-10-PCS | Mod: CPTII,,, | Performed by: PODIATRIST

## 2022-08-30 PROCEDURE — 99213 OFFICE O/P EST LOW 20 MIN: CPT | Mod: S$PBB,,, | Performed by: PODIATRIST

## 2022-08-30 PROCEDURE — 99213 PR OFFICE/OUTPT VISIT, EST, LEVL III, 20-29 MIN: ICD-10-PCS | Mod: S$PBB,,, | Performed by: PODIATRIST

## 2022-08-30 PROCEDURE — 99999 PR PBB SHADOW E&M-EST. PATIENT-LVL III: ICD-10-PCS | Mod: PBBFAC,,, | Performed by: PODIATRIST

## 2022-08-30 PROCEDURE — 3077F PR MOST RECENT SYSTOLIC BLOOD PRESSURE >= 140 MM HG: ICD-10-PCS | Mod: CPTII,,, | Performed by: PODIATRIST

## 2022-08-30 PROCEDURE — 4010F PR ACE/ARB THEARPY RXD/TAKEN: ICD-10-PCS | Mod: CPTII,,, | Performed by: PODIATRIST

## 2022-08-30 PROCEDURE — 3008F BODY MASS INDEX DOCD: CPT | Mod: CPTII,,, | Performed by: PODIATRIST

## 2022-08-30 PROCEDURE — 99213 OFFICE O/P EST LOW 20 MIN: CPT | Mod: PBBFAC,PN | Performed by: PODIATRIST

## 2022-08-30 PROCEDURE — 3008F PR BODY MASS INDEX (BMI) DOCUMENTED: ICD-10-PCS | Mod: CPTII,,, | Performed by: PODIATRIST

## 2022-08-30 PROCEDURE — 4010F ACE/ARB THERAPY RXD/TAKEN: CPT | Mod: CPTII,,, | Performed by: PODIATRIST

## 2022-08-30 PROCEDURE — 3044F PR MOST RECENT HEMOGLOBIN A1C LEVEL <7.0%: ICD-10-PCS | Mod: CPTII,,, | Performed by: PODIATRIST

## 2022-08-30 PROCEDURE — 3078F DIAST BP <80 MM HG: CPT | Mod: CPTII,,, | Performed by: PODIATRIST

## 2022-08-30 PROCEDURE — 3078F PR MOST RECENT DIASTOLIC BLOOD PRESSURE < 80 MM HG: ICD-10-PCS | Mod: CPTII,,, | Performed by: PODIATRIST

## 2022-08-30 PROCEDURE — 3077F SYST BP >= 140 MM HG: CPT | Mod: CPTII,,, | Performed by: PODIATRIST

## 2022-08-30 PROCEDURE — 3066F NEPHROPATHY DOC TX: CPT | Mod: CPTII,,, | Performed by: PODIATRIST

## 2022-08-30 PROCEDURE — 99999 PR PBB SHADOW E&M-EST. PATIENT-LVL III: CPT | Mod: PBBFAC,,, | Performed by: PODIATRIST

## 2022-08-30 PROCEDURE — 3044F HG A1C LEVEL LT 7.0%: CPT | Mod: CPTII,,, | Performed by: PODIATRIST

## 2022-08-30 PROCEDURE — 3066F PR DOCUMENTATION OF TREATMENT FOR NEPHROPATHY: ICD-10-PCS | Mod: CPTII,,, | Performed by: PODIATRIST

## 2022-08-30 PROCEDURE — 3061F NEG MICROALBUMINURIA REV: CPT | Mod: CPTII,,, | Performed by: PODIATRIST

## 2022-08-30 RX ORDER — DICLOFENAC SODIUM 10 MG/G
2 GEL TOPICAL 4 TIMES DAILY
Qty: 350 G | Refills: 2 | Status: SHIPPED | OUTPATIENT
Start: 2022-08-30 | End: 2023-04-04 | Stop reason: CLARIF

## 2022-08-30 NOTE — PROGRESS NOTES
Subjective:      Patient ID: Holly Mcclelland is a 61 y.o. female.    Chief Complaint: Foot Pain (L foot)    Holly is a 61 y.o. female who presents for pain left foot x 2 days & worsening dorsal - lump. No h/o injury. Pain level 10/10.  Last here for DM nail care 2 months ago.     Doing PT s/p neck pain/Cspine fusion. RLE weakness - h/o back problems. Not having L cramping leg. Due to see neurology.     Diabetes management: Em Diamond NP 3/10/22  PCP: Lasha Braga 8/8/22/ Rut Morris NP    Shoe gear: slippers      Patient Active Problem List   Diagnosis    TIA (transient ischemic attack)    GERD (gastroesophageal reflux disease)    Class 1 obesity due to excess calories with serious comorbidity and body mass index (BMI) of 31.0 to 31.9 in adult    Onychogryphosis    Onychocryptosis    Onychomycosis due to dermatophyte    Type 2 diabetes mellitus with diabetic polyneuropathy, without long-term current use of insulin    Essential hypertension    Dyslipidemia, goal LDL below 70    Hypertensive heart disease with chronic diastolic congestive heart failure    Palpitations    Heart murmur    Right leg weakness    History of cerebrovascular accident (CVA) greater than eight weeks in the past    Obstructive sleep apnea    Spinal stenosis of cervical region    S/P cervical spinal fusion    Neck pain    Lumbar radiculopathy    Orthostatic hypotension      This patient has documented high risk feet requiring routine maintenance secondary to diabetes mellitis and those secondary complications of diabetes, as mentioned.    Hemoglobin A1C   Date Value Ref Range Status   03/03/2022 6.4 (H) 4.0 - 5.6 % Final     Comment:     ADA Screening Guidelines:  5.7-6.4%  Consistent with prediabetes  >or=6.5%  Consistent with diabetes    High levels of fetal hemoglobin interfere with the HbA1C  assay. Heterozygous hemoglobin variants (HbS, HgC, etc)do  not significantly interfere with this assay.   However, presence of multiple  variants may affect accuracy.     02/10/2022 6.6 (H) 4.0 - 5.6 % Final     Comment:     ADA Screening Guidelines:  5.7-6.4%  Consistent with prediabetes  >or=6.5%  Consistent with diabetes    High levels of fetal hemoglobin interfere with the HbA1C  assay. Heterozygous hemoglobin variants (HbS, HgC, etc)do  not significantly interfere with this assay.   However, presence of multiple variants may affect accuracy.     11/09/2021 6.0 (H) 4.0 - 5.6 % Final     Comment:     ADA Screening Guidelines:  5.7-6.4%  Consistent with prediabetes  >or=6.5%  Consistent with diabetes    High levels of fetal hemoglobin interfere with the HbA1C  assay. Heterozygous hemoglobin variants (HbS, HgC, etc)do  not significantly interfere with this assay.   However, presence of multiple variants may affect accuracy.        Objective:      Physical Exam  Vitals reviewed.   Constitutional:       Appearance: She is well-developed. She is obese.   Cardiovascular:      Pulses:           Dorsalis pedis pulses are 2+ on the right side and 2+ on the left side.   Musculoskeletal:      Right lower leg: No tenderness. Edema present.      Left lower leg: No tenderness. Edema present.        Feet:       Comments: No reproducible discomfort nor any tenderness to the calf bilaterally.   Feet:      Right foot:      Skin integrity: Callus (diffuse WB hyperkeratosis B/L ) and dry skin present. No fissure.      Toenail Condition: Right toenails are ingrown. Fungal disease present.     Left foot:      Skin integrity: Callus and dry skin present. No fissure.      Toenail Condition: Left toenails are ingrown. Fungal disease present.     Comments: Nails are all cryptotic, dystrophic, thickened, discolored sparing 3rd & 4th B/L.     Palpable prominence dorsal midfoot L consistent w/ saddle bone deformity; enthesophyte.  Skin:     General: Skin is warm and dry.      Capillary Refill: Capillary refill takes 2 to 3 seconds.      Comments: WB diffuse hyperkeratotic  tissue B/L w/out fissuring   Neurological:      Mental Status: She is alert and oriented to person, place, and time.      Sensory: Sensation is intact.      Motor: Motor function is intact.      Gait: Gait is intact.      Comments: Paresthesias including tingling and weakness RLE with no clearly identified trigger or source; no hyperemia (likely lumbar radiculopathy)   Psychiatric:         Mood and Affect: Mood and affect normal.         Behavior: Behavior normal. Behavior is cooperative.      Assessment:      Encounter Diagnoses   Name Primary?    Type 2 diabetes mellitus with diabetic polyneuropathy, without long-term current use of insulin Yes    Localized swelling, mass and lump, left lower limb     Long term current use of anticoagulant     Enthesopathy of left foot      Problem List Items Addressed This Visit          Endocrine    Type 2 diabetes mellitus with diabetic polyneuropathy, without long-term current use of insulin - Primary     Other Visit Diagnoses       Localized swelling, mass and lump, left lower limb        Long term current use of anticoagulant        Enthesopathy of left foot        Relevant Medications    diclofenac sodium (VOLTAREN) 1 % Gel            Plan:      Holly was seen today for foot pain.    Diagnoses and all orders for this visit:    Type 2 diabetes mellitus with diabetic polyneuropathy, without long-term current use of insulin    Localized swelling, mass and lump, left lower limb    Long term current use of anticoagulant    Enthesopathy of left foot  -     diclofenac sodium (VOLTAREN) 1 % Gel; Apply 2 g topically 4 (four) times daily.    I counseled the patient on her conditions, their implications and medical management.    - Shoe inspection. Patient reminded of the importance of wearing proper supportive shoe gear, daily foot inspections, never walking without protective shoe gear.    Dispensed gelstrap & advised on MLA support.    F/U 3-4 wks., sooner prn.

## 2022-08-31 ENCOUNTER — OFFICE VISIT (OUTPATIENT)
Dept: NEUROSURGERY | Facility: CLINIC | Age: 62
End: 2022-08-31
Payer: MEDICAID

## 2022-08-31 ENCOUNTER — HOSPITAL ENCOUNTER (OUTPATIENT)
Dept: RADIOLOGY | Facility: HOSPITAL | Age: 62
Discharge: HOME | End: 2022-08-31
Attending: NEUROLOGICAL SURGERY
Payer: MEDICAID

## 2022-08-31 VITALS
WEIGHT: 206.13 LBS | SYSTOLIC BLOOD PRESSURE: 134 MMHG | DIASTOLIC BLOOD PRESSURE: 65 MMHG | HEART RATE: 77 BPM | OXYGEN SATURATION: 99 % | BODY MASS INDEX: 32.35 KG/M2 | HEIGHT: 67 IN

## 2022-08-31 DIAGNOSIS — Z98.1 S/P CERVICAL SPINAL FUSION: ICD-10-CM

## 2022-08-31 DIAGNOSIS — M54.16 LUMBAR RADICULOPATHY: ICD-10-CM

## 2022-08-31 DIAGNOSIS — M54.2 CERVICALGIA: ICD-10-CM

## 2022-08-31 DIAGNOSIS — M48.02 SPINAL STENOSIS OF CERVICAL REGION: ICD-10-CM

## 2022-08-31 DIAGNOSIS — R29.898 RIGHT LEG WEAKNESS: Primary | ICD-10-CM

## 2022-08-31 PROCEDURE — 99214 PR OFFICE/OUTPT VISIT, EST, LEVL IV, 30-39 MIN: ICD-10-PCS | Mod: S$PBB,,, | Performed by: NEUROLOGICAL SURGERY

## 2022-08-31 PROCEDURE — 3066F NEPHROPATHY DOC TX: CPT | Mod: CPTII,,, | Performed by: NEUROLOGICAL SURGERY

## 2022-08-31 PROCEDURE — 3061F PR NEG MICROALBUMINURIA RESULT DOCUMENTED/REVIEW: ICD-10-PCS | Mod: CPTII,,, | Performed by: NEUROLOGICAL SURGERY

## 2022-08-31 PROCEDURE — 1159F PR MEDICATION LIST DOCUMENTED IN MEDICAL RECORD: ICD-10-PCS | Mod: CPTII,,, | Performed by: NEUROLOGICAL SURGERY

## 2022-08-31 PROCEDURE — 4010F PR ACE/ARB THEARPY RXD/TAKEN: ICD-10-PCS | Mod: CPTII,,, | Performed by: NEUROLOGICAL SURGERY

## 2022-08-31 PROCEDURE — 1159F MED LIST DOCD IN RCRD: CPT | Mod: CPTII,,, | Performed by: NEUROLOGICAL SURGERY

## 2022-08-31 PROCEDURE — 72125 CT NECK SPINE W/O DYE: CPT | Mod: TC

## 2022-08-31 PROCEDURE — 3066F PR DOCUMENTATION OF TREATMENT FOR NEPHROPATHY: ICD-10-PCS | Mod: CPTII,,, | Performed by: NEUROLOGICAL SURGERY

## 2022-08-31 PROCEDURE — 1160F RVW MEDS BY RX/DR IN RCRD: CPT | Mod: CPTII,,, | Performed by: NEUROLOGICAL SURGERY

## 2022-08-31 PROCEDURE — 99214 OFFICE O/P EST MOD 30 MIN: CPT | Mod: PBBFAC,25 | Performed by: NEUROLOGICAL SURGERY

## 2022-08-31 PROCEDURE — 99214 OFFICE O/P EST MOD 30 MIN: CPT | Mod: S$PBB,,, | Performed by: NEUROLOGICAL SURGERY

## 2022-08-31 PROCEDURE — 99999 PR PBB SHADOW E&M-EST. PATIENT-LVL IV: CPT | Mod: PBBFAC,,, | Performed by: NEUROLOGICAL SURGERY

## 2022-08-31 PROCEDURE — 3044F PR MOST RECENT HEMOGLOBIN A1C LEVEL <7.0%: ICD-10-PCS | Mod: CPTII,,, | Performed by: NEUROLOGICAL SURGERY

## 2022-08-31 PROCEDURE — 3061F NEG MICROALBUMINURIA REV: CPT | Mod: CPTII,,, | Performed by: NEUROLOGICAL SURGERY

## 2022-08-31 PROCEDURE — 3044F HG A1C LEVEL LT 7.0%: CPT | Mod: CPTII,,, | Performed by: NEUROLOGICAL SURGERY

## 2022-08-31 PROCEDURE — 4010F ACE/ARB THERAPY RXD/TAKEN: CPT | Mod: CPTII,,, | Performed by: NEUROLOGICAL SURGERY

## 2022-08-31 PROCEDURE — 1160F PR REVIEW ALL MEDS BY PRESCRIBER/CLIN PHARMACIST DOCUMENTED: ICD-10-PCS | Mod: CPTII,,, | Performed by: NEUROLOGICAL SURGERY

## 2022-08-31 PROCEDURE — 3078F DIAST BP <80 MM HG: CPT | Mod: CPTII,,, | Performed by: NEUROLOGICAL SURGERY

## 2022-08-31 PROCEDURE — 3078F PR MOST RECENT DIASTOLIC BLOOD PRESSURE < 80 MM HG: ICD-10-PCS | Mod: CPTII,,, | Performed by: NEUROLOGICAL SURGERY

## 2022-08-31 PROCEDURE — 72125 CT CERVICAL SPINE WITHOUT CONTRAST: ICD-10-PCS | Mod: 26,,, | Performed by: RADIOLOGY

## 2022-08-31 PROCEDURE — 72125 CT NECK SPINE W/O DYE: CPT | Mod: 26,,, | Performed by: RADIOLOGY

## 2022-08-31 PROCEDURE — 3075F SYST BP GE 130 - 139MM HG: CPT | Mod: CPTII,,, | Performed by: NEUROLOGICAL SURGERY

## 2022-08-31 PROCEDURE — 3008F BODY MASS INDEX DOCD: CPT | Mod: CPTII,,, | Performed by: NEUROLOGICAL SURGERY

## 2022-08-31 PROCEDURE — 3008F PR BODY MASS INDEX (BMI) DOCUMENTED: ICD-10-PCS | Mod: CPTII,,, | Performed by: NEUROLOGICAL SURGERY

## 2022-08-31 PROCEDURE — 3075F PR MOST RECENT SYSTOLIC BLOOD PRESS GE 130-139MM HG: ICD-10-PCS | Mod: CPTII,,, | Performed by: NEUROLOGICAL SURGERY

## 2022-08-31 PROCEDURE — 99999 PR PBB SHADOW E&M-EST. PATIENT-LVL IV: ICD-10-PCS | Mod: PBBFAC,,, | Performed by: NEUROLOGICAL SURGERY

## 2022-08-31 RX ORDER — GABAPENTIN 300 MG/1
300 CAPSULE ORAL 3 TIMES DAILY
Qty: 90 CAPSULE | Refills: 11 | Status: SHIPPED | OUTPATIENT
Start: 2022-08-31 | End: 2023-04-04 | Stop reason: CLARIF

## 2022-08-31 NOTE — PATIENT INSTRUCTIONS
I have personally reviewed the CT cervical with the pt which shows status post ACDF at C3-C4.  Prevertebral soft tissue gas and swelling noted.  Hardware is intact. Moderate disc height loss at C4-C7 with marginal osteophyte production.  Facet arthropathy noted.    I will prescribe the pt Gabapentin and schedule the patient for 6 month follow up with Nell Marshall PA-C.

## 2022-08-31 NOTE — PROGRESS NOTES
Subjective:   I, Cheryl Garcia, attest that this documentation has been prepared under the direction and in the presence of Damian Arroyo MD.     Patient ID: Holly Mcclelland is a 61 y.o. female     Chief Complaint: No chief complaint on file.        HPI  Ms. Holly Mcclelland is a 61 y.o. 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 This is a pt who developed right leg weakness in February 2022. She was initially evaluated for stroke in the ED but diagnostic work up showed negative results. She then had MRI lumbar spine done and was found to have mild central canal spinal stenosis, bulging at L3-L4. The pt reported of chronic neck pain that had gradually worsened in the last few weeks. The pain was so severe that it was interfering with her work. Her activity levels have decreased secondary to neck pain and leg weakness. States her right foot tends to drag when she walks. The pt also reports of pain and spasms to her bilateral hands. She was seen by Nell Marshall PA-C on 6/28/2022 for complaints of unchanged right leg weakness along with intermittent pain in her lateral right leg.    Today the pt reports she is compliant with PT. She participates in PT 2x a week with her last therapy session yesterday. Patient utilizes a stretching machine and undergoes dry needling with relief to the right lower extremity. However, now she notes new pain to the left lower extremity. Denies back pain. Patient states she moves slower than at baseline, but she is able to get around as normal.    Review of Systems   Constitutional:  Negative for activity change, appetite change, fatigue, fever and unexpected weight change.   HENT:  Negative for facial swelling.    Eyes: Negative.    Respiratory: Negative.     Cardiovascular: Negative.    Gastrointestinal:  Negative for diarrhea, nausea and vomiting.   Endocrine: Negative.     Genitourinary: Negative.    Musculoskeletal:  Positive for myalgias. Negative for back pain, joint swelling and neck pain.   Neurological:  Negative for dizziness, seizures, weakness, numbness and headaches.   Psychiatric/Behavioral: Negative.        Past Medical History:   Diagnosis Date    CHF (congestive heart failure)     Diabetes mellitus     Heart murmur     Hyperlipidemia     Hypertension     Sleep apnea     wears CPAP    Stroke        Objective:      Vitals:    08/31/22 1036   BP: 134/65   Pulse: 77      Physical Exam  Constitutional:       General: She is not in acute distress.     Appearance: Normal appearance.   HENT:      Head: Normocephalic and atraumatic.   Pulmonary:      Effort: Pulmonary effort is normal.   Musculoskeletal:      Cervical back: Neck supple.   Neurological:      Mental Status: She is alert and oriented to person, place, and time.      GCS: GCS eye subscore is 4. GCS verbal subscore is 5. GCS motor subscore is 6.      Cranial Nerves: No cranial nerve deficit.      Motor: Motor function is intact.      Gait: Gait is intact.       IMAGING:  CT Cervical Spine WO Contrast (8/31/2022):  Status post ACDF at C3-C4. Hardware is intact. Moderate disc height loss at C4-C7 with marginal osteophyte production.  Facet arthropathy noted.      I have personally reviewed the images with the pt.      I, Dr. Damian Arroyo, personally performed the services described in this documentation. All medical record entries made by the scribe, Cheryl Garcia, were at my direction and in my presence.  I have reviewed the chart and agree that the record reflects my personal performance and is accurate and complete. Damian Arroyo MD. 08/31/2022    Assessment:       1. Right leg weakness    2. Lumbar radiculopathy    3. S/P cervical spinal fusion         Plan:   I have personally reviewed the CT cervical with the pt which shows status post ACDF at C3-C4.  Prevertebral soft tissue gas and swelling noted.  Hardware is  intact. Moderate disc height loss at C4-C7 with marginal osteophyte production.  Facet arthropathy noted.    I will prescribe the pt Gabapentin and schedule the patient for 6 month follow up with Nell Marshall PA-C.

## 2022-09-19 ENCOUNTER — PATIENT MESSAGE (OUTPATIENT)
Dept: ADMINISTRATIVE | Facility: OTHER | Age: 62
End: 2022-09-19
Payer: MEDICAID

## 2022-09-22 ENCOUNTER — TELEPHONE (OUTPATIENT)
Dept: PODIATRY | Facility: CLINIC | Age: 62
End: 2022-09-22
Payer: MEDICAID

## 2022-09-22 ENCOUNTER — TELEPHONE (OUTPATIENT)
Dept: OBSTETRICS AND GYNECOLOGY | Facility: CLINIC | Age: 62
End: 2022-09-22
Payer: MEDICAID

## 2022-09-22 NOTE — TELEPHONE ENCOUNTER
----- Message from Celina Syed sent at 9/22/2022  1:49 PM CDT -----  Contact: Holly @[phone]  Pt returning phone call message

## 2022-09-26 ENCOUNTER — TELEPHONE (OUTPATIENT)
Dept: OBSTETRICS AND GYNECOLOGY | Facility: CLINIC | Age: 62
End: 2022-09-26
Payer: MEDICAID

## 2022-09-26 ENCOUNTER — OFFICE VISIT (OUTPATIENT)
Dept: PODIATRY | Facility: CLINIC | Age: 62
End: 2022-09-26
Payer: MEDICAID

## 2022-09-26 VITALS
DIASTOLIC BLOOD PRESSURE: 82 MMHG | HEART RATE: 80 BPM | SYSTOLIC BLOOD PRESSURE: 136 MMHG | BODY MASS INDEX: 31.48 KG/M2 | WEIGHT: 201 LBS

## 2022-09-26 DIAGNOSIS — Z86.73 HISTORY OF CEREBROVASCULAR ACCIDENT (CVA) GREATER THAN EIGHT WEEKS IN THE PAST: ICD-10-CM

## 2022-09-26 DIAGNOSIS — M12.872 ARTHROPATHY, TRANSIENT, ANKLE/FOOT, LEFT: ICD-10-CM

## 2022-09-26 DIAGNOSIS — M67.472 GANGLION CYST OF LEFT FOOT: ICD-10-CM

## 2022-09-26 DIAGNOSIS — E11.42 TYPE 2 DIABETES MELLITUS WITH DIABETIC POLYNEUROPATHY, WITHOUT LONG-TERM CURRENT USE OF INSULIN: ICD-10-CM

## 2022-09-26 DIAGNOSIS — Z79.01 LONG TERM CURRENT USE OF ANTICOAGULANT: Primary | ICD-10-CM

## 2022-09-26 PROCEDURE — 4010F PR ACE/ARB THEARPY RXD/TAKEN: ICD-10-PCS | Mod: CPTII,,, | Performed by: PODIATRIST

## 2022-09-26 PROCEDURE — 3008F PR BODY MASS INDEX (BMI) DOCUMENTED: ICD-10-PCS | Mod: CPTII,,, | Performed by: PODIATRIST

## 2022-09-26 PROCEDURE — 3061F NEG MICROALBUMINURIA REV: CPT | Mod: CPTII,,, | Performed by: PODIATRIST

## 2022-09-26 PROCEDURE — 99214 PR OFFICE/OUTPT VISIT, EST, LEVL IV, 30-39 MIN: ICD-10-PCS | Mod: S$PBB,,, | Performed by: PODIATRIST

## 2022-09-26 PROCEDURE — 4010F ACE/ARB THERAPY RXD/TAKEN: CPT | Mod: CPTII,,, | Performed by: PODIATRIST

## 2022-09-26 PROCEDURE — 99214 OFFICE O/P EST MOD 30 MIN: CPT | Mod: PBBFAC,PN | Performed by: PODIATRIST

## 2022-09-26 PROCEDURE — 3044F PR MOST RECENT HEMOGLOBIN A1C LEVEL <7.0%: ICD-10-PCS | Mod: CPTII,,, | Performed by: PODIATRIST

## 2022-09-26 PROCEDURE — 3075F PR MOST RECENT SYSTOLIC BLOOD PRESS GE 130-139MM HG: ICD-10-PCS | Mod: CPTII,,, | Performed by: PODIATRIST

## 2022-09-26 PROCEDURE — 3079F PR MOST RECENT DIASTOLIC BLOOD PRESSURE 80-89 MM HG: ICD-10-PCS | Mod: CPTII,,, | Performed by: PODIATRIST

## 2022-09-26 PROCEDURE — 1159F MED LIST DOCD IN RCRD: CPT | Mod: CPTII,,, | Performed by: PODIATRIST

## 2022-09-26 PROCEDURE — 99999 PR PBB SHADOW E&M-EST. PATIENT-LVL IV: ICD-10-PCS | Mod: PBBFAC,,, | Performed by: PODIATRIST

## 2022-09-26 PROCEDURE — 3061F PR NEG MICROALBUMINURIA RESULT DOCUMENTED/REVIEW: ICD-10-PCS | Mod: CPTII,,, | Performed by: PODIATRIST

## 2022-09-26 PROCEDURE — 3075F SYST BP GE 130 - 139MM HG: CPT | Mod: CPTII,,, | Performed by: PODIATRIST

## 2022-09-26 PROCEDURE — 3008F BODY MASS INDEX DOCD: CPT | Mod: CPTII,,, | Performed by: PODIATRIST

## 2022-09-26 PROCEDURE — 3079F DIAST BP 80-89 MM HG: CPT | Mod: CPTII,,, | Performed by: PODIATRIST

## 2022-09-26 PROCEDURE — 99999 PR PBB SHADOW E&M-EST. PATIENT-LVL IV: CPT | Mod: PBBFAC,,, | Performed by: PODIATRIST

## 2022-09-26 PROCEDURE — 3066F PR DOCUMENTATION OF TREATMENT FOR NEPHROPATHY: ICD-10-PCS | Mod: CPTII,,, | Performed by: PODIATRIST

## 2022-09-26 PROCEDURE — 3044F HG A1C LEVEL LT 7.0%: CPT | Mod: CPTII,,, | Performed by: PODIATRIST

## 2022-09-26 PROCEDURE — 1159F PR MEDICATION LIST DOCUMENTED IN MEDICAL RECORD: ICD-10-PCS | Mod: CPTII,,, | Performed by: PODIATRIST

## 2022-09-26 PROCEDURE — 99214 OFFICE O/P EST MOD 30 MIN: CPT | Mod: S$PBB,,, | Performed by: PODIATRIST

## 2022-09-26 PROCEDURE — 3066F NEPHROPATHY DOC TX: CPT | Mod: CPTII,,, | Performed by: PODIATRIST

## 2022-09-26 NOTE — PROGRESS NOTES
Subjective:      Patient ID: Holly Mcclelland is a 61 y.o. female.    Chief Complaint: Follow-up (L foot pain)    Holly is a 61 y.o. female who presents for 1 month f/u of pain L foot x 2 days & worsening dorsal - lump. States medication helped w/ symptomatology as did pads, so the lump has decreased and she is barely getting any pain. Given gelstrap & to use Voltaren gel. Not using sandals/slippers.     DM management: Em Diamond NP 3/10/22  PCP: Lasha Braga 8/8/22/ Rut Morris NP    Shoe gear: new shoes    Patient Active Problem List   Diagnosis    TIA (transient ischemic attack)    GERD (gastroesophageal reflux disease)    Class 1 obesity due to excess calories with serious comorbidity and body mass index (BMI) of 31.0 to 31.9 in adult    Onychogryphosis    Onychocryptosis    Onychomycosis due to dermatophyte    Type 2 diabetes mellitus with diabetic polyneuropathy, without long-term current use of insulin    Essential hypertension    Dyslipidemia, goal LDL below 70    Hypertensive heart disease with chronic diastolic congestive heart failure    Palpitations    Heart murmur    Right leg weakness    History of cerebrovascular accident (CVA) greater than eight weeks in the past    Obstructive sleep apnea    Spinal stenosis of cervical region    S/P cervical spinal fusion    Neck pain    Lumbar radiculopathy    Orthostatic hypotension      Hemoglobin A1C   Date Value Ref Range Status   03/03/2022 6.4 (H) 4.0 - 5.6 % Final     Comment:     ADA Screening Guidelines:  5.7-6.4%  Consistent with prediabetes  >or=6.5%  Consistent with diabetes    High levels of fetal hemoglobin interfere with the HbA1C  assay. Heterozygous hemoglobin variants (HbS, HgC, etc)do  not significantly interfere with this assay.   However, presence of multiple variants may affect accuracy.     02/10/2022 6.6 (H) 4.0 - 5.6 % Final     Comment:     ADA Screening Guidelines:  5.7-6.4%  Consistent with prediabetes  >or=6.5%  Consistent with  diabetes    High levels of fetal hemoglobin interfere with the HbA1C  assay. Heterozygous hemoglobin variants (HbS, HgC, etc)do  not significantly interfere with this assay.   However, presence of multiple variants may affect accuracy.     11/09/2021 6.0 (H) 4.0 - 5.6 % Final     Comment:     ADA Screening Guidelines:  5.7-6.4%  Consistent with prediabetes  >or=6.5%  Consistent with diabetes    High levels of fetal hemoglobin interfere with the HbA1C  assay. Heterozygous hemoglobin variants (HbS, HgC, etc)do  not significantly interfere with this assay.   However, presence of multiple variants may affect accuracy.        Objective:      Physical Exam  Vitals reviewed.   Constitutional:       Appearance: She is well-developed. She is obese.   Cardiovascular:      Pulses:           Dorsalis pedis pulses are 2+ on the right side and 2+ on the left side.   Musculoskeletal:      Right lower leg: No tenderness. Edema present.      Left lower leg: No tenderness. Edema present.        Feet:       Comments: No reproducible discomfort nor any tenderness to the calf bilaterally.   Feet:      Right foot:      Skin integrity: Callus (diffuse WB hyperkeratosis B/L ) and dry skin present. No fissure.      Toenail Condition: Right toenails are ingrown. Fungal disease present.     Left foot:      Skin integrity: Callus and dry skin present. No fissure.      Toenail Condition: Left toenails are ingrown. Fungal disease present.     Comments: Palpable prominence dorsal midfoot L consistent w/ saddle bone deformity; enthesophyte. No residual fluctuance.    Nails are cryptotic, dystrophic, thickened, discolored sparing 3rd & 4th B/L.   Skin:     General: Skin is warm and dry.      Capillary Refill: Capillary refill takes 2 to 3 seconds.      Comments: WB diffuse hyperkeratotic tissue B/L w/out fissuring   Neurological:      Mental Status: She is alert and oriented to person, place, and time.      Sensory: Sensation is intact.      Motor:  Motor function is intact.      Gait: Gait is intact.      Comments: Paresthesias including tingling and weakness RLE with no clearly identified trigger or source; no hyperemia (likely lumbar radiculopathy)   Psychiatric:         Mood and Affect: Mood and affect normal.         Behavior: Behavior normal. Behavior is cooperative.      Assessment:      Encounter Diagnoses   Name Primary?    Long term current use of anticoagulant Yes    Type 2 diabetes mellitus with diabetic polyneuropathy, without long-term current use of insulin     History of cerebrovascular accident (CVA) greater than eight weeks in the past     Arthropathy, transient, ankle/foot, left     Ganglion cyst of left foot      Problem List Items Addressed This Visit          Neuro    History of cerebrovascular accident (CVA) greater than eight weeks in the past       Endocrine    Type 2 diabetes mellitus with diabetic polyneuropathy, without long-term current use of insulin     Other Visit Diagnoses       Long term current use of anticoagulant    -  Primary    Arthropathy, transient, ankle/foot, left        Ganglion cyst of left foot                Plan:      Holly was seen today for follow-up.    Diagnoses and all orders for this visit:    Long term current use of anticoagulant    Type 2 diabetes mellitus with diabetic polyneuropathy, without long-term current use of insulin    History of cerebrovascular accident (CVA) greater than eight weeks in the past    Arthropathy, transient, ankle/foot, left    Ganglion cyst of left foot    I counseled the patient on her conditions, their implications and medical management.    - Shoe inspection. Patient reminded of the importance of wearing proper supportive shoe gear, daily foot inspections, never walking without protective shoe gear.    Dispensed new gelstrap L & added PPT arch pads to current new pair of shoes .    Continue with Voltaren gel up to q.i.d. p.r.n..    F/U prn or for DM foot care.

## 2022-09-26 NOTE — PATIENT INSTRUCTIONS
PPT arch pad w/ adhesive - small OR /medium    Visco-gel universal metatarsal strap - small/medium left

## 2022-09-26 NOTE — TELEPHONE ENCOUNTER
Spoke to pt in regards to message.   ----- Message from Dayana Adler LPN sent at 9/23/2022 12:12 PM CDT -----    ----- Message -----  From: Satnam Ruvalcaba  Sent: 9/23/2022   8:09 AM CDT  To: [facility] Staff     Type:  Patient Returning Call    Who Called:SARANYA WEST     Who Left Message for Patient:Jace Rdz MA     Does the patient know what this is regarding?:    Best Call Back Number:[phone]    Additional Information:

## 2022-11-09 ENCOUNTER — OFFICE VISIT (OUTPATIENT)
Dept: CARDIOLOGY | Facility: CLINIC | Age: 62
End: 2022-11-09
Payer: MEDICAID

## 2022-11-09 VITALS
HEIGHT: 67 IN | DIASTOLIC BLOOD PRESSURE: 66 MMHG | OXYGEN SATURATION: 98 % | SYSTOLIC BLOOD PRESSURE: 133 MMHG | BODY MASS INDEX: 32.4 KG/M2 | WEIGHT: 206.44 LBS | HEART RATE: 72 BPM

## 2022-11-09 DIAGNOSIS — I10 ESSENTIAL HYPERTENSION: Primary | ICD-10-CM

## 2022-11-09 DIAGNOSIS — I11.0 HYPERTENSIVE HEART DISEASE WITH CHRONIC DIASTOLIC CONGESTIVE HEART FAILURE: ICD-10-CM

## 2022-11-09 DIAGNOSIS — I50.32 HYPERTENSIVE HEART DISEASE WITH CHRONIC DIASTOLIC CONGESTIVE HEART FAILURE: ICD-10-CM

## 2022-11-09 DIAGNOSIS — K21.9 GASTROESOPHAGEAL REFLUX DISEASE, UNSPECIFIED WHETHER ESOPHAGITIS PRESENT: ICD-10-CM

## 2022-11-09 DIAGNOSIS — I95.1 ORTHOSTATIC HYPOTENSION: ICD-10-CM

## 2022-11-09 DIAGNOSIS — G47.33 OBSTRUCTIVE SLEEP APNEA: ICD-10-CM

## 2022-11-09 DIAGNOSIS — E78.5 DYSLIPIDEMIA, GOAL LDL BELOW 70: ICD-10-CM

## 2022-11-09 DIAGNOSIS — E11.42 TYPE 2 DIABETES MELLITUS WITH DIABETIC POLYNEUROPATHY, WITHOUT LONG-TERM CURRENT USE OF INSULIN: ICD-10-CM

## 2022-11-09 PROCEDURE — 1159F PR MEDICATION LIST DOCUMENTED IN MEDICAL RECORD: ICD-10-PCS | Mod: CPTII,,, | Performed by: INTERNAL MEDICINE

## 2022-11-09 PROCEDURE — 93010 ELECTROCARDIOGRAM REPORT: CPT | Mod: S$PBB,,, | Performed by: INTERNAL MEDICINE

## 2022-11-09 PROCEDURE — 1160F PR REVIEW ALL MEDS BY PRESCRIBER/CLIN PHARMACIST DOCUMENTED: ICD-10-PCS | Mod: CPTII,,, | Performed by: INTERNAL MEDICINE

## 2022-11-09 PROCEDURE — 1160F RVW MEDS BY RX/DR IN RCRD: CPT | Mod: CPTII,,, | Performed by: INTERNAL MEDICINE

## 2022-11-09 PROCEDURE — 99214 PR OFFICE/OUTPT VISIT, EST, LEVL IV, 30-39 MIN: ICD-10-PCS | Mod: S$PBB,25,, | Performed by: INTERNAL MEDICINE

## 2022-11-09 PROCEDURE — 3066F NEPHROPATHY DOC TX: CPT | Mod: CPTII,,, | Performed by: INTERNAL MEDICINE

## 2022-11-09 PROCEDURE — 99213 OFFICE O/P EST LOW 20 MIN: CPT | Mod: PBBFAC,PN | Performed by: INTERNAL MEDICINE

## 2022-11-09 PROCEDURE — 93005 ELECTROCARDIOGRAM TRACING: CPT | Mod: PBBFAC,PN | Performed by: INTERNAL MEDICINE

## 2022-11-09 PROCEDURE — 3078F PR MOST RECENT DIASTOLIC BLOOD PRESSURE < 80 MM HG: ICD-10-PCS | Mod: CPTII,,, | Performed by: INTERNAL MEDICINE

## 2022-11-09 PROCEDURE — 99999 PR PBB SHADOW E&M-EST. PATIENT-LVL III: ICD-10-PCS | Mod: PBBFAC,,, | Performed by: INTERNAL MEDICINE

## 2022-11-09 PROCEDURE — 3075F SYST BP GE 130 - 139MM HG: CPT | Mod: CPTII,,, | Performed by: INTERNAL MEDICINE

## 2022-11-09 PROCEDURE — 3008F BODY MASS INDEX DOCD: CPT | Mod: CPTII,,, | Performed by: INTERNAL MEDICINE

## 2022-11-09 PROCEDURE — 3008F PR BODY MASS INDEX (BMI) DOCUMENTED: ICD-10-PCS | Mod: CPTII,,, | Performed by: INTERNAL MEDICINE

## 2022-11-09 PROCEDURE — 3044F PR MOST RECENT HEMOGLOBIN A1C LEVEL <7.0%: ICD-10-PCS | Mod: CPTII,,, | Performed by: INTERNAL MEDICINE

## 2022-11-09 PROCEDURE — 3044F HG A1C LEVEL LT 7.0%: CPT | Mod: CPTII,,, | Performed by: INTERNAL MEDICINE

## 2022-11-09 PROCEDURE — 1159F MED LIST DOCD IN RCRD: CPT | Mod: CPTII,,, | Performed by: INTERNAL MEDICINE

## 2022-11-09 PROCEDURE — 3078F DIAST BP <80 MM HG: CPT | Mod: CPTII,,, | Performed by: INTERNAL MEDICINE

## 2022-11-09 PROCEDURE — 93010 EKG 12-LEAD: ICD-10-PCS | Mod: S$PBB,,, | Performed by: INTERNAL MEDICINE

## 2022-11-09 PROCEDURE — 4010F ACE/ARB THERAPY RXD/TAKEN: CPT | Mod: CPTII,,, | Performed by: INTERNAL MEDICINE

## 2022-11-09 PROCEDURE — 3061F PR NEG MICROALBUMINURIA RESULT DOCUMENTED/REVIEW: ICD-10-PCS | Mod: CPTII,,, | Performed by: INTERNAL MEDICINE

## 2022-11-09 PROCEDURE — 3066F PR DOCUMENTATION OF TREATMENT FOR NEPHROPATHY: ICD-10-PCS | Mod: CPTII,,, | Performed by: INTERNAL MEDICINE

## 2022-11-09 PROCEDURE — 99214 OFFICE O/P EST MOD 30 MIN: CPT | Mod: S$PBB,25,, | Performed by: INTERNAL MEDICINE

## 2022-11-09 PROCEDURE — 3061F NEG MICROALBUMINURIA REV: CPT | Mod: CPTII,,, | Performed by: INTERNAL MEDICINE

## 2022-11-09 PROCEDURE — 3075F PR MOST RECENT SYSTOLIC BLOOD PRESS GE 130-139MM HG: ICD-10-PCS | Mod: CPTII,,, | Performed by: INTERNAL MEDICINE

## 2022-11-09 PROCEDURE — 99999 PR PBB SHADOW E&M-EST. PATIENT-LVL III: CPT | Mod: PBBFAC,,, | Performed by: INTERNAL MEDICINE

## 2022-11-09 PROCEDURE — 4010F PR ACE/ARB THEARPY RXD/TAKEN: ICD-10-PCS | Mod: CPTII,,, | Performed by: INTERNAL MEDICINE

## 2022-11-09 RX ORDER — PANTOPRAZOLE SODIUM 40 MG/1
40 TABLET, DELAYED RELEASE ORAL DAILY
Qty: 30 TABLET | Refills: 1 | Status: SHIPPED | OUTPATIENT
Start: 2022-11-09 | End: 2023-02-09

## 2022-11-09 NOTE — PROGRESS NOTES
"  Subjective:      Patient ID: Holly Mcclelland is a 62 y.o. female.    Chief Complaint: Follow-up    HPI:  Works as a  in stores.    Knees get stiff sometimes.  Pt took a hydrocodone  the other night and then developed a little chest pain while working on the computer.  "I thought I was having a mild heart attack."      There is no hx of chest pain with exertion      "I burp a lot."    No fatty food intolerance    Pt has a remote hx of GERD.    Pt has not started wearing her new CPAP machine      Review of Systems   Cardiovascular:  Positive for chest pain (one episode after taking hydrocodone which lasted an hour). Negative for claudication, dyspnea on exertion, irregular heartbeat, leg swelling, near-syncope, orthopnea, palpitations and syncope.      Past Medical History:   Diagnosis Date    CHF (congestive heart failure)     Diabetes mellitus     Heart murmur     Hyperlipidemia     Hypertension     Sleep apnea     wears CPAP    Stroke         Past Surgical History:   Procedure Laterality Date    ANTERIOR CERVICAL DISCECTOMY W/ FUSION N/A 2/9/2022    Procedure: DISCECTOMY, SPINE, CERVICAL, ANTERIOR APPROACH, WITH FUSION C3-4 Gareth PRADHAN notified;  Surgeon: Damian Arroyo MD;  Location: [facility];  Service: Neurosurgery;  Laterality: N/A;  ASA1  TOR1  T&Cross x 2 units  EMG  SEP  MEP  RN PREOP ON 1/31/22,COVID---NEGATIVE- on 2/8-- T/S  done  ON 2/8  [facility] GARETH RUIZ  [phone] NOTIFIED (CALLED) GARETH ON 1/31/2022 @ 3:11PM-LO  NEURO MONIT    BREAST BIOPSY Left 2015       Family History   Problem Relation Age of Onset    Diabetes Maternal Grandmother     Diabetes Mother     Hypertension Mother     Heart disease Mother     Diabetes Sister     Diabetes Father     Breast cancer Neg Hx     Colon cancer Neg Hx     Ovarian cancer Neg Hx        Social History     Socioeconomic History    Marital status:    Tobacco Use    Smoking status: Never    Smokeless tobacco: Never   Substance and " Sexual Activity    Alcohol use: No    Drug use: No    Sexual activity: Not Currently       Current Outpatient Medications on File Prior to Visit   Medication Sig Dispense Refill    aspirin (ECOTRIN) 81 MG EC tablet Take 1 tablet by mouth once daily 30 tablet 0    blood sugar diagnostic (ONETOUCH ULTRA TEST) Strp 1 strip by Misc.(Non-Drug; Combo Route) route once daily. 100 each 3    cholecalciferol, vitamin D3, 1,250 mcg (50,000 unit) capsule Take 1 capsule by mouth once a week 4 capsule 0    clopidogreL (PLAVIX) 75 mg tablet Take 1 tablet (75 mg total) by mouth once daily. 30 tablet 3    dapagliflozin (FARXIGA) 5 mg Tab tablet Take 1 tablet (5 mg total) by mouth once daily. 90 tablet 2    diazePAM (VALIUM) 10 MG Tab Take 10 mg by mouth daily as needed.      diclofenac sodium (VOLTAREN) 1 % Gel Apply 2 g topically 4 (four) times daily. 350 g 2    dulaglutide (TRULICITY) 4.5 mg/0.5 mL pen injector INJECT 4.5 MG SUBCUTANEOUSLY ONCE A WEEK 4 pen 9    furosemide (LASIX) 20 MG tablet TAKE 1 TABLET BY MOUTH ONCE DAILY AS NEEDED FOR  SWELLING 15 tablet 3    gabapentin (NEURONTIN) 300 MG capsule Take 1 capsule (300 mg total) by mouth 3 (three) times daily. 90 capsule 11    hydrALAZINE (APRESOLINE) 25 MG tablet Take 1 tablet (25 mg total) by mouth 3 (three) times daily. 90 tablet 11    HYDROcodone-acetaminophen (NORCO)  mg per tablet Take 1 tablet by mouth every 4 (four) hours as needed (7-10/10 pain). 42 tablet 0    lisinopriL-hydrochlorothiazide (PRINZIDE,ZESTORETIC) 20-25 mg Tab Take 1 tablet by mouth once daily. 90 tablet 2    metFORMIN (GLUCOPHAGE) 1000 MG tablet Take 1 tablet (1,000 mg total) by mouth 2 (two) times daily with meals. 180 tablet 2    ondansetron (ZOFRAN) 4 MG tablet Take 1 tablet (4 mg total) by mouth every 8 (eight) hours as needed for Nausea. 12 tablet 0    ONETOUCH DELICA PLUS LANCET 33 gauge Misc USE 1  TO CHECK GLUCOSE 4 TIMES DAILY 100 each 0    [DISCONTINUED] ezetimibe (ZETIA) 10 mg tablet  "Take 1 tablet (10 mg total) by mouth once daily. 90 tablet 3    [DISCONTINUED] medroxyPROGESTERone (PROVERA) 5 MG tablet Take 1 tablet by mouth once daily 30 tablet 0    [DISCONTINUED] methocarbamoL (ROBAXIN) 750 MG Tab Take 1 tablet (750 mg total) by mouth every 8 (eight) hours as needed (muscle spasms). 60 tablet 0    [DISCONTINUED] miconazole (MICOTIN) 2 % cream Apply topically every evening. for 7 days (Patient not taking: Reported on 11/9/2022) 30 g 0     No current facility-administered medications on file prior to visit.       Review of patient's allergies indicates:   Allergen Reactions    Victoza [liraglutide] Other (See Comments)     Throat closed ? But not seen in the ER      Objective:     Vitals:    11/09/22 0956   BP: 133/66   BP Location: Left arm   Patient Position: Sitting   BP Method: Large (Automatic)   Pulse: 72   SpO2: 98%   Weight: 93.6 kg (206 lb 7.4 oz)   Height: 5' 7" (1.702 m)        Physical Exam  Vitals reviewed.   Constitutional:       Appearance: She is well-developed.   Eyes:      General: No scleral icterus.  Neck:      Vascular: No carotid bruit or JVD.   Cardiovascular:      Rate and Rhythm: Normal rate and regular rhythm.      Heart sounds: No murmur heard.    No gallop.   Pulmonary:      Breath sounds: Normal breath sounds.   Musculoskeletal:      Right lower leg: No edema.      Left lower leg: No edema.   Skin:     General: Skin is warm and dry.   Neurological:      Mental Status: She is alert and oriented to person, place, and time.   Psychiatric:         Behavior: Behavior normal.         Thought Content: Thought content normal.         Judgment: Judgment normal.            Wt up 5 lbs      ECG today reviewed by me: NSR, WNL      Admission on 09/25/2022, Discharged on 09/25/2022   Component Date Value Ref Range Status    POCT Glucose 09/25/2022 101  70 - 110 mg/dL Final    Specimen UA 09/25/2022 Urine, Clean Catch   Final    Color, UA 09/25/2022 Yellow  Yellow, Straw, Priti " Final    Appearance, UA 09/25/2022 Clear  Clear Final    pH, UA 09/25/2022 6.0  5.0 - 8.0 Final    Specific Gravity, UA 09/25/2022 1.020  1.005 - 1.030 Final    Protein, UA 09/25/2022 Negative  Negative Final    Glucose, UA 09/25/2022 4+ (A)  Negative Final    Ketones, UA 09/25/2022 Negative  Negative Final    Bilirubin (UA) 09/25/2022 Negative  Negative Final    Occult Blood UA 09/25/2022 Negative  Negative Final    Nitrite, UA 09/25/2022 Negative  Negative Final    Urobilinogen, UA 09/25/2022 Negative  Negative EU/dL Final    Leukocytes, UA 09/25/2022 3+ (A)  Negative Final    Trichomonas 09/25/2022 None  None Final    Clue Cells 09/25/2022 None  None Final    Budding Yeast 09/25/2022 None  None Final    Fungal Hyphae 09/25/2022 None  None Final    WBC - Vaginal Screen 09/25/2022 Occasional (A)  None Final    Bacteria - Vaginal Screen 09/25/2022 Few (A)  None Final    Wet Prep Source 09/25/2022 VAG   Final    Chlamydia, Amplified DNA 09/25/2022 Not Detected  Not Detected Final    N gonorrhoeae, amplified DNA 09/25/2022 Not Detected  Not Detected Final    RBC, UA 09/25/2022 4  0 - 4 /hpf Final    WBC, UA 09/25/2022 10 (H)  0 - 5 /hpf Final    Bacteria 09/25/2022 Rare  None-Occ /hpf Final    Yeast, UA 09/25/2022 None  None Final    Squam Epithel, UA 09/25/2022 3  /hpf Final    Microscopic Comment 09/25/2022 SEE COMMENT   Final    Urine Culture, Routine 09/25/2022  (A)   Final                    Value:STREPTOCOCCUS AGALACTIAE (GROUP B)  < 10,000 cfu/ml  In case of Penicillin allergy, call lab for further testing.  Beta-hemolytic streptococci are routinely susceptible to   penicillins,cephalosporins and carbapenems.  Susceptibility testing not routinely performed     (    Details    Reading Physician Reading Date Result Priority   Jan Baez MD  [phone]  [phone] 6/2/2020 Routine     Narrative & Impression  EXAMINATION:  NM MYOCARDIAL PERFUSION SPECT MULTI PHARM     CLINICAL HISTORY:  r07.9;e71.51;  Chest  pain, unspecified     TECHNIQUE:  SPECT images were acquired after the injection of 10.2 mCi of Tc-99m tetrofosmin at rest and 29 mCi during a cardiac stress. The clinical stress and ECG portion of the study are to be read separately.     COMPARISON:  None.     FINDINGS:  The quality of the study is degraded by the patient's body habitus including significant anterior wall attenuation, more apparent on the rest component of the exam.     Stress SPECT images demonstrate homogenous distribution of the tracer throughout the left ventricle.  Diminished counts in the anterior walls likely related to attenuation artifact.  On the resting images, there is matched homogenous distribution of the tracer throughout the left ventricle.     The gated post-stress images reveal normal wall motion and thickening with an estimated LVEF of 63 %. The LV cavity is not dilated with an end-diastolic volume of 139 ml (normal is less than 140 mL) and an end-systolic volume of 52 ml (normal is less than 70 mL).   Volume measurements may be underestimated and ejection fractions overestimated in patients with small hearts.     Impression:     1. No definite evidence of ischemia.  2. The global left ventricular systolic function is normal with an LV ejection fraction of 63 % and no evidence of LV dilatation. Wall motion is normal.        Electronically signed by: Jan Baez  Date:                                            06/02/2020  Time:                                           15:47             Exam Ended: 06/02/20 13:38           Status: Final result       [employer] Results Release    quickhuddle Status: Active  Results Release       PACS Images for [employer] Viewer     Show images for CT Head Without Contrast    CT Head Without Contrast  Order: [id number]  Status: Final result     Visible to patient: Yes (seen)      Next appt: 02/04/2022 at 08:20 AM in Pre-Admission Testing (COVID TESTING, [facility] PRE-ADMIT)      0 Result  Notes      Details    Reading Physician Reading Date Result Priority   Martín Manzano MD  [phone]  [phone] 11/15/2021 STAT     Narrative & Impression  EXAMINATION:  CT HEAD WITHOUT CONTRAST     CLINICAL HISTORY:  Neuro deficit, acute, stroke suspected;     TECHNIQUE:  Low dose axial images were obtained through the head.  Coronal and sagittal reformations were also performed. Contrast was not administered.     COMPARISON:  MRI 09/25/2019     FINDINGS:  There is generalized cerebral volume loss.  There is hypoattenuation in a periventricular fashion, likely sequela of chronic microvascular ischemic change.There are scattered punctate foci of low attenuation within the bilateral basal ganglia, may reflect sequela of remote infarcts.  There is no evidence of acute major vascular territory infarct, hemorrhage, or mass.  There is no hydrocephalus.  There are no abnormal extra-axial fluid collections.  The paranasal sinuses and mastoid air cells are clear, and there is no evidence of calvarial fracture.  The visualized soft tissues are unremarkable.     Impression:     1. Sequela of chronic microvascular ischemic change and senescent change.  Several punctate foci of low attenuation are noted within the basal ganglia bilaterally suggesting remote infarcts however could mask focus of acute ischemia.  Correlation with current symptomatology is recommended, further evaluation if warranted.        Electronically signed by: Martín Manzano MD  Date:                                            11/15/2021  Time:                                           16:45               Exam Ended: 11/15/21 16:38 Last Resulted: 11/15/21 16:45       Order Details       View Encounter       Lab and Collection Details       Routing      Result History              Result Care Coordination        Patient Communication     Add Comments                Details    Reading Physician Reading Date Result Priority   Aaron Brewster,  MD  [phone]  [phone] 11/16/2021 Routine     Narrative & Impression  EXAMINATION:  US CAROTID BILATERAL     CLINICAL HISTORY:  TIA;     TECHNIQUE:  Grayscale and color Doppler ultrasound examination of the carotid and vertebral artery systems bilaterally.  Stenosis estimates are per the NASCET measurement criteria.  Note technologist notes a tortuous vessels and patient inability to hold still limits evaluation.     COMPARISON:  None.     FINDINGS:  Right:     Internal Carotid Artery (ICA):     Peak systolic velocity 58 cm/sec     End diastolic velocity 18 cm/sec     ICA/CCA peak systolic ratio: 0.7     ICA/CCA end diastolic ratio: 1.0     Plaque formation: No significant     Vertebral artery: Antegrade flow and normal waveform.     Left:     Internal Carotid Artery (ICA):     Peak systolic velocity 57 cm/sec     End diastolic velocity 20.4 cm/sec     ICA/CCA peak systolic ratio: 0.8     ICA/CCA end diastolic ratio: 1.0     Plaque formation: No significant     Vertebral artery: Antegrade flow and normal waveform.     Impression:     No evidence of a hemodynamically significant carotid bifurcation stenosis.  Limited examination.        Electronically signed by: Aaron Brewster MD  Date:                                            11/16/2021  Time:                                           08:39             Exam Ended: 11/16/21 08:23 Last Resulted: 11/16/21 08:39    Steve as an Unsuccessful Attempt       Order Details     View Encounter     Lab and Collection Details     Routing    Result History            Result Care Coordination                ECG 12 lead  Order: [id number]  Status: Final result   Visible to patient: Yes (seen)    Next appt: 02/04/2022 at 08:20 AM in Pre-Admission Testing (COVID TESTING, [facility] PRE-ADMIT)    Dx: Stroke    0 Result Notes           Narrative  Performed by: GEMUSE  Test Reason : I63.9,     Vent. Rate : 097 BPM     Atrial Rate : 097 BPM      P-R Int : 132 ms          QRS Dur : 082  ms       QT Int : 354 ms       P-R-T Axes : 060 052 025 degrees      QTc Int : 449 ms     Normal sinus rhythm   Possible Left atrial enlargement   Borderline Abnormal ECG   When compared with ECG of 16-AUG-2021 10:22,   Nonspecific T wave abnormality, improved in Inferior leads   Nonspecific T wave abnormality no longer evident in Lateral leads   Confirmed by KRISTOFER ORO MD ([id number]) on 11/16/2021 9:53:55 AM     Referred By: [email or web address]    SELF           Confirmed By:KRISTOFER ORO MD         TTE) complete  Order# [id number]  Reading physician: Aguilar Fox MD Ordering physician: Rut Morris NP Study date: 11/16/21       Reason for Exam  Priority: Routine  Dx: Stroke [I63.9 (ICD-10-CM)]     Result Image Hyperlink     Show images for Echo    Summary    The left ventricle is normal in size with concentric hypertrophy and normal systolic function.  The estimated ejection fraction is 70%.  Grade I left ventricular diastolic dysfunction.  Normal right ventricular size with normal right ventricular systolic function.  Normal central venous pressure (3 mmHg).  The estimated PA systolic pressure is 35 mmHg.         Vitals    Height Weight      Chol HDL LDL CHOLc) TG   03/03/22 1115 147 43 88.2 79   11/15/21 1722 99 Important  35 Important  48.0 Important  80   11/09/21 0844               Assessment:     1. Essential hypertension    2. Dyslipidemia, goal LDL below 70    3. Hypertensive heart disease with chronic diastolic congestive heart failure    4. Orthostatic hypotension    5. Type 2 diabetes mellitus with diabetic polyneuropathy, without long-term current use of insulin    6. Gastroesophageal reflux disease, unspecified whether esophagitis present    7. Obstructive sleep apnea      Plan:   Holly was seen today for follow-up.    Diagnoses and all orders for this visit:    Essential hypertension  -     IN OFFICE EKG 12-LEAD (to Muse)  -     TSH; Future  -     CBC Auto Differential; Future  -     Comprehensive Metabolic  Panel; Future    Dyslipidemia, goal LDL below 70  -     IN OFFICE EKG 12-LEAD (to Muse)  -     TSH; Future  -     CBC Auto Differential; Future  -     Comprehensive Metabolic Panel; Future    Hypertensive heart disease with chronic diastolic congestive heart failure  -     IN OFFICE EKG 12-LEAD (to Muse)  -     TSH; Future  -     CBC Auto Differential; Future  -     Comprehensive Metabolic Panel; Future    Orthostatic hypotension  -     IN OFFICE EKG 12-LEAD (to Muse)  -     TSH; Future  -     CBC Auto Differential; Future  -     Comprehensive Metabolic Panel; Future    Type 2 diabetes mellitus with diabetic polyneuropathy, without long-term current use of insulin  -     IN OFFICE EKG 12-LEAD (to Muse)  -     TSH; Future  -     CBC Auto Differential; Future  -     Comprehensive Metabolic Panel; Future    Gastroesophageal reflux disease, unspecified whether esophagitis present  -     IN OFFICE EKG 12-LEAD (to Muse)  -     pantoprazole (PROTONIX) 40 MG tablet; Take 1 tablet (40 mg total) by mouth once daily.  -     TSH; Future  -     CBC Auto Differential; Future  -     Comprehensive Metabolic Panel; Future    Obstructive sleep apnea  -     IN OFFICE EKG 12-LEAD (to Muse)  -     TSH; Future  -     CBC Auto Differential; Future  -     Comprehensive Metabolic Panel; Future     Recent mild episode of non-exertional chest pain was likely GERD.  Will try pantoprazole 40 mg daily fro 2 months    Same meds otherwise    HBP is well controlled    RTC 6 months with lab    F/u with Dr Lasha Braga.    F/u with LATISHA Diamond  No follow-ups on file.

## 2022-11-29 ENCOUNTER — NUTRITION (OUTPATIENT)
Dept: DIABETES | Facility: CLINIC | Age: 62
End: 2022-11-29
Payer: MEDICAID

## 2022-11-29 VITALS — WEIGHT: 208 LBS | HEIGHT: 67 IN | BODY MASS INDEX: 32.65 KG/M2

## 2022-11-29 DIAGNOSIS — E11.42 TYPE 2 DIABETES MELLITUS WITH DIABETIC POLYNEUROPATHY, WITHOUT LONG-TERM CURRENT USE OF INSULIN: Primary | ICD-10-CM

## 2022-11-29 PROCEDURE — G0108 DIAB MANAGE TRN  PER INDIV: HCPCS | Mod: PBBFAC,PN | Performed by: DIETITIAN, REGISTERED

## 2022-11-29 NOTE — PROGRESS NOTES
Diabetes Care Specialist Follow-up Note  Author: Arpita Sanchez RD, CDE  Date: 11/29/2022    Program Intake  Reason for Diabetes Program Visit:: Intervention  Type of Intervention:: Individual  Individual: Education  Education: Nutrition and Meal Planning, Self-Management Skill Review  Current diabetes risk level:: moderate  In the last 12 months, have you:: none  Permission to speak with others about care:: no    Lab Results   Component Value Date    HGBA1C 6.1 (H) 11/22/2022     A1c Pre Diabetes Care Specialist Intervention:  9.6%    Clinical    Patient Health Rating  Compared to other people your age, how would you rate your health?: Good    Problem Review  Reviewed Problem List with Patient: yes  Active comorbidities affecting diabetes self-care.: yes  Comorbidities: Stroke, Cardiovascular Disease, Hypertension, Neuropathy, Gastrointestinal Disorder  Reviewed health maintenance: yes    Clinical Assessment  Current Diabetes Treatment: Oral Medication, Diet, Injectable  Have you ever experienced hypoglycemia (low blood sugar)?: yes  In the last month, how often have you experienced low blood sugar?: once every other week  Are you able to tell when your blood sugar is low?: Yes  What symptoms do you experience?: Anxious/nervous, Shaky  Have you ever been hospitalized because your blood sugar was too low?: no  How do you treat hypoglycemia (low blood sugar)?: 5-6 pieces of hard candy  Have you ever experienced hyperglycemia (high blood sugar)?: yes  In the last month, how often have you experienced high blood sugar?: more than once a week  Are you able to tell when your blood sugar is high?: No (comment)  Have you ever been hospitalized because your blood sugar was high?: no    Medication Information  How do you obtain your medications?: Patient drives  How many days a week do you miss your medications?: Never  Do you use a pill box or medication chart to help you manage your medications?: No  Do you sometimes have  difficulty refilling your medications?: No  Medication adherence impacting ability to self-manage diabetes?: No    Labs  Do you have regular lab work to monitor your medications?: Yes  Type of Regular Lab Work: A1c, Cholesterol, Microalbumin, CBC, BMP  Where do you get your labs drawn?: [facility]  Lab Compliance Barriers: No    Nutritional Status  Diet: Diabetic diet, Low sodium, Low fat  Meal Plan 24 Hour Recall: Breakfast, Lunch, Dinner, Snack  Meal Plan 24 Hour Recall - Breakfast: 2 boiled eggs  Meal Plan 24 Hour Recall - Lunch: usually fast food  Meal Plan 24 Hour Recall - Dinner: home cooked usually, chicken wings and fries  Meal Plan 24 Hour Recall - Snack: chips, sweets, tends to snack after dinner while relaxing in the living room, beverages: water, green tea, hot tea, occassionally a regular soda  Change in appetite?: No  Dentation:: Intact  Recent Changes in Weight: Weight Loss  Was weight loss intentional or unintentional?: Intentional  Current nutritional status an area of need that is impacting patient's ability to self-manage diabetes?: Yes    Additional Social History    Support  Does anyone support you with your diabetes care?: yes  Who supports you?: self, son/daughter  Who takes you to your medical appointments?: self  Does the current support meet the patient's needs?: Yes  Is Support an area impacting ability to self-manage diabetes?: No    Access to Mass Media & Technology  Does the patient have access to any of the following devices or technologies?: Smart phone  Media or technology needs impacting ability to self-manage diabetes?: No    Cognitive/Behavioral Health  Alert and Oriented: Yes  Difficulty Thinking: No  Requires Prompting: No  Requires assistance for routine expression?: No  Cognitive or behavioral barriers impacting ability to self-manage diabetes?: No    Culture/Quaker  Culture or Spiritism beliefs that may impact ability to access healthcare: No    Communication  Language  preference: English  Hearing Problems: No  Vision Problems: No  Communication needs impacting ability to self-manage diabetes?: No    Health Literacy  Preferred Learning Method: Face to Face  How often do you need to have someone help you read instructions, pamphlets, or written material from your doctor or pharmacy?: Never  Health literacy needs impacting ability to self-manage diabetes?: No      Diabetes Self-Management Skills Assessment     Diabetes Disease Process/Treatment Options  Patient/caregiver able to state what happens when someone has diabetes.: yes  Patient/caregiver knows what type of diabetes they have.: yes  Diabetes Type : Type II  Patient/caregiver able to identify at least three signs and symptoms of diabetes.: yes  Identified signs and symptoms:: blurred vision, fatigue, frequent urination, increased thirst  Patient able to identify at least three risk factors for diabetes.: yes  Identified risk factors:: age over 40, being overweight, ethnicity, family history, reduced activity, history of gestational diabetes  Diabetes Disease Process/Treatment Options: Skills Assessment Completed: Yes  Assessment indicates:: Adequate understanding  Area of need?: No    Nutrition/Healthy Eating  Challenges to healthy eating:: portion control, eating when feeling depressed/stressed, lack of will power, snacking between meals and at night, eating out, going to parties  Method of carbohydrate measurement:: plate method  Patient can identify foods that impact blood sugar.: yes  Patient-identified foods:: fruit/fruit juice, starches (bread, pasta, rice, cereal), milk, soda, starchy vegetables (corn, peas, beans), sweets, yogurt  Nutrition/Healthy Eating Skills Assessment Completed:: Yes  Assessment indicates:: Adequate understanding  Area of need?: No    Physical Activity/Exercise  Patient's daily activity level:: moderately active  Patient formally exercises outside of work.: no  Reasons for not exercising:: work  schedule, time constraints  Patient can identify forms of physical activity.: yes  Stated forms of physical activity:: manual activity at work, moving to burn calories  Patient can identify reasons why exercise/physical activity is important in diabetes management.: yes  Identified reasons:: lowers blood glucose, blood pressure, and cholesterol, tones muscles  Physical Activity/Exercise Skills Assessment Completed: : Yes  Assessment indicates:: Adequate understanding  Area of need?: No    Medications  Patient is able to describe current diabetes management routine.: yes  Diabetes management routine:: insulin, oral medications  Patient is able to identify current diabetes medications, dosages, and appropriate timing of medications.: yes  Patient understands the purpose of the medications taken for diabetes.: yes  Patient reports problems or concerns with current medication regimen.: yes  Medication regimen problems/concerns:: does not feel like regimen is working  Medication Skills Assessment Completed:: Yes  Assessment indicates:: Adequate understanding, Instruction Needed  Area of need?: Yes    Home Blood Glucose Monitoring  Patient states that blood sugar is checked at home daily.: yes  Monitoring Method:: home glucometer  Home glucometer meter type:: onetouch  How often do you check your blood sugar?: Once a day  When do you check your blood sugar?: Before breakfast  When you check what is your typical blood sugar range? : BS: 119, 111, 133, 128, 116, 119, 152, 123, 93, 96, 96, 95, 115, 93  Blood glucose logs:: yes, encouraged to keep logs, encouraged to bring logs to provider visits  Blood glucose logs reviewed today?: yes  Home Blood Glucose Monitoring Skills Assessment Completed: : Yes  Assessment indicates:: Adequate understanding  Area of need?: No  Patient is doing wonderful  Weight has decreased by nearly 60 lbs and HgbA1c has come down by 3.5    Acute Complications  Patient is able to identify types of  acute complications: Yes  Patient Identified:: Hypoglycemia, Hyperglycemia  Patient is able to state the basic meaning of hypoglycemia?: Yes  Able to state the blood sugar range for hypoglycemia?: yes  Patient can identify general symptoms of hypoglycemia: yes  Patient identified:: dizziness, rapid heartbeat, shakiness  Able to state proper treatment of hypoglycemia?: yes  Patient identified:: 1/2 can soda/fruit juice, 5-6 pieces of hard candy  Patient is able to state the basic meaning of hyperglycemia?: Yes  Able to state the blood sugar range for hyperglycemia?: yes  Patient able to state proper treatment of hyperglycemia?: yes  Patient identified:: take medication as recommended, increase water intake, monitor blood sugar  Patient able to verbalize sick day plan?: yes  Patient-stated sick day plan:: drink sugar-free/calorie containing clear liquids if unable to take solid food, drink more fluids, check blood sugar every 2-3 hours, call provider if blood sugar does not improve, seek medical attention for nausea, diarrhea, vomiting, fever with high blood sugar  Acute Complications Skills Assessment Completed: : Yes  Assessment indicates:: Adequate understanding  Area of need?: No    Chronic Complications  Patient can identify major chronic complications of diabetes.: yes  Stated chronic complications:: heart disease/heart attack, kidney disease, neuropathy/nerve damage, retinopathy, sexual dysfunction, stroke  Patient can identify ways to prevent or delay diabetes complications.: yes  Stated ways to prevent complications:: controlling blood sugar, controlling cholesterol and triglycerides  Patient is aware that having diabetes increases risk of heart disease?: Yes  Patient is aware that heart disease is the leading cause of death and disability in people with diabetes?: Yes  Patient able to state risk factors for heart disease?: Yes  Patient stated risk factors for heart disease:: High blood pressure, Medication  non-adherance, High cholesterol, Having diabetes, Diet, Limited activity  Patient is taking statin?: Yes  Chronic Complications Skills Assessment Completed: : Yes  Assessment indicates:: Adequate understanding  Area of need?: No    Psychosocial/Coping  Patient can identify ways of coping with chronic disease.: yes  Patient-stated ways of coping with chronic disease:: support from loved ones, spiritual/Shinto practices  Psychosocial/Coping Skills Assessment Completed: : Yes  Assessment indicates:: Adequate understanding  Area of need?: No      During today's follow-up visit,  the following areas required further assessment and content was provided/reviewed.    Based on today's diabetes care assessment, the following areas of need were identified:      Social 11/29/2022   Support No   Access to Mass Media/Tech No   Cognitive/Behavioral Health No   Culture/Jewish No   Communication No   Health Literacy No        Clinical 11/29/2022   Medication Adherence No   Lab Compliance No   Nutritional Status Yes        Diabetes Self-Management Skills 11/29/2022   Diabetes Disease Process/Treatment Options No   Nutrition/Healthy Eating No   Physical Activity/Exercise No   Medication Yes   Home Blood Glucose Monitoring No   Acute Complications No   Chronic Complications No   Psychosocial/Coping No        Today's interventions were provided through individual discussion, instruction, and written materials were provided.    Patient verbalized understanding of instruction and written materials.  Pt was able to return back demonstration of instructions today. Patient understood key points, needs reinforcement and further instruction.     Diabetes Self-Management Care Plan Review:  Diabetes Self-Management Care Plan Review and Evaluation of Progress:    During today's follow-up [place] Diabetes Self-Management Care Plan progress was reviewed and progress was evaluated including his/her input. Holly has agreed to continue his/her  journey to improve/maintain overall diabetes control by continuing to set health goals. See care plan progress below.      Care Plan: Diabetes Management   Updates made since 10/30/2022 12:00 AM        Problem: Healthy Eating Resolved 11/29/2022        Goal: Eat 3 meals daily with 30-45g/2-3 servings of Carbohydrate per meal. Completed 11/29/2022   Start Date: 4/19/2021   Expected End Date: 4/19/2022   This Visit's Progress: On track   Priority: High   Barriers: No Barriers Identified   Note:    Reviewed carb counting, portion control, importance of spacing meals throughout the day to prevent post prandial elevations.  Recommended low saturated fat, low sodium diet to aid in control of hypertension and cholesterol. Reviewed plate method and portion control, dining out tips, meal planning, reading a food label, healthy snack options, benefits of physical activity  Education provided:   Discussed protein sources like eggs, low-fat cottage cheese, greek yogurt, sliced deli turkey, low-fat sliced cheese, protein drinks and protein bars, foods to avoid/limit such as starchy carbohydrates (bread, rice, pasta, potatoes, corn, grits, oatmeal, etc), planning to have 4-6 small meals a day rather than 3 large meals, measure portion sizes, use smaller bowls and plates, limit eating out to once a week and make low fat, low carbohydrate choices, include fruits and vegetables in daily meal plan, avoid/limit candy and desserts, low fat options (baked, broiled, grilled, and boiled instead of fried, sauteed, creamed), increase physical activity, chew food thoroughly before swallowing, sip beverages don't chug or gulp, no liquids 30 minutes before, during and 30 minutes after meals  Reviewed pre-op liquid diet, protein supplement suggestions, sugar free clear liquids allowed in unlimited amounts, progression of diet after Bariatric surgery, Phase 1 liquid (high protein liquids for 1-2 weeks after surgery), phase 2 pureed (for the next  2 weeks phase 1 items and add pureed foods and soft scrambled eggs, always focus on getting protein in first), phase 3 soft (all the items from phases 1 and 2 add soft foods that can easily be mashed with a fork, add cooked vegetables and fruits that are soft, no skins) and phase 4 solid (add back one food item at a time, focus on getting protein in first, can add back raw vegetables, lettuce, unsalted nuts and seeds, limit fruit to no more than 2 servings a day, and protein bars), reviewed life long nutrition guidelines like eating slowly, eat and drink small amounts at a time, stop eating and drinking when you feel full, chew food thoroughly before swallowing, drink adequate fluids, eat protein rich foods first, keep food choices sugar free and low in fat, avoid starchy carbohydrates, reviewed common nutritional problem and prevention tips, encouraged physical activity with permission from physician, reviewed required vitamin/mineral supplements and where to purchase, resources for bariatric patients and helpful apps for bariatric patients, and ten tips for healthy and conscious eati ng.   Reviewed Nutrition Before and After Surgery materials  Reviewed Pre-op Liquid Protein Diet  Reviewed protein supplement suggestions and where to purchase them  Reviewed dietary progression after bariatric surgery  Bariatric high protein liquid diet  Bariatric high protein pureed diet  Bariatric high protein soft diet  Regular Bariatric diet  Reviewed lifelong nutrition guidelines after weight loss surgery  Reviewed common nutritional problems and prevention tips  Reviewed physical activity recommendations  Reviewed required vitamin/mineral supplements  Reviewed resources for bariatric patients and helpful apps  Reviewed tips for healthy and conscious eating  Reviewed and patient verbalized understanding of and signed bariatric nutrition core points and contract agreement  Patient completed [facility] weight loss program  nutrition and eating habits questionnaire         Problem: Medications Resolved 11/29/2022        Goal: Patient Agrees to take Diabetes Medication(s) as prescribed. Completed 11/29/2022   Start Date: 11/26/2021   This Visit's Progress: On track   Priority: High   Barriers: No Barriers Identified   Note:    Discussed MOA, onset, side effects, dosage of meds.  Provided with strategies for daily medication adherence (phone alarms, medication reminder apps, medication list, pill organizer, pill packing, pharmacy delivery options).   Discussed any concerns about regimen.   Reviewed significance of mealtimes and medication administration.   Reviewed fluid pills and farxiga - patient is on HCTZ and lasix - she has symptoms likely associated with dehydrateing and hypotension. Patient is going to try to see her PCP today          Follow Up Plan     Follow up in about 1 year (around 11/29/2023), or if symptoms worsen or fail to improve, for General Follow-up, Blood Sugar Log Review and F/U MNT.    Today's care plan and follow up schedule was discussed with patient.  Holly verbalized understanding of the care plan, goals, and agrees to follow up plan.        The patient was encouraged to communicate with his/her health care provider/physician and care team regarding his/her condition(s) and treatment.  I provided the patient with my contact information today and encouraged to contact me via phone or [facility] Patient Portal as needed.

## 2022-12-05 ENCOUNTER — OFFICE VISIT (OUTPATIENT)
Dept: DIABETES | Facility: CLINIC | Age: 62
End: 2022-12-05
Payer: MEDICAID

## 2022-12-05 VITALS
WEIGHT: 204.81 LBS | BODY MASS INDEX: 32.15 KG/M2 | OXYGEN SATURATION: 98 % | HEART RATE: 85 BPM | SYSTOLIC BLOOD PRESSURE: 118 MMHG | DIASTOLIC BLOOD PRESSURE: 68 MMHG | HEIGHT: 67 IN

## 2022-12-05 DIAGNOSIS — E11.65 TYPE 2 DIABETES MELLITUS WITH HYPERGLYCEMIA, WITH LONG-TERM CURRENT USE OF INSULIN: ICD-10-CM

## 2022-12-05 DIAGNOSIS — E66.09 CLASS 1 OBESITY DUE TO EXCESS CALORIES WITH SERIOUS COMORBIDITY AND BODY MASS INDEX (BMI) OF 32.0 TO 32.9 IN ADULT: ICD-10-CM

## 2022-12-05 DIAGNOSIS — Z71.9 HEALTH EDUCATION/COUNSELING: ICD-10-CM

## 2022-12-05 DIAGNOSIS — Z78.9 STATIN INTOLERANCE: ICD-10-CM

## 2022-12-05 DIAGNOSIS — E78.5 DYSLIPIDEMIA, GOAL LDL BELOW 70: ICD-10-CM

## 2022-12-05 DIAGNOSIS — E55.9 VITAMIN D DEFICIENCY: ICD-10-CM

## 2022-12-05 DIAGNOSIS — I10 ESSENTIAL HYPERTENSION: ICD-10-CM

## 2022-12-05 DIAGNOSIS — G45.9 TIA (TRANSIENT ISCHEMIC ATTACK): ICD-10-CM

## 2022-12-05 DIAGNOSIS — E11.42 TYPE 2 DIABETES MELLITUS WITH DIABETIC POLYNEUROPATHY, WITHOUT LONG-TERM CURRENT USE OF INSULIN: Primary | ICD-10-CM

## 2022-12-05 DIAGNOSIS — Z79.4 TYPE 2 DIABETES MELLITUS WITH HYPERGLYCEMIA, WITH LONG-TERM CURRENT USE OF INSULIN: ICD-10-CM

## 2022-12-05 PROCEDURE — 4010F PR ACE/ARB THEARPY RXD/TAKEN: ICD-10-PCS | Mod: CPTII,,, | Performed by: NURSE PRACTITIONER

## 2022-12-05 PROCEDURE — 99214 PR OFFICE/OUTPT VISIT, EST, LEVL IV, 30-39 MIN: ICD-10-PCS | Mod: S$PBB,,, | Performed by: NURSE PRACTITIONER

## 2022-12-05 PROCEDURE — 99214 OFFICE O/P EST MOD 30 MIN: CPT | Mod: PBBFAC,PN | Performed by: NURSE PRACTITIONER

## 2022-12-05 PROCEDURE — 3066F PR DOCUMENTATION OF TREATMENT FOR NEPHROPATHY: ICD-10-PCS | Mod: CPTII,,, | Performed by: NURSE PRACTITIONER

## 2022-12-05 PROCEDURE — 3061F NEG MICROALBUMINURIA REV: CPT | Mod: CPTII,,, | Performed by: NURSE PRACTITIONER

## 2022-12-05 PROCEDURE — 3044F PR MOST RECENT HEMOGLOBIN A1C LEVEL <7.0%: ICD-10-PCS | Mod: CPTII,,, | Performed by: NURSE PRACTITIONER

## 2022-12-05 PROCEDURE — 3066F NEPHROPATHY DOC TX: CPT | Mod: CPTII,,, | Performed by: NURSE PRACTITIONER

## 2022-12-05 PROCEDURE — 3008F PR BODY MASS INDEX (BMI) DOCUMENTED: ICD-10-PCS | Mod: CPTII,,, | Performed by: NURSE PRACTITIONER

## 2022-12-05 PROCEDURE — 99214 OFFICE O/P EST MOD 30 MIN: CPT | Mod: S$PBB,,, | Performed by: NURSE PRACTITIONER

## 2022-12-05 PROCEDURE — 3061F PR NEG MICROALBUMINURIA RESULT DOCUMENTED/REVIEW: ICD-10-PCS | Mod: CPTII,,, | Performed by: NURSE PRACTITIONER

## 2022-12-05 PROCEDURE — 1159F MED LIST DOCD IN RCRD: CPT | Mod: CPTII,,, | Performed by: NURSE PRACTITIONER

## 2022-12-05 PROCEDURE — 1160F RVW MEDS BY RX/DR IN RCRD: CPT | Mod: CPTII,,, | Performed by: NURSE PRACTITIONER

## 2022-12-05 PROCEDURE — 3044F HG A1C LEVEL LT 7.0%: CPT | Mod: CPTII,,, | Performed by: NURSE PRACTITIONER

## 2022-12-05 PROCEDURE — 99999 PR PBB SHADOW E&M-EST. PATIENT-LVL IV: ICD-10-PCS | Mod: PBBFAC,,, | Performed by: NURSE PRACTITIONER

## 2022-12-05 PROCEDURE — 3074F PR MOST RECENT SYSTOLIC BLOOD PRESSURE < 130 MM HG: ICD-10-PCS | Mod: CPTII,,, | Performed by: NURSE PRACTITIONER

## 2022-12-05 PROCEDURE — 3078F DIAST BP <80 MM HG: CPT | Mod: CPTII,,, | Performed by: NURSE PRACTITIONER

## 2022-12-05 PROCEDURE — 3074F SYST BP LT 130 MM HG: CPT | Mod: CPTII,,, | Performed by: NURSE PRACTITIONER

## 2022-12-05 PROCEDURE — 3078F PR MOST RECENT DIASTOLIC BLOOD PRESSURE < 80 MM HG: ICD-10-PCS | Mod: CPTII,,, | Performed by: NURSE PRACTITIONER

## 2022-12-05 PROCEDURE — 3008F BODY MASS INDEX DOCD: CPT | Mod: CPTII,,, | Performed by: NURSE PRACTITIONER

## 2022-12-05 PROCEDURE — 4010F ACE/ARB THERAPY RXD/TAKEN: CPT | Mod: CPTII,,, | Performed by: NURSE PRACTITIONER

## 2022-12-05 PROCEDURE — 1160F PR REVIEW ALL MEDS BY PRESCRIBER/CLIN PHARMACIST DOCUMENTED: ICD-10-PCS | Mod: CPTII,,, | Performed by: NURSE PRACTITIONER

## 2022-12-05 PROCEDURE — 99999 PR PBB SHADOW E&M-EST. PATIENT-LVL IV: CPT | Mod: PBBFAC,,, | Performed by: NURSE PRACTITIONER

## 2022-12-05 PROCEDURE — 1159F PR MEDICATION LIST DOCUMENTED IN MEDICAL RECORD: ICD-10-PCS | Mod: CPTII,,, | Performed by: NURSE PRACTITIONER

## 2022-12-05 RX ORDER — DAPAGLIFLOZIN 10 MG/1
10 TABLET, FILM COATED ORAL DAILY
Qty: 30 TABLET | Refills: 6 | Status: SHIPPED | OUTPATIENT
Start: 2022-12-05 | End: 2023-03-06 | Stop reason: SDUPTHER

## 2022-12-05 RX ORDER — DULAGLUTIDE 4.5 MG/.5ML
INJECTION, SOLUTION SUBCUTANEOUS
Qty: 4 PEN | Refills: 6 | Status: SHIPPED | OUTPATIENT
Start: 2022-12-05 | End: 2023-03-06

## 2022-12-05 NOTE — ASSESSMENT & PLAN NOTE
Controlled   She wishes to try to come off of metformin if possible  Her A1c is below goal  Can trial off although I am hesitant and feel she may need to be on it at least daily      -- Medication Changes:   We will trial off the metformin     Increase the Farxiga to 10 mg daily - with stopping the metformin   -- Please let us know if you have nausea, vomiting, or weakness with a normal BG. This could be euglycemic DKA.  -- please hold medication 3 days prior to surgery or if you are sick and have decreased intake.   -- Please drink lots of water daily while on this medication.   --This medication could also give you a yeast infection- please let us know if this occurs and we can treat it.   -- NO KETO diet   -- drink 6-8 glasses of water per day       Continue Trulicity 4.5 mg weekly     If your blood sugars are running 150-160-'s -- restart metformin 1000 mg daily and let me know         -- Reviewed goals of therapy are to get the best control we can without hypoglycemia.    -- Advised frequent self blood glucose monitoring.  Patient encouraged to document glucose results and bring them to every clinic visit.- daily at alternating times   -- Hypoglycemia precautions discussed. Instructed on precautions before driving.    -- Call for Bg repeatedly < 70 or > 180.   -- Close adherence to lifestyle changes recommended.   -- Periodic follow ups for eye evaluations, foot care and dental care suggested.      Regarding polyneuropathy:  Optimize BG readings.   See above.     Educated patient to check feet daily for any foreign objects and/or wounds. Discussed with patient the importance of wearing appropriate footwear at all times, not to walk barefoot ever, and to check shoes before putting them on feet. Instructed patient to keep feet dry by regularly changing shoes and socks and drying feet after baths and exercises. Also, instructed patient to report any new lesions, discolorations, or swelling to a healthcare  professional.

## 2022-12-05 NOTE — PATIENT INSTRUCTIONS
We will trial off the metformin     Increase the Farxiga to 10 mg daily   -- Please let us know if you have nausea, vomiting, or weakness with a normal BG. This could be euglycemic DKA.  -- please hold medication 3 days prior to surgery or if you are sick and have decreased intake.   -- Please drink lots of water daily while on this medication.   --This medication could also give you a yeast infection- please let us know if this occurs and we can treat it.   -- NO KETO diet   -- drink 6-8 glasses of water per day       Continue Trulicity 4.5 mg weekly     If your blood sugars are running 150-160-'s -- restart metformin 1000 mg daily and let me know

## 2022-12-05 NOTE — ASSESSMENT & PLAN NOTE
Body mass index is 32.08 kg/m².  Increases insulin resistance.   Discussed DM diet and exercise.   Encouraged continued weight loss

## 2022-12-05 NOTE — PROGRESS NOTES
CC:   Chief Complaint   Patient presents with    Diabetes Mellitus       HPI: Holly Mcclelland is a 62 y.o. female presents for a follow up visit today for the management of T2DM.     She was diagnosed with Type 2 diabetes around 8432-6081 on routine lab work. She was initially started on Metformin. She started insulin therapy in 2020.     Family hx of diabetes: mother, grandmother, aunts, uncles - strong family hx, sister who is blind and has amputations.   Hospitalized for diabetes: denies      No personal or FH of thyroid cancer or personal of pancreatic cancer or pancreatitis.     Works in retail     Our last visit was in March 2022   At that visit we continued her Farxiga 5 mg daily, Trulicity 4.5 mg weekly, Metformin BID   A1c is down to 6.1%   A she expresses that she would like to try to come off of the metformin if possible  She is no longer taking any statin therapy due to inability to tolerate  Reports she also tried Zetia from Cardiology but was unable to tolerate that either      DIABETES COMPLICATIONS: peripheral neuropathy and cerebrovascular disease   Hx of TIA in 2019       Diabetes Management Status    ASA:  Yes - 81 mg ASA + plavix     Statin:not taking  -- she stopped the Lipitor-- she reports that it was causing her nausea -- stopped the Crestor due to SE as well    She also tried Zetia     ACE/ARB: Taking-Lisinopril    Screening or Prevention Patient's value Goal Complete/Controlled?   HgA1C Testing and Control   Lab Results   Component Value Date    HGBA1C 6.1 (H) 11/22/2022      Annually/Less than 8% No   Lipid profile : 11/22/2022 Annually Yes   LDL control Lab Results   Component Value Date    LDLCALC 85.2 11/22/2022    Annually/Less than 100 mg/dl  Yes   Nephropathy screening Lab Results   Component Value Date    LABMICR 15.0 03/08/2022     Lab Results   Component Value Date    PROTEINUA Negative 09/25/2022    Annually Yes   Blood pressure BP Readings from Last 1 Encounters:   12/05/22  118/68    Less than 140/90 No   Dilated retinal exam : 11/30/2021-external- Dr. Ball  Annually No   Foot exam   : 06/20/2022 Annually No       CURRENT A1C:    Hemoglobin A1C   Date Value Ref Range Status   11/22/2022 6.1 (H) 4.0 - 5.6 % Final     Comment:     ADA Screening Guidelines:  5.7-6.4%  Consistent with prediabetes  >or=6.5%  Consistent with diabetes    High levels of fetal hemoglobin interfere with the HbA1C  assay. Heterozygous hemoglobin variants (HbS, HgC, etc)do  not significantly interfere with this assay.   However, presence of multiple variants may affect accuracy.     03/03/2022 6.4 (H) 4.0 - 5.6 % Final     Comment:     ADA Screening Guidelines:  5.7-6.4%  Consistent with prediabetes  >or=6.5%  Consistent with diabetes    High levels of fetal hemoglobin interfere with the HbA1C  assay. Heterozygous hemoglobin variants (HbS, HgC, etc)do  not significantly interfere with this assay.   However, presence of multiple variants may affect accuracy.     02/10/2022 6.6 (H) 4.0 - 5.6 % Final     Comment:     ADA Screening Guidelines:  5.7-6.4%  Consistent with prediabetes  >or=6.5%  Consistent with diabetes    High levels of fetal hemoglobin interfere with the HbA1C  assay. Heterozygous hemoglobin variants (HbS, HgC, etc)do  not significantly interfere with this assay.   However, presence of multiple variants may affect accuracy.         GOAL A1C: 7% without hypoglycemia    DM MEDICATIONS USED IN THE PAST:  Metformin, Lantus, glyburide, glipizide, Januvia  Victoza- throat closed   Trulicity   Farxiga       CURRENT DIABETES MEDICATIONS:  Metformin 1000 mg b.i.d.,  Trulicity 4.5 mg weekly  (Thursdays), Farxiga 5 mg daily   Insulin: N/A   Missed doses: rarely        BLOOD GLUCOSE MONITORING:  She checks her BG 1-2 times per day   Brought her meter for review   7 day average with 7 readings -118  14 day average with 13 readings 122  30 day with 24 readings - 114   137, 126, 119, 116, 97, 111, 119, 111, 133.,  128, 116, 119, 152, 123, 93, 96   96, 95, 115, 93, 108, 120, 90, 125,       HYPOGLYCEMIA: denies   Carries mints         MEALS: eating 2 meals per day   Avoids bread   Trying to stay away from potatoes   BF:  Banana and yogurt or bagel with peanut butter or she skips   Lunch: leftovers usually- trying to stay away from fast food - sometimes skipping   Dinner: home cooked   1 banana a couple times per week   Apple    Oranges   Snack: yogurt   Drinks: water   Green tea- it has sugar   occ juice       CURRENT EXERCISE:  None         Review of Systems  Review of Systems   Constitutional:  Negative for appetite change, fatigue and unexpected weight change.   HENT:  Negative for trouble swallowing.    Eyes:  Negative for visual disturbance.   Respiratory:  Negative for shortness of breath.         + sleep apnea- off CPAP machine -- she has it but has not been using it -- just sent her one    Cardiovascular:  Negative for chest pain.   Gastrointestinal:  Negative for nausea.   Endocrine: Negative for polydipsia, polyphagia and polyuria.   Genitourinary:         No Nocturia    Musculoskeletal:  Negative for neck pain.   Skin:  Negative for wound.   Neurological:  Negative for numbness.     Physical Exam   Physical Exam  Vitals and nursing note reviewed.   Constitutional:       Appearance: She is well-developed. She is obese.   HENT:      Head: Normocephalic and atraumatic.      Right Ear: External ear normal.      Left Ear: External ear normal.      Nose: Nose normal.   Neck:      Thyroid: No thyromegaly.      Trachea: No tracheal deviation.   Cardiovascular:      Rate and Rhythm: Normal rate and regular rhythm.      Heart sounds: No murmur heard.  Pulmonary:      Effort: Pulmonary effort is normal. No respiratory distress.      Breath sounds: Normal breath sounds.   Abdominal:      Palpations: Abdomen is soft.      Tenderness: There is no abdominal tenderness.      Hernia: No hernia is present.   Musculoskeletal:       Cervical back: Normal range of motion and neck supple.   Skin:     General: Skin is warm and dry.      Capillary Refill: Capillary refill takes less than 2 seconds.      Findings: No rash.      Comments: Injection sites are normal appearing. No lipo hypertropthy or atrophy     Neurological:      Mental Status: She is alert and oriented to person, place, and time.      Cranial Nerves: No cranial nerve deficit.   Psychiatric:         Behavior: Behavior normal.         Judgment: Judgment normal.       FOOT EXAMINATION: appropriate footwear         Lab Results   Component Value Date    TSH 0.760 03/03/2022           Type 2 diabetes mellitus with diabetic polyneuropathy, without long-term current use of insulin  Controlled   She wishes to try to come off of metformin if possible  Her A1c is below goal  Can trial off although I am hesitant and feel she may need to be on it at least daily      -- Medication Changes:   We will trial off the metformin     Increase the Farxiga to 10 mg daily - with stopping the metformin   -- Please let us know if you have nausea, vomiting, or weakness with a normal BG. This could be euglycemic DKA.  -- please hold medication 3 days prior to surgery or if you are sick and have decreased intake.   -- Please drink lots of water daily while on this medication.   --This medication could also give you a yeast infection- please let us know if this occurs and we can treat it.   -- NO KETO diet   -- drink 6-8 glasses of water per day       Continue Trulicity 4.5 mg weekly     If your blood sugars are running 150-160-'s -- restart metformin 1000 mg daily and let me know         -- Reviewed goals of therapy are to get the best control we can without hypoglycemia.    -- Advised frequent self blood glucose monitoring.  Patient encouraged to document glucose results and bring them to every clinic visit.- daily at alternating times   -- Hypoglycemia precautions discussed. Instructed on precautions before  driving.    -- Call for Bg repeatedly < 70 or > 180.   -- Close adherence to lifestyle changes recommended.   -- Periodic follow ups for eye evaluations, foot care and dental care suggested.      Regarding polyneuropathy:  Optimize BG readings.   See above.     Educated patient to check feet daily for any foreign objects and/or wounds. Discussed with patient the importance of wearing appropriate footwear at all times, not to walk barefoot ever, and to check shoes before putting them on feet. Instructed patient to keep feet dry by regularly changing shoes and socks and drying feet after baths and exercises. Also, instructed patient to report any new lesions, discolorations, or swelling to a healthcare professional.        Essential hypertension  BP goal is < 140/90.   Tolerating ACEi  Controlled   Blood pressure goals discussed with patient      Class 1 obesity due to excess calories with serious comorbidity and body mass index (BMI) of 32.0 to 32.9 in adult  Body mass index is 32.08 kg/m².  Increases insulin resistance.   Discussed DM diet and exercise.   Encouraged continued weight loss    TIA (transient ischemic attack)  Optimize BG readings.   See above.       Dyslipidemia, goal LDL below 70  LDL goal is <70  She stopped the Lipitor due to nausea  She stopped the Crestor due to intolerance   She also tried Zetia and was unable to tolerate  Discussed that she needs to be on a statin therapy if possible to reduce CV risks--given her history       Vitamin D deficiency  Taking an OTC supplement  Check vitamin-D with RTC      Spent 25 minutes with patient with > 50% time spent in counseling, as noted above on medications, self glucose monitoring, diet, exercise, weight loss      Follow up in about 3 months (around 3/5/2023).  Schedule with Dr. Marte please -- OBGYN- abnormal bleeding   Follow up with me in 3 months with labs prior --since we are stopping her metformin will do a close follow-up      Orders Placed  This Encounter   Procedures    Hemoglobin A1C     Standing Status:   Future     Standing Expiration Date:   6/5/2024    Basic Metabolic Panel     Standing Status:   Future     Standing Expiration Date:   6/5/2024    TSH     Standing Status:   Future     Standing Expiration Date:   6/5/2024    Microalbumin/Creatinine Ratio, Urine     Standing Status:   Future     Standing Expiration Date:   6/5/2024     Order Specific Question:   Specimen Source     Answer:   Urine    Vitamin D     Standing Status:   Future     Standing Expiration Date:   6/5/2024         Recommendations were discussed with the patient in detail  The patient verbalized understanding and agrees with the plan outlined as above.     This note was partly generated with [employer] voice recognition software. I apologize for any possible typographical errors.

## 2022-12-05 NOTE — ASSESSMENT & PLAN NOTE
LDL goal is <70  She stopped the Lipitor due to nausea  She stopped the Crestor due to intolerance   She also tried Zetia and was unable to tolerate  Discussed that she needs to be on a statin therapy if possible to reduce CV risks--given her history

## 2022-12-07 ENCOUNTER — TELEPHONE (OUTPATIENT)
Dept: OBSTETRICS AND GYNECOLOGY | Facility: CLINIC | Age: 62
End: 2022-12-07
Payer: MEDICAID

## 2022-12-07 NOTE — TELEPHONE ENCOUNTER
Called pt to discuss symptoms. Reports she stopped provera ~ 3 month ago but started bleeding 2 weeks ago. Bleeding is reported as light. Pt has appt with Dr. Marte for end of January. Contacted Dr. Marte to see if she requires sooner appt. Will refill provera in mean time. Advised to contact office if bleeding worsens. VU

## 2022-12-13 NOTE — ASSESSMENT & PLAN NOTE
LDL goal is <70  She stopped the Lipitor due to nausea  Start Crestor 10 mg nightly  Discussed that she needs to be on a statin therapy if possible to reduce CV risks--given her history  Lipids with RTC    Consent: Written consent obtained. Risks include but not limited to lid/brow ptosis, bruising, swelling, diplopia, temporary effect, incomplete chemical denervation.

## 2023-01-10 ENCOUNTER — TELEPHONE (OUTPATIENT)
Dept: DIABETES | Facility: CLINIC | Age: 63
End: 2023-01-10
Payer: MEDICAID

## 2023-01-26 ENCOUNTER — OFFICE VISIT (OUTPATIENT)
Dept: OBSTETRICS AND GYNECOLOGY | Facility: CLINIC | Age: 63
End: 2023-01-26
Payer: MEDICAID

## 2023-01-26 VITALS
HEIGHT: 67 IN | DIASTOLIC BLOOD PRESSURE: 72 MMHG | BODY MASS INDEX: 32.73 KG/M2 | SYSTOLIC BLOOD PRESSURE: 124 MMHG | WEIGHT: 208.56 LBS

## 2023-01-26 DIAGNOSIS — N95.0 PMB (POSTMENOPAUSAL BLEEDING): Primary | ICD-10-CM

## 2023-01-26 PROCEDURE — 99214 OFFICE O/P EST MOD 30 MIN: CPT | Mod: S$PBB,,, | Performed by: STUDENT IN AN ORGANIZED HEALTH CARE EDUCATION/TRAINING PROGRAM

## 2023-01-26 PROCEDURE — 99999 PR PBB SHADOW E&M-EST. PATIENT-LVL IV: ICD-10-PCS | Mod: PBBFAC,,, | Performed by: STUDENT IN AN ORGANIZED HEALTH CARE EDUCATION/TRAINING PROGRAM

## 2023-01-26 PROCEDURE — 1160F PR REVIEW ALL MEDS BY PRESCRIBER/CLIN PHARMACIST DOCUMENTED: ICD-10-PCS | Mod: CPTII,,, | Performed by: STUDENT IN AN ORGANIZED HEALTH CARE EDUCATION/TRAINING PROGRAM

## 2023-01-26 PROCEDURE — 1159F PR MEDICATION LIST DOCUMENTED IN MEDICAL RECORD: ICD-10-PCS | Mod: CPTII,,, | Performed by: STUDENT IN AN ORGANIZED HEALTH CARE EDUCATION/TRAINING PROGRAM

## 2023-01-26 PROCEDURE — 3078F PR MOST RECENT DIASTOLIC BLOOD PRESSURE < 80 MM HG: ICD-10-PCS | Mod: CPTII,,, | Performed by: STUDENT IN AN ORGANIZED HEALTH CARE EDUCATION/TRAINING PROGRAM

## 2023-01-26 PROCEDURE — 3074F SYST BP LT 130 MM HG: CPT | Mod: CPTII,,, | Performed by: STUDENT IN AN ORGANIZED HEALTH CARE EDUCATION/TRAINING PROGRAM

## 2023-01-26 PROCEDURE — 1159F MED LIST DOCD IN RCRD: CPT | Mod: CPTII,,, | Performed by: STUDENT IN AN ORGANIZED HEALTH CARE EDUCATION/TRAINING PROGRAM

## 2023-01-26 PROCEDURE — 1160F RVW MEDS BY RX/DR IN RCRD: CPT | Mod: CPTII,,, | Performed by: STUDENT IN AN ORGANIZED HEALTH CARE EDUCATION/TRAINING PROGRAM

## 2023-01-26 PROCEDURE — 3008F BODY MASS INDEX DOCD: CPT | Mod: CPTII,,, | Performed by: STUDENT IN AN ORGANIZED HEALTH CARE EDUCATION/TRAINING PROGRAM

## 2023-01-26 PROCEDURE — 99214 PR OFFICE/OUTPT VISIT, EST, LEVL IV, 30-39 MIN: ICD-10-PCS | Mod: S$PBB,,, | Performed by: STUDENT IN AN ORGANIZED HEALTH CARE EDUCATION/TRAINING PROGRAM

## 2023-01-26 PROCEDURE — 99214 OFFICE O/P EST MOD 30 MIN: CPT | Mod: PBBFAC,PN | Performed by: STUDENT IN AN ORGANIZED HEALTH CARE EDUCATION/TRAINING PROGRAM

## 2023-01-26 PROCEDURE — 3074F PR MOST RECENT SYSTOLIC BLOOD PRESSURE < 130 MM HG: ICD-10-PCS | Mod: CPTII,,, | Performed by: STUDENT IN AN ORGANIZED HEALTH CARE EDUCATION/TRAINING PROGRAM

## 2023-01-26 PROCEDURE — 3008F PR BODY MASS INDEX (BMI) DOCUMENTED: ICD-10-PCS | Mod: CPTII,,, | Performed by: STUDENT IN AN ORGANIZED HEALTH CARE EDUCATION/TRAINING PROGRAM

## 2023-01-26 PROCEDURE — 3078F DIAST BP <80 MM HG: CPT | Mod: CPTII,,, | Performed by: STUDENT IN AN ORGANIZED HEALTH CARE EDUCATION/TRAINING PROGRAM

## 2023-01-26 PROCEDURE — 99999 PR PBB SHADOW E&M-EST. PATIENT-LVL IV: CPT | Mod: PBBFAC,,, | Performed by: STUDENT IN AN ORGANIZED HEALTH CARE EDUCATION/TRAINING PROGRAM

## 2023-01-30 NOTE — PROGRESS NOTES
History & Physical  Gynecology      SUBJECTIVE:     Chief Complaint: Vaginal Bleeding and Consult       History of Present Illness:  Holly Mcclelland is a 62 y.o.  here for persistent PMB. Not having bleeding so much as intermittent spotting. It is bothersome. Was resolved for some time on provera before PMB resumed.  She has hx CVA on plavix. She also has CHF, DM, HLD, HTN, TAWNYA.    :   - EMS 2 mm  - EMBx negative for hyerplasia, atypia, or malignancy   - Started on provera which resolved her bleeding          Review of patient's allergies indicates:   Allergen Reactions    Victoza [liraglutide] Other (See Comments)     Throat closed ? But not seen in the ER        Past Medical History:   Diagnosis Date    CHF (congestive heart failure)     Diabetes mellitus     Heart murmur     Hyperlipidemia     Hypertension     Sleep apnea     wears CPAP    Stroke      Past Surgical History:   Procedure Laterality Date    ANTERIOR CERVICAL DISCECTOMY W/ FUSION N/A 2022    Procedure: DISCECTOMY, SPINE, CERVICAL, ANTERIOR APPROACH, WITH FUSION C3-4 Gareth PRADHAN notified;  Surgeon: Damian Arroyo MD;  Location: [facility];  Service: Neurosurgery;  Laterality: N/A;  ASA1  TOR1  T&Cross x 2 units  EMG  SEP  MEP  RN PREOP ON 22,COVID---NEGATIVE- on -- T/S  done  ON   [facility] GARETH RUIZ  [phone] NOTIFIED (CALLED) GARETH ON 2022 @ 3:11PM-LO  NEURO MONIT    BREAST BIOPSY Left      OB History          1    Para   1    Term   0       1    AB        Living   1         SAB        IAB        Ectopic        Multiple        Live Births   1               Family History   Problem Relation Age of Onset    Diabetes Maternal Grandmother     Diabetes Mother     Hypertension Mother     Heart disease Mother     Diabetes Sister     Diabetes Father     Breast cancer Neg Hx     Colon cancer Neg Hx     Ovarian cancer Neg Hx      Social History     Tobacco Use    Smoking status: Never    Smokeless tobacco:  Never   Substance Use Topics    Alcohol use: No    Drug use: No       Current Outpatient Medications   Medication Sig    aspirin (ECOTRIN) 81 MG EC tablet Take 1 tablet by mouth once daily    blood sugar diagnostic (ONETOUCH ULTRA TEST) Strp 1 strip by Misc.(Non-Drug; Combo Route) route once daily.    cholecalciferol, vitamin D3, 1,250 mcg (50,000 unit) capsule Take 1 capsule by mouth once a week    clopidogreL (PLAVIX) 75 mg tablet Take 1 tablet by mouth once daily    dapagliflozin (FARXIGA) 10 mg tablet Take 1 tablet (10 mg total) by mouth once daily.    diazePAM (VALIUM) 10 MG Tab Take 10 mg by mouth daily as needed.    diclofenac sodium (VOLTAREN) 1 % Gel Apply 2 g topically 4 (four) times daily.    dulaglutide (TRULICITY) 4.5 mg/0.5 mL pen injector INJECT 4.5 MG SUBCUTANEOUSLY ONCE A WEEK    furosemide (LASIX) 20 MG tablet TAKE 1 TABLET BY MOUTH ONCE DAILY AS NEEDED FOR  SWELLING    gabapentin (NEURONTIN) 300 MG capsule Take 1 capsule (300 mg total) by mouth 3 (three) times daily.    hydrALAZINE (APRESOLINE) 25 MG tablet Take 1 tablet (25 mg total) by mouth 3 (three) times daily.    HYDROcodone-acetaminophen (NORCO)  mg per tablet Take 1 tablet by mouth every 4 (four) hours as needed (7-10/10 pain).    lisinopriL-hydrochlorothiazide (PRINZIDE,ZESTORETIC) 20-25 mg Tab Take 1 tablet by mouth once daily.    medroxyPROGESTERone (PROVERA) 5 MG tablet Take 1 tablet by mouth once daily    ondansetron (ZOFRAN) 4 MG tablet Take 1 tablet (4 mg total) by mouth every 8 (eight) hours as needed for Nausea.    ONETOUCH DELICA PLUS LANCET 33 gauge Misc USE 1  TO CHECK GLUCOSE 4 TIMES DAILY    pantoprazole (PROTONIX) 40 MG tablet Take 1 tablet (40 mg total) by mouth once daily.     No current facility-administered medications for this visit.         Review of Systems:  Review of Systems   Constitutional:  Negative for chills and fever.   Respiratory:  Negative for cough, shortness of breath and wheezing.     Cardiovascular:  Negative for chest pain, palpitations and leg swelling.   Gastrointestinal:  Negative for abdominal pain, nausea and vomiting.   Endocrine: Positive for diabetes. Negative for hyperthyroidism and hypothyroidism.   Genitourinary:  Positive for postmenopausal bleeding. Negative for dysuria, pelvic pain, vaginal bleeding and vaginal pain.   Musculoskeletal:  Negative for joint swelling and myalgias.   Integumentary:  Negative for rash.   Neurological:  Negative for vertigo, seizures, syncope and numbness.   Hematological:  Does not bruise/bleed easily.   Psychiatric/Behavioral:  Negative for depression. The patient is not nervous/anxious.       OBJECTIVE:     Physical Exam:  Physical Exam  Constitutional:       General: She is not in acute distress.     Appearance: She is well-developed.   HENT:      Head: Normocephalic and atraumatic.   Eyes:      Extraocular Movements: Extraocular movements intact.      Conjunctiva/sclera: Conjunctivae normal.   Pulmonary:      Effort: Pulmonary effort is normal. No respiratory distress.   Musculoskeletal:         General: Normal range of motion.      Right lower leg: No edema.      Left lower leg: No edema.   Skin:     General: Skin is warm and dry.   Neurological:      Mental Status: She is alert and oriented to person, place, and time.   Psychiatric:         Mood and Affect: Mood normal.         Behavior: Behavior normal.         ASSESSMENT:       ICD-10-CM ICD-9-CM    1. PMB (postmenopausal bleeding)  N95.0 627.1 US Pelvis Comp with Transvag NON-OB (xpd             Plan:      Due to recurrent PMB after negative TVUS, EMBx next step is hysteroscopy in the OR. May need hysterectomy if persists thereafter.   Could continue plavix for hsyteroscopy, though would need to hold for hysterectomy.     Recommend pt see her PCP for pre-op evaluation, anticoagulation recommendations.     Consents signed for hysteroscopy today after thorough discussion of r/b.a. will  schedule pending PCP recs.    Nella Jiménez

## 2023-03-01 PROBLEM — D25.1 INTRAMURAL AND SUBSEROUS LEIOMYOMA OF UTERUS: Status: ACTIVE | Noted: 2023-03-01

## 2023-03-01 PROBLEM — D25.2 INTRAMURAL AND SUBSEROUS LEIOMYOMA OF UTERUS: Status: ACTIVE | Noted: 2023-03-01

## 2023-03-03 ENCOUNTER — TELEPHONE (OUTPATIENT)
Dept: DIABETES | Facility: CLINIC | Age: 63
End: 2023-03-03
Payer: MEDICAID

## 2023-03-06 ENCOUNTER — OFFICE VISIT (OUTPATIENT)
Dept: DIABETES | Facility: CLINIC | Age: 63
End: 2023-03-06
Payer: MEDICAID

## 2023-03-06 ENCOUNTER — TELEPHONE (OUTPATIENT)
Dept: DIABETES | Facility: CLINIC | Age: 63
End: 2023-03-06
Payer: MEDICAID

## 2023-03-06 VITALS
BODY MASS INDEX: 33.46 KG/M2 | SYSTOLIC BLOOD PRESSURE: 128 MMHG | HEART RATE: 89 BPM | WEIGHT: 213.19 LBS | OXYGEN SATURATION: 98 % | DIASTOLIC BLOOD PRESSURE: 70 MMHG | HEIGHT: 67 IN

## 2023-03-06 DIAGNOSIS — E11.42 TYPE 2 DIABETES MELLITUS WITH DIABETIC POLYNEUROPATHY, WITHOUT LONG-TERM CURRENT USE OF INSULIN: Primary | ICD-10-CM

## 2023-03-06 DIAGNOSIS — E55.9 VITAMIN D DEFICIENCY: ICD-10-CM

## 2023-03-06 DIAGNOSIS — E78.5 DYSLIPIDEMIA, GOAL LDL BELOW 70: ICD-10-CM

## 2023-03-06 DIAGNOSIS — Z12.31 SCREENING MAMMOGRAM FOR BREAST CANCER: Primary | ICD-10-CM

## 2023-03-06 DIAGNOSIS — K21.9 GASTROESOPHAGEAL REFLUX DISEASE, UNSPECIFIED WHETHER ESOPHAGITIS PRESENT: ICD-10-CM

## 2023-03-06 DIAGNOSIS — Z71.9 HEALTH EDUCATION/COUNSELING: ICD-10-CM

## 2023-03-06 DIAGNOSIS — E11.22 TYPE 2 DIABETES MELLITUS WITH STAGE 3A CHRONIC KIDNEY DISEASE, WITHOUT LONG-TERM CURRENT USE OF INSULIN: ICD-10-CM

## 2023-03-06 DIAGNOSIS — N18.31 TYPE 2 DIABETES MELLITUS WITH STAGE 3A CHRONIC KIDNEY DISEASE, WITHOUT LONG-TERM CURRENT USE OF INSULIN: ICD-10-CM

## 2023-03-06 DIAGNOSIS — I10 ESSENTIAL HYPERTENSION: ICD-10-CM

## 2023-03-06 DIAGNOSIS — G45.9 TIA (TRANSIENT ISCHEMIC ATTACK): ICD-10-CM

## 2023-03-06 DIAGNOSIS — E66.09 CLASS 1 OBESITY DUE TO EXCESS CALORIES WITH SERIOUS COMORBIDITY AND BODY MASS INDEX (BMI) OF 33.0 TO 33.9 IN ADULT: ICD-10-CM

## 2023-03-06 PROCEDURE — 99999 PR PBB SHADOW E&M-EST. PATIENT-LVL V: ICD-10-PCS | Mod: PBBFAC,,, | Performed by: NURSE PRACTITIONER

## 2023-03-06 PROCEDURE — 4010F ACE/ARB THERAPY RXD/TAKEN: CPT | Mod: CPTII,,, | Performed by: NURSE PRACTITIONER

## 2023-03-06 PROCEDURE — 3074F SYST BP LT 130 MM HG: CPT | Mod: CPTII,,, | Performed by: NURSE PRACTITIONER

## 2023-03-06 PROCEDURE — 99215 OFFICE O/P EST HI 40 MIN: CPT | Mod: PBBFAC,PN | Performed by: NURSE PRACTITIONER

## 2023-03-06 PROCEDURE — 3078F PR MOST RECENT DIASTOLIC BLOOD PRESSURE < 80 MM HG: ICD-10-PCS | Mod: CPTII,,, | Performed by: NURSE PRACTITIONER

## 2023-03-06 PROCEDURE — 1160F PR REVIEW ALL MEDS BY PRESCRIBER/CLIN PHARMACIST DOCUMENTED: ICD-10-PCS | Mod: CPTII,,, | Performed by: NURSE PRACTITIONER

## 2023-03-06 PROCEDURE — 3078F DIAST BP <80 MM HG: CPT | Mod: CPTII,,, | Performed by: NURSE PRACTITIONER

## 2023-03-06 PROCEDURE — 99214 OFFICE O/P EST MOD 30 MIN: CPT | Mod: S$PBB,,, | Performed by: NURSE PRACTITIONER

## 2023-03-06 PROCEDURE — 3044F PR MOST RECENT HEMOGLOBIN A1C LEVEL <7.0%: ICD-10-PCS | Mod: CPTII,,, | Performed by: NURSE PRACTITIONER

## 2023-03-06 PROCEDURE — 99214 PR OFFICE/OUTPT VISIT, EST, LEVL IV, 30-39 MIN: ICD-10-PCS | Mod: S$PBB,,, | Performed by: NURSE PRACTITIONER

## 2023-03-06 PROCEDURE — 3044F HG A1C LEVEL LT 7.0%: CPT | Mod: CPTII,,, | Performed by: NURSE PRACTITIONER

## 2023-03-06 PROCEDURE — 3074F PR MOST RECENT SYSTOLIC BLOOD PRESSURE < 130 MM HG: ICD-10-PCS | Mod: CPTII,,, | Performed by: NURSE PRACTITIONER

## 2023-03-06 PROCEDURE — 3008F BODY MASS INDEX DOCD: CPT | Mod: CPTII,,, | Performed by: NURSE PRACTITIONER

## 2023-03-06 PROCEDURE — 1159F PR MEDICATION LIST DOCUMENTED IN MEDICAL RECORD: ICD-10-PCS | Mod: CPTII,,, | Performed by: NURSE PRACTITIONER

## 2023-03-06 PROCEDURE — 99999 PR PBB SHADOW E&M-EST. PATIENT-LVL V: CPT | Mod: PBBFAC,,, | Performed by: NURSE PRACTITIONER

## 2023-03-06 PROCEDURE — 1159F MED LIST DOCD IN RCRD: CPT | Mod: CPTII,,, | Performed by: NURSE PRACTITIONER

## 2023-03-06 PROCEDURE — 4010F PR ACE/ARB THEARPY RXD/TAKEN: ICD-10-PCS | Mod: CPTII,,, | Performed by: NURSE PRACTITIONER

## 2023-03-06 PROCEDURE — 3008F PR BODY MASS INDEX (BMI) DOCUMENTED: ICD-10-PCS | Mod: CPTII,,, | Performed by: NURSE PRACTITIONER

## 2023-03-06 PROCEDURE — 1160F RVW MEDS BY RX/DR IN RCRD: CPT | Mod: CPTII,,, | Performed by: NURSE PRACTITIONER

## 2023-03-06 RX ORDER — DAPAGLIFLOZIN 10 MG/1
10 TABLET, FILM COATED ORAL DAILY
Qty: 30 TABLET | Refills: 6 | Status: SHIPPED | OUTPATIENT
Start: 2023-03-06 | End: 2023-10-12 | Stop reason: SDUPTHER

## 2023-03-06 RX ORDER — PRAVASTATIN SODIUM 40 MG/1
40 TABLET ORAL NIGHTLY
Qty: 30 TABLET | Refills: 6 | Status: SHIPPED | OUTPATIENT
Start: 2023-03-06 | End: 2023-12-21

## 2023-03-06 RX ORDER — TIRZEPATIDE 5 MG/.5ML
5 INJECTION, SOLUTION SUBCUTANEOUS
Qty: 4 PEN | Refills: 0 | Status: SHIPPED | OUTPATIENT
Start: 2023-03-06 | End: 2023-03-07

## 2023-03-06 NOTE — PATIENT INSTRUCTIONS
We will try to change your Trulicity to Mounjaro   Start with Mounjaro 5 mg weekly for 4 weeks and then increase to 7.5 mg weekly for 4 weeks and then increase to 10 mg weekly   Please notify me for any abdominal pain, nausea, vomiting, diarrhea, acid, constipation     Continue Farxiga 10 mg daily       Try Pravastatin 40 mg nightly for your cholesterol

## 2023-03-06 NOTE — ASSESSMENT & PLAN NOTE
Optimize BG readings.   See above.   Avoid insulin stacking and hypoglycemia  On lisinopril and Farxiga

## 2023-03-06 NOTE — ASSESSMENT & PLAN NOTE
LDL goal is <70--given history of TIA  She stopped the Lipitor due to nausea  She stopped the Crestor due to intolerance   She also tried Zetia and was unable to tolerate  Discussed that she needs to be on a statin therapy if possible to reduce CV risks--given her history    Will try pravastatin 40 mg nightly

## 2023-03-06 NOTE — ASSESSMENT & PLAN NOTE
Body mass index is 33.39 kg/m².  Increases insulin resistance.   Discussed DM diet and exercise.   Encouraged continued weight loss

## 2023-03-06 NOTE — ASSESSMENT & PLAN NOTE
A1c has increased but still reasonable  She attest to increased stress and dietary indiscretions negatively impacting her diabetes management      -- Medication Changes:     We will try to change your Trulicity to Mounjaro   Start with Mounjaro 5 mg weekly for 4 weeks and then increase to 7.5 mg weekly for 4 weeks and then increase to 10 mg weekly   Please notify me for any abdominal pain, nausea, vomiting, diarrhea, acid, constipation     Continue Farxiga 10 mg daily       -- Reviewed goals of therapy are to get the best control we can without hypoglycemia.    -- Advised frequent self blood glucose monitoring.  Patient encouraged to document glucose results and bring them to every clinic visit.- daily at alternating times   -- Hypoglycemia precautions discussed. Instructed on precautions before driving.    -- Call for Bg repeatedly < 70 or > 180.   -- Close adherence to lifestyle changes recommended.   -- Periodic follow ups for eye evaluations, foot care and dental care suggested.      Regarding polyneuropathy:  Optimize BG readings.   See above.     Educated patient to check feet daily for any foreign objects and/or wounds. Discussed with patient the importance of wearing appropriate footwear at all times, not to walk barefoot ever, and to check shoes before putting them on feet. Instructed patient to keep feet dry by regularly changing shoes and socks and drying feet after baths and exercises. Also, instructed patient to report any new lesions, discolorations, or swelling to a healthcare professional.

## 2023-03-06 NOTE — ASSESSMENT & PLAN NOTE
Instructed patient to stop ergo as vitamin-D level is high end of normal  Encouraged her to take a multivitamin over-the-counter that has vitamin-D supplement in it daily

## 2023-03-06 NOTE — PROGRESS NOTES
CC:   Chief Complaint   Patient presents with    Diabetes Mellitus       HPI: Holly Mcclelland is a 62 y.o. female presents for a follow up visit today for the management of T2DM.     She was diagnosed with Type 2 diabetes around 5876-1599 on routine lab work. She was initially started on Metformin. She started insulin therapy in 2020.     Family hx of diabetes: mother, grandmother, aunts, uncles - strong family hx, sister who is blind and has amputations.   Hospitalized for diabetes: denies      No personal or FH of thyroid cancer or personal of pancreatic cancer or pancreatitis.     Works in retail     Our last visit was in December 2022   At that visit we stopped her Metformin to trial off   We increased her Farxiga to 10 mg daily and continued her Trulicity   Her A1c has increased to 6.7% to 6.1%   She would like to stay off the metformin if possible   Attest to increased stress and dietary indiscretions   Weight increased from 204# to 213#   She is frustrated with her diet and weight       DIABETES COMPLICATIONS: nephropathy, peripheral neuropathy, and cerebrovascular disease   Hx of TIA in 2019       Diabetes Management Status    ASA:  Yes - 81 mg ASA + plavix     Statin:not taking  -- she stopped the Lipitor-- she reports that it was causing her nausea -- stopped the Crestor due to SE as well    She also tried Zetia     ACE/ARB: Taking-Lisinopril    Screening or Prevention Patient's value Goal Complete/Controlled?   HgA1C Testing and Control   Lab Results   Component Value Date    HGBA1C 6.7 (H) 02/27/2023    HGBA1C 6.7 (H) 02/27/2023      Annually/Less than 8% No   Lipid profile : 11/22/2022 Annually Yes   LDL control Lab Results   Component Value Date    LDLCALC 85.2 11/22/2022    Annually/Less than 100 mg/dl  Yes   Nephropathy screening Lab Results   Component Value Date    LABMICR 15.0 03/08/2022     Lab Results   Component Value Date    PROTEINUA Negative 09/25/2022    Annually Yes   Blood pressure BP  Readings from Last 1 Encounters:   03/06/23 128/70    Less than 140/90 No   Dilated retinal exam : 12/19/2022-external- Dr. Ball  Annually No   Foot exam   : 06/20/2022 Annually No       CURRENT A1C:    Hemoglobin A1C   Date Value Ref Range Status   02/27/2023 6.7 (H) 4.0 - 5.6 % Final     Comment:     ADA Screening Guidelines:  5.7-6.4%  Consistent with prediabetes  >or=6.5%  Consistent with diabetes    High levels of fetal hemoglobin interfere with the HbA1C  assay. Heterozygous hemoglobin variants (HbS, HgC, etc)do  not significantly interfere with this assay.   However, presence of multiple variants may affect accuracy.     02/27/2023 6.7 (H) 4.0 - 5.6 % Final     Comment:     ADA Screening Guidelines:  5.7-6.4%  Consistent with prediabetes  >or=6.5%  Consistent with diabetes    High levels of fetal hemoglobin interfere with the HbA1C  assay. Heterozygous hemoglobin variants (HbS, HgC, etc)do  not significantly interfere with this assay.   However, presence of multiple variants may affect accuracy.     11/22/2022 6.1 (H) 4.0 - 5.6 % Final     Comment:     ADA Screening Guidelines:  5.7-6.4%  Consistent with prediabetes  >or=6.5%  Consistent with diabetes    High levels of fetal hemoglobin interfere with the HbA1C  assay. Heterozygous hemoglobin variants (HbS, HgC, etc)do  not significantly interfere with this assay.   However, presence of multiple variants may affect accuracy.         GOAL A1C: 7% without hypoglycemia    DM MEDICATIONS USED IN THE PAST:  Metformin, Lantus, glyburide, glipizide, Januvia  Victoza- throat closed   Trulicity   Farxiga       CURRENT DIABETES MEDICATIONS:   Trulicity 4.5 mg weekly  (Thursdays) and Farxiga 10 mg daily   Insulin: N/A   Missed doses: rarely        BLOOD GLUCOSE MONITORING:  She checks her BG 1-2 times per day   Brought her meter for review   126, 147, 121, 147, 158, 121, 125, 138, 120, 131, 110, 142, 120, 155, 131, 136  150, 168, 137, 139, 122, 144, 139, 123, 135, 137,  135, 160, 103, 142, 119, 117       HYPOGLYCEMIA: denies   Carries mints         MEALS: eating 2 meals per day   Avoids bread   Trying to stay away from potatoes   BF:  Banana and yogurt or bagel with peanut butter or she skips   Lunch: leftovers usually- trying to stay away from fast food - sometimes skipping   Dinner: home cooked   1 banana a couple times per week   Apple    Oranges   Snack: yogurt   Drinks: water   Green tea- it has sugar   occ juice       CURRENT EXERCISE:  None         Review of Systems  Review of Systems   Constitutional:  Negative for appetite change, fatigue and unexpected weight change.   HENT:  Negative for trouble swallowing.    Eyes:  Negative for visual disturbance.   Respiratory:  Negative for shortness of breath.         + sleep apnea- off CPAP machine -- she has it but has not been using it -- just sent her one    Cardiovascular:  Negative for chest pain.   Gastrointestinal:  Negative for nausea.   Endocrine: Negative for polydipsia, polyphagia and polyuria.   Genitourinary:         No Nocturia    Musculoskeletal:  Negative for neck pain.   Skin:  Negative for wound.   Neurological:  Negative for numbness.     Physical Exam   Physical Exam  Vitals and nursing note reviewed.   Constitutional:       Appearance: She is well-developed. She is obese.   HENT:      Head: Normocephalic and atraumatic.      Right Ear: External ear normal.      Left Ear: External ear normal.      Nose: Nose normal.   Neck:      Thyroid: No thyromegaly.      Trachea: No tracheal deviation.   Cardiovascular:      Rate and Rhythm: Normal rate and regular rhythm.      Heart sounds: No murmur heard.  Pulmonary:      Effort: Pulmonary effort is normal. No respiratory distress.      Breath sounds: Normal breath sounds.   Abdominal:      Palpations: Abdomen is soft.      Tenderness: There is no abdominal tenderness.      Hernia: No hernia is present.   Musculoskeletal:      Cervical back: Normal range of motion and  neck supple.   Skin:     General: Skin is warm and dry.      Capillary Refill: Capillary refill takes less than 2 seconds.      Findings: No rash.      Comments: Injection sites are normal appearing. No lipo hypertropthy or atrophy     Neurological:      Mental Status: She is alert and oriented to person, place, and time.      Cranial Nerves: No cranial nerve deficit.   Psychiatric:         Behavior: Behavior normal.         Judgment: Judgment normal.       FOOT EXAMINATION: appropriate footwear         Lab Results   Component Value Date    TSH 0.862 02/27/2023    TSH 0.862 02/27/2023           Type 2 diabetes mellitus with diabetic polyneuropathy, without long-term current use of insulin  A1c has increased but still reasonable  She attest to increased stress and dietary indiscretions negatively impacting her diabetes management      -- Medication Changes:     We will try to change your Trulicity to Mounjaro   Start with Mounjaro 5 mg weekly for 4 weeks and then increase to 7.5 mg weekly for 4 weeks and then increase to 10 mg weekly   Please notify me for any abdominal pain, nausea, vomiting, diarrhea, acid, constipation     Continue Farxiga 10 mg daily       -- Reviewed goals of therapy are to get the best control we can without hypoglycemia.    -- Advised frequent self blood glucose monitoring.  Patient encouraged to document glucose results and bring them to every clinic visit.- daily at alternating times   -- Hypoglycemia precautions discussed. Instructed on precautions before driving.    -- Call for Bg repeatedly < 70 or > 180.   -- Close adherence to lifestyle changes recommended.   -- Periodic follow ups for eye evaluations, foot care and dental care suggested.      Regarding polyneuropathy:  Optimize BG readings.   See above.     Educated patient to check feet daily for any foreign objects and/or wounds. Discussed with patient the importance of wearing appropriate footwear at all times, not to walk barefoot  ever, and to check shoes before putting them on feet. Instructed patient to keep feet dry by regularly changing shoes and socks and drying feet after baths and exercises. Also, instructed patient to report any new lesions, discolorations, or swelling to a healthcare professional.        Type 2 diabetes mellitus with stage 3a chronic kidney disease, without long-term current use of insulin  Optimize BG readings.   See above.   Avoid insulin stacking and hypoglycemia  On lisinopril and Farxiga    Essential hypertension  BP goal is < 140/90.   Tolerating ACEi  Controlled   Blood pressure goals discussed with patient      Class 1 obesity due to excess calories with serious comorbidity and body mass index (BMI) of 33.0 to 33.9 in adult  Body mass index is 33.39 kg/m².  Increases insulin resistance.   Discussed DM diet and exercise.   Encouraged continued weight loss    TIA (transient ischemic attack)  Optimize BG readings.   See above.       Dyslipidemia, goal LDL below 70  LDL goal is <70--given history of TIA  She stopped the Lipitor due to nausea  She stopped the Crestor due to intolerance   She also tried Zetia and was unable to tolerate  Discussed that she needs to be on a statin therapy if possible to reduce CV risks--given her history    Will try pravastatin 40 mg nightly      Vitamin D deficiency  Instructed patient to stop ergo as vitamin-D level is high end of normal  Encouraged her to take a multivitamin over-the-counter that has vitamin-D supplement in it daily         Spent 25 minutes with patient with > 50% time spent in counseling, as noted above on medications, self glucose monitoring, diet, exercise, weight loss      Follow up in about 3 months (around 6/6/2023).  Follow up with  me in 3 months with labs prior   Repeat BMP in 3 weeks ---slight decline in kidney function will repeat after hydration      Orders Placed This Encounter   Procedures    Basic Metabolic Panel     Standing Status:   Future      Standing Expiration Date:   9/6/2024    Hemoglobin A1C     Standing Status:   Future     Standing Expiration Date:   9/6/2024    Comprehensive Metabolic Panel     Standing Status:   Future     Standing Expiration Date:   9/6/2024    Lipid Panel     Standing Status:   Future     Standing Expiration Date:   9/6/2024    Microalbumin/Creatinine Ratio, Urine     Standing Status:   Future     Standing Expiration Date:   9/6/2024     Order Specific Question:   Specimen Source     Answer:   Urine         Recommendations were discussed with the patient in detail  The patient verbalized understanding and agrees with the plan outlined as above.     This note was partly generated with [employer] voice recognition software. I apologize for any possible typographical errors.

## 2023-03-07 ENCOUNTER — TELEPHONE (OUTPATIENT)
Dept: DIABETES | Facility: CLINIC | Age: 63
End: 2023-03-07
Payer: MEDICAID

## 2023-03-07 DIAGNOSIS — E11.42 TYPE 2 DIABETES MELLITUS WITH DIABETIC POLYNEUROPATHY, WITHOUT LONG-TERM CURRENT USE OF INSULIN: Primary | ICD-10-CM

## 2023-03-07 RX ORDER — SEMAGLUTIDE 1.34 MG/ML
0.5 INJECTION, SOLUTION SUBCUTANEOUS
Qty: 1 PEN | Refills: 1 | Status: SHIPPED | OUTPATIENT
Start: 2023-03-07 | End: 2023-06-06 | Stop reason: ALTCHOICE

## 2023-03-07 NOTE — TELEPHONE ENCOUNTER
----- Message from Carina Downing MA sent at 3/7/2023  2:27 PM CST -----  Regarding: Mounjaro Denial  [facility]  Mounjaro 5mg  Notice of Denial (scanned into media)  Must have tried and failed:  Trulicity, Byetta, Ozempic, or Victoza

## 2023-03-07 NOTE — TELEPHONE ENCOUNTER
Please let her know it was denied   She is only tried two--Trulicity and Victoza    We could try the Ozempic instead?    Please just let me know so I can send in the correct prescription to the pharmacy

## 2023-03-07 NOTE — TELEPHONE ENCOUNTER
She can do the 0.5 mg weekly dose for 1 month  After 1 month let us know how she is doing a we can look at increasing to the 1 mg weekly dose which would be a different syringe  If she has any issues with the Ozempic injection offer her an appointment with Arpita for teaching

## 2023-03-08 ENCOUNTER — PATIENT MESSAGE (OUTPATIENT)
Dept: DIABETES | Facility: CLINIC | Age: 63
End: 2023-03-08
Payer: MEDICAID

## 2023-03-08 ENCOUNTER — TELEPHONE (OUTPATIENT)
Dept: DIABETES | Facility: CLINIC | Age: 63
End: 2023-03-08
Payer: MEDICAID

## 2023-03-09 ENCOUNTER — OFFICE VISIT (OUTPATIENT)
Dept: NEUROSURGERY | Facility: CLINIC | Age: 63
End: 2023-03-09
Payer: MEDICAID

## 2023-03-09 VITALS
OXYGEN SATURATION: 97 % | HEIGHT: 67 IN | SYSTOLIC BLOOD PRESSURE: 144 MMHG | WEIGHT: 209.19 LBS | BODY MASS INDEX: 32.83 KG/M2 | DIASTOLIC BLOOD PRESSURE: 63 MMHG | HEART RATE: 90 BPM

## 2023-03-09 DIAGNOSIS — R29.898 RIGHT LEG WEAKNESS: ICD-10-CM

## 2023-03-09 DIAGNOSIS — M54.16 LUMBAR RADICULOPATHY, CHRONIC: Primary | ICD-10-CM

## 2023-03-09 DIAGNOSIS — Z98.1 S/P CERVICAL SPINAL FUSION: ICD-10-CM

## 2023-03-09 PROCEDURE — 3044F HG A1C LEVEL LT 7.0%: CPT | Mod: CPTII,,, | Performed by: PHYSICIAN ASSISTANT

## 2023-03-09 PROCEDURE — 4010F ACE/ARB THERAPY RXD/TAKEN: CPT | Mod: CPTII,,, | Performed by: PHYSICIAN ASSISTANT

## 2023-03-09 PROCEDURE — 3078F PR MOST RECENT DIASTOLIC BLOOD PRESSURE < 80 MM HG: ICD-10-PCS | Mod: CPTII,,, | Performed by: PHYSICIAN ASSISTANT

## 2023-03-09 PROCEDURE — 3008F PR BODY MASS INDEX (BMI) DOCUMENTED: ICD-10-PCS | Mod: CPTII,,, | Performed by: PHYSICIAN ASSISTANT

## 2023-03-09 PROCEDURE — 4010F PR ACE/ARB THEARPY RXD/TAKEN: ICD-10-PCS | Mod: CPTII,,, | Performed by: PHYSICIAN ASSISTANT

## 2023-03-09 PROCEDURE — 1160F PR REVIEW ALL MEDS BY PRESCRIBER/CLIN PHARMACIST DOCUMENTED: ICD-10-PCS | Mod: CPTII,,, | Performed by: PHYSICIAN ASSISTANT

## 2023-03-09 PROCEDURE — 99215 PR OFFICE/OUTPT VISIT, EST, LEVL V, 40-54 MIN: ICD-10-PCS | Mod: S$PBB,,, | Performed by: PHYSICIAN ASSISTANT

## 2023-03-09 PROCEDURE — 3077F SYST BP >= 140 MM HG: CPT | Mod: CPTII,,, | Performed by: PHYSICIAN ASSISTANT

## 2023-03-09 PROCEDURE — 99999 PR PBB SHADOW E&M-EST. PATIENT-LVL IV: ICD-10-PCS | Mod: PBBFAC,,, | Performed by: PHYSICIAN ASSISTANT

## 2023-03-09 PROCEDURE — 3008F BODY MASS INDEX DOCD: CPT | Mod: CPTII,,, | Performed by: PHYSICIAN ASSISTANT

## 2023-03-09 PROCEDURE — 99999 PR PBB SHADOW E&M-EST. PATIENT-LVL IV: CPT | Mod: PBBFAC,,, | Performed by: PHYSICIAN ASSISTANT

## 2023-03-09 PROCEDURE — 1159F PR MEDICATION LIST DOCUMENTED IN MEDICAL RECORD: ICD-10-PCS | Mod: CPTII,,, | Performed by: PHYSICIAN ASSISTANT

## 2023-03-09 PROCEDURE — 99214 OFFICE O/P EST MOD 30 MIN: CPT | Mod: PBBFAC | Performed by: PHYSICIAN ASSISTANT

## 2023-03-09 PROCEDURE — 1159F MED LIST DOCD IN RCRD: CPT | Mod: CPTII,,, | Performed by: PHYSICIAN ASSISTANT

## 2023-03-09 PROCEDURE — 3078F DIAST BP <80 MM HG: CPT | Mod: CPTII,,, | Performed by: PHYSICIAN ASSISTANT

## 2023-03-09 PROCEDURE — 1160F RVW MEDS BY RX/DR IN RCRD: CPT | Mod: CPTII,,, | Performed by: PHYSICIAN ASSISTANT

## 2023-03-09 PROCEDURE — 3044F PR MOST RECENT HEMOGLOBIN A1C LEVEL <7.0%: ICD-10-PCS | Mod: CPTII,,, | Performed by: PHYSICIAN ASSISTANT

## 2023-03-09 PROCEDURE — 99215 OFFICE O/P EST HI 40 MIN: CPT | Mod: S$PBB,,, | Performed by: PHYSICIAN ASSISTANT

## 2023-03-09 PROCEDURE — 3077F PR MOST RECENT SYSTOLIC BLOOD PRESSURE >= 140 MM HG: ICD-10-PCS | Mod: CPTII,,, | Performed by: PHYSICIAN ASSISTANT

## 2023-03-09 RX ORDER — CYCLOBENZAPRINE HCL 10 MG
10 TABLET ORAL 3 TIMES DAILY PRN
Qty: 30 TABLET | Refills: 0 | Status: SHIPPED | OUTPATIENT
Start: 2023-03-09 | End: 2023-04-04 | Stop reason: CLARIF

## 2023-03-09 NOTE — PROGRESS NOTES
[facility]     Interval history 3/9/23:  Patient has been taking gabapentin. She reports tension across posterior neck and shoulders. She tried Flexeril from a family member for relief. Pain is intermittent, and started in February after starting a new job. Worse with stress, heavy lifting. She finished PT, but continues a regular HEP.    Regarding her legs, she still feels off balance and drags her right leg. She thinks her balance is worse than before surgery. Pain is very intermittent and runs down her right leg when present. She reports increased urinary urgency with occasional associated incontinence.       HPI from Dr. Arroyo 8/2022  Ms. Holly Mcclelland is a 62 y.o. 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 This is a pt who developed right leg weakness in February 2022. She was initially evaluated for stroke in the ED but diagnostic work up showed negative results. She then had MRI lumbar spine done and was found to have mild central canal spinal stenosis, bulging at L3-L4. The pt reported of chronic neck pain that had gradually worsened in the last few weeks. The pain was so severe that it was interfering with her work. Her activity levels have decreased secondary to neck pain and leg weakness. States her right foot tends to drag when she walks. The pt also reports of pain and spasms to her bilateral hands. She was seen by Nell Marshall PA-C on 6/28/2022 for complaints of unchanged right leg weakness along with intermittent pain in her lateral right leg.    Today the pt reports she is compliant with PT. She participates in PT 2x a week with her last therapy session yesterday. Patient utilizes a stretching machine and undergoes dry needling with relief to the right lower extremity. However, now she notes new pain to the left lower extremity. Denies back pain. Patient states she moves  slower than at baseline, but she is able to get around as normal.    Review of Systems   Constitutional:  Negative for activity change, appetite change, fatigue, fever and unexpected weight change.   HENT:  Negative for facial swelling.    Eyes: Negative.    Respiratory: Negative.     Cardiovascular: Negative.    Gastrointestinal:  Negative for diarrhea, nausea and vomiting.   Endocrine: Negative.    Genitourinary: Negative.    Musculoskeletal:  Positive for myalgias. Negative for back pain, joint swelling and neck pain.   Neurological:  Negative for dizziness, seizures, weakness, numbness and headaches.   Psychiatric/Behavioral: Negative.        Past Medical History:   Diagnosis Date    CHF (congestive heart failure)     Diabetes mellitus     Heart murmur     Hyperlipidemia     Hypertension     Sleep apnea     wears CPAP    Stroke        Objective:      Vitals:    03/09/23 1336   BP: (!) 144/63   Pulse: 90        Physical Exam  General: well developed, well nourished, no distress  Neurologic: Alert and oriented. Thought content appropriate.  GCS: Motor: 6/Verbal: 5/Eyes: 4 GCS Total: 15  Cranial nerves: II-XII grossly intact  Neck: supple, without obvious masses or lesions  Skin: grossly intact in all 4 extremities without obvious rashes or lesions    Gait - steady      Motor Strength: No focal numbness or weakness  Strength  Deltoids Triceps Biceps Wrist Extension Wrist Flexion Hand  FA   Upper: R 5/5 5/5 5/5 5/5 5/5 5/5 5/5    L 5/5 5/5 5/5 5/5 5/5 5/5 5/5     Iliopsoas Quadriceps Knee  Flexion Tibialis  anterior Gastro- cnemius EHL    Lower: R 5/5 5/5 5/5 5/5 5/5 5/5     L 5/5 5/5 5/5 5/5 5/5 5/5    Sensory: intact to light touch B/L UE and LE  DTR's - 2 + and symmetric in UE and LE  Lindo's - positive bilaterally   Ankle Clonus - Negative  Midline bony tenderness negative  SI joint tenderness negative  Paraspinous muscle tenderness negative       IMAGING:  CT Cervical Spine WO Contrast  (8/31/2022):  Status post ACDF at C3-C4. Hardware is intact. Moderate disc height loss at C4-C7 with marginal osteophyte production.  Facet arthropathy noted.    Cervical xray 11/2022  Stable hardware placement     I have personally reviewed the images with the pt.      Assessment:       1. Lumbar radiculopathy, chronic    2. Right leg weakness    3. S/P cervical spinal fusion           Plan:   Patient is 1 year s/p C3-4 ACDF with Dr. Arroyo. She reports new neck/shoulder pain and tension. Will check xrays and advise adding voltaren gel as needed. Prescribed flexeril and will discuss the use of mobic with her cardiologist.     Check lumbar MRI for intermittent leg weakness and continued radicular pain.               Nell Marshall PA-C  [facility]  Department of Neurosurgery  [phone]

## 2023-03-15 ENCOUNTER — TELEPHONE (OUTPATIENT)
Dept: DIABETES | Facility: CLINIC | Age: 63
End: 2023-03-15
Payer: MEDICAID

## 2023-03-15 NOTE — TELEPHONE ENCOUNTER
Stated that pt prior authorization has to be sent to special team to complete , also that office would have to reach out to the insurance to check on the status of ozempic PA

## 2023-03-16 ENCOUNTER — OFFICE VISIT (OUTPATIENT)
Dept: OBSTETRICS AND GYNECOLOGY | Facility: CLINIC | Age: 63
End: 2023-03-16
Payer: MEDICAID

## 2023-03-16 ENCOUNTER — HOSPITAL ENCOUNTER (OUTPATIENT)
Dept: RADIOLOGY | Facility: HOSPITAL | Age: 63
Discharge: HOME | End: 2023-03-16
Attending: INTERNAL MEDICINE
Payer: MEDICAID

## 2023-03-16 VITALS — HEIGHT: 67 IN | BODY MASS INDEX: 32.8 KG/M2 | WEIGHT: 209 LBS

## 2023-03-16 VITALS
BODY MASS INDEX: 32.75 KG/M2 | SYSTOLIC BLOOD PRESSURE: 126 MMHG | DIASTOLIC BLOOD PRESSURE: 70 MMHG | WEIGHT: 209.13 LBS

## 2023-03-16 DIAGNOSIS — N89.8 VAGINAL ODOR: ICD-10-CM

## 2023-03-16 DIAGNOSIS — Z12.31 SCREENING MAMMOGRAM FOR BREAST CANCER: ICD-10-CM

## 2023-03-16 DIAGNOSIS — N95.0 POSTMENOPAUSAL BLEEDING: Primary | ICD-10-CM

## 2023-03-16 PROCEDURE — 4010F PR ACE/ARB THEARPY RXD/TAKEN: ICD-10-PCS | Mod: CPTII,,, | Performed by: STUDENT IN AN ORGANIZED HEALTH CARE EDUCATION/TRAINING PROGRAM

## 2023-03-16 PROCEDURE — 99214 OFFICE O/P EST MOD 30 MIN: CPT | Mod: S$PBB,,, | Performed by: STUDENT IN AN ORGANIZED HEALTH CARE EDUCATION/TRAINING PROGRAM

## 2023-03-16 PROCEDURE — 3008F PR BODY MASS INDEX (BMI) DOCUMENTED: ICD-10-PCS | Mod: CPTII,,, | Performed by: STUDENT IN AN ORGANIZED HEALTH CARE EDUCATION/TRAINING PROGRAM

## 2023-03-16 PROCEDURE — 3074F SYST BP LT 130 MM HG: CPT | Mod: CPTII,,, | Performed by: STUDENT IN AN ORGANIZED HEALTH CARE EDUCATION/TRAINING PROGRAM

## 2023-03-16 PROCEDURE — 99999 PR PBB SHADOW E&M-EST. PATIENT-LVL III: ICD-10-PCS | Mod: PBBFAC,,, | Performed by: STUDENT IN AN ORGANIZED HEALTH CARE EDUCATION/TRAINING PROGRAM

## 2023-03-16 PROCEDURE — 3008F BODY MASS INDEX DOCD: CPT | Mod: CPTII,,, | Performed by: STUDENT IN AN ORGANIZED HEALTH CARE EDUCATION/TRAINING PROGRAM

## 2023-03-16 PROCEDURE — 3078F DIAST BP <80 MM HG: CPT | Mod: CPTII,,, | Performed by: STUDENT IN AN ORGANIZED HEALTH CARE EDUCATION/TRAINING PROGRAM

## 2023-03-16 PROCEDURE — 3078F PR MOST RECENT DIASTOLIC BLOOD PRESSURE < 80 MM HG: ICD-10-PCS | Mod: CPTII,,, | Performed by: STUDENT IN AN ORGANIZED HEALTH CARE EDUCATION/TRAINING PROGRAM

## 2023-03-16 PROCEDURE — 77067 MAMMO DIGITAL SCREENING BILAT WITH TOMO: ICD-10-PCS | Mod: 26,,, | Performed by: RADIOLOGY

## 2023-03-16 PROCEDURE — 99214 PR OFFICE/OUTPT VISIT, EST, LEVL IV, 30-39 MIN: ICD-10-PCS | Mod: S$PBB,,, | Performed by: STUDENT IN AN ORGANIZED HEALTH CARE EDUCATION/TRAINING PROGRAM

## 2023-03-16 PROCEDURE — 3044F HG A1C LEVEL LT 7.0%: CPT | Mod: CPTII,,, | Performed by: STUDENT IN AN ORGANIZED HEALTH CARE EDUCATION/TRAINING PROGRAM

## 2023-03-16 PROCEDURE — 4010F ACE/ARB THERAPY RXD/TAKEN: CPT | Mod: CPTII,,, | Performed by: STUDENT IN AN ORGANIZED HEALTH CARE EDUCATION/TRAINING PROGRAM

## 2023-03-16 PROCEDURE — 99213 OFFICE O/P EST LOW 20 MIN: CPT | Mod: PBBFAC,PN | Performed by: STUDENT IN AN ORGANIZED HEALTH CARE EDUCATION/TRAINING PROGRAM

## 2023-03-16 PROCEDURE — 77063 BREAST TOMOSYNTHESIS BI: CPT | Mod: 26,,, | Performed by: RADIOLOGY

## 2023-03-16 PROCEDURE — 3074F PR MOST RECENT SYSTOLIC BLOOD PRESSURE < 130 MM HG: ICD-10-PCS | Mod: CPTII,,, | Performed by: STUDENT IN AN ORGANIZED HEALTH CARE EDUCATION/TRAINING PROGRAM

## 2023-03-16 PROCEDURE — 77063 MAMMO DIGITAL SCREENING BILAT WITH TOMO: ICD-10-PCS | Mod: 26,,, | Performed by: RADIOLOGY

## 2023-03-16 PROCEDURE — 77067 SCR MAMMO BI INCL CAD: CPT | Mod: 26,,, | Performed by: RADIOLOGY

## 2023-03-16 PROCEDURE — 77067 SCR MAMMO BI INCL CAD: CPT | Mod: TC

## 2023-03-16 PROCEDURE — 3044F PR MOST RECENT HEMOGLOBIN A1C LEVEL <7.0%: ICD-10-PCS | Mod: CPTII,,, | Performed by: STUDENT IN AN ORGANIZED HEALTH CARE EDUCATION/TRAINING PROGRAM

## 2023-03-16 PROCEDURE — 99999 PR PBB SHADOW E&M-EST. PATIENT-LVL III: CPT | Mod: PBBFAC,,, | Performed by: STUDENT IN AN ORGANIZED HEALTH CARE EDUCATION/TRAINING PROGRAM

## 2023-03-16 RX ORDER — METRONIDAZOLE 500 MG/1
500 TABLET ORAL EVERY 12 HOURS
Qty: 14 TABLET | Refills: 0 | Status: SHIPPED | OUTPATIENT
Start: 2023-03-16 | End: 2023-03-23

## 2023-03-21 ENCOUNTER — TELEPHONE (OUTPATIENT)
Dept: DIABETES | Facility: CLINIC | Age: 63
End: 2023-03-21
Payer: MEDICAID

## 2023-03-21 DIAGNOSIS — E11.42 TYPE 2 DIABETES MELLITUS WITH DIABETIC POLYNEUROPATHY, WITHOUT LONG-TERM CURRENT USE OF INSULIN: Primary | ICD-10-CM

## 2023-03-22 ENCOUNTER — CLINICAL SUPPORT (OUTPATIENT)
Dept: DIABETES | Facility: CLINIC | Age: 63
End: 2023-03-22
Payer: MEDICAID

## 2023-03-22 ENCOUNTER — HOSPITAL ENCOUNTER (OUTPATIENT)
Dept: RADIOLOGY | Facility: HOSPITAL | Age: 63
Discharge: HOME | End: 2023-03-22
Attending: PHYSICIAN ASSISTANT
Payer: MEDICAID

## 2023-03-22 VITALS — BODY MASS INDEX: 33.12 KG/M2 | WEIGHT: 211 LBS | HEIGHT: 67 IN

## 2023-03-22 DIAGNOSIS — E11.42 TYPE 2 DIABETES MELLITUS WITH DIABETIC POLYNEUROPATHY, WITHOUT LONG-TERM CURRENT USE OF INSULIN: ICD-10-CM

## 2023-03-22 DIAGNOSIS — R29.898 RIGHT LEG WEAKNESS: ICD-10-CM

## 2023-03-22 DIAGNOSIS — Z98.1 S/P CERVICAL SPINAL FUSION: ICD-10-CM

## 2023-03-22 DIAGNOSIS — M54.16 LUMBAR RADICULOPATHY, CHRONIC: ICD-10-CM

## 2023-03-22 PROCEDURE — 72148 MRI LUMBAR SPINE W/O DYE: CPT | Mod: 26,,, | Performed by: RADIOLOGY

## 2023-03-22 PROCEDURE — 99212 OFFICE O/P EST SF 10 MIN: CPT | Mod: PBBFAC,25,PN | Performed by: DIETITIAN, REGISTERED

## 2023-03-22 PROCEDURE — 72050 XR CERVICAL SPINE AP LAT WITH FLEX EXTEN: ICD-10-PCS | Mod: 26,,, | Performed by: RADIOLOGY

## 2023-03-22 PROCEDURE — 99999 PR PBB SHADOW E&M-EST. PATIENT-LVL II: CPT | Mod: PBBFAC,,, | Performed by: DIETITIAN, REGISTERED

## 2023-03-22 PROCEDURE — 99999 PR PBB SHADOW E&M-EST. PATIENT-LVL II: ICD-10-PCS | Mod: PBBFAC,,, | Performed by: DIETITIAN, REGISTERED

## 2023-03-22 PROCEDURE — 72050 X-RAY EXAM NECK SPINE 4/5VWS: CPT | Mod: 26,,, | Performed by: RADIOLOGY

## 2023-03-22 PROCEDURE — 72148 MRI LUMBAR SPINE WITHOUT CONTRAST: ICD-10-PCS | Mod: 26,,, | Performed by: RADIOLOGY

## 2023-03-22 PROCEDURE — 72148 MRI LUMBAR SPINE W/O DYE: CPT | Mod: TC

## 2023-03-22 PROCEDURE — G0108 DIAB MANAGE TRN  PER INDIV: HCPCS | Mod: PBBFAC,PN | Performed by: DIETITIAN, REGISTERED

## 2023-03-22 PROCEDURE — 72050 X-RAY EXAM NECK SPINE 4/5VWS: CPT | Mod: TC

## 2023-03-22 RX ORDER — LANCETS 30 GAUGE
EACH MISCELLANEOUS
COMMUNITY
Start: 2023-01-26

## 2023-03-22 RX ORDER — DULAGLUTIDE 4.5 MG/.5ML
INJECTION, SOLUTION SUBCUTANEOUS
COMMUNITY
Start: 2023-03-14 | End: 2023-04-04 | Stop reason: CLARIF

## 2023-03-22 NOTE — PROGRESS NOTES
Diabetes Care Specialist Progress Note  Author: Arpita Sanchez RD, CDE  Date: 3/22/2023    Program Intake  Reason for Diabetes Program Visit:: Initial Diabetes Assessment  Current diabetes risk level:: low (HgbA1c 6.7)  In the last 12 months, have you:: none  Permission to speak with others about care:: no    Lab Results   Component Value Date    HGBA1C 6.7 (H) 02/27/2023    HGBA1C 6.7 (H) 02/27/2023       Clinical    Patient Health Rating  Compared to other people your age, how would you rate your health?: Good    Problem Review  Reviewed Problem List with Patient: yes  Active comorbidities affecting diabetes self-care.: yes  Comorbidities: Stroke, Cardiovascular Disease, Hypertension, Neuropathy, Gastrointestinal Disorder  Reviewed health maintenance: yes    Clinical Assessment  Current Diabetes Treatment: Oral Medication  Have you ever experienced hypoglycemia (low blood sugar)?: yes  In the last month, how often have you experienced low blood sugar?: once every other week  Are you able to tell when your blood sugar is low?: Yes  What symptoms do you experience?: Anxious/nervous, Shaky  Have you ever been hospitalized because your blood sugar was too low?: no  How do you treat hypoglycemia (low blood sugar)?: 1/2 can soda/fruit juice  Have you ever experienced hyperglycemia (high blood sugar)?: yes  In the last month, how often have you experienced high blood sugar?: once every other week  Are you able to tell when your blood sugar is high?: No (comment)  Have you ever been hospitalized because your blood sugar was high?: no    Medication Information  How do you obtain your medications?: Patient drives  How many days a week do you miss your medications?: Never  Do you use a pill box or medication chart to help you manage your medications?: No  Do you sometimes have difficulty refilling your medications?: No  Medication adherence impacting ability to self-manage diabetes?: No    Labs  Do you have regular lab work  to monitor your medications?: Yes  Type of Regular Lab Work: A1c, Cholesterol, Microalbumin, CBC, BMP  Where do you get your labs drawn?: [facility]  Lab Compliance Barriers: No    Nutritional Status  Diet: Diabetic diet, Low cholesterol, Low sodium, Low fat  Meal Plan 24 Hour Recall: Breakfast, Lunch, Dinner, Snack  Meal Plan 24 Hour Recall - Breakfast: 2 boiled eggs  Meal Plan 24 Hour Recall - Dinner: home cooked usually, chicken wings and fries, but lately fast food  Meal Plan 24 Hour Recall - Snack: chips, sweets, tends to snack after dinner while relaxing in the living room, beverages: water, green tea, hot tea, occassionally a regular soda  Change in appetite?: No  Dentation:: Intact  Recent Changes in Weight: No Recent Weight Change  Current nutritional status an area of need that is impacting patient's ability to self-manage diabetes?: No    Additional Social History    Support  Does anyone support you with your diabetes care?: yes  Who supports you?: self, son/daughter  Who takes you to your medical appointments?: self  Does the current support meet the patient's needs?: Yes  Is Support an area impacting ability to self-manage diabetes?: No    Access to Mass Media & Technology  Does the patient have access to any of the following devices or technologies?: Smart phone  Media or technology needs impacting ability to self-manage diabetes?: No    Cognitive/Behavioral Health  Alert and Oriented: Yes  Difficulty Thinking: No  Requires Prompting: No  Requires assistance for routine expression?: No  Cognitive or behavioral barriers impacting ability to self-manage diabetes?: No    Culture/Church  Culture or Pentecostalism beliefs that may impact ability to access healthcare: No    Communication  Language preference: English  Hearing Problems: No  Vision Problems: No  Communication needs impacting ability to self-manage diabetes?: No    Health Literacy  Preferred Learning Method: Face to Face  How often do you need to  have someone help you read instructions, pamphlets, or written material from your doctor or pharmacy?: Never  Health literacy needs impacting ability to self-manage diabetes?: No      Diabetes Self-Management Skills Assessment    Diabetes Disease Process/Treatment Options  Patient/caregiver able to state what happens when someone has diabetes.: yes  Patient/caregiver knows what type of diabetes they have.: yes  Diabetes Type : Type II  Patient/caregiver able to identify at least three signs and symptoms of diabetes.: yes  Identified signs and symptoms:: blurred vision, fatigue, frequent urination, increased thirst  Patient able to identify at least three risk factors for diabetes.: yes  Identified risk factors:: age over 40, being overweight, ethnicity, family history, reduced activity, history of gestational diabetes  Diabetes Disease Process/Treatment Options: Skills Assessment Completed: Yes  Assessment indicates:: Adequate understanding  Area of need?: No    Nutrition/Healthy Eating  Challenges to healthy eating:: portion control, eating when feeling depressed/stressed, lack of will power, snacking between meals and at night, eating out, going to parties  Method of carbohydrate measurement:: plate method, carb counting/reading labels  Patient can identify foods that impact blood sugar.: yes  Patient-identified foods:: fruit/fruit juice, starches (bread, pasta, rice, cereal), milk, soda, starchy vegetables (corn, peas, beans), sweets, yogurt  Nutrition/Healthy Eating Skills Assessment Completed:: Yes  Assessment indicates:: Adequate understanding  Area of need?: No    Physical Activity/Exercise  Patient's daily activity level:: moderately active  Patient formally exercises outside of work.: no  Reasons for not exercising:: work schedule, time constraints  Patient can identify forms of physical activity.: yes  Stated forms of physical activity:: manual activity at work, moving to burn calories  Patient can  identify reasons why exercise/physical activity is important in diabetes management.: yes  Identified reasons:: lowers blood glucose, blood pressure, and cholesterol, tones muscles  Physical Activity/Exercise Skills Assessment Completed: : Yes  Assessment indicates:: Adequate understanding  Area of need?: No    Medications  Patient is able to describe current diabetes management routine.: yes  Diabetes management routine:: oral medications, injectable medications  Patient is able to identify current diabetes medications, dosages, and appropriate timing of medications.: yes  Patient understands the purpose of the medications taken for diabetes.: yes  Patient reports problems or concerns with current medication regimen.: no  Medication Skills Assessment Completed:: Yes  Assessment indicates:: Adequate understanding  Area of need?: No    Home Blood Glucose Monitoring  Patient states that blood sugar is checked at home daily.: yes  Monitoring Method:: home glucometer  Home glucometer meter type:: onetouch  How often do you check your blood sugar?: Once a day  When do you check your blood sugar?: Before breakfast  When you check what is your typical blood sugar range? : 136, 138, 143, 141, 126, 173, 179, 189, 203, 141, 161, 124, 126, 140, 153, 146, 126, 147, 158, 121, 125, 138, 120, 131, 110, 142, 120  Blood glucose logs:: yes, encouraged to keep logs, encouraged to bring logs to provider visits  Blood glucose logs reviewed today?: yes  Home Blood Glucose Monitoring Skills Assessment Completed: : Yes  Assessment indicates:: Adequate understanding  Area of need?: No    Acute Complications  Patient is able to identify types of acute complications: Yes  Patient Identified:: Hypoglycemia, Hyperglycemia  Patient is able to state the basic meaning of hypoglycemia?: Yes  Able to state the blood sugar range for hypoglycemia?: yes  Patient stated range:: <70  Patient can identify general symptoms of hypoglycemia: yes  Patient  identified:: dizziness, rapid heartbeat, shakiness  Able to state proper treatment of hypoglycemia?: yes  Patient identified:: 1/2 can soda/fruit juice, 5-6 pieces of hard candy  Patient is able to state the basic meaning of hyperglycemia?: Yes  Able to state the blood sugar range for hyperglycemia?: yes  Patient stated range:: >200  Patient able to state proper treatment of hyperglycemia?: yes  Patient identified:: take medication as recommended, increase water intake, monitor blood sugar  Patient able to verbalize sick day plan?: yes  Patient-stated sick day plan:: drink sugar-free/calorie containing clear liquids if unable to take solid food, drink more fluids, check blood sugar every 2-3 hours, call provider if blood sugar does not improve, seek medical attention for nausea, diarrhea, vomiting, fever with high blood sugar  Acute Complications Skills Assessment Completed: : Yes  Assessment indicates:: Adequate understanding  Area of need?: No    Chronic Complications  Patient can identify major chronic complications of diabetes.: yes  Stated chronic complications:: heart disease/heart attack, kidney disease, neuropathy/nerve damage, retinopathy, sexual dysfunction, stroke  Patient can identify ways to prevent or delay diabetes complications.: yes  Stated ways to prevent complications:: controlling blood sugar, controlling cholesterol and triglycerides  Patient is aware that having diabetes increases risk of heart disease?: Yes  Patient is aware that heart disease is the leading cause of death and disability in people with diabetes?: Yes  Patient able to state risk factors for heart disease?: Yes  Patient stated risk factors for heart disease:: High blood pressure, High cholesterol, Having diabetes, Medication non-adherance, Limited activity, Diet  Patient is taking statin?: Yes  Chronic Complications Skills Assessment Completed: : Yes  Assessment indicates:: Adequate understanding  Area of need?:  No    Psychosocial/Coping  Patient can identify ways of coping with chronic disease.: yes  Patient-stated ways of coping with chronic disease:: support from loved ones, spiritual/Baptism practices  Psychosocial/Coping Skills Assessment Completed: : Yes  Assessment indicates:: Adequate understanding  Area of need?: No      Diabetes Self Support Plan    Diabetes Self-Management Support Plan  Exercise/nutrition:: use a local track, join a gym  Stress management:: family, friends  Medication:: pharmacy  Review status:: Patient has selected and agrees to support plan., Patient was provided Diabetes Self-Management Support Plan document that includes support options.    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 Currently she is getting home around 8 pm and having to grab something on the way home to eat each night rather than cooking for herself   Physical Activity/Exercise No working longer hours, unable to fit in exercise at this time   Medication No   Home Blood Glucose Monitoring No monitoring blood glucose daily, numbers have increased with new workload, longer hours, and increased stress levels   Acute Complications No patient is preparing to have a total hysterectomy, surgery date will be scheduled with provider tomorrow, will be out of work for 8 weeks   Chronic Complications No patient is up to date on screenings at this time   Psychosocial/Coping No          Today's interventions  were provided through individual discussion, instruction, and written materials were provided.      Patient verbalized understanding of instruction and written materials.  Pt was able to return back demonstration of instructions today. Patient understood key points, needs reinforcement and further instruction.     Diabetes Self-Management Care Plan:    Today's Diabetes Self-Management Care Plan was developed with Holly's input. Holly has agreed to work toward the following goal(s) to improve his/her overall diabetes control.      Care Plan: Diabetes Management   Updates made since 2/20/2023 12:00 AM        Problem: Healthy Eating         Goal: Eat dinner at home as a homecooked meal rather than take out at least 4 times a week for the next 3 months    Start Date: 3/22/2023   Expected End Date: 6/6/2023   This Visit's Progress: Deferred   Priority: High   Barriers: Other (comments)   Note:    Currently time is an issue due to work schedule       Task: Reviewed the sources and role of Carbohydrate, Protein, and Fat and how each nutrient impacts blood sugar. Completed 3/22/2023   Note:    Reviewed carb counting, portion control, importance of spacing meals throughout the day to prevent post prandial elevations.  Recommended low saturated fat, low sodium diet to aid in control of hypertension and cholesterol. Reviewed plate method and portion control, dining out tips, meal planning, reading a food label, healthy snack options, benefits of physical activity         Task: Provided visual examples using dry measuring cups, food models, and other familiar objects such as computer mouse, deck or cards, tennis ball etc. to help with visualization of portions. Completed 3/22/2023        Task: Explained how to count carbohydrates using the food label and the use of dry measuring cups for accurate carb counting. Completed 3/22/2023        Task: Discussed strategies for choosing healthier menu options when dining out. Completed  3/22/2023        Task: Recommended replacing beverages containing high sugar content with noncaloric/sugar free options and/or water.         Task: Review the importance of balancing carbohydrates with each meal using portion control techniques to count servings of carbohydrate and label reading to identify serving size and amount of total carbs per serving. Completed 3/22/2023        Task: Provided Sample plate method and reviewed the use of the plate to estimate amounts of carbohydrate per meal.         Task: The Diabetes Care Specialist identified the following barriers to self-manage their condition.    Note:    Barriers: No Barriers Identified       Task: The Diabetes Care Specialist identified the following barriers to self-manage their condition. Completed 3/22/2023   Note:    Barriers: Time       Task: Reviewed the sources and role of Carbohydrate, Protein, and Fat and how each nutrient impacts blood sugar.         Task: Reviewed Blood Sugar Goals after eating and explained how carbohydrate sources, nonstarchy vegetables, protein and fat affects blood sugar control.         Task: Reviewed sources of Carbohydrate: Starch, Milk, Fruit, Sugar, and nonstarchy vegetables         Task: Discussed the importance of balancing carbohydrates with each meal using portion control techniques to count carbohydrate grams or servings, label reading to identify servings size and amount of total carbohydrate per serving, the plate method, other         Task: Provided visual examples using dry measuring cups, food models, and other familiar objects such as computer mouse, deck or cards, tennis ball etc. to help with visualization of portions.         Task: Explained how to count carbohydrates using the food label and the use of dry measuring cups for accurate carb counting.         Task: Discussed strategies for choosing healthier menu options when dining out.         Task: Recommended and provided sample meal plan reflecting:  20-30g and 30-45g (2-3 servings), 45-60g and 60-75g (3-4 servings) of carbohydrates per meal.         Task: Provided examples and planning techniques to help with healthy and balanced meal planning and timing of meals.         Task: Discussed strategies for choosing healthier menu options.         Task: Encouraged reducing frequency of fast food/eating out.         Task: Counseled on mindful eating practices         Task: Reviewed healthy (0-5g carbohydrate) snack options and provided list of examples.         Task: Encouraged reducing snacking and reviewed appropriate snack choices.         Task: Recommended carbohydrates per snack and provided samples of snack options.         Task: Encouraged to reduce or replace consumption of sugar-containing beverages with noncalorie or sugar-free versions and/or water; provided examples of beverage options.         Task: Provided information on low sodium, low saturated fat/low cholesterol         Task: Discussed guidelines for preventing, detecting and treating hypoglycemia and hyperglycemia and reviewed the importance of meal and medication timing with diabetes mediations for prevention of hypoglycemia and maximum drug benefit.           Follow Up Plan     Follow up in about 3 months (around 6/22/2023) for Blood Sugar Log Review and F/U MNT, General Follow-up.    Today's care plan and follow up schedule was discussed with patient.  Holly verbalized understanding of the care plan, goals, and agrees to follow up plan.        The patient was encouraged to communicate with his/her health care provider/physician and care team regarding his/her condition(s) and treatment.  I provided the patient with my contact information today and encouraged to contact me via phone or [facility] Patient Portal as needed.     Length of Visit   Total Time: 60 Minutes

## 2023-03-23 ENCOUNTER — OFFICE VISIT (OUTPATIENT)
Dept: OBSTETRICS AND GYNECOLOGY | Facility: CLINIC | Age: 63
End: 2023-03-23
Payer: MEDICAID

## 2023-03-23 VITALS
HEIGHT: 67 IN | DIASTOLIC BLOOD PRESSURE: 82 MMHG | WEIGHT: 211 LBS | BODY MASS INDEX: 33.12 KG/M2 | SYSTOLIC BLOOD PRESSURE: 122 MMHG

## 2023-03-23 DIAGNOSIS — N95.0 POSTMENOPAUSAL BLEEDING: Primary | ICD-10-CM

## 2023-03-23 PROCEDURE — 88305 TISSUE EXAM BY PATHOLOGIST: CPT | Mod: 26,,, | Performed by: PATHOLOGY

## 2023-03-23 PROCEDURE — 58100 BIOPSY OF UTERUS LINING: CPT | Mod: PBBFAC,PN | Performed by: STUDENT IN AN ORGANIZED HEALTH CARE EDUCATION/TRAINING PROGRAM

## 2023-03-23 PROCEDURE — 88305 TISSUE EXAM BY PATHOLOGIST: CPT | Performed by: PATHOLOGY

## 2023-03-23 PROCEDURE — 3079F DIAST BP 80-89 MM HG: CPT | Mod: CPTII,,, | Performed by: STUDENT IN AN ORGANIZED HEALTH CARE EDUCATION/TRAINING PROGRAM

## 2023-03-23 PROCEDURE — 1160F RVW MEDS BY RX/DR IN RCRD: CPT | Mod: CPTII,,, | Performed by: STUDENT IN AN ORGANIZED HEALTH CARE EDUCATION/TRAINING PROGRAM

## 2023-03-23 PROCEDURE — 99499 NO LOS: ICD-10-PCS | Mod: S$PBB,,, | Performed by: STUDENT IN AN ORGANIZED HEALTH CARE EDUCATION/TRAINING PROGRAM

## 2023-03-23 PROCEDURE — 58100 ENDOMETRIAL BIOPSY: ICD-10-PCS | Mod: S$PBB,,, | Performed by: STUDENT IN AN ORGANIZED HEALTH CARE EDUCATION/TRAINING PROGRAM

## 2023-03-23 PROCEDURE — 1159F PR MEDICATION LIST DOCUMENTED IN MEDICAL RECORD: ICD-10-PCS | Mod: CPTII,,, | Performed by: STUDENT IN AN ORGANIZED HEALTH CARE EDUCATION/TRAINING PROGRAM

## 2023-03-23 PROCEDURE — 88305 TISSUE EXAM BY PATHOLOGIST: ICD-10-PCS | Mod: 26,,, | Performed by: PATHOLOGY

## 2023-03-23 PROCEDURE — 3074F PR MOST RECENT SYSTOLIC BLOOD PRESSURE < 130 MM HG: ICD-10-PCS | Mod: CPTII,,, | Performed by: STUDENT IN AN ORGANIZED HEALTH CARE EDUCATION/TRAINING PROGRAM

## 2023-03-23 PROCEDURE — 3008F PR BODY MASS INDEX (BMI) DOCUMENTED: ICD-10-PCS | Mod: CPTII,,, | Performed by: STUDENT IN AN ORGANIZED HEALTH CARE EDUCATION/TRAINING PROGRAM

## 2023-03-23 PROCEDURE — 99499 UNLISTED E&M SERVICE: CPT | Mod: S$PBB,,, | Performed by: STUDENT IN AN ORGANIZED HEALTH CARE EDUCATION/TRAINING PROGRAM

## 2023-03-23 PROCEDURE — 4010F ACE/ARB THERAPY RXD/TAKEN: CPT | Mod: CPTII,,, | Performed by: STUDENT IN AN ORGANIZED HEALTH CARE EDUCATION/TRAINING PROGRAM

## 2023-03-23 PROCEDURE — 3008F BODY MASS INDEX DOCD: CPT | Mod: CPTII,,, | Performed by: STUDENT IN AN ORGANIZED HEALTH CARE EDUCATION/TRAINING PROGRAM

## 2023-03-23 PROCEDURE — 99215 OFFICE O/P EST HI 40 MIN: CPT | Mod: PBBFAC,PN,25 | Performed by: STUDENT IN AN ORGANIZED HEALTH CARE EDUCATION/TRAINING PROGRAM

## 2023-03-23 PROCEDURE — 99999 PR PBB SHADOW E&M-EST. PATIENT-LVL V: ICD-10-PCS | Mod: PBBFAC,,, | Performed by: STUDENT IN AN ORGANIZED HEALTH CARE EDUCATION/TRAINING PROGRAM

## 2023-03-23 PROCEDURE — 1159F MED LIST DOCD IN RCRD: CPT | Mod: CPTII,,, | Performed by: STUDENT IN AN ORGANIZED HEALTH CARE EDUCATION/TRAINING PROGRAM

## 2023-03-23 PROCEDURE — 3074F SYST BP LT 130 MM HG: CPT | Mod: CPTII,,, | Performed by: STUDENT IN AN ORGANIZED HEALTH CARE EDUCATION/TRAINING PROGRAM

## 2023-03-23 PROCEDURE — 99999 PR PBB SHADOW E&M-EST. PATIENT-LVL V: CPT | Mod: PBBFAC,,, | Performed by: STUDENT IN AN ORGANIZED HEALTH CARE EDUCATION/TRAINING PROGRAM

## 2023-03-23 PROCEDURE — 3044F PR MOST RECENT HEMOGLOBIN A1C LEVEL <7.0%: ICD-10-PCS | Mod: CPTII,,, | Performed by: STUDENT IN AN ORGANIZED HEALTH CARE EDUCATION/TRAINING PROGRAM

## 2023-03-23 PROCEDURE — 1160F PR REVIEW ALL MEDS BY PRESCRIBER/CLIN PHARMACIST DOCUMENTED: ICD-10-PCS | Mod: CPTII,,, | Performed by: STUDENT IN AN ORGANIZED HEALTH CARE EDUCATION/TRAINING PROGRAM

## 2023-03-23 PROCEDURE — 3079F PR MOST RECENT DIASTOLIC BLOOD PRESSURE 80-89 MM HG: ICD-10-PCS | Mod: CPTII,,, | Performed by: STUDENT IN AN ORGANIZED HEALTH CARE EDUCATION/TRAINING PROGRAM

## 2023-03-23 PROCEDURE — 3044F HG A1C LEVEL LT 7.0%: CPT | Mod: CPTII,,, | Performed by: STUDENT IN AN ORGANIZED HEALTH CARE EDUCATION/TRAINING PROGRAM

## 2023-03-23 PROCEDURE — 4010F PR ACE/ARB THEARPY RXD/TAKEN: ICD-10-PCS | Mod: CPTII,,, | Performed by: STUDENT IN AN ORGANIZED HEALTH CARE EDUCATION/TRAINING PROGRAM

## 2023-03-27 ENCOUNTER — PATIENT MESSAGE (OUTPATIENT)
Dept: DIABETES | Facility: CLINIC | Age: 63
End: 2023-03-27
Payer: MEDICAID

## 2023-03-27 ENCOUNTER — TELEPHONE (OUTPATIENT)
Dept: DIABETES | Facility: CLINIC | Age: 63
End: 2023-03-27
Payer: MEDICAID

## 2023-03-27 RX ORDER — SODIUM CHLORIDE 9 MG/ML
INJECTION, SOLUTION INTRAVENOUS CONTINUOUS
Status: CANCELLED | OUTPATIENT
Start: 2023-03-27

## 2023-03-27 RX ORDER — MUPIROCIN 20 MG/G
OINTMENT TOPICAL
Status: CANCELLED | OUTPATIENT
Start: 2023-03-27

## 2023-03-27 RX ORDER — CEFAZOLIN SODIUM 2 G/50ML
2 SOLUTION INTRAVENOUS
Status: CANCELLED | OUTPATIENT
Start: 2023-03-27

## 2023-03-28 LAB
FINAL PATHOLOGIC DIAGNOSIS: NORMAL
GROSS: NORMAL
Lab: NORMAL

## 2023-03-28 NOTE — H&P (VIEW-ONLY)
History & Physical  Gynecology      SUBJECTIVE:     Chief Complaint: Pre-op Exam and Endometrial Biopsy       History of Present Illness:  Holly Mcclelland is a 62 y.o.  here to sign consents for TLH/BSO and for EMBx due to persistent, bothersome PMB.   She has hx CVA/TIA and is rx'd Plavix--notes she actually has not taken this for some time and is not sure if she ever was taking it consistently.  She also has CHF, DM (A1c 6.7), HLD, HTN, TAWNYA.  Her PCP has cleared her for surgery.     :  - returned to clinic with persistent PMB  - EMS 1.7 mm     :   - EMS 2 mm  - EMBx negative for hyerplasia, atypia, or malignancy   - Started on provera which resolved her bleeding      Review of patient's allergies indicates:   Allergen Reactions    Victoza [liraglutide] Other (See Comments)     Throat closed ? But not seen in the ER        Past Medical History:   Diagnosis Date    CHF (congestive heart failure)     Diabetes mellitus     Heart murmur     Hyperlipidemia     Hypertension     Sleep apnea     wears CPAP    Stroke      Past Surgical History:   Procedure Laterality Date    ANTERIOR CERVICAL DISCECTOMY W/ FUSION N/A 2022    Procedure: DISCECTOMY, SPINE, CERVICAL, ANTERIOR APPROACH, WITH FUSION C3-4 Gareth PRADHAN notified;  Surgeon: Damian Arroyo MD;  Location: [facility] OR;  Service: Neurosurgery;  Laterality: N/A;  ASA1  TOR1  T&Cross x 2 units  EMG  SEP  MEP  RN PREOP ON 22,COVID---NEGATIVE- on -- T/S  done  ON   SPINEWAVE GARETH RUIZ  [phone] NOTIFIED (CALLED) GARETH ON 2022 @ 3:11PM-LO  NEURO MONIT    BREAST BIOPSY Left      OB History          2    Para   2    Term   1       1    AB        Living   1         SAB        IAB        Ectopic        Multiple        Live Births   1               Family History   Problem Relation Age of Onset    Diabetes Maternal Grandmother     Diabetes Mother     Hypertension Mother     Heart disease Mother     Diabetes Sister      Diabetes Father     Breast cancer Neg Hx     Colon cancer Neg Hx     Ovarian cancer Neg Hx      Social History     Tobacco Use    Smoking status: Never    Smokeless tobacco: Never   Substance Use Topics    Alcohol use: No    Drug use: No       Current Outpatient Medications   Medication Sig    aspirin (ECOTRIN) 81 MG EC tablet Take 1 tablet by mouth once daily    blood sugar diagnostic (ONETOUCH ULTRA TEST) Strp 1 strip by Misc.(Non-Drug; Combo Route) route once daily.    clopidogreL (PLAVIX) 75 mg tablet Take 1 tablet by mouth once daily    cyclobenzaprine (FLEXERIL) 10 MG tablet Take 1 tablet (10 mg total) by mouth 3 (three) times daily as needed for Muscle spasms.    dapagliflozin (FARXIGA) 10 mg tablet Take 1 tablet (10 mg total) by mouth once daily.    diazePAM (VALIUM) 10 MG Tab Take 10 mg by mouth daily as needed.    diclofenac sodium (VOLTAREN) 1 % Gel Apply 2 g topically 4 (four) times daily.    hydrALAZINE (APRESOLINE) 25 MG tablet Take 1 tablet (25 mg total) by mouth 3 (three) times daily.    medroxyPROGESTERone (PROVERA) 5 MG tablet Take 1 tablet by mouth once daily    ONETOUCH DELICA PLUS LANCET 33 gauge Misc USE 1  TO CHECK GLUCOSE 4 TIMES DAILY    ONETOUCH ULTRA2 METER Misc USE TO CHECK GLUCOSE TWICE DAILY    pantoprazole (PROTONIX) 40 MG tablet Take 1 tablet by mouth once daily    pravastatin (PRAVACHOL) 40 MG tablet Take 1 tablet (40 mg total) by mouth every evening.    semaglutide (OZEMPIC) 0.25 mg or 0.5 mg(2 mg/1.5 mL) pen injector Inject 0.5 mg into the skin every 7 days.    TRULICITY 4.5 mg/0.5 mL pen injector SMARTSI.5 Milligram(s) SUB-Q Once a Week    furosemide (LASIX) 20 MG tablet TAKE 1 TABLET BY MOUTH ONCE DAILY AS NEEDED FOR  SWELLING (Patient not taking: Reported on 3/6/2023)    gabapentin (NEURONTIN) 300 MG capsule Take 1 capsule (300 mg total) by mouth 3 (three) times daily. (Patient not taking: Reported on 3/6/2023)    lisinopriL-hydrochlorothiazide (PRINZIDE,ZESTORETIC) 20-12.5  mg per tablet Take 1 tablet by mouth once daily.     No current facility-administered medications for this visit.         Review of Systems:  Review of Systems   Constitutional:  Negative for chills and fever.   Respiratory:  Negative for cough, shortness of breath and wheezing.    Cardiovascular:  Negative for chest pain, palpitations and leg swelling.   Gastrointestinal:  Negative for abdominal pain, nausea and vomiting.   Endocrine: Positive for diabetes. Negative for hyperthyroidism and hypothyroidism.   Genitourinary:  Positive for vaginal bleeding and postmenopausal bleeding. Negative for dysuria, pelvic pain and vaginal pain.   Musculoskeletal:  Negative for joint swelling and myalgias.   Integumentary:  Negative for rash.   Neurological:  Negative for vertigo, seizures, syncope and numbness.   Hematological:  Does not bruise/bleed easily.   Psychiatric/Behavioral:  Negative for depression. The patient is not nervous/anxious.       OBJECTIVE:     Physical Exam:  Physical Exam  Exam conducted with a chaperone present.   Constitutional:       General: She is not in acute distress.     Appearance: Normal appearance. She is well-developed.   HENT:      Head: Normocephalic and atraumatic.   Eyes:      Conjunctiva/sclera: Conjunctivae normal.   Pulmonary:      Effort: Pulmonary effort is normal. No respiratory distress.   Abdominal:      General: There is no distension.      Palpations: Abdomen is soft. There is no mass.      Tenderness: There is no abdominal tenderness. There is no guarding or rebound.      Hernia: No hernia is present.   Genitourinary:     General: Normal vulva.      Pubic Area: No rash.       Labia:         Right: No rash, tenderness or lesion.         Left: No rash, tenderness or lesion.       Urethra: No prolapse, urethral pain, urethral swelling or urethral lesion.      Vagina: No vaginal discharge, tenderness or bleeding.      Cervix: No cervical motion tenderness, discharge, friability or  lesion.      Uterus: Normal. Not enlarged and not tender.       Adnexa: Right adnexa normal and left adnexa normal.        Right: No mass, tenderness or fullness.          Left: No mass, tenderness or fullness.     Musculoskeletal:         General: No tenderness. Normal range of motion.      Right lower leg: No edema.      Left lower leg: No edema.   Skin:     General: Skin is warm and dry.      Findings: No erythema.   Neurological:      Mental Status: She is alert and oriented to person, place, and time.   Psychiatric:         Mood and Affect: Mood normal.         Behavior: Behavior normal.         ASSESSMENT:       ICD-10-CM ICD-9-CM    1. Postmenopausal bleeding  N95.0 627.1 Specimen to Pathology, Ob/Gyn      Endometrial biopsy      Case Request Operating Room: HYSTERECTOMY,TOTAL,LAPAROSCOPIC,WITH SALPINGO-OOPHORECTOMY             Plan:      R/b/a of planned procedure reviewed in detail. Risks include but not limited to bleeding, blood transfusion, wound infection, wound dehiscence, intra-abdominal infection, damage to surrounding structures such as bladder, bowel. The possibility that her pain may persist despite surgery was also addressed. We also discussed the possibility of conversion to traditional/open approach based on uterine size/intra-abdominal findings. The possibility of uterine morcellation was also discussed.   R/b/a of BSO vs ovarian conservation were reviewed and she desires BSO.    We also reviewed post-operative expectations and restrictions including limiting strenuous activity and lifting objects >10 lb x 6 weeks as well as pelvic rest for 10-12 weeks.    States has not been taking plavix for some time. I recommend she contact her PCP about whether she should start the plavix before her scheduled procedure. If plavix is started I informed her to stop 7 days prior to surgery.     I recommend her surgery take place at [facility] due to co morbidities. Case request 4/12/23, pending pathology from  today's biopsy.     Nella Jiménez

## 2023-03-28 NOTE — PROCEDURES
Endometrial biopsy    Date/Time: 3/23/2023 10:00 AM  Performed by: Nella Marte MD  Authorized by: Nella Marte MD     Consent:     Consent obtained:  Verbal and written    Consent given by:  Patient    Patient questions answered: yes      Patient agrees, verbalizes understanding, and wants to proceed: yes      Instructions and paperwork completed: yes    Indication:     Indications: Post-menopausal bleeding      Chronicity of post-menopausal bleeding:  Chronic    Progression of post-menopausal bleeding:  Waxing and waning  Pre-procedure:     Pre-procedure timeout performed: yes    Procedure:     Procedure: endometrial biopsy with Pipelle      Cervix cleaned and prepped: yes      A paracervical block was performed: no      An intracervical block was performed: no      The cervix was dilated: no      Uterus sounded: yes      Uterus sound depth (cm):  7    Curettes used:  1    Specimen collected: specimen collected and sent to pathology      Patient tolerated procedure well with no complications: yes    Comments:     Procedure comments:  Post-procedure counseling, expectations reviewed. Let us know if any issues arise.

## 2023-03-28 NOTE — PROGRESS NOTES
History & Physical  Gynecology      SUBJECTIVE:     Chief Complaint: Consult       History of Present Illness:  Holly Mcclelland is a 62 y.o.  here following up persistent PMB.   Not having bleeding so much as intermittent spotting. It is bothersome. Was resolved for some time on provera before PMB resumed.  She strongly desires definitive management.   She has hx CVA/TIA and is rx'd Plavix.  She also has CHF, DM, HLD, HTN, TAWNYA.  Her PCP has cleared her for surgery.     She also c/o vaginal odor that is new and she is concerned for vaginal infection.     :  - returned to clinic with persistent PMB  - EMS 1.7 mm     :   - EMS 2 mm  - EMBx negative for hyerplasia, atypia, or malignancy   - Started on provera which resolved her bleeding    Review of patient's allergies indicates:   Allergen Reactions    Victoza [liraglutide] Other (See Comments)     Throat closed ? But not seen in the ER        Past Medical History:   Diagnosis Date    CHF (congestive heart failure)     Diabetes mellitus     Heart murmur     Hyperlipidemia     Hypertension     Sleep apnea     wears CPAP    Stroke      Past Surgical History:   Procedure Laterality Date    ANTERIOR CERVICAL DISCECTOMY W/ FUSION N/A 2022    Procedure: DISCECTOMY, SPINE, CERVICAL, ANTERIOR APPROACH, WITH FUSION C3-4 Gareth PRADHAN notified;  Surgeon: Damian Arroyo MD;  Location: [facility] OR;  Service: Neurosurgery;  Laterality: N/A;  ASA1  TOR1  T&Cross x 2 units  EMG  SEP  MEP  RN PREOP ON 22,COVID---NEGATIVE- on -- T/S  done  ON   SPINEWAVE GARETH RUIZ  [phone] NOTIFIED (CALLED) GARETH ON 2022 @ 3:11PM-  NEURO MONIT    BREAST BIOPSY Left      OB History          2    Para   2    Term   1       1    AB        Living   1         SAB        IAB        Ectopic        Multiple        Live Births   1               Family History   Problem Relation Age of Onset    Diabetes Maternal Grandmother     Diabetes Mother      Hypertension Mother     Heart disease Mother     Diabetes Sister     Diabetes Father     Breast cancer Neg Hx     Colon cancer Neg Hx     Ovarian cancer Neg Hx      Social History     Tobacco Use    Smoking status: Never    Smokeless tobacco: Never   Substance Use Topics    Alcohol use: No    Drug use: No       Current Outpatient Medications   Medication Sig    aspirin (ECOTRIN) 81 MG EC tablet Take 1 tablet by mouth once daily    blood sugar diagnostic (ONETOUCH ULTRA TEST) Strp 1 strip by Misc.(Non-Drug; Combo Route) route once daily.    clopidogreL (PLAVIX) 75 mg tablet Take 1 tablet by mouth once daily    cyclobenzaprine (FLEXERIL) 10 MG tablet Take 1 tablet (10 mg total) by mouth 3 (three) times daily as needed for Muscle spasms.    dapagliflozin (FARXIGA) 10 mg tablet Take 1 tablet (10 mg total) by mouth once daily.    diazePAM (VALIUM) 10 MG Tab Take 10 mg by mouth daily as needed.    diclofenac sodium (VOLTAREN) 1 % Gel Apply 2 g topically 4 (four) times daily.    hydrALAZINE (APRESOLINE) 25 MG tablet Take 1 tablet (25 mg total) by mouth 3 (three) times daily.    medroxyPROGESTERone (PROVERA) 5 MG tablet Take 1 tablet by mouth once daily    ONETOUCH DELICA PLUS LANCET 33 gauge Misc USE 1  TO CHECK GLUCOSE 4 TIMES DAILY    pantoprazole (PROTONIX) 40 MG tablet Take 1 tablet by mouth once daily    pravastatin (PRAVACHOL) 40 MG tablet Take 1 tablet (40 mg total) by mouth every evening.    semaglutide (OZEMPIC) 0.25 mg or 0.5 mg(2 mg/1.5 mL) pen injector Inject 0.5 mg into the skin every 7 days.    furosemide (LASIX) 20 MG tablet TAKE 1 TABLET BY MOUTH ONCE DAILY AS NEEDED FOR  SWELLING (Patient not taking: Reported on 3/6/2023)    gabapentin (NEURONTIN) 300 MG capsule Take 1 capsule (300 mg total) by mouth 3 (three) times daily. (Patient not taking: Reported on 3/6/2023)    lisinopriL-hydrochlorothiazide (PRINZIDE,ZESTORETIC) 20-12.5 mg per tablet Take 1 tablet by mouth once daily.    ONETOUCH ULTRA2 METER [facility]  USE TO CHECK GLUCOSE TWICE DAILY    TRULICITY 4.5 mg/0.5 mL pen injector SMARTSI.5 Milligram(s) SUB-Q Once a Week     No current facility-administered medications for this visit.         Review of Systems:  Review of Systems   Constitutional:  Negative for chills and fever.   Respiratory:  Negative for cough, shortness of breath and wheezing.    Cardiovascular:  Negative for chest pain, palpitations and leg swelling.   Gastrointestinal:  Negative for abdominal pain, nausea and vomiting.   Endocrine: Positive for diabetes. Negative for hyperthyroidism and hypothyroidism.   Genitourinary:  Positive for vaginal discharge, postmenopausal bleeding and vaginal odor. Negative for dysuria, pelvic pain, vaginal bleeding and vaginal pain.   Musculoskeletal:  Negative for joint swelling and myalgias.   Integumentary:  Negative for rash.   Neurological:  Negative for vertigo, seizures, syncope and numbness.   Hematological:  Does not bruise/bleed easily.   Psychiatric/Behavioral:  Negative for depression. The patient is not nervous/anxious.       OBJECTIVE:     Physical Exam:  Physical Exam  Constitutional:       General: She is not in acute distress.     Appearance: She is well-developed.   HENT:      Head: Normocephalic and atraumatic.   Eyes:      Extraocular Movements: Extraocular movements intact.      Conjunctiva/sclera: Conjunctivae normal.   Pulmonary:      Effort: Pulmonary effort is normal. No respiratory distress.   Musculoskeletal:         General: Normal range of motion.      Right lower leg: No edema.      Left lower leg: No edema.   Skin:     General: Skin is warm and dry.   Neurological:      Mental Status: She is alert and oriented to person, place, and time.   Psychiatric:         Mood and Affect: Mood normal.         Behavior: Behavior normal.         ASSESSMENT:       ICD-10-CM ICD-9-CM    1. Postmenopausal bleeding  N95.0 627.1       2. Vaginal odor  N89.8 625.8              Plan:      Last visit we  discussed that hysteroscopy is typically the next step for evaluation of persistent PMB after office biopsy.    We repeated her pelvic US and her EMS remains thin at 1.7 mm    Given lapse of time since last evaluation (>1 year) and desire to avoid multiple surgical procedures and delays of definitive treatment, we discussed repeating office EMBx over hysteroscopy. After reviewing r/b/a, she desires to pursue this option.  Once pathology returns she desires hysterectomy/BSO.     Return next week for EMBx, pre-op.    Nella Jiménez

## 2023-03-28 NOTE — PROGRESS NOTES
History & Physical  Gynecology      SUBJECTIVE:     Chief Complaint: Pre-op Exam and Endometrial Biopsy       History of Present Illness:  Holly Mcclelland is a 62 y.o.  here to sign consents for TLH/BSO and for EMBx due to persistent, bothersome PMB.   She has hx CVA/TIA and is rx'd Plavix--notes she actually has not taken this for some time and is not sure if she ever was taking it consistently.  She also has CHF, DM (A1c 6.7), HLD, HTN, TAWNYA.  Her PCP has cleared her for surgery.     :  - returned to clinic with persistent PMB  - EMS 1.7 mm     :   - EMS 2 mm  - EMBx negative for hyerplasia, atypia, or malignancy   - Started on provera which resolved her bleeding      Review of patient's allergies indicates:   Allergen Reactions    Victoza [liraglutide] Other (See Comments)     Throat closed ? But not seen in the ER        Past Medical History:   Diagnosis Date    CHF (congestive heart failure)     Diabetes mellitus     Heart murmur     Hyperlipidemia     Hypertension     Sleep apnea     wears CPAP    Stroke      Past Surgical History:   Procedure Laterality Date    ANTERIOR CERVICAL DISCECTOMY W/ FUSION N/A 2022    Procedure: DISCECTOMY, SPINE, CERVICAL, ANTERIOR APPROACH, WITH FUSION C3-4 Gareth PRADHAN notified;  Surgeon: Damian Arroyo MD;  Location: [facility] OR;  Service: Neurosurgery;  Laterality: N/A;  ASA1  TOR1  T&Cross x 2 units  EMG  SEP  MEP  RN PREOP ON 22,COVID---NEGATIVE- on -- T/S  done  ON   SPINEWAVE GARETH RUIZ  [phone] NOTIFIED (CALLED) GARETH ON 2022 @ 3:11PM-LO  NEURO MONIT    BREAST BIOPSY Left      OB History          2    Para   2    Term   1       1    AB        Living   1         SAB        IAB        Ectopic        Multiple        Live Births   1               Family History   Problem Relation Age of Onset    Diabetes Maternal Grandmother     Diabetes Mother     Hypertension Mother     Heart disease Mother     Diabetes Sister      Diabetes Father     Breast cancer Neg Hx     Colon cancer Neg Hx     Ovarian cancer Neg Hx      Social History     Tobacco Use    Smoking status: Never    Smokeless tobacco: Never   Substance Use Topics    Alcohol use: No    Drug use: No       Current Outpatient Medications   Medication Sig    aspirin (ECOTRIN) 81 MG EC tablet Take 1 tablet by mouth once daily    blood sugar diagnostic (ONETOUCH ULTRA TEST) Strp 1 strip by Misc.(Non-Drug; Combo Route) route once daily.    clopidogreL (PLAVIX) 75 mg tablet Take 1 tablet by mouth once daily    cyclobenzaprine (FLEXERIL) 10 MG tablet Take 1 tablet (10 mg total) by mouth 3 (three) times daily as needed for Muscle spasms.    dapagliflozin (FARXIGA) 10 mg tablet Take 1 tablet (10 mg total) by mouth once daily.    diazePAM (VALIUM) 10 MG Tab Take 10 mg by mouth daily as needed.    diclofenac sodium (VOLTAREN) 1 % Gel Apply 2 g topically 4 (four) times daily.    hydrALAZINE (APRESOLINE) 25 MG tablet Take 1 tablet (25 mg total) by mouth 3 (three) times daily.    medroxyPROGESTERone (PROVERA) 5 MG tablet Take 1 tablet by mouth once daily    ONETOUCH DELICA PLUS LANCET 33 gauge Misc USE 1  TO CHECK GLUCOSE 4 TIMES DAILY    ONETOUCH ULTRA2 METER Misc USE TO CHECK GLUCOSE TWICE DAILY    pantoprazole (PROTONIX) 40 MG tablet Take 1 tablet by mouth once daily    pravastatin (PRAVACHOL) 40 MG tablet Take 1 tablet (40 mg total) by mouth every evening.    semaglutide (OZEMPIC) 0.25 mg or 0.5 mg(2 mg/1.5 mL) pen injector Inject 0.5 mg into the skin every 7 days.    TRULICITY 4.5 mg/0.5 mL pen injector SMARTSI.5 Milligram(s) SUB-Q Once a Week    furosemide (LASIX) 20 MG tablet TAKE 1 TABLET BY MOUTH ONCE DAILY AS NEEDED FOR  SWELLING (Patient not taking: Reported on 3/6/2023)    gabapentin (NEURONTIN) 300 MG capsule Take 1 capsule (300 mg total) by mouth 3 (three) times daily. (Patient not taking: Reported on 3/6/2023)    lisinopriL-hydrochlorothiazide (PRINZIDE,ZESTORETIC) 20-12.5  mg per tablet Take 1 tablet by mouth once daily.     No current facility-administered medications for this visit.         Review of Systems:  Review of Systems   Constitutional:  Negative for chills and fever.   Respiratory:  Negative for cough, shortness of breath and wheezing.    Cardiovascular:  Negative for chest pain, palpitations and leg swelling.   Gastrointestinal:  Negative for abdominal pain, nausea and vomiting.   Endocrine: Positive for diabetes. Negative for hyperthyroidism and hypothyroidism.   Genitourinary:  Positive for vaginal bleeding and postmenopausal bleeding. Negative for dysuria, pelvic pain and vaginal pain.   Musculoskeletal:  Negative for joint swelling and myalgias.   Integumentary:  Negative for rash.   Neurological:  Negative for vertigo, seizures, syncope and numbness.   Hematological:  Does not bruise/bleed easily.   Psychiatric/Behavioral:  Negative for depression. The patient is not nervous/anxious.       OBJECTIVE:     Physical Exam:  Physical Exam  Exam conducted with a chaperone present.   Constitutional:       General: She is not in acute distress.     Appearance: Normal appearance. She is well-developed.   HENT:      Head: Normocephalic and atraumatic.   Eyes:      Conjunctiva/sclera: Conjunctivae normal.   Pulmonary:      Effort: Pulmonary effort is normal. No respiratory distress.   Abdominal:      General: There is no distension.      Palpations: Abdomen is soft. There is no mass.      Tenderness: There is no abdominal tenderness. There is no guarding or rebound.      Hernia: No hernia is present.   Genitourinary:     General: Normal vulva.      Pubic Area: No rash.       Labia:         Right: No rash, tenderness or lesion.         Left: No rash, tenderness or lesion.       Urethra: No prolapse, urethral pain, urethral swelling or urethral lesion.      Vagina: No vaginal discharge, tenderness or bleeding.      Cervix: No cervical motion tenderness, discharge, friability or  lesion.      Uterus: Normal. Not enlarged and not tender.       Adnexa: Right adnexa normal and left adnexa normal.        Right: No mass, tenderness or fullness.          Left: No mass, tenderness or fullness.     Musculoskeletal:         General: No tenderness. Normal range of motion.      Right lower leg: No edema.      Left lower leg: No edema.   Skin:     General: Skin is warm and dry.      Findings: No erythema.   Neurological:      Mental Status: She is alert and oriented to person, place, and time.   Psychiatric:         Mood and Affect: Mood normal.         Behavior: Behavior normal.         ASSESSMENT:       ICD-10-CM ICD-9-CM    1. Postmenopausal bleeding  N95.0 627.1 Specimen to Pathology, Ob/Gyn      Endometrial biopsy      Case Request Operating Room: HYSTERECTOMY,TOTAL,LAPAROSCOPIC,WITH SALPINGO-OOPHORECTOMY             Plan:      R/b/a of planned procedure reviewed in detail. Risks include but not limited to bleeding, blood transfusion, wound infection, wound dehiscence, intra-abdominal infection, damage to surrounding structures such as bladder, bowel. The possibility that her pain may persist despite surgery was also addressed. We also discussed the possibility of conversion to traditional/open approach based on uterine size/intra-abdominal findings. The possibility of uterine morcellation was also discussed.   R/b/a of BSO vs ovarian conservation were reviewed and she desires BSO.    We also reviewed post-operative expectations and restrictions including limiting strenuous activity and lifting objects >10 lb x 6 weeks as well as pelvic rest for 10-12 weeks.    States has not been taking plavix for some time. I recommend she contact her PCP about whether she should start the plavix before her scheduled procedure. If plavix is started I informed her to stop 7 days prior to surgery.     I recommend her surgery take place at [facility] due to co morbidities. Case request 4/12/23, pending pathology from  today's biopsy.     Nella Jiménez

## 2023-04-04 ENCOUNTER — ANESTHESIA EVENT (OUTPATIENT)
Dept: SURGERY | Facility: OTHER | Age: 63
End: 2023-04-04
Payer: MEDICAID

## 2023-04-04 ENCOUNTER — HOSPITAL ENCOUNTER (OUTPATIENT)
Dept: PREADMISSION TESTING | Facility: OTHER | Age: 63
Discharge: HOME | End: 2023-04-04
Attending: STUDENT IN AN ORGANIZED HEALTH CARE EDUCATION/TRAINING PROGRAM
Payer: MEDICAID

## 2023-04-04 VITALS
WEIGHT: 210 LBS | BODY MASS INDEX: 32.89 KG/M2 | HEART RATE: 90 BPM | DIASTOLIC BLOOD PRESSURE: 65 MMHG | OXYGEN SATURATION: 98 % | SYSTOLIC BLOOD PRESSURE: 135 MMHG | TEMPERATURE: 99 F

## 2023-04-04 DIAGNOSIS — N95.0 POSTMENOPAUSAL BLEEDING: ICD-10-CM

## 2023-04-04 LAB
ABO + RH BLD: NORMAL
BLD GP AB SCN CELLS X3 SERPL QL: NORMAL
SPECIMEN OUTDATE: NORMAL

## 2023-04-04 PROCEDURE — 86900 BLOOD TYPING SEROLOGIC ABO: CPT | Performed by: STUDENT IN AN ORGANIZED HEALTH CARE EDUCATION/TRAINING PROGRAM

## 2023-04-04 PROCEDURE — 36415 COLL VENOUS BLD VENIPUNCTURE: CPT | Performed by: STUDENT IN AN ORGANIZED HEALTH CARE EDUCATION/TRAINING PROGRAM

## 2023-04-04 RX ORDER — SODIUM CHLORIDE, SODIUM LACTATE, POTASSIUM CHLORIDE, CALCIUM CHLORIDE 600; 310; 30; 20 MG/100ML; MG/100ML; MG/100ML; MG/100ML
INJECTION, SOLUTION INTRAVENOUS CONTINUOUS
Status: CANCELLED | OUTPATIENT
Start: 2023-04-04

## 2023-04-04 RX ORDER — GLUCOSAMINE/CHONDRO SU A 500-400 MG
2 TABLET ORAL DAILY
COMMUNITY
End: 2023-10-12

## 2023-04-04 RX ORDER — ACETAMINOPHEN 325 MG/1
975 TABLET ORAL
Status: CANCELLED | OUTPATIENT
Start: 2023-04-04 | End: 2023-04-04

## 2023-04-04 RX ORDER — LIDOCAINE HYDROCHLORIDE 10 MG/ML
0.5 INJECTION, SOLUTION EPIDURAL; INFILTRATION; INTRACAUDAL; PERINEURAL ONCE
Status: CANCELLED | OUTPATIENT
Start: 2023-04-04 | End: 2023-04-04

## 2023-04-04 RX ORDER — FAMOTIDINE 20 MG/1
20 TABLET, FILM COATED ORAL
Status: CANCELLED | OUTPATIENT
Start: 2023-04-04 | End: 2023-04-04

## 2023-04-04 RX ORDER — MAGNESIUM HYDROXIDE 400 MG/5ML
SUSPENSION, ORAL (FINAL DOSE FORM) ORAL DAILY
COMMUNITY
End: 2023-10-12

## 2023-04-04 RX ORDER — ACETAMINOPHEN AND PHENYLEPHRINE HCL 325; 5 MG/1; MG/1
TABLET ORAL DAILY
COMMUNITY
End: 2023-10-12

## 2023-04-04 NOTE — ANESTHESIA PREPROCEDURE EVALUATION
04/04/2023  Holly Mcclelland is a 62 y.o., female.      Pre-op Assessment    I have reviewed the Patient Summary Reports.     I have reviewed the Nursing Notes. I have reviewed the NPO Status.   I have reviewed the Medications.     Review of Systems  Anesthesia Hx:  No problems with previous Anesthesia    Social:  Non-Smoker    Cardiovascular:   Hypertension    Pulmonary:   Sleep Apnea    Renal/:   Chronic Renal Disease, CKD    Hepatic/GI:   GERD, well controlled    Neurological:   TIA, CVA, no residual symptoms Neuromuscular Disease,    Endocrine:   Diabetes, well controlled        Physical Exam  General: Well nourished, Cooperative and Alert    Airway:  Mallampati: II   Mouth Opening: Normal  TM Distance: Normal  Tongue: Normal  Neck ROM: Normal ROM    Dental:  Intact        Anesthesia Plan  Type of Anesthesia, risks & benefits discussed:    Anesthesia Type: Gen ETT  Intra-op Monitoring Plan: Standard ASA Monitors  Post Op Pain Control Plan: multimodal analgesia  Induction:  IV  Airway Plan: Video  Informed Consent: Informed consent signed with the Patient and all parties understand the risks and agree with anesthesia plan.  All questions answered.   ASA Score: 3  Anesthesia Plan Notes: Has recent labs. Pt has clearance from PCP.    Ready For Surgery From Anesthesia Perspective.     .

## 2023-04-04 NOTE — DISCHARGE INSTRUCTIONS
Information to Prepare you for your Surgery    PRE-ADMIT TESTING -  [phone]    [address] [facility]          Your surgery has been scheduled at [facility]. We are pleased to have the opportunity to serve you. For Further Information please call [phone].    On the day of surgery please report to the Information Desk on the 1st floor.    CONTACT YOUR PHYSICIAN'S OFFICE THE DAY PRIOR TO YOUR SURGERY TO OBTAIN YOUR ARRIVAL TIME.     The evening before surgery do not eat anything after 9 p.m. ( this includes hard candy, chewing gum and mints).  You may only have GATORADE, POWERADE AND WATER  from 9 p.m. until you leave your home.   DO NOT DRINK ANY LIQUIDS ON THE WAY TO THE HOSPITAL.      Why does your anesthesiologist allow you to drink Gatorade/Powerade before surgery?  Gatorade/Powerade helps to increase your comfort before surgery and to decrease your nausea after surgery. The carbohydrates in Gatorade/Powerade help reduce your body's stress response to surgery.  If you are a diabetic-drink only water prior to surgery.      Current Visitor policy(12/27/2021) - Patients may have 2 visitors pre and post procedure. Only 2 visitors will be allowed in the Surgical building with the patient. No one under the age of 12 will be allowed into the facility.    SPECIAL MEDICATION INSTRUCTIONS: TAKE medications checked off by the Anesthesiologist on your Medication List.    Angiogram Patients: Take medications as instructed by your physician, including aspirin.     Surgery Patients:    If you take ASPIRIN - Your PHYSICIAN/SURGEON will need to inform you IF/OR when you need to stop taking aspirin prior to your surgery.     The week prior to surgery do not ot take any medications containing IBUPROFEN or NSAIDS ( Advil, Motrin, Goodys, BC, Aleve, Naproxen etc) If you are not sure if you should take a medicine please call your surgeon's office.  Ok to take  Tylenol    Do Not Wear any make-up (especially eye make-up) to surgery. Please remove any false eyelashes or eyelash extensions. If you arrive the day of surgery with makeup/eyelashes on you will be required to remove prior to surgery. (There is a risk of corneal abrasions if eye makeup/eyelash extensions are not removed)      Leave all valuables at home.   Do Not wear any jewelry or watches, including any metal in body piercings. Jewelry must be removed prior to coming to the hospital.  There is a possibility that rings that are unable to be removed may be cut off if they are on the surgical extremity.    Please remove all hair extensions, wigs, clips and any other metal accessories/ ornaments from your hair.  These items may pose a flammable/fire risk in Surgery and must be removed.    Do not shave your surgical area at least 5 days prior to your surgery. The surgical prep will be performed at the hospital according to Infection Control regulations.    Contact Lens must be removed before surgery. Either do not wear the contact lens or bring a case and solution for storage.  Please bring a container for eyeglasses or dentures as required.  Bring any paperwork your physician has provided, such as consent forms,  history and physicals, doctor's orders, etc.   Bring comfortable clothes that are loose fitting to wear upon discharge. Take into consideration the type of surgery being performed.  Maintain your diet as advised per your physician the day prior to surgery.      Adequate rest the night before surgery is advised.   Park in the Parking lot behind the hospital or in the [facility] across the street from the parking lot. Parking is complimentary.  If you will be discharged the same day as your procedure, please arrange for a responsible adult to drive you home or to accompany you if traveling by taxi.   YOU WILL NOT BE PERMITTED TO DRIVE OR TO LEAVE THE HOSPITAL ALONE AFTER SURGERY.   If you are  being discharged the same day, it is strongly recommended that you arrange for someone to remain with you for the first 24 hrs following your surgery.    The Surgeon will speak to your family/visitor after your surgery regarding the outcome of your surgery and post op care.  The Surgeon may speak to you after your surgery, but there is a possibility you may not remember the details.  Please check with your family members regarding the conversation with the Surgeon.    We strongly recommend whoever is bringing you home be present for discharge instructions.  This will ensure a thorough understanding for your post op home care.    ALL CHILDREN MUST ALWAYS BE ACCOMPANIED BY AN ADULT.    Visitors-Refer to current Visitor policy handouts.    Thank you for your cooperation.  The Staff of [facility].            Bathing Instructions with Hibiclens    Shower the evening before and morning of your procedure with Chlorhexidine (Hibiclens)  do not use Chlorhexidine on your face or genitals. Do not get in your eyes.  Wash your face with water and your regular face wash/soap  Use your regular shampoo  Apply Chlorhexidine (Hibiclens) directly on your skin or on a wet washcloth and wash gently. When showering: Move away from the shower stream when applying Chlorhexidine (Hibiclens) to avoid rinsing off too soon.  Rinse thoroughly with warm water  Do not dilute Chlorhexidine (Hibiclens)   Dry off as usual, do not use any deodorant, powder, body lotions, perfume, after shave or cologne.

## 2023-04-10 ENCOUNTER — TELEPHONE (OUTPATIENT)
Dept: OBSTETRICS AND GYNECOLOGY | Facility: CLINIC | Age: 63
End: 2023-04-10
Payer: MEDICAID

## 2023-04-10 NOTE — TELEPHONE ENCOUNTER
Returned pts call. Pt stated she needs a note for work letting her job know that she will be out due to surgery. Vu and informed pt I would inform provider. Pt vu and all questions answered     ----- Message from Thania Light sent at 4/10/2023  9:47 AM CDT -----  Contact: SARANYA WEST [[id number]]  Type: Call Back      Who called: SARANYA WEST [[id number]]      What is the request in detail: Patient is requesting a call back. Pt states that she needs an letter stating she will be out of work due to her procedure.   Please advise.     Can the clinic reply by [facility]? Yes      Would the patient rather a call back or a response via [facility]? Call back       Best call back number: [phone] (home)       Additional Information:

## 2023-04-12 ENCOUNTER — HOSPITAL ENCOUNTER (OUTPATIENT)
Facility: OTHER | Age: 63
Discharge: HOME | End: 2023-04-12
Attending: STUDENT IN AN ORGANIZED HEALTH CARE EDUCATION/TRAINING PROGRAM | Admitting: STUDENT IN AN ORGANIZED HEALTH CARE EDUCATION/TRAINING PROGRAM
Payer: MEDICAID

## 2023-04-12 ENCOUNTER — ANESTHESIA (OUTPATIENT)
Dept: SURGERY | Facility: OTHER | Age: 63
End: 2023-04-12
Payer: MEDICAID

## 2023-04-12 DIAGNOSIS — N95.0 POSTMENOPAUSAL BLEEDING: ICD-10-CM

## 2023-04-12 DIAGNOSIS — Z90.710 S/P LAPAROSCOPIC HYSTERECTOMY: Primary | ICD-10-CM

## 2023-04-12 LAB
POCT GLUCOSE: 100 MG/DL (ref 70–110)
POCT GLUCOSE: 109 MG/DL (ref 70–110)

## 2023-04-12 PROCEDURE — 71000033 HC RECOVERY, INTIAL HOUR: Performed by: STUDENT IN AN ORGANIZED HEALTH CARE EDUCATION/TRAINING PROGRAM

## 2023-04-12 PROCEDURE — 63600175 PHARM REV CODE 636 W HCPCS: Mod: JZ,JG | Performed by: NURSE ANESTHETIST, CERTIFIED REGISTERED

## 2023-04-12 PROCEDURE — 88307 TISSUE EXAM BY PATHOLOGIST: CPT | Performed by: PATHOLOGY

## 2023-04-12 PROCEDURE — 71000039 HC RECOVERY, EACH ADD'L HOUR: Performed by: STUDENT IN AN ORGANIZED HEALTH CARE EDUCATION/TRAINING PROGRAM

## 2023-04-12 PROCEDURE — 88307 PR  SURG PATH,LEVEL V: ICD-10-PCS | Mod: 26,,, | Performed by: PATHOLOGY

## 2023-04-12 PROCEDURE — 25000003 PHARM REV CODE 250: Performed by: NURSE ANESTHETIST, CERTIFIED REGISTERED

## 2023-04-12 PROCEDURE — 71000015 HC POSTOP RECOV 1ST HR: Performed by: STUDENT IN AN ORGANIZED HEALTH CARE EDUCATION/TRAINING PROGRAM

## 2023-04-12 PROCEDURE — 82962 GLUCOSE BLOOD TEST: CPT | Performed by: STUDENT IN AN ORGANIZED HEALTH CARE EDUCATION/TRAINING PROGRAM

## 2023-04-12 PROCEDURE — 88307 TISSUE EXAM BY PATHOLOGIST: CPT | Mod: 26,,, | Performed by: PATHOLOGY

## 2023-04-12 PROCEDURE — 36000711: Performed by: STUDENT IN AN ORGANIZED HEALTH CARE EDUCATION/TRAINING PROGRAM

## 2023-04-12 PROCEDURE — 37000008 HC ANESTHESIA 1ST 15 MINUTES: Performed by: STUDENT IN AN ORGANIZED HEALTH CARE EDUCATION/TRAINING PROGRAM

## 2023-04-12 PROCEDURE — 36000710: Performed by: STUDENT IN AN ORGANIZED HEALTH CARE EDUCATION/TRAINING PROGRAM

## 2023-04-12 PROCEDURE — 63600175 PHARM REV CODE 636 W HCPCS: Performed by: NURSE ANESTHETIST, CERTIFIED REGISTERED

## 2023-04-12 PROCEDURE — 25000003 PHARM REV CODE 250: Performed by: STUDENT IN AN ORGANIZED HEALTH CARE EDUCATION/TRAINING PROGRAM

## 2023-04-12 PROCEDURE — 58571 TLH W/T/O 250 G OR LESS: CPT | Mod: ,,, | Performed by: STUDENT IN AN ORGANIZED HEALTH CARE EDUCATION/TRAINING PROGRAM

## 2023-04-12 PROCEDURE — 00840 ANES IPER PX LOWER ABD NOS: CPT | Performed by: STUDENT IN AN ORGANIZED HEALTH CARE EDUCATION/TRAINING PROGRAM

## 2023-04-12 PROCEDURE — P9045 ALBUMIN (HUMAN), 5%, 250 ML: HCPCS | Mod: JZ,JG | Performed by: NURSE ANESTHETIST, CERTIFIED REGISTERED

## 2023-04-12 PROCEDURE — 27201423 OPTIME MED/SURG SUP & DEVICES STERILE SUPPLY: Performed by: STUDENT IN AN ORGANIZED HEALTH CARE EDUCATION/TRAINING PROGRAM

## 2023-04-12 PROCEDURE — 63600175 PHARM REV CODE 636 W HCPCS: Performed by: ANESTHESIOLOGY

## 2023-04-12 PROCEDURE — 37000009 HC ANESTHESIA EA ADD 15 MINS: Performed by: STUDENT IN AN ORGANIZED HEALTH CARE EDUCATION/TRAINING PROGRAM

## 2023-04-12 PROCEDURE — 58571 PR LAPAROSCOPY W TOT HYSTERECTUTERUS <=250 GRAM  W TUBE/OVARY: ICD-10-PCS | Mod: ,,, | Performed by: STUDENT IN AN ORGANIZED HEALTH CARE EDUCATION/TRAINING PROGRAM

## 2023-04-12 PROCEDURE — 71000016 HC POSTOP RECOV ADDL HR: Performed by: STUDENT IN AN ORGANIZED HEALTH CARE EDUCATION/TRAINING PROGRAM

## 2023-04-12 PROCEDURE — 63600175 PHARM REV CODE 636 W HCPCS: Performed by: STUDENT IN AN ORGANIZED HEALTH CARE EDUCATION/TRAINING PROGRAM

## 2023-04-12 PROCEDURE — 25000003 PHARM REV CODE 250: Performed by: ANESTHESIOLOGY

## 2023-04-12 RX ORDER — ROCURONIUM BROMIDE 10 MG/ML
INJECTION, SOLUTION INTRAVENOUS
Status: DISCONTINUED | OUTPATIENT
Start: 2023-04-12 | End: 2023-04-12

## 2023-04-12 RX ORDER — DIPHENHYDRAMINE HYDROCHLORIDE 50 MG/ML
12.5 INJECTION INTRAMUSCULAR; INTRAVENOUS EVERY 30 MIN PRN
Status: DISCONTINUED | OUTPATIENT
Start: 2023-04-12 | End: 2023-04-12 | Stop reason: HOSPADM

## 2023-04-12 RX ORDER — ONDANSETRON 2 MG/ML
INJECTION INTRAMUSCULAR; INTRAVENOUS
Status: DISCONTINUED | OUTPATIENT
Start: 2023-04-12 | End: 2023-04-12

## 2023-04-12 RX ORDER — FAMOTIDINE 20 MG/1
20 TABLET, FILM COATED ORAL
Status: COMPLETED | OUTPATIENT
Start: 2023-04-12 | End: 2023-04-12

## 2023-04-12 RX ORDER — DEXTROMETHORPHAN HYDROBROMIDE, GUAIFENESIN 5; 100 MG/5ML; MG/5ML
650 LIQUID ORAL EVERY 6 HOURS PRN
Qty: 30 TABLET | Refills: 1 | Status: SHIPPED | OUTPATIENT
Start: 2023-04-12 | End: 2023-07-06 | Stop reason: ALTCHOICE

## 2023-04-12 RX ORDER — FENTANYL CITRATE 50 UG/ML
INJECTION, SOLUTION INTRAMUSCULAR; INTRAVENOUS
Status: DISCONTINUED | OUTPATIENT
Start: 2023-04-12 | End: 2023-04-12

## 2023-04-12 RX ORDER — PHENYLEPHRINE HYDROCHLORIDE 10 MG/ML
INJECTION INTRAVENOUS
Status: DISCONTINUED | OUTPATIENT
Start: 2023-04-12 | End: 2023-04-12

## 2023-04-12 RX ORDER — MEPERIDINE HYDROCHLORIDE 25 MG/ML
12.5 INJECTION INTRAMUSCULAR; INTRAVENOUS; SUBCUTANEOUS ONCE AS NEEDED
Status: DISCONTINUED | OUTPATIENT
Start: 2023-04-12 | End: 2023-04-12 | Stop reason: HOSPADM

## 2023-04-12 RX ORDER — SODIUM CHLORIDE, SODIUM LACTATE, POTASSIUM CHLORIDE, CALCIUM CHLORIDE 600; 310; 30; 20 MG/100ML; MG/100ML; MG/100ML; MG/100ML
INJECTION, SOLUTION INTRAVENOUS CONTINUOUS
Status: DISCONTINUED | OUTPATIENT
Start: 2023-04-12 | End: 2023-04-12 | Stop reason: HOSPADM

## 2023-04-12 RX ORDER — PROPOFOL 10 MG/ML
VIAL (ML) INTRAVENOUS
Status: DISCONTINUED | OUTPATIENT
Start: 2023-04-12 | End: 2023-04-12

## 2023-04-12 RX ORDER — MUPIROCIN 20 MG/G
OINTMENT TOPICAL
Status: DISCONTINUED | OUTPATIENT
Start: 2023-04-12 | End: 2023-04-12 | Stop reason: HOSPADM

## 2023-04-12 RX ORDER — BUPIVACAINE HYDROCHLORIDE 2.5 MG/ML
INJECTION, SOLUTION EPIDURAL; INFILTRATION; INTRACAUDAL
Status: DISCONTINUED | OUTPATIENT
Start: 2023-04-12 | End: 2023-04-12 | Stop reason: HOSPADM

## 2023-04-12 RX ORDER — SODIUM CHLORIDE 9 MG/ML
INJECTION, SOLUTION INTRAVENOUS CONTINUOUS
Status: DISCONTINUED | OUTPATIENT
Start: 2023-04-12 | End: 2023-04-12 | Stop reason: HOSPADM

## 2023-04-12 RX ORDER — ALBUMIN HUMAN 50 G/1000ML
SOLUTION INTRAVENOUS CONTINUOUS PRN
Status: DISCONTINUED | OUTPATIENT
Start: 2023-04-12 | End: 2023-04-12

## 2023-04-12 RX ORDER — OXYCODONE HYDROCHLORIDE 5 MG/1
5 TABLET ORAL EVERY 4 HOURS PRN
Qty: 30 TABLET | Refills: 0 | Status: SHIPPED | OUTPATIENT
Start: 2023-04-12 | End: 2023-07-06 | Stop reason: ALTCHOICE

## 2023-04-12 RX ORDER — HYDROMORPHONE HYDROCHLORIDE 2 MG/ML
0.4 INJECTION, SOLUTION INTRAMUSCULAR; INTRAVENOUS; SUBCUTANEOUS EVERY 5 MIN PRN
Status: DISCONTINUED | OUTPATIENT
Start: 2023-04-12 | End: 2023-04-12 | Stop reason: HOSPADM

## 2023-04-12 RX ORDER — AMOXICILLIN 250 MG
2 CAPSULE ORAL DAILY
Qty: 30 TABLET | Refills: 0 | Status: SHIPPED | OUTPATIENT
Start: 2023-04-12 | End: 2023-07-06

## 2023-04-12 RX ORDER — ACETAMINOPHEN 325 MG/1
975 TABLET ORAL
Status: COMPLETED | OUTPATIENT
Start: 2023-04-12 | End: 2023-04-12

## 2023-04-12 RX ORDER — LIDOCAINE HYDROCHLORIDE 20 MG/ML
INJECTION INTRAVENOUS
Status: DISCONTINUED | OUTPATIENT
Start: 2023-04-12 | End: 2023-04-12

## 2023-04-12 RX ORDER — HALOPERIDOL 5 MG/ML
INJECTION INTRAMUSCULAR
Status: DISCONTINUED | OUTPATIENT
Start: 2023-04-12 | End: 2023-04-12

## 2023-04-12 RX ORDER — PROCHLORPERAZINE EDISYLATE 5 MG/ML
5 INJECTION INTRAMUSCULAR; INTRAVENOUS EVERY 30 MIN PRN
Status: DISCONTINUED | OUTPATIENT
Start: 2023-04-12 | End: 2023-04-12 | Stop reason: HOSPADM

## 2023-04-12 RX ORDER — OXYCODONE HYDROCHLORIDE 5 MG/1
5 TABLET ORAL
Status: DISCONTINUED | OUTPATIENT
Start: 2023-04-12 | End: 2023-04-12 | Stop reason: HOSPADM

## 2023-04-12 RX ORDER — LIDOCAINE HYDROCHLORIDE 10 MG/ML
0.5 INJECTION, SOLUTION EPIDURAL; INFILTRATION; INTRACAUDAL; PERINEURAL ONCE
Status: DISCONTINUED | OUTPATIENT
Start: 2023-04-12 | End: 2023-04-12 | Stop reason: HOSPADM

## 2023-04-12 RX ORDER — SODIUM CHLORIDE 0.9 % (FLUSH) 0.9 %
3 SYRINGE (ML) INJECTION
Status: DISCONTINUED | OUTPATIENT
Start: 2023-04-12 | End: 2023-04-12 | Stop reason: HOSPADM

## 2023-04-12 RX ORDER — KETAMINE HYDROCHLORIDE 50 MG/ML
INJECTION, SOLUTION INTRAMUSCULAR; INTRAVENOUS
Status: DISCONTINUED | OUTPATIENT
Start: 2023-04-12 | End: 2023-04-12

## 2023-04-12 RX ADMIN — PHENYLEPHRINE HYDROCHLORIDE 100 MCG: 10 INJECTION INTRAVENOUS at 02:04

## 2023-04-12 RX ADMIN — CEFAZOLIN 2 G: 2 INJECTION, POWDER, FOR SOLUTION INTRAMUSCULAR; INTRAVENOUS at 12:04

## 2023-04-12 RX ADMIN — ROCURONIUM BROMIDE 10 MG: 10 INJECTION INTRAVENOUS at 01:04

## 2023-04-12 RX ADMIN — PHENYLEPHRINE HYDROCHLORIDE 100 MCG: 10 INJECTION INTRAVENOUS at 01:04

## 2023-04-12 RX ADMIN — MUPIROCIN: 20 OINTMENT TOPICAL at 10:04

## 2023-04-12 RX ADMIN — LIDOCAINE HYDROCHLORIDE 75 MG: 20 INJECTION, SOLUTION INTRAVENOUS at 12:04

## 2023-04-12 RX ADMIN — ACETAMINOPHEN 975 MG: 325 TABLET, FILM COATED ORAL at 10:04

## 2023-04-12 RX ADMIN — SODIUM CHLORIDE, SODIUM LACTATE, POTASSIUM CHLORIDE, AND CALCIUM CHLORIDE: 600; 310; 30; 20 INJECTION, SOLUTION INTRAVENOUS at 12:04

## 2023-04-12 RX ADMIN — ROCURONIUM BROMIDE 20 MG: 10 INJECTION INTRAVENOUS at 02:04

## 2023-04-12 RX ADMIN — FENTANYL CITRATE 100 MCG: 0.05 INJECTION, SOLUTION INTRAMUSCULAR; INTRAVENOUS at 12:04

## 2023-04-12 RX ADMIN — PROPOFOL 160 MG: 10 INJECTION, EMULSION INTRAVENOUS at 12:04

## 2023-04-12 RX ADMIN — ROCURONIUM BROMIDE 20 MG: 10 INJECTION INTRAVENOUS at 01:04

## 2023-04-12 RX ADMIN — KETAMINE HYDROCHLORIDE 50 MG: 50 INJECTION, SOLUTION INTRAMUSCULAR; INTRAVENOUS at 12:04

## 2023-04-12 RX ADMIN — SUGAMMADEX 400 MG: 100 INJECTION, SOLUTION INTRAVENOUS at 03:04

## 2023-04-12 RX ADMIN — SODIUM CHLORIDE, SODIUM LACTATE, POTASSIUM CHLORIDE, AND CALCIUM CHLORIDE: 600; 310; 30; 20 INJECTION, SOLUTION INTRAVENOUS at 01:04

## 2023-04-12 RX ADMIN — HALOPERIDOL LACTATE 1 MG: 5 INJECTION, SOLUTION INTRAMUSCULAR at 03:04

## 2023-04-12 RX ADMIN — GLYCOPYRROLATE 0.2 MG: 0.2 INJECTION, SOLUTION INTRAMUSCULAR; INTRAVITREAL at 01:04

## 2023-04-12 RX ADMIN — ONDANSETRON HYDROCHLORIDE 4 MG: 2 INJECTION INTRAMUSCULAR; INTRAVENOUS at 03:04

## 2023-04-12 RX ADMIN — FAMOTIDINE 20 MG: 20 TABLET, FILM COATED ORAL at 10:04

## 2023-04-12 RX ADMIN — ROCURONIUM BROMIDE 40 MG: 10 INJECTION INTRAVENOUS at 12:04

## 2023-04-12 RX ADMIN — ALBUMIN (HUMAN): 12.5 SOLUTION INTRAVENOUS at 12:04

## 2023-04-12 RX ADMIN — PHENYLEPHRINE HYDROCHLORIDE 100 MCG: 10 INJECTION INTRAVENOUS at 12:04

## 2023-04-12 NOTE — ANESTHESIA POSTPROCEDURE EVALUATION
Anesthesia Post Evaluation    Patient: Holly Mcclelland    Procedure(s) Performed: Procedure(s) (LRB):  HYSTERECTOMY,TOTAL,LAPAROSCOPIC,WITH SALPINGO-OOPHORECTOMY (Bilateral)  CYSTOSCOPY (N/A)  LYSIS, ADHESIONS (N/A)    Final Anesthesia Type: general      Patient location during evaluation: PACU  Patient participation: Yes- Able to Participate  Level of consciousness: awake and alert  Post-procedure vital signs: reviewed and stable  Pain management: adequate  Airway patency: patent    PONV status at discharge: No PONV  Anesthetic complications: no      Cardiovascular status: blood pressure returned to baseline  Respiratory status: spontaneous ventilation  Hydration status: euvolemic  Follow-up not needed.          Vitals Value Taken Time   /77 04/12/23 1615   Temp 36.6 °C (97.9 °F) 04/12/23 1615   Pulse 72 04/12/23 1615   Resp 18 04/12/23 1645   SpO2 100 % 04/12/23 1645         Event Time   Out of Recovery 16:10:00         Pain/Mary Score: Pain Rating Prior to Med Admin: 0 (4/12/2023 10:27 AM)  Mary Score: 10 (4/12/2023  4:45 PM)

## 2023-04-12 NOTE — INTERVAL H&P NOTE
The patient has been examined and the H&P has been reviewed:    I concur with the findings and no changes have occurred since H&P was written.    Surgery risks, benefits and alternative options discussed and understood by patient/family.      Vibha Guerrero M.D.   OB/GYN  PGY-4      There are no hospital problems to display for this patient.

## 2023-04-12 NOTE — PLAN OF CARE
Holly Mcclelland has met all discharge criteria from Phase II. Vital Signs are stable, ambulating  without difficulty. Discharge instructions given, patient verbalized understanding. Discharged from facility via wheelchair in stable condition.

## 2023-04-12 NOTE — ANESTHESIA PROCEDURE NOTES
Intubation    Date/Time: 4/12/2023 12:29 PM  Performed by: Shahana Hayden CRNA  Authorized by: Edgardo Linares MD     Intubation:     Induction:  Intravenous    Intubated:  Postinduction    Mask Ventilation:  Easy with oral airway    Attempts:  1    Attempted By:  CRNA    Method of Intubation:  Video laryngoscopy    Blade:  Ricketts 3    Laryngeal View Grade: Grade I - full view of cords      Difficult Airway Encountered?: No      Complications:  None    Airway Device:  Oral endotracheal tube    Airway Device Size:  7.0    Style/Cuff Inflation:  Cuffed (inflated to minimal occlusive pressure)    Tube secured:  22    Secured at:  The lips    Placement Verified By:  Capnometry    Complicating Factors:  None    Findings Post-Intubation:  BS equal bilateral and atraumatic/condition of teeth unchanged

## 2023-04-12 NOTE — TRANSFER OF CARE
Anesthesia Transfer of Care Note    Patient: Holly Mcclelland    Procedure(s) Performed: Procedure(s) (LRB):  HYSTERECTOMY,TOTAL,LAPAROSCOPIC,WITH SALPINGO-OOPHORECTOMY (Bilateral)  CYSTOSCOPY (N/A)  LYSIS, ADHESIONS (N/A)    Patient location: PACU    Anesthesia Type: general    Transport from OR: Transported from OR on 2-3 L/min O2 by NC with adequate spontaneous ventilation    Post pain: adequate analgesia    Post assessment: no apparent anesthetic complications and tolerated procedure well    Post vital signs: stable    Level of consciousness: awake and responds to stimulation    Nausea/Vomiting: no nausea/vomiting    Complications: none    Transfer of care protocol was followed      Last vitals:   Visit Vitals  /73 (BP Location: Right arm, Patient Position: Lying)   Pulse 83   Temp 36.9 °C (98.4 °F) (Oral)   Resp 18   SpO2 98%   Breastfeeding No

## 2023-04-12 NOTE — OP NOTE
OPERATIVE REPORT    DATE: 04/12/2023     PREOPERATIVE DIAGNOSIS  1. PMB    POSTOPERATIVE DIAGNOSIS  1. PMB    PROCEDURE:  1. Total Laparoscopic Hysterectomy  2. Bilateral Salpingo-oophorectomy    SURGEON: Nella Marte MD    ASSISTANT:   Bruno Gr MD-PGY 4  Moriah Guerrero MD, PGY4    ANESTHESIA: General    COMPLICATIONS: None    EBL: < 25 mL    IV FLUIDS: 2000 mL    URINE OUTPUT: 200 mL    FINDINGS: Normal external genitalia, atrophic vagina, normal cervix. Uterus sound to 8 cm. Normal uterus/tubes/ovaries, bowel, cardiac window, stomach, liver, falciform. Epiploica attached to posterior uterus and hydro dissected off. Hemostasis noted. Uterus, cervix, bilateral tubes and ovaries removed and sent to pathology. Vaginal cuff closed vaginally, Cystoscopy revealed bilateral ureteral efflux. Hemostasis noted.     PROCEDURE: Patient was taken to the operating room where general anesthesia was administered and found to be adequate. She was prepped and draped in the dorsal lithotomy position with legs in booted stirrups. A doe catheter was placed in the bladder. A weighted sterile speculum was placed in the vagina and a right angle retractor was used to visualize the cervix. The anterior lip of the cervix was grasped with a single tooth tenaculum. The uterus was sounded to approximately 8 cm. Figure-of-eight sutures of 0-Vicryl were placed at 12 o'clock and 6 o'clock on the cervix. A 8 cm JAISON manipulator was placed inside the endometrial cavity, with medium Koh cup and vaginal occluder ballon.     Gloves were changed. A Veress needle was inserted into the umbilicus under tenting of the anterior abdominal wall. Placement into the peritoneal cavity was confirmed via saline drop test. The abdomen was insufflated to 15mm Hg using carbon dioxide. A 5mm infraumbilical skin incision was made with the scalpel and a 5mm Optiview trocar was advanced through this incision into the abdominal cavity. And abdominal survey revealed  normal-appearing structures including liver edge, cardiac window, stomach edge, omentum, and bowel. The patient was placed in deep Trendelenburg. A survey of the pelvis revealed normal uterus, with normal-appearing bilateral tubes and ovaries. Anterior cul-de-sacs were free of adhesive disease. Posterior cul de sac with bowel adhered to left pelvic side wall and bowel epiploica adhered to left uterosacral/uterus.     A 5 mm left lateral skin incision was made with a scalpel and a 5 mm trocar was advanced through this incision under visualization with laparoscope. The same was done on the right. Attention was turned to the right round ligament. This was cauterized and transected, and then the anterior leaf was divided anteriorly to create the bladder flap.The right ureter was observed vermiculating, and noted to be well below the IP ligament proximally, and well below the top of the koh cup distally. The right IP was cauterized and transected, the dissection was carried down to the level of the uterine vessels.     Attention was turned to the left side. The round ligament was cauterized and transected and the anterior leaf was divided and connected to the bladder flap. The left ureter was initially unable to be visualized on the left pelvic side wall. The posterior leaf of the broad was taken down and the retroperitoneal space developed. The ureter was noted here and was well below the IP ligament proximally. A window was created in the avascular posterior broad ligament and the IP was isolated then cauterized and transected and the dissection was carried down to the level of the uterine arteries. The uterine arteries were skeletonized bilaterally and cauterized.  The posterior cul de sac was then evaluated. The bowel epiploica. adhered to the posterior uterus/uterosacrals had a clear plane and hydro dissection and blunt sweeping techniques were used and the epiploica successfully came down.The bladder was dissected  further off the cervix. The anterior colpotomy was created. This was continued to the level of the uterine vessels bilaterally. Attention was turned to the posterior portion of the uterus. The posterior colpotomy was created and carried around to the level of the uterine vessels bilaterally. The left uterine vessels were again cauterized and then were transected. The anterior and posterior colpotomies were connected. The anterior and posterior colpotomies were connected. The uterus was removed vaginally. The pelvis was irrigated and suctioned and noted to be hemostatic, including the epiploica that was taken down from the left posterior uterus.     The vaginal cuff was then grasped at the angles with Allis clamps. The angles were secured bilaterally with 0 vicryl in figure-of-eight sutures. The Allis clamps were moved to the anterior and posterior edges of the cuff, taking care to include the peritoneal edges. The vaginal cuff was then reapproximated with figure-of-eight sutures of 0 vicryl for complete closure.    Cystourethroscopy demonstrated no evidence of bladder injury, and bilateral ureteral efflux was observed. Nino catheter was replaced.     Gloves were changed again and the pelvis was inspected laparoscopically. Adequate vaginal closure including peritoneum was visually noted and a manual exam was performed to verify that there is no defect. The pelvis was copiously irrigated and suctioned. Excellent hemostasis was noted.  The instruments were withdrawn and the trocars were removed. The patient was taken out of Trendelenburg. The abdomen was desufflated and the incisions were closed with subcuticular sutures in 4-0 Monocryl. Sponge, lap, and needle counts were correct x 2. The patient was taken to the recovery room in stable condition.     Bruno Gr MD  OBGYN PGY-4    I was present and scrubbed for the entire procedure to the point of skin closure and agree with the documentation as written.      Nella Marte MD  Obstetrics & Gynecology   [facility]

## 2023-04-12 NOTE — DISCHARGE SUMMARY
[facility]  Brief Op Note/Discharge Note  Short Stay    Admit Date: 4/12/2023    Discharge Date: 04/12/2023    Attending Physician: Scott Marte MD     Surgery Date: 4/12/2023     Surgeon(s) and Role:     * Scott Marte MD - Primary     * Vibha Guerrero MD - Resident - Assisting     * Shahana Gr MD - Resident - Assisting        Pre-op Diagnosis:  Postmenopausal bleeding [N95.0]    Post-op Diagnosis:  Post-Op Diagnosis Codes:     * Postmenopausal bleeding [N95.0]    Procedure(s) (LRB):  HYSTERECTOMY,TOTAL,LAPAROSCOPIC,WITH SALPINGO-OOPHORECTOMY (Bilateral)  CYSTOSCOPY (N/A)  LYSIS, ADHESIONS (N/A)    Anesthesia: General    Findings/Key Components: Normal external genitalia, atrophic vagina, normal cervix. Uterus sound to 8 cm. Normal uterus/tubes/ovaries, bowel, cardiac window, stomach, liver, falciform. Epiploica attached to posterior uterus and hydro dissected off. Hemostasis noted. Uterus, cervix, bilateral tubes and ovaries removed and sent to pathology. Vaginal cuff closed vaginally, Cystoscopy revealed bilateral ureteral efflux. Hemostasis noted.     Estimated Blood Loss: 50 mL         Specimens:   Specimen (24h ago, onward)       Start     Ordered    04/12/23 1514  Specimen to Pathology, Surgery Gynecology and Obstetrics  Once        Comments: Pre-op Diagnosis: Postmenopausal bleeding [N95.0]Procedure(s):HYSTERECTOMY,TOTAL,LAPAROSCOPIC,WITH SALPINGO-OOPHORECTOMY Number of specimens: 1Name of specimens: 1).  UTERUS, CERVIX, BILATERAL FALLOPIAN TUBES AND OVARIES     References:    Click here for ordering Quick Tip   Question Answer Comment   Procedure Type: Gynecology and Obstetrics    Specimen Class: Routine/Screening    Which provider would you like to cc? SCOTT MARTE    Release to patient Immediate        04/12/23 1514    04/12/23 1446  Specimen to Pathology, Surgery Gynecology and Obstetrics  Once,   Status:  Canceled        Comments: Pre-op Diagnosis:  Postmenopausal bleeding [N95.0]Procedure(s):HYSTERECTOMY,TOTAL,LAPAROSCOPIC,WITH SALPINGO-OOPHORECTOMY Number of specimens: 1Name of specimens: 1).  UTERUS, CERVIX, BILATERAL FALLOPIAN TUBES     References:    Click here for ordering Quick Tip   Question Answer Comment   Procedure Type: Gynecology and Obstetrics    Which provider would you like to cc? SCOTT JIMÉNEZ    Release to patient Immediate        04/12/23 1446                    Discharge Provider: Shahana Gr    Diagnoses:  Active Hospital Problems    Diagnosis  POA    *sp TLH/BSO on 4/12/23 for PMB at age 62 [Z90.710]  No    Post-menopausal bleeding [N95.0]  Yes      Resolved Hospital Problems   No resolved problems to display.       Discharged Condition: good    Hospital Course:   Patient was admitted for outpatient procedure as above, and tolerated the procedure well with no complications. Please see operative report for further details. Following the procedure, the patient was awakened from anesthesia and transferred to the recovery area in stable condition. She was discharged to home once ambulating, voiding, tolerating PO intake, and pain was well-controlled. Patient was given routine post-op instructions and prescriptions for pain medication to take as needed. Patient instructed to follow up with Dr. Jiménez in 6 weeks.    Final Diagnoses: Same as principal problem.    Disposition: Home or Self Care    Follow up/Patient Instructions:    Medications:  Reconciled Home Medications:      Medication List        START taking these medications      acetaminophen 650 MG Tbsr  Commonly known as: TYLENOL  Take 1 tablet (650 mg total) by mouth every 6 (six) hours as needed (Pain).     oxyCODONE 5 MG immediate release tablet  Commonly known as: ROXICODONE  Take 1 tablet (5 mg total) by mouth every 4 (four) hours as needed for Pain.     senna-docusate 8.6-50 mg 8.6-50 mg per tablet  Commonly known as: PERICOLACE  Take 2 tablets by mouth once daily.             CONTINUE taking these medications      aspirin 81 MG EC tablet  Commonly known as: ECOTRIN  Take 1 tablet by mouth once daily     biotin 10,000 mcg Cap  Take by mouth Daily.     blood sugar diagnostic Strp  Commonly known as: ONETOUCH ULTRA TEST  1 strip by Misc.(Non-Drug; Combo Route) route once daily.     CINNAMON ORAL  Take 1,000 mg by mouth Daily.     diazePAM 10 MG Tab  Commonly known as: VALIUM  Take 10 mg by mouth daily as needed.     FARXIGA 10 mg tablet  Generic drug: dapagliflozin  Take 1 tablet (10 mg total) by mouth once daily.     glucosamine-chondroitin 500-400 mg tablet  Take 2 tablets by mouth Daily.     hydrALAZINE 25 MG tablet  Commonly known as: APRESOLINE  Take 1 tablet (25 mg total) by mouth 3 (three) times daily.     lisinopriL-hydrochlorothiazide 20-12.5 mg per tablet  Commonly known as: PRINZIDE,ZESTORETIC  Take 1 tablet by mouth once daily.     OCUVITE ORAL  Take by mouth Daily.     ONETOUCH DELICA PLUS LANCET 33 gauge Misc  Generic drug: lancets  USE 1  TO CHECK GLUCOSE 4 TIMES DAILY     ONETOUCH ULTRA2 METER Misc  Generic drug: blood-glucose meter  USE TO CHECK GLUCOSE TWICE DAILY     OZEMPIC 0.25 mg or 0.5 mg(2 mg/1.5 mL) pen injector  Generic drug: semaglutide  Inject 0.5 mg into the skin every 7 days.     potassium gluconate 595 mg (99 mg) Tab  Take by mouth Daily.     pravastatin 40 MG tablet  Commonly known as: PRAVACHOL  Take 1 tablet (40 mg total) by mouth every evening.            ASK your doctor about these medications      furosemide 20 MG tablet  Commonly known as: LASIX  TAKE 1 TABLET BY MOUTH ONCE DAILY AS NEEDED FOR  SWELLING            Discharge Procedure Orders   Diet general     Lifting restrictions   Order Comments: No lifting greater than 15 pounds for six weeks.     Other restrictions (specify):   Order Comments: PELVIC REST:  No douching, tampons, or intercourse for 6 weeks.    If prescribed, vaginal estrogen cream may be used during the postoperative period.      DRIVING:  No driving while on narcotics. Driving may be resumed initially with a competent passenger one to two weeks after surgery if no longer taking narcotics.     EXERCISE:  For six weeks your exercise should be limited to walking. You may walk as far as you wish, as long as you increase your level of exertion gradually and avoid slippery surfaces. You may climb stairs as needed to get around, but should not use stair climbing for exercise.     Remove dressing in 24 hours   Order Comments: If you have a bandage on wound, you may remove it the day after dismissal.  If you had steri-strips remove them once they begin to peel off (usually 2 weeks). Keep incision clean and dry.  Inspect the incision daily for signs and symptoms of infection.     Wound care routine (specify)   Order Comments: WOUND CARE:  If you have a band-aid or bandage on your wound, you may remove it the day after dismissal.  If you had steri-strips remove them once they begin to peel off (usually 2 weeks).  If your steri-strips still haven't come off in 2 weeks, please remove them. You may wash the wound with mild soap and water.   You may shower at any time but should avoid immersing any abdominal incisions in water for at least two weeks after surgery or until the wound is completely healed. If given, please shower with Hibiclens soap until bottle is completely finished. Keep your wound clean and dry.  You should observe your incision for signs of infection which include redness, warmth, drainage or fever.     Call MD for:  temperature >100.4     Call MD for:  persistent nausea and vomiting     Call MD for:  severe uncontrolled pain     Call MD for:  difficulty breathing, headache or visual disturbances     Call MD for:  redness, tenderness, or signs of infection (pain, swelling, redness, odor or green/yellow discharge around incision site)     Call MD for:  hives     Call MD for:   Order Comments: inability to void,urine is ketchup  colored or you have large clots, vaginal bleeding is heavier than a period.    VAGINAL DISCHARGE: You may develop a vaginal discharge and intermittent vaginal spotting after surgery and up to 6 weeks postoperatively.  The discharge may have an odor and may change in color but it is normal.  This is due to dissolving stiches.  Contact your surgical team if you develop vaginal or vulvar irritation along with a discharge.  Also contact your surgical team if you have vaginal discharge that smells like urine or stool.    CONSTIPATION REMEDIES: Patients are often constipated after surgery or with use of oral narcotic medicine. You should continue to take the stool softener, Senokot-S during the next six weeks, and consume adequate amounts of water.  If you have not had a bowel movement for 3 days after dismissal, or are uncomfortable and unable to pass stool, please try one or all of the following measures:  1.  Milk of Magnesia - 30 cc by mouth every 12 hours   2.  Dulcolax suppository - One suppository per rectum every 4-6 hours   3.  Metamucil, Fibercon or other bulk former - use as directed  4.  Fleets Enema        PAIN MEDICATIONS:     Take your pain medications as instructed. It is best to take pain medications before your pain becomes severe. This will allow you to take less medication yet have better pain relief. For the first 2 or 3 days it may be helpful to take your pain medications on a regular schedule (e.g. every 4 to 6 hours). This will help you to keep your pain under better control. You should then begin to take fewer medications each day until you no longer need them. Do not take pain medication on an empty stomach. This may lead to nausea and vomiting.     Activity as tolerated   Order Comments: Return to normal activity slowly as you feel able.  For 6 weeks your exercise should be limited to walking.  You may walk as far as you wish, as long as you increase your level of exertion gradually and avoid  slippery surfaces.    If you had a hysterectomy at the surgery do not insert anything in your vagina for 9 weeks.     Shower on day dressing removed (No bath)   Order Comments: You may shower at any time but should avoid immersing any abdominal incisions in water for at least 2 weeks after surgery or until the wound is completely healed.  If given, please shower with Hibaclens soap until bottle is completely finish      Follow-up Information       Nella Jiménez MD. Schedule an appointment as soon as possible for a visit in 6 week(s).    Specialty: Obstetrics and Gynecology  Why: Post Op  Contact information:  [address]  [address] [facility] [address]  [phone]                             Bruno Gr MD  OBGYN PGY-4

## 2023-04-13 ENCOUNTER — TELEPHONE (OUTPATIENT)
Dept: OBSTETRICS AND GYNECOLOGY | Facility: CLINIC | Age: 63
End: 2023-04-13
Payer: MEDICAID

## 2023-04-13 VITALS
HEART RATE: 87 BPM | TEMPERATURE: 98 F | RESPIRATION RATE: 16 BRPM | SYSTOLIC BLOOD PRESSURE: 160 MMHG | OXYGEN SATURATION: 100 % | DIASTOLIC BLOOD PRESSURE: 74 MMHG

## 2023-04-13 NOTE — TELEPHONE ENCOUNTER
Routine post op call.   TLH/BSO yesterday. Tolerating PO, urinating, pain well controlled, +flatus, no VB. Seems pleased. Taking pain meds and stool softeners as prescribed. Office/ED precautions reviewed.

## 2023-04-13 NOTE — LETTER
April 13, 2023      [facility] [address]  [address]  [facility] [address]  Phone: [phone]  Fax: [phone]       Patient: Holly Mcclelland   YOB: 1960  Date of Visit: 04/13/2023    To Whom It May Concern:    Holly Mcclelland  underwent surgery at [facility] on 04/12/2023. I have asked her to take a leave from work for a minimum of 6 weeks to recover. If you have any questions or concerns, or if I can be of further assistance, please do not hesitate to contact me.    Sincerely,    Nella Marte MD

## 2023-04-20 LAB
FINAL PATHOLOGIC DIAGNOSIS: NORMAL
GROSS: NORMAL
Lab: NORMAL

## 2023-04-21 ENCOUNTER — TELEPHONE (OUTPATIENT)
Dept: OBSTETRICS AND GYNECOLOGY | Facility: CLINIC | Age: 63
End: 2023-04-21
Payer: MEDICAID

## 2023-04-21 NOTE — TELEPHONE ENCOUNTER
Returned pts call. Pt needed post op visit scheduled. Vu and appt scheduled     ----- Message from Germán Bear sent at 4/21/2023  1:31 PM CDT -----      Name of Who is Calling: SARANYA WEST [[id number]]      What is the request in detail: Pt called to schedule post-op appt hopefully at the [place] location.Please contact to further discuss and advise.          Can the clinic reply by [facility]: Y      What Number to Call Back if not in [facility]: [phone]

## 2023-04-27 ENCOUNTER — PATIENT MESSAGE (OUTPATIENT)
Dept: DIABETES | Facility: CLINIC | Age: 63
End: 2023-04-27
Payer: MEDICAID

## 2023-05-03 ENCOUNTER — TELEPHONE (OUTPATIENT)
Dept: OBSTETRICS AND GYNECOLOGY | Facility: CLINIC | Age: 63
End: 2023-05-03
Payer: MEDICAID

## 2023-05-03 NOTE — TELEPHONE ENCOUNTER
Returned pts call regarding bleeding. She was having the usual light spotting off/on up until today when she stood she passed several large clots. This has happened a couple of times today. She has not noticed any ongoing bleeding in a few hours says it seems to be lightening. She is not having any pain. Was not lifting anything, no vaginal penetration etc.    I advised pt to go to [facility]. She seemed quite hesitant. I have added her onto clinic schedule but I did strongly recommend that she present to [facility] ED especially if she has further episodes of heavy bleeding/clots.

## 2023-05-03 NOTE — TELEPHONE ENCOUNTER
Returned pts call. Per Dr. Marte to schedule pt for tomorrow morning. Pt vu and appt scheduled     ----- Message from Rajani Souza sent at 5/3/2023  2:07 PM CDT -----  Regarding: concerns  Name of Who is Calling: Holly           What is the request in detail: Patient is requesting a call back in regards to the heavy bleeding she is having since her hysterectomy. She stated this is the first time that this has happen.            Can the clinic reply by [facility]: No           What Number to Call Back if not in [facility]: [phone]

## 2023-05-04 ENCOUNTER — OFFICE VISIT (OUTPATIENT)
Dept: OBSTETRICS AND GYNECOLOGY | Facility: CLINIC | Age: 63
End: 2023-05-04
Payer: MEDICAID

## 2023-05-04 VITALS
DIASTOLIC BLOOD PRESSURE: 62 MMHG | BODY MASS INDEX: 33.01 KG/M2 | SYSTOLIC BLOOD PRESSURE: 110 MMHG | WEIGHT: 210.31 LBS | TEMPERATURE: 99 F | HEIGHT: 67 IN

## 2023-05-04 DIAGNOSIS — N95.8 GENITOURINARY SYNDROME OF MENOPAUSE: ICD-10-CM

## 2023-05-04 DIAGNOSIS — Z09 POSTOP CHECK: Primary | ICD-10-CM

## 2023-05-04 DIAGNOSIS — N89.8 VAGINAL ODOR: ICD-10-CM

## 2023-05-04 PROCEDURE — 99499 NO LOS: ICD-10-PCS | Mod: S$PBB,,, | Performed by: STUDENT IN AN ORGANIZED HEALTH CARE EDUCATION/TRAINING PROGRAM

## 2023-05-04 PROCEDURE — 3008F BODY MASS INDEX DOCD: CPT | Mod: CPTII,,, | Performed by: STUDENT IN AN ORGANIZED HEALTH CARE EDUCATION/TRAINING PROGRAM

## 2023-05-04 PROCEDURE — 4010F PR ACE/ARB THEARPY RXD/TAKEN: ICD-10-PCS | Mod: CPTII,,, | Performed by: STUDENT IN AN ORGANIZED HEALTH CARE EDUCATION/TRAINING PROGRAM

## 2023-05-04 PROCEDURE — 3044F HG A1C LEVEL LT 7.0%: CPT | Mod: CPTII,,, | Performed by: STUDENT IN AN ORGANIZED HEALTH CARE EDUCATION/TRAINING PROGRAM

## 2023-05-04 PROCEDURE — 4010F ACE/ARB THERAPY RXD/TAKEN: CPT | Mod: CPTII,,, | Performed by: STUDENT IN AN ORGANIZED HEALTH CARE EDUCATION/TRAINING PROGRAM

## 2023-05-04 PROCEDURE — 1159F PR MEDICATION LIST DOCUMENTED IN MEDICAL RECORD: ICD-10-PCS | Mod: CPTII,,, | Performed by: STUDENT IN AN ORGANIZED HEALTH CARE EDUCATION/TRAINING PROGRAM

## 2023-05-04 PROCEDURE — 3078F DIAST BP <80 MM HG: CPT | Mod: CPTII,,, | Performed by: STUDENT IN AN ORGANIZED HEALTH CARE EDUCATION/TRAINING PROGRAM

## 2023-05-04 PROCEDURE — 99999 PR PBB SHADOW E&M-EST. PATIENT-LVL IV: CPT | Mod: PBBFAC,,, | Performed by: STUDENT IN AN ORGANIZED HEALTH CARE EDUCATION/TRAINING PROGRAM

## 2023-05-04 PROCEDURE — 3078F PR MOST RECENT DIASTOLIC BLOOD PRESSURE < 80 MM HG: ICD-10-PCS | Mod: CPTII,,, | Performed by: STUDENT IN AN ORGANIZED HEALTH CARE EDUCATION/TRAINING PROGRAM

## 2023-05-04 PROCEDURE — 99214 OFFICE O/P EST MOD 30 MIN: CPT | Mod: PBBFAC,PN | Performed by: STUDENT IN AN ORGANIZED HEALTH CARE EDUCATION/TRAINING PROGRAM

## 2023-05-04 PROCEDURE — 99499 UNLISTED E&M SERVICE: CPT | Mod: S$PBB,,, | Performed by: STUDENT IN AN ORGANIZED HEALTH CARE EDUCATION/TRAINING PROGRAM

## 2023-05-04 PROCEDURE — 3074F PR MOST RECENT SYSTOLIC BLOOD PRESSURE < 130 MM HG: ICD-10-PCS | Mod: CPTII,,, | Performed by: STUDENT IN AN ORGANIZED HEALTH CARE EDUCATION/TRAINING PROGRAM

## 2023-05-04 PROCEDURE — 99999 PR PBB SHADOW E&M-EST. PATIENT-LVL IV: ICD-10-PCS | Mod: PBBFAC,,, | Performed by: STUDENT IN AN ORGANIZED HEALTH CARE EDUCATION/TRAINING PROGRAM

## 2023-05-04 PROCEDURE — 3044F PR MOST RECENT HEMOGLOBIN A1C LEVEL <7.0%: ICD-10-PCS | Mod: CPTII,,, | Performed by: STUDENT IN AN ORGANIZED HEALTH CARE EDUCATION/TRAINING PROGRAM

## 2023-05-04 PROCEDURE — 3008F PR BODY MASS INDEX (BMI) DOCUMENTED: ICD-10-PCS | Mod: CPTII,,, | Performed by: STUDENT IN AN ORGANIZED HEALTH CARE EDUCATION/TRAINING PROGRAM

## 2023-05-04 PROCEDURE — 1159F MED LIST DOCD IN RCRD: CPT | Mod: CPTII,,, | Performed by: STUDENT IN AN ORGANIZED HEALTH CARE EDUCATION/TRAINING PROGRAM

## 2023-05-04 PROCEDURE — 3074F SYST BP LT 130 MM HG: CPT | Mod: CPTII,,, | Performed by: STUDENT IN AN ORGANIZED HEALTH CARE EDUCATION/TRAINING PROGRAM

## 2023-05-04 RX ORDER — CONJUGATED ESTROGENS 0.62 MG/G
CREAM VAGINAL
Qty: 45 G | Refills: 12 | Status: SHIPPED | OUTPATIENT
Start: 2023-05-04 | End: 2023-07-06

## 2023-05-04 RX ORDER — METRONIDAZOLE 500 MG/1
500 TABLET ORAL EVERY 12 HOURS
Qty: 14 TABLET | Refills: 0 | Status: SHIPPED | OUTPATIENT
Start: 2023-05-04 | End: 2023-05-11

## 2023-05-04 RX ORDER — GABAPENTIN 300 MG/1
300 CAPSULE ORAL 3 TIMES DAILY
COMMUNITY
Start: 2023-04-28

## 2023-05-04 NOTE — PROGRESS NOTES
History & Physical  Gynecology      SUBJECTIVE:     Chief Complaint: Post-op Evaluation       History of Present Illness:  Holly Mcclelland is a .age  here for check up a few weeks s/p TLH/BSO.  Called yesterday when she passed several large blood clots and soaked through pad and underwear. It gradually decreased and is absent this morning. I advised her to go to ED but as her symptoms were improving she elected to wait until this apt.  She has had no pain, soft BMs, normal urination. She did not a foul odor off/on.       Review of patient's allergies indicates:   Allergen Reactions    Victoza [liraglutide] Other (See Comments)     Throat closed ? But not seen in the ER        Past Medical History:   Diagnosis Date    CHF (congestive heart failure)     Diabetes mellitus     Heart murmur     Hyperlipidemia     Hypertension     Sleep apnea     no cpap since weight loss    Stroke     remote hx TIA; no residual     Past Surgical History:   Procedure Laterality Date    ANTERIOR CERVICAL DISCECTOMY W/ FUSION N/A 2022    Procedure: DISCECTOMY, SPINE, CERVICAL, ANTERIOR APPROACH, WITH FUSION C3-4 Gareth PRADHAN notified;  Surgeon: Damian Arroyo MD;  Location: [facility];  Service: Neurosurgery;  Laterality: N/A;  ASA1  TOR1  T&Cross x 2 units  EMG  SEP  MEP  RN PREOP ON 22,COVID---NEGATIVE- on -- T/S  done  ON   [facility] GARETH BROOKSZ  [phone] NOTIFIED (CALLED) GARETH ON 2022 @ 3:11PM-LO  NEURO MONIT    BREAST BIOPSY Left     CYSTOSCOPY N/A 2023    Procedure: CYSTOSCOPY;  Surgeon: Nella Marte MD;  Location: [facility];  Service: OB/GYN;  Laterality: N/A;    HYSTERECTOMY, TOTAL, LAPAROSCOPIC, WITH SALPINGO-OOPHORECTOMY Bilateral 2023    Procedure: HYSTERECTOMY,TOTAL,LAPAROSCOPIC,WITH SALPINGO-OOPHORECTOMY;  Surgeon: Nella Marte MD;  Location: [facility] OR;  Service: OB/GYN;  Laterality: Bilateral;    LYSIS OF ADHESIONS N/A 2023    Procedure: LYSIS, ADHESIONS;   Surgeon: Nella Marte MD;  Location: [facility] OR;  Service: OB/GYN;  Laterality: N/A;     OB History          2    Para   2    Term   1       1    AB        Living   1         SAB        IAB        Ectopic        Multiple        Live Births   1               Family History   Problem Relation Age of Onset    Diabetes Maternal Grandmother     Diabetes Mother     Hypertension Mother     Heart disease Mother     Diabetes Sister     Diabetes Father     Breast cancer Neg Hx     Colon cancer Neg Hx     Ovarian cancer Neg Hx      Social History     Tobacco Use    Smoking status: Former     Types: Cigarettes     Quit date:      Years since quittin.3    Smokeless tobacco: Never   Substance Use Topics    Alcohol use: No    Drug use: No       Current Outpatient Medications   Medication Sig    acetaminophen (TYLENOL) 650 MG TbSR Take 1 tablet (650 mg total) by mouth every 6 (six) hours as needed (Pain).    aspirin (ECOTRIN) 81 MG EC tablet Take 1 tablet by mouth once daily    beta-carotene,A,-vits C,E/mins (OCUVITE ORAL) Take by mouth Daily.    biotin 10,000 mcg Cap Take by mouth Daily.    blood sugar diagnostic (ONETOUCH ULTRA TEST) Strp 1 strip by Misc.(Non-Drug; Combo Route) route once daily.    cinnamon bark (CINNAMON ORAL) Take 1,000 mg by mouth Daily.    dapagliflozin (FARXIGA) 10 mg tablet Take 1 tablet (10 mg total) by mouth once daily.    diazePAM (VALIUM) 10 MG Tab Take 10 mg by mouth daily as needed.    furosemide (LASIX) 20 MG tablet TAKE 1 TABLET BY MOUTH ONCE DAILY AS NEEDED FOR  SWELLING    gabapentin (NEURONTIN) 300 MG capsule Take 300 mg by mouth 3 (three) times daily.    glucosamine-chondroitin 500-400 mg tablet Take 2 tablets by mouth Daily.    hydrALAZINE (APRESOLINE) 25 MG tablet Take 1 tablet (25 mg total) by mouth 3 (three) times daily.    lisinopriL-hydrochlorothiazide (PRINZIDE,ZESTORETIC) 20-12.5 mg per tablet Take 1 tablet by mouth once daily.    ONETOUCH DELICA PLUS LANCET  33 gauge Misc USE 1  TO CHECK GLUCOSE 4 TIMES DAILY    ONETOUCH ULTRA2 METER Misc USE TO CHECK GLUCOSE TWICE DAILY    oxyCODONE (ROXICODONE) 5 MG immediate release tablet Take 1 tablet (5 mg total) by mouth every 4 (four) hours as needed for Pain.    potassium gluconate 595 mg (99 mg) Tab Take by mouth Daily.    pravastatin (PRAVACHOL) 40 MG tablet Take 1 tablet (40 mg total) by mouth every evening.    semaglutide (OZEMPIC) 0.25 mg or 0.5 mg(2 mg/1.5 mL) pen injector Inject 0.5 mg into the skin every 7 days.    senna-docusate 8.6-50 mg (PERICOLACE) 8.6-50 mg per tablet Take 2 tablets by mouth once daily.    conjugated estrogens (PREMARIN) vaginal cream Use pea-sized amount vaginally (insert to second knuckle) nightly for 2 weeks, then 2-3x/week    metroNIDAZOLE (FLAGYL) 500 MG tablet Take 1 tablet (500 mg total) by mouth every 12 (twelve) hours. for 7 days     No current facility-administered medications for this visit.         Review of Systems:  Review of Systems   Constitutional:  Positive for chills. Negative for fever.   Respiratory:  Negative for cough, shortness of breath and wheezing.    Cardiovascular:  Negative for chest pain, palpitations and leg swelling.   Gastrointestinal:  Negative for abdominal pain, nausea and vomiting.   Endocrine: Positive for diabetes. Negative for hyperthyroidism and hypothyroidism.   Genitourinary:  Positive for vaginal bleeding and vaginal odor. Negative for dysuria, pelvic pain and vaginal pain.   Musculoskeletal:  Negative for joint swelling and myalgias.   Integumentary:  Negative for rash.   Neurological:  Negative for vertigo, seizures, syncope and numbness.   Hematological:  Does not bruise/bleed easily.   Psychiatric/Behavioral:  Negative for depression. The patient is not nervous/anxious.       OBJECTIVE:     Physical Exam:  Physical Exam  Exam conducted with a chaperone present.   Constitutional:       General: She is not in acute distress.     Appearance: Normal  appearance. She is well-developed.   HENT:      Head: Normocephalic and atraumatic.   Eyes:      Conjunctiva/sclera: Conjunctivae normal.   Pulmonary:      Effort: Pulmonary effort is normal. No respiratory distress.   Genitourinary:     General: Normal vulva.      Pubic Area: No rash.       Labia:         Right: No rash, tenderness or lesion.         Left: No rash, tenderness or lesion.       Urethra: No prolapse, urethral pain, urethral swelling or urethral lesion.      Vagina: No vaginal discharge or tenderness.      Comments: Suture line visibly entirely intact. Areas that are somewhat friable. Cuff entirely intact on palpable bimanual. No tenderness.   Musculoskeletal:         General: No tenderness. Normal range of motion.      Right lower leg: No edema.      Left lower leg: No edema.   Skin:     General: Skin is warm and dry.      Findings: No erythema.   Neurological:      Mental Status: She is alert and oriented to person, place, and time.   Psychiatric:         Mood and Affect: Mood normal.         Behavior: Behavior normal.         ASSESSMENT:       ICD-10-CM ICD-9-CM    1. Postop check  Z09 V67.00 CBC Auto Differential      2. Vaginal odor  N89.8 625.8 metroNIDAZOLE (FLAGYL) 500 MG tablet      conjugated estrogens (PREMARIN) vaginal cream      3. Genitourinary syndrome of menopause  N95.8 627.8 conjugated estrogens (PREMARIN) vaginal cream             Plan:      Cuff intact on exam, no areas of dehiscence   Start premarin  Will use flagyl for odor in case BV  Very strict lifting precautions, avoid constipation  Notify me if such bleeding occurs again, if saturating pads would encourage ED visit.    F/U as sched or sooner PRN    Nella Jiménez

## 2023-05-22 ENCOUNTER — OFFICE VISIT (OUTPATIENT)
Dept: OBSTETRICS AND GYNECOLOGY | Facility: CLINIC | Age: 63
End: 2023-05-22
Payer: MEDICAID

## 2023-05-22 VITALS
DIASTOLIC BLOOD PRESSURE: 64 MMHG | HEIGHT: 67 IN | HEART RATE: 78 BPM | WEIGHT: 210.13 LBS | BODY MASS INDEX: 32.98 KG/M2 | SYSTOLIC BLOOD PRESSURE: 120 MMHG

## 2023-05-22 DIAGNOSIS — Z09 POSTOP CHECK: ICD-10-CM

## 2023-05-22 DIAGNOSIS — N39.41 URGE INCONTINENCE OF URINE: ICD-10-CM

## 2023-05-22 DIAGNOSIS — N95.1 HOT FLASH, MENOPAUSAL: ICD-10-CM

## 2023-05-22 DIAGNOSIS — N89.8 VAGINA ITCHING: Primary | ICD-10-CM

## 2023-05-22 PROCEDURE — 3044F PR MOST RECENT HEMOGLOBIN A1C LEVEL <7.0%: ICD-10-PCS | Mod: CPTII,,, | Performed by: STUDENT IN AN ORGANIZED HEALTH CARE EDUCATION/TRAINING PROGRAM

## 2023-05-22 PROCEDURE — 99999 PR PBB SHADOW E&M-EST. PATIENT-LVL IV: CPT | Mod: PBBFAC,,, | Performed by: STUDENT IN AN ORGANIZED HEALTH CARE EDUCATION/TRAINING PROGRAM

## 2023-05-22 PROCEDURE — 99024 POSTOP FOLLOW-UP VISIT: CPT | Mod: S$PBB,,, | Performed by: STUDENT IN AN ORGANIZED HEALTH CARE EDUCATION/TRAINING PROGRAM

## 2023-05-22 PROCEDURE — 99024 PR POST-OP FOLLOW-UP VISIT: ICD-10-PCS | Mod: S$PBB,,, | Performed by: STUDENT IN AN ORGANIZED HEALTH CARE EDUCATION/TRAINING PROGRAM

## 2023-05-22 PROCEDURE — 3074F SYST BP LT 130 MM HG: CPT | Mod: CPTII,,, | Performed by: STUDENT IN AN ORGANIZED HEALTH CARE EDUCATION/TRAINING PROGRAM

## 2023-05-22 PROCEDURE — 3078F PR MOST RECENT DIASTOLIC BLOOD PRESSURE < 80 MM HG: ICD-10-PCS | Mod: CPTII,,, | Performed by: STUDENT IN AN ORGANIZED HEALTH CARE EDUCATION/TRAINING PROGRAM

## 2023-05-22 PROCEDURE — 3008F PR BODY MASS INDEX (BMI) DOCUMENTED: ICD-10-PCS | Mod: CPTII,,, | Performed by: STUDENT IN AN ORGANIZED HEALTH CARE EDUCATION/TRAINING PROGRAM

## 2023-05-22 PROCEDURE — 1159F PR MEDICATION LIST DOCUMENTED IN MEDICAL RECORD: ICD-10-PCS | Mod: CPTII,,, | Performed by: STUDENT IN AN ORGANIZED HEALTH CARE EDUCATION/TRAINING PROGRAM

## 2023-05-22 PROCEDURE — 4010F PR ACE/ARB THEARPY RXD/TAKEN: ICD-10-PCS | Mod: CPTII,,, | Performed by: STUDENT IN AN ORGANIZED HEALTH CARE EDUCATION/TRAINING PROGRAM

## 2023-05-22 PROCEDURE — 1160F RVW MEDS BY RX/DR IN RCRD: CPT | Mod: CPTII,,, | Performed by: STUDENT IN AN ORGANIZED HEALTH CARE EDUCATION/TRAINING PROGRAM

## 2023-05-22 PROCEDURE — 3044F HG A1C LEVEL LT 7.0%: CPT | Mod: CPTII,,, | Performed by: STUDENT IN AN ORGANIZED HEALTH CARE EDUCATION/TRAINING PROGRAM

## 2023-05-22 PROCEDURE — 99214 OFFICE O/P EST MOD 30 MIN: CPT | Mod: PBBFAC,PN | Performed by: STUDENT IN AN ORGANIZED HEALTH CARE EDUCATION/TRAINING PROGRAM

## 2023-05-22 PROCEDURE — 3008F BODY MASS INDEX DOCD: CPT | Mod: CPTII,,, | Performed by: STUDENT IN AN ORGANIZED HEALTH CARE EDUCATION/TRAINING PROGRAM

## 2023-05-22 PROCEDURE — 4010F ACE/ARB THERAPY RXD/TAKEN: CPT | Mod: CPTII,,, | Performed by: STUDENT IN AN ORGANIZED HEALTH CARE EDUCATION/TRAINING PROGRAM

## 2023-05-22 PROCEDURE — 99999 PR PBB SHADOW E&M-EST. PATIENT-LVL IV: ICD-10-PCS | Mod: PBBFAC,,, | Performed by: STUDENT IN AN ORGANIZED HEALTH CARE EDUCATION/TRAINING PROGRAM

## 2023-05-22 PROCEDURE — 1159F MED LIST DOCD IN RCRD: CPT | Mod: CPTII,,, | Performed by: STUDENT IN AN ORGANIZED HEALTH CARE EDUCATION/TRAINING PROGRAM

## 2023-05-22 PROCEDURE — 3074F PR MOST RECENT SYSTOLIC BLOOD PRESSURE < 130 MM HG: ICD-10-PCS | Mod: CPTII,,, | Performed by: STUDENT IN AN ORGANIZED HEALTH CARE EDUCATION/TRAINING PROGRAM

## 2023-05-22 PROCEDURE — 81514 NFCT DS BV&VAGINITIS DNA ALG: CPT | Performed by: STUDENT IN AN ORGANIZED HEALTH CARE EDUCATION/TRAINING PROGRAM

## 2023-05-22 PROCEDURE — 87086 URINE CULTURE/COLONY COUNT: CPT | Performed by: STUDENT IN AN ORGANIZED HEALTH CARE EDUCATION/TRAINING PROGRAM

## 2023-05-22 PROCEDURE — 1160F PR REVIEW ALL MEDS BY PRESCRIBER/CLIN PHARMACIST DOCUMENTED: ICD-10-PCS | Mod: CPTII,,, | Performed by: STUDENT IN AN ORGANIZED HEALTH CARE EDUCATION/TRAINING PROGRAM

## 2023-05-22 PROCEDURE — 3078F DIAST BP <80 MM HG: CPT | Mod: CPTII,,, | Performed by: STUDENT IN AN ORGANIZED HEALTH CARE EDUCATION/TRAINING PROGRAM

## 2023-05-22 RX ORDER — OXYBUTYNIN CHLORIDE 5 MG/1
5 TABLET, EXTENDED RELEASE ORAL DAILY
Qty: 30 TABLET | Refills: 11 | Status: SHIPPED | OUTPATIENT
Start: 2023-05-22 | End: 2024-05-21

## 2023-05-22 RX ORDER — FLUCONAZOLE 150 MG/1
150 TABLET ORAL ONCE
Qty: 1 TABLET | Refills: 1 | Status: SHIPPED | OUTPATIENT
Start: 2023-05-22 | End: 2023-05-22

## 2023-05-22 NOTE — PROGRESS NOTES
History & Physical  Gynecology      SUBJECTIVE:     Chief Complaint: Post-op Evaluation       History of Present Illness:  Holly Mcclelland is a 62 y.o.  here for 6 week post op s/p TLH/BSO. Was seen in office earlier this month due to episode of heavy bleeding. Exam was reassuring at that time. Premarin was recommended and prescribed- she picked up and has not started using due to concern for adverse event a/w the medication. She has had no further bleeding.   Also c/o UUI that has always happened on occasion- seems to be happening more since surgery. May be because she is drinking much more water at home.     Denies pain, urinary issues other than above, no BM issues. Feels she may be getting a yeast infection.     Review of patient's allergies indicates:   Allergen Reactions    Victoza [liraglutide] Other (See Comments)     Throat closed ? But not seen in the ER        Past Medical History:   Diagnosis Date    CHF (congestive heart failure)     Diabetes mellitus     Heart murmur     Hyperlipidemia     Hypertension     Sleep apnea     no cpap since weight loss    Stroke     remote hx TIA; no residual     Past Surgical History:   Procedure Laterality Date    ANTERIOR CERVICAL DISCECTOMY W/ FUSION N/A 2022    Procedure: DISCECTOMY, SPINE, CERVICAL, ANTERIOR APPROACH, WITH FUSION C3-4 Gareth PRADHAN notified;  Surgeon: Damian Arroyo MD;  Location: [facility] OR;  Service: Neurosurgery;  Laterality: N/A;  ASA1  TOR1  T&Cross x 2 units  EMG  SEP  MEP  RN PREOP ON 22,COVID---NEGATIVE- on -- T/S  done  ON   [facility] GARETH RUIZ  [phone] NOTIFIED (CALLED) GARETH ON 2022 @ 3:11PM-LO  NEURO MONIT    BREAST BIOPSY Left     CYSTOSCOPY N/A 2023    Procedure: CYSTOSCOPY;  Surgeon: Nella Marte MD;  Location: [facility] OR;  Service: OB/GYN;  Laterality: N/A;    HYSTERECTOMY, TOTAL, LAPAROSCOPIC, WITH SALPINGO-OOPHORECTOMY Bilateral 2023    Procedure:  HYSTERECTOMY,TOTAL,LAPAROSCOPIC,WITH SALPINGO-OOPHORECTOMY;  Surgeon: Nella Marte MD;  Location: [facility] OR;  Service: OB/GYN;  Laterality: Bilateral;    LYSIS OF ADHESIONS N/A 2023    Procedure: LYSIS, ADHESIONS;  Surgeon: Nella Marte MD;  Location: [facility] OR;  Service: OB/GYN;  Laterality: N/A;     OB History          2    Para   2    Term   1       1    AB        Living   1         SAB        IAB        Ectopic        Multiple        Live Births   1               Family History   Problem Relation Age of Onset    Diabetes Maternal Grandmother     Diabetes Mother     Hypertension Mother     Heart disease Mother     Diabetes Sister     Diabetes Father     Breast cancer Neg Hx     Colon cancer Neg Hx     Ovarian cancer Neg Hx      Social History     Tobacco Use    Smoking status: Former     Types: Cigarettes     Quit date:      Years since quittin.3    Smokeless tobacco: Never   Substance Use Topics    Alcohol use: No    Drug use: No       Current Outpatient Medications   Medication Sig    acetaminophen (TYLENOL) 650 MG TbSR Take 1 tablet (650 mg total) by mouth every 6 (six) hours as needed (Pain).    aspirin (ECOTRIN) 81 MG EC tablet Take 1 tablet by mouth once daily    beta-carotene,A,-vits C,E/mins (OCUVITE ORAL) Take by mouth Daily.    biotin 10,000 mcg Cap Take by mouth Daily.    blood sugar diagnostic (ONETOUCH ULTRA TEST) Strp 1 strip by Misc.(Non-Drug; Combo Route) route once daily.    cinnamon bark (CINNAMON ORAL) Take 1,000 mg by mouth Daily.    conjugated estrogens (PREMARIN) vaginal cream Use pea-sized amount vaginally (insert to second knuckle) nightly for 2 weeks, then 2-3x/week    dapagliflozin (FARXIGA) 10 mg tablet Take 1 tablet (10 mg total) by mouth once daily.    diazePAM (VALIUM) 10 MG Tab Take 10 mg by mouth daily as needed.    furosemide (LASIX) 20 MG tablet TAKE 1 TABLET BY MOUTH ONCE DAILY AS NEEDED FOR  SWELLING    gabapentin (NEURONTIN) 300 MG  capsule Take 300 mg by mouth 3 (three) times daily.    glucosamine-chondroitin 500-400 mg tablet Take 2 tablets by mouth Daily.    lisinopriL-hydrochlorothiazide (PRINZIDE,ZESTORETIC) 20-12.5 mg per tablet Take 1 tablet by mouth once daily.    ONETOUCH DELICA PLUS LANCET 33 gauge Misc USE 1  TO CHECK GLUCOSE 4 TIMES DAILY    ONETOUCH ULTRA2 METER Misc USE TO CHECK GLUCOSE TWICE DAILY    oxyCODONE (ROXICODONE) 5 MG immediate release tablet Take 1 tablet (5 mg total) by mouth every 4 (four) hours as needed for Pain.    potassium gluconate 595 mg (99 mg) Tab Take by mouth Daily.    pravastatin (PRAVACHOL) 40 MG tablet Take 1 tablet (40 mg total) by mouth every evening.    semaglutide (OZEMPIC) 0.25 mg or 0.5 mg(2 mg/1.5 mL) pen injector Inject 0.5 mg into the skin every 7 days.    senna-docusate 8.6-50 mg (PERICOLACE) 8.6-50 mg per tablet Take 2 tablets by mouth once daily.    fluconazole (DIFLUCAN) 150 MG Tab Take 1 tablet (150 mg total) by mouth once. REFILL AND RE-DOSE IF SYMPTOMS RECUR for 1 dose    hydrALAZINE (APRESOLINE) 25 MG tablet Take 1 tablet (25 mg total) by mouth 3 (three) times daily.    oxybutynin (DITROPAN-XL) 5 MG TR24 Take 1 tablet (5 mg total) by mouth once daily.     No current facility-administered medications for this visit.         Review of Systems:  Review of Systems   Constitutional:  Negative for chills and fever.   Respiratory:  Negative for cough, shortness of breath and wheezing.    Cardiovascular:  Negative for chest pain, palpitations and leg swelling.   Gastrointestinal:  Negative for abdominal pain, nausea and vomiting.   Endocrine: Positive for hot flashes. Negative for diabetes, hyperthyroidism and hypothyroidism.   Genitourinary:  Positive for bladder incontinence, hot flashes and urgency. Negative for dysuria, hematuria, pelvic pain, vaginal bleeding and vaginal pain.   Musculoskeletal:  Negative for joint swelling and myalgias.   Integumentary:  Negative for rash.   Neurological:   Negative for vertigo, seizures, syncope and numbness.   Hematological:  Does not bruise/bleed easily.   Psychiatric/Behavioral:  Negative for depression. The patient is not nervous/anxious.       OBJECTIVE:     Physical Exam:  Physical Exam  Exam conducted with a chaperone present.   Constitutional:       General: She is not in acute distress.     Appearance: Normal appearance. She is well-developed.   HENT:      Head: Normocephalic and atraumatic.   Eyes:      Conjunctiva/sclera: Conjunctivae normal.   Pulmonary:      Effort: Pulmonary effort is normal. No respiratory distress.   Abdominal:      General: Abdomen is flat. There is no distension.      Palpations: Abdomen is soft. There is no mass.      Tenderness: There is no abdominal tenderness. There is no guarding or rebound.      Hernia: No hernia is present.   Genitourinary:     General: Normal vulva.      Pubic Area: No rash.       Labia:         Right: No rash, tenderness or lesion.         Left: No rash, tenderness or lesion.       Urethra: No prolapse, urethral pain, urethral swelling or urethral lesion.      Vagina: Vaginal discharge present. No tenderness or bleeding.      Comments: Cuff well healing, no visible granulation tissue or areas of separation.  Some suture material remains.   Musculoskeletal:         General: No tenderness. Normal range of motion.      Right lower leg: No edema.      Left lower leg: No edema.   Skin:     General: Skin is warm and dry.      Findings: No erythema.   Neurological:      Mental Status: She is alert and oriented to person, place, and time.   Psychiatric:         Mood and Affect: Mood normal.         Behavior: Behavior normal.         ASSESSMENT:       ICD-10-CM ICD-9-CM    1. Vagina itching  N89.8 698.1 Vaginosis Screen by DNA Probe      fluconazole (DIFLUCAN) 150 MG Tab      CANCELED: Urine culture      2. Urge incontinence of urine  N39.41 788.31 Urine culture      oxybutynin (DITROPAN-XL) 5 MG TR24      3. Hot  flash, menopausal  N95.1 627.2       4. Postop check  Z09 V67.00              Plan:      Holly was seen today for post-op evaluation.    Diagnoses and all orders for this visit:    Vagina itching  -     Vaginosis Screen by DNA Probe  -     fluconazole (DIFLUCAN) 150 MG Tab; Take 1 tablet (150 mg total) by mouth once. REFILL AND RE-DOSE IF SYMPTOMS RECUR for 1 dose    Urge incontinence of urine  -     Urine culture  -     oxybutynin (DITROPAN-XL) 5 MG TR24; Take 1 tablet (5 mg total) by mouth once daily.   Side effect profile reviewed, starting lowest dose   Retention precautions     Hot flash, menopausal   Was likely still seeing some hormonal benefit from ovaries despite ; now with ovarian removal may have worsening hot flashes sweats   Reviewed systemic HT is NOT an option due to prior TIA   Rx options with psb benefit include gabapentin, paxil/SSR/SNR, clonidine   She is already on gabapentin so we will try having her double nightly dose initially    Postop check   Reassured that vaginal estrogen has no to minimal absorption, and have not been proven to have any impact on VTE profile. Will facilitate vaginal cuff healing.  Does not have to be on long term. Would use for initial few months.   Continue pelvic rest, lifting restrictions to 12 weeks    Otherwise ok to return to work    F/u 6 weeks for return to work eval/letter and medication f/u       Nella Jiménez

## 2023-05-22 NOTE — LETTER
May 22, 2023      [facility] [address]  [address]  [facility] [address]  Phone: [phone]  Fax: [phone]       Patient: Holly Mcclelland   YOB: 1960  Date of Visit: 05/22/2023    To Whom It May Concern:    Sherry Mcclelland  was at [facility] on 05/22/2023. The patient may return to work/school on 05/29/2023 with the following restrictions: I have asked her not to lift anything greater than 10 lbs for an additional 6 weeks. If you have any questions or concerns, or if I can be of further assistance, please do not hesitate to contact me.    Sincerely,    Nella Marte MD

## 2023-05-23 LAB
BACTERIA UR CULT: NO GROWTH
BACTERIAL VAGINOSIS DNA: NEGATIVE
CANDIDA GLABRATA DNA: NEGATIVE
CANDIDA KRUSEI DNA: NEGATIVE
CANDIDA RRNA VAG QL PROBE: NEGATIVE
T VAGINALIS RRNA GENITAL QL PROBE: NEGATIVE

## 2023-06-05 ENCOUNTER — TELEPHONE (OUTPATIENT)
Dept: DIABETES | Facility: CLINIC | Age: 63
End: 2023-06-05
Payer: MEDICAID

## 2023-06-05 NOTE — PROGRESS NOTES
CC:   Chief Complaint   Patient presents with    Diabetes Mellitus       HPI: Holly Mcclelland is a 62 y.o. female presents for a follow up visit today for the management of T2DM.     She was diagnosed with Type 2 diabetes around 2483-5920 on routine lab work. She was initially started on Metformin. She started insulin therapy in 2020.     Family hx of diabetes: mother, grandmother, aunts, uncles - strong family hx, sister who is blind and has amputations.   Hospitalized for diabetes: denies      No personal or FH of thyroid cancer or personal of pancreatic cancer or pancreatitis.     Works in retail     Our last visit was in March of 2023  At that visit we attempted to change her Trulicity to Mounjaro.   Her insurance denied our request so we did Ozempic instead  Continued her Farxiga 10 mg daily  Her recent A1c went up to 7.2% from 6.7%  + weight gain   Reports that the ozempic is making her crave carbohydrates-- and always feels hungry   Had a hysterectomy last month   No GI upset or issues  with ozempic   She is asking to come off of the Ozempic and maybe try to see if insurance will cover the Mounjaro -- if the insurance will not cover the Mounjaro.  She wants to try to go back on the Trulicity.   Cholesterol has improved since she started the pravastatin and she is tolerating it well without side effects   She is meeting with diabetes education to review diet after our visit today      DIABETES COMPLICATIONS: nephropathy, peripheral neuropathy, and cerebrovascular disease   Hx of TIA in 2019       Diabetes Management Status    ASA:  Yes - 81 mg ASA + plavix     Statin: Taking Pravastatin 40 mg nightly with out issues   she stopped the Lipitor-- she reports that it was causing her nausea -- stopped the Crestor due to SE as well    She also tried Zetia     ACE/ARB: Taking-Lisinopril    Screening or Prevention Patient's value Goal Complete/Controlled?   HgA1C Testing and Control   Lab Results   Component  Value Date    HGBA1C 7.2 (H) 05/30/2023      Annually/Less than 8% No   Lipid profile : 05/30/2023 Annually Yes   LDL control Lab Results   Component Value Date    LDLCALC 64.6 05/30/2023    Annually/Less than 100 mg/dl  Yes   Nephropathy screening Lab Results   Component Value Date    LABMICR <5.0 05/30/2023     Lab Results   Component Value Date    PROTEINUA Negative 09/25/2022    Annually Yes   Blood pressure BP Readings from Last 1 Encounters:   06/06/23 (!) 90/58    Less than 140/90 No   Dilated retinal exam : 12/19/2022-external- Dr. Ball  Annually No   Foot exam   : 06/20/2022 Annually No       CURRENT A1C:    Hemoglobin A1C   Date Value Ref Range Status   05/30/2023 7.2 (H) 4.0 - 5.6 % Final     Comment:     ADA Screening Guidelines:  5.7-6.4%  Consistent with prediabetes  >or=6.5%  Consistent with diabetes    High levels of fetal hemoglobin interfere with the HbA1C  assay. Heterozygous hemoglobin variants (HbS, HgC, etc)do  not significantly interfere with this assay.   However, presence of multiple variants may affect accuracy.     02/27/2023 6.7 (H) 4.0 - 5.6 % Final     Comment:     ADA Screening Guidelines:  5.7-6.4%  Consistent with prediabetes  >or=6.5%  Consistent with diabetes    High levels of fetal hemoglobin interfere with the HbA1C  assay. Heterozygous hemoglobin variants (HbS, HgC, etc)do  not significantly interfere with this assay.   However, presence of multiple variants may affect accuracy.     02/27/2023 6.7 (H) 4.0 - 5.6 % Final     Comment:     ADA Screening Guidelines:  5.7-6.4%  Consistent with prediabetes  >or=6.5%  Consistent with diabetes    High levels of fetal hemoglobin interfere with the HbA1C  assay. Heterozygous hemoglobin variants (HbS, HgC, etc)do  not significantly interfere with this assay.   However, presence of multiple variants may affect accuracy.         GOAL A1C: 7% without hypoglycemia      DM MEDICATIONS USED IN THE PAST:  Metformin, Lantus, glyburide, glipizide,  Januvia  Victoza- throat closed   Trulicity   Farxiga   Ozempic -- not effective       CURRENT DIABETES MEDICATIONS:   Ozempic 0.5 mg weekly (Thursdays) and Farxiga 10 mg daily   Insulin: N/A   Missed doses:occ missing doses         BLOOD GLUCOSE MONITORING:  She checks her BG 1-2 times per day   Brought her meter for review   135, 140, 139, 165, 148, 146, 171, 149, 153, 130, 132, 127, 105, 153, 191, 195, 130, 135, 161, 152, 168, 236, 182, 146, 144, 147, 133, 141, 151, 161, 136, 182, 139, 147, 128, 136,123,128, 155, 136,112, 121, 156, 145       HYPOGLYCEMIA: denies   Carries mints       MEALS: eating 2 meals per day   Eating more bread, rice and pasta-- craving CHO on the Ozempic   BF:  biscuits, eggs, and contreras   Lunch: leftovers usually- trying to stay away from fast food - sometimes skipping   Dinner: home cooked   Baked mac n cheese   Pope[place] chicken sometimes   Snack: yogurt   Drinks: water   Green tea- it has sugar   occ juice       CURRENT EXERCISE:  None         Review of Systems  Review of Systems   Constitutional:  Negative for appetite change, fatigue and unexpected weight change.   HENT:  Negative for trouble swallowing.    Eyes:  Negative for visual disturbance.   Respiratory:  Negative for shortness of breath.         + sleep apnea- off CPAP machine -- she has it but has not been using it -- just sent her one    Cardiovascular:  Negative for chest pain.   Gastrointestinal:  Negative for nausea.   Endocrine: Positive for polyphagia. Negative for polydipsia and polyuria.   Genitourinary:         No Nocturia    Musculoskeletal:  Negative for neck pain.   Skin:  Negative for wound.   Neurological:  Negative for numbness.     Physical Exam   Physical Exam  Vitals and nursing note reviewed.   Constitutional:       Appearance: She is well-developed. She is obese.   HENT:      Head: Normocephalic and atraumatic.      Right Ear: External ear normal.      Left Ear: External ear normal.      Nose: Nose normal.    Neck:      Thyroid: No thyromegaly.      Trachea: No tracheal deviation.   Cardiovascular:      Rate and Rhythm: Normal rate and regular rhythm.      Heart sounds: No murmur heard.  Pulmonary:      Effort: Pulmonary effort is normal. No respiratory distress.      Breath sounds: Normal breath sounds.   Abdominal:      Palpations: Abdomen is soft.      Tenderness: There is no abdominal tenderness.      Hernia: No hernia is present.   Musculoskeletal:      Cervical back: Normal range of motion and neck supple.   Skin:     General: Skin is warm and dry.      Capillary Refill: Capillary refill takes less than 2 seconds.      Findings: No rash.      Comments: Injection sites are normal appearing. No lipo hypertropthy or atrophy     Neurological:      Mental Status: She is alert and oriented to person, place, and time.      Cranial Nerves: No cranial nerve deficit.   Psychiatric:         Behavior: Behavior normal.         Judgment: Judgment normal.       FOOT EXAMINATION: appropriate footwear         Lab Results   Component Value Date    TSH 0.862 02/27/2023    TSH 0.862 02/27/2023           Type 2 diabetes mellitus with diabetic polyneuropathy, without long-term current use of insulin  Uncontrolled   A1c has increased since changing to Ozempic from high dose Trulicity   Ozempic is making her gain weight, feel hungry, and have bad cravings   Will try for Mounjaro again -- PA will be needed       -- Medication Changes:   We will try to change your Ozempic to Mounjaro   Start with Mounjaro 5 mg weekly for 4 weeks and then increase to 7.5 mg weekly for 4 weeks and then increase to 10 mg weekly   Please notify me for any abdominal pain, nausea, vomiting, diarrhea, acid, constipation   We will have to do a PA for the Mounjaro   But you have tried and failed-- Trulicity, Ozempic, and Victoza-- so they should approve it   If not, we will go back to Trulicity 4.5 mg weekly      Continue Farxiga 10 mg daily          -- Reviewed  goals of therapy are to get the best control we can without hypoglycemia.    -- Advised frequent self blood glucose monitoring.  Patient encouraged to document glucose results and bring them to every clinic visit.- daily at alternating times   -- Hypoglycemia precautions discussed. Instructed on precautions before driving.    -- Call for Bg repeatedly < 70 or > 180.   -- Close adherence to lifestyle changes recommended.   -- Periodic follow ups for eye evaluations, foot care and dental care suggested.      Regarding polyneuropathy:  Optimize BG readings.   See above.     Educated patient to check feet daily for any foreign objects and/or wounds. Discussed with patient the importance of wearing appropriate footwear at all times, not to walk barefoot ever, and to check shoes before putting them on feet. Instructed patient to keep feet dry by regularly changing shoes and socks and drying feet after baths and exercises. Also, instructed patient to report any new lesions, discolorations, or swelling to a healthcare professional.        Type 2 diabetes mellitus with stage 3a chronic kidney disease, without long-term current use of insulin  Blood pressure low today in clinic will repeat  Blood pressure within normal limits 2 weeks ago  On Farxiga for renal protection    Optimize BG readings.   See above.   Avoid insulin stacking and hypoglycemia  On lisinopril and Farxiga    Essential hypertension  BP goal is < 140/90.   Tolerating ACEi  Blood pressure goals discussed with patient    Blood pressure low today in clinic will repeat  Blood pressure within normal limits 2 weeks ago  Encouraged patient to monitor blood pressure at home if systolic consistently under 100 and diastolic consistently under 60--reached out as we may need to cut back on her blood pressure medications    Class 1 obesity due to excess calories with serious comorbidity and body mass index (BMI) of 34.0 to 34.9 in adult  Body mass index is 34.07  kg/m².  Increases insulin resistance.   Discussed DM diet and exercise.   Encouraged continued weight loss    TIA (transient ischemic attack)  Optimize BG readings.   See above.       Dyslipidemia, goal LDL below 70  LDL goal is <70--given history of TIA  She stopped the Lipitor due to nausea  She stopped the Crestor due to intolerance   She also tried Zetia and was unable to tolerate  Now on pravastatin 40 mg nightly and tolerating without any side effects  LDL at goal  Continue statin    Vitamin D deficiency  On multivitamins daily          I spent a total of 30 minutes on the day of the visit.This includes face to face time and non-face to face time preparing to see the patient (eg, review of tests), obtaining and/or reviewing separately obtained history, documenting clinical information in the electronic or other health record, independently interpreting results and communicating results to the patient/family/caregiver, or care coordinator.        Follow up in about 4 months (around 10/6/2023).  Follow up with me in 4 months with labs prior         Orders Placed This Encounter   Procedures    Hemoglobin A1C     Standing Status:   Future     Standing Expiration Date:   12/6/2024    Basic Metabolic Panel     Standing Status:   Future     Standing Expiration Date:   12/6/2024         Recommendations were discussed with the patient in detail  The patient verbalized understanding and agrees with the plan outlined as above.     This note was partly generated with [employer] voice recognition software. I apologize for any possible typographical errors.

## 2023-06-06 ENCOUNTER — TELEPHONE (OUTPATIENT)
Dept: DIABETES | Facility: CLINIC | Age: 63
End: 2023-06-06

## 2023-06-06 ENCOUNTER — OFFICE VISIT (OUTPATIENT)
Dept: DIABETES | Facility: CLINIC | Age: 63
End: 2023-06-06
Payer: MEDICAID

## 2023-06-06 ENCOUNTER — NUTRITION (OUTPATIENT)
Dept: DIABETES | Facility: CLINIC | Age: 63
End: 2023-06-06
Payer: MEDICAID

## 2023-06-06 VITALS
HEIGHT: 67 IN | BODY MASS INDEX: 34.14 KG/M2 | OXYGEN SATURATION: 98 % | HEART RATE: 75 BPM | DIASTOLIC BLOOD PRESSURE: 58 MMHG | WEIGHT: 217.5 LBS | SYSTOLIC BLOOD PRESSURE: 90 MMHG

## 2023-06-06 VITALS — DIASTOLIC BLOOD PRESSURE: 70 MMHG | SYSTOLIC BLOOD PRESSURE: 110 MMHG

## 2023-06-06 DIAGNOSIS — N18.31 TYPE 2 DIABETES MELLITUS WITH STAGE 3A CHRONIC KIDNEY DISEASE, WITHOUT LONG-TERM CURRENT USE OF INSULIN: Primary | ICD-10-CM

## 2023-06-06 DIAGNOSIS — E78.5 DYSLIPIDEMIA, GOAL LDL BELOW 70: ICD-10-CM

## 2023-06-06 DIAGNOSIS — E11.65 TYPE 2 DIABETES MELLITUS WITH HYPERGLYCEMIA, WITHOUT LONG-TERM CURRENT USE OF INSULIN: ICD-10-CM

## 2023-06-06 DIAGNOSIS — E11.22 TYPE 2 DIABETES MELLITUS WITH STAGE 3A CHRONIC KIDNEY DISEASE, WITHOUT LONG-TERM CURRENT USE OF INSULIN: Primary | ICD-10-CM

## 2023-06-06 DIAGNOSIS — E11.42 TYPE 2 DIABETES MELLITUS WITH DIABETIC POLYNEUROPATHY, WITHOUT LONG-TERM CURRENT USE OF INSULIN: Primary | ICD-10-CM

## 2023-06-06 DIAGNOSIS — G45.9 TIA (TRANSIENT ISCHEMIC ATTACK): ICD-10-CM

## 2023-06-06 DIAGNOSIS — E55.9 VITAMIN D DEFICIENCY: ICD-10-CM

## 2023-06-06 DIAGNOSIS — N18.31 TYPE 2 DIABETES MELLITUS WITH STAGE 3A CHRONIC KIDNEY DISEASE, WITHOUT LONG-TERM CURRENT USE OF INSULIN: ICD-10-CM

## 2023-06-06 DIAGNOSIS — E66.09 CLASS 1 OBESITY DUE TO EXCESS CALORIES WITH SERIOUS COMORBIDITY AND BODY MASS INDEX (BMI) OF 34.0 TO 34.9 IN ADULT: ICD-10-CM

## 2023-06-06 DIAGNOSIS — I10 ESSENTIAL HYPERTENSION: ICD-10-CM

## 2023-06-06 DIAGNOSIS — E11.22 TYPE 2 DIABETES MELLITUS WITH STAGE 3A CHRONIC KIDNEY DISEASE, WITHOUT LONG-TERM CURRENT USE OF INSULIN: ICD-10-CM

## 2023-06-06 DIAGNOSIS — Z71.9 HEALTH EDUCATION/COUNSELING: ICD-10-CM

## 2023-06-06 PROCEDURE — 3008F BODY MASS INDEX DOCD: CPT | Mod: CPTII,,, | Performed by: NURSE PRACTITIONER

## 2023-06-06 PROCEDURE — 99214 PR OFFICE/OUTPT VISIT, EST, LEVL IV, 30-39 MIN: ICD-10-PCS | Mod: S$PBB,,, | Performed by: NURSE PRACTITIONER

## 2023-06-06 PROCEDURE — 3051F HG A1C>EQUAL 7.0%<8.0%: CPT | Mod: CPTII,,, | Performed by: NURSE PRACTITIONER

## 2023-06-06 PROCEDURE — 1159F PR MEDICATION LIST DOCUMENTED IN MEDICAL RECORD: ICD-10-PCS | Mod: CPTII,,, | Performed by: NURSE PRACTITIONER

## 2023-06-06 PROCEDURE — 3061F PR NEG MICROALBUMINURIA RESULT DOCUMENTED/REVIEW: ICD-10-PCS | Mod: CPTII,,, | Performed by: NURSE PRACTITIONER

## 2023-06-06 PROCEDURE — 99999 PR PBB SHADOW E&M-EST. PATIENT-LVL I: ICD-10-PCS | Mod: PBBFAC,,,

## 2023-06-06 PROCEDURE — 99215 OFFICE O/P EST HI 40 MIN: CPT | Mod: PBBFAC,PN | Performed by: NURSE PRACTITIONER

## 2023-06-06 PROCEDURE — 3051F PR MOST RECENT HEMOGLOBIN A1C LEVEL 7.0 - < 8.0%: ICD-10-PCS | Mod: CPTII,,, | Performed by: NURSE PRACTITIONER

## 2023-06-06 PROCEDURE — 3066F PR DOCUMENTATION OF TREATMENT FOR NEPHROPATHY: ICD-10-PCS | Mod: CPTII,,, | Performed by: NURSE PRACTITIONER

## 2023-06-06 PROCEDURE — 3008F PR BODY MASS INDEX (BMI) DOCUMENTED: ICD-10-PCS | Mod: CPTII,,, | Performed by: NURSE PRACTITIONER

## 2023-06-06 PROCEDURE — 4010F PR ACE/ARB THEARPY RXD/TAKEN: ICD-10-PCS | Mod: CPTII,,, | Performed by: NURSE PRACTITIONER

## 2023-06-06 PROCEDURE — 3066F NEPHROPATHY DOC TX: CPT | Mod: CPTII,,, | Performed by: NURSE PRACTITIONER

## 2023-06-06 PROCEDURE — 1160F PR REVIEW ALL MEDS BY PRESCRIBER/CLIN PHARMACIST DOCUMENTED: ICD-10-PCS | Mod: CPTII,,, | Performed by: NURSE PRACTITIONER

## 2023-06-06 PROCEDURE — 99214 OFFICE O/P EST MOD 30 MIN: CPT | Mod: S$PBB,,, | Performed by: NURSE PRACTITIONER

## 2023-06-06 PROCEDURE — 99999 PR PBB SHADOW E&M-EST. PATIENT-LVL V: CPT | Mod: PBBFAC,,, | Performed by: NURSE PRACTITIONER

## 2023-06-06 PROCEDURE — 99999 PR PBB SHADOW E&M-EST. PATIENT-LVL I: CPT | Mod: PBBFAC,,,

## 2023-06-06 PROCEDURE — 4010F ACE/ARB THERAPY RXD/TAKEN: CPT | Mod: CPTII,,, | Performed by: NURSE PRACTITIONER

## 2023-06-06 PROCEDURE — 3074F SYST BP LT 130 MM HG: CPT | Mod: CPTII,,, | Performed by: NURSE PRACTITIONER

## 2023-06-06 PROCEDURE — 3078F PR MOST RECENT DIASTOLIC BLOOD PRESSURE < 80 MM HG: ICD-10-PCS | Mod: CPTII,,, | Performed by: NURSE PRACTITIONER

## 2023-06-06 PROCEDURE — 3078F DIAST BP <80 MM HG: CPT | Mod: CPTII,,, | Performed by: NURSE PRACTITIONER

## 2023-06-06 PROCEDURE — 3074F PR MOST RECENT SYSTOLIC BLOOD PRESSURE < 130 MM HG: ICD-10-PCS | Mod: CPTII,,, | Performed by: NURSE PRACTITIONER

## 2023-06-06 PROCEDURE — 3061F NEG MICROALBUMINURIA REV: CPT | Mod: CPTII,,, | Performed by: NURSE PRACTITIONER

## 2023-06-06 PROCEDURE — 1160F RVW MEDS BY RX/DR IN RCRD: CPT | Mod: CPTII,,, | Performed by: NURSE PRACTITIONER

## 2023-06-06 PROCEDURE — 1159F MED LIST DOCD IN RCRD: CPT | Mod: CPTII,,, | Performed by: NURSE PRACTITIONER

## 2023-06-06 PROCEDURE — 99211 OFF/OP EST MAY X REQ PHY/QHP: CPT | Mod: PBBFAC,27,PN

## 2023-06-06 PROCEDURE — G0108 DIAB MANAGE TRN  PER INDIV: HCPCS | Mod: PBBFAC,PN

## 2023-06-06 PROCEDURE — 99999 PR PBB SHADOW E&M-EST. PATIENT-LVL V: ICD-10-PCS | Mod: PBBFAC,,, | Performed by: NURSE PRACTITIONER

## 2023-06-06 RX ORDER — TIRZEPATIDE 7.5 MG/.5ML
7.5 INJECTION, SOLUTION SUBCUTANEOUS
Qty: 4 PEN | Refills: 0 | Status: SHIPPED | OUTPATIENT
Start: 2023-06-06 | End: 2023-07-24 | Stop reason: SDUPTHER

## 2023-06-06 RX ORDER — TIRZEPATIDE 5 MG/.5ML
5 INJECTION, SOLUTION SUBCUTANEOUS
Qty: 4 PEN | Refills: 0 | Status: SHIPPED | OUTPATIENT
Start: 2023-06-06 | End: 2023-07-24 | Stop reason: DRUGHIGH

## 2023-06-06 RX ORDER — TIRZEPATIDE 10 MG/.5ML
10 INJECTION, SOLUTION SUBCUTANEOUS
Qty: 4 PEN | Refills: 3 | Status: SHIPPED | OUTPATIENT
Start: 2023-06-06 | End: 2023-10-12

## 2023-06-06 NOTE — TELEPHONE ENCOUNTER
Stated to pt that if her blood pressures runs under 100/60 to let the office know so that adjustments can be made to blood pressure medication

## 2023-06-06 NOTE — ASSESSMENT & PLAN NOTE
Blood pressure low today in clinic will repeat  Blood pressure within normal limits 2 weeks ago  On Farxiga for renal protection    Optimize BG readings.   See above.   Avoid insulin stacking and hypoglycemia  On lisinopril and Farxiga

## 2023-06-06 NOTE — PATIENT INSTRUCTIONS
We will try to change your Ozempic to Mounjaro   Start with Mounjaro 5 mg weekly for 4 weeks and then increase to 7.5 mg weekly for 4 weeks and then increase to 10 mg weekly   Please notify me for any abdominal pain, nausea, vomiting, diarrhea, acid, constipation   We will have to do a PA for the Mounjaro   But you have tried and failed-- Trulicity, Ozempic, and Victoza-- so they should approve it   If not, we will go back to Trulicity 4.5 mg weekly      Continue Farxiga 10 mg daily

## 2023-06-06 NOTE — ASSESSMENT & PLAN NOTE
LDL goal is <70--given history of TIA  She stopped the Lipitor due to nausea  She stopped the Crestor due to intolerance   She also tried Zetia and was unable to tolerate  Now on pravastatin 40 mg nightly and tolerating without any side effects  LDL at goal  Continue statin

## 2023-06-06 NOTE — TELEPHONE ENCOUNTER
----- Message from Edelmira De La Rosa sent at 6/6/2023  4:06 PM CDT -----  Regarding: Rx refill  Pt calling to get Rx refill for: pt states that the pharmacy will not authorizing the following medication. Please advise. Requesting a call back.          tirzepatide (MOUNJARO) 10 mg/0.5 mL PnIj        [phone] (home)

## 2023-06-06 NOTE — ASSESSMENT & PLAN NOTE
BP goal is < 140/90.   Tolerating ACEi  Blood pressure goals discussed with patient    Blood pressure low today in clinic will repeat  Blood pressure within normal limits 2 weeks ago  Encouraged patient to monitor blood pressure at home if systolic consistently under 100 and diastolic consistently under 60--reached out as we may need to cut back on her blood pressure medications

## 2023-06-06 NOTE — TELEPHONE ENCOUNTER
Blood pressure looks good  If she is having low readings such as systolic running under 100 or diastolic running under 60 she needs to let us know as we may need to cut back her blood pressure medications

## 2023-06-06 NOTE — Clinical Note
PA for mounjaro  Tried and failed - Trulicity, Ozempic, and Victoza  See notes  Let me know if denied as she will need to go back on Trulicity 4.5 mg weekly

## 2023-06-06 NOTE — ASSESSMENT & PLAN NOTE
Uncontrolled   A1c has increased since changing to Ozempic from high dose Trulicity   Ozempic is making her gain weight, feel hungry, and have bad cravings   Will try for Mounjaro again -- PA will be needed       -- Medication Changes:   We will try to change your Ozempic to Mounjaro   Start with Mounjaro 5 mg weekly for 4 weeks and then increase to 7.5 mg weekly for 4 weeks and then increase to 10 mg weekly   Please notify me for any abdominal pain, nausea, vomiting, diarrhea, acid, constipation   We will have to do a PA for the Mounjaro   But you have tried and failed-- Trulicity, Ozempic, and Victoza-- so they should approve it   If not, we will go back to Trulicity 4.5 mg weekly      Continue Farxiga 10 mg daily          -- Reviewed goals of therapy are to get the best control we can without hypoglycemia.    -- Advised frequent self blood glucose monitoring.  Patient encouraged to document glucose results and bring them to every clinic visit.- daily at alternating times   -- Hypoglycemia precautions discussed. Instructed on precautions before driving.    -- Call for Bg repeatedly < 70 or > 180.   -- Close adherence to lifestyle changes recommended.   -- Periodic follow ups for eye evaluations, foot care and dental care suggested.      Regarding polyneuropathy:  Optimize BG readings.   See above.     Educated patient to check feet daily for any foreign objects and/or wounds. Discussed with patient the importance of wearing appropriate footwear at all times, not to walk barefoot ever, and to check shoes before putting them on feet. Instructed patient to keep feet dry by regularly changing shoes and socks and drying feet after baths and exercises. Also, instructed patient to report any new lesions, discolorations, or swelling to a healthcare professional.

## 2023-06-06 NOTE — TELEPHONE ENCOUNTER
----- Message from Viji Huerta LPN sent at 6/6/2023 11:45 AM CDT -----  Blood pressure was 110/70, I would give  her if any adjustments need to be made we would give her a call

## 2023-06-06 NOTE — PROGRESS NOTES
Diabetes Care Specialist Progress Note  Author: Sravanthi Castellano RD  Date: 6/6/2023    Program Intake  Reason for Diabetes Program Visit:: Intervention  Type of Intervention:: Individual  Individual: Education  Education: Self-Management Skill Review, Nutrition and Meal Planning  Current diabetes risk level:: low (HgbA1c 6.7)  In the last 12 months, have you:: none  Permission to speak with others about care:: no    Lab Results   Component Value Date    HGBA1C 7.2 (H) 05/30/2023     Clinical    Problem Review  Reviewed Problem List with Patient: yes  Active comorbidities affecting diabetes self-care.: yes  Comorbidities: Stroke, Cardiovascular Disease, Hypertension, Neuropathy, Gastrointestinal Disorder  Reviewed health maintenance: yes    Clinical Assessment  Current Diabetes Treatment: Oral Medication, Injectable  Have you ever experienced hypoglycemia (low blood sugar)?: yes  In the last month, how often have you experienced low blood sugar?: once every other week  What symptoms do you experience?: Anxious/nervous, Shaky  Have you ever been hospitalized because your blood sugar was too low?: no  Have you ever experienced hyperglycemia (high blood sugar)?: yes  In the last month, how often have you experienced high blood sugar?: once every other week  Are you able to tell when your blood sugar is high?: No (comment)  Have you ever been hospitalized because your blood sugar was high?: no    Medication Information  Medication adherence impacting ability to self-manage diabetes?: No    Labs  Do you have regular lab work to monitor your medications?: No  Type of Regular Lab Work: A1c, Cholesterol, Microalbumin, CBC, BMP  Lab Compliance Barriers: No    Nutritional Status  Meal Plan 24 Hour Recall: Breakfast, Lunch, Dinner, Snack  Meal Plan 24 Hour Recall - Breakfast: Zamorano, biscuit, egg, tea  Meal Plan 24 Hour Recall - Lunch: Chips  Meal Plan 24 Hour Recall - Dinner: Red beans with rice, cornbread, hot sausage  Meal Plan  24 Hour Recall - Snack: chips, sweets, Bit O' Honey tends to snack after dinner while relaxing in the living room, beverages: water, green tea, hot tea, occassionally a regular soda  Current nutritional status an area of need that is impacting patient's ability to self-manage diabetes?: No    Additional Social History    Support  Does anyone support you with your diabetes care?: yes  Who supports you?: self, son/daughter  Who takes you to your medical appointments?: self  Does the current support meet the patient's needs?: Yes  Is Support an area impacting ability to self-manage diabetes?: No    Access to Mass Media & Technology  Does the patient have access to any of the following devices or technologies?: Smart phone  Media or technology needs impacting ability to self-manage diabetes?: No    Cognitive/Behavioral Health  Alert and Oriented: Yes  Difficulty Thinking: No  Requires Prompting: No  Requires assistance for routine expression?: No  Cognitive or behavioral barriers impacting ability to self-manage diabetes?: No    Culture/Mandaeism  Culture or Quaker beliefs that may impact ability to access healthcare: No    Communication  Language preference: English  Hearing Problems: No  Vision Problems: No  Communication needs impacting ability to self-manage diabetes?: No    Health Literacy  Preferred Learning Method: Face to Face  Health literacy needs impacting ability to self-manage diabetes?: No    Diabetes Self-Management Skills Assessment    Diabetes Disease Process/Treatment Options  Patient/caregiver able to state what happens when someone has diabetes.: yes  Patient/caregiver knows what type of diabetes they have.: yes  Diabetes Type : Type II  Patient/caregiver able to identify at least three signs and symptoms of diabetes.: yes  Identified signs and symptoms:: blurred vision, fatigue, frequent urination, increased thirst  Patient able to identify at least three risk factors for diabetes.:  yes  Identified risk factors:: age over 40, being overweight, ethnicity, family history, reduced activity, history of gestational diabetes  Assessment indicates:: Adequate understanding  Area of need?: No    Nutrition/Healthy Eating  Challenges to healthy eating:: portion control, eating when feeling depressed/stressed, lack of will power, snacking between meals and at night, eating out, going to parties  Method of carbohydrate measurement:: plate method, carb counting/reading labels  Patient can identify foods that impact blood sugar.: yes  Patient-identified foods:: fruit/fruit juice, starches (bread, pasta, rice, cereal), milk, soda, starchy vegetables (corn, peas, beans), sweets, yogurt  Nutrition/Healthy Eating Skills Assessment Completed:: Yes  Assessment indicates:: Adequate understanding, Instruction Needed  Area of need?: Yes    Physical Activity/Exercise  Patient's daily activity level:: moderately active  Patient formally exercises outside of work.: no  Reasons for not exercising:: work schedule, time constraints  Patient can identify forms of physical activity.: yes  Stated forms of physical activity:: manual activity at work, moving to burn calories  Patient can identify reasons why exercise/physical activity is important in diabetes management.: yes  Identified reasons:: lowers blood glucose, blood pressure, and cholesterol, tones muscles  Assessment indicates:: Adequate understanding  Area of need?: No    Medications  Patient is able to describe current diabetes management routine.: yes  Diabetes management routine:: oral medications, injectable medications  Patient is able to identify current diabetes medications, dosages, and appropriate timing of medications.: yes  Patient understands the purpose of the medications taken for diabetes.: yes  Patient reports problems or concerns with current medication regimen.: no  Assessment indicates:: Adequate understanding  Area of need?: No    Home Blood  Glucose Monitoring  Patient states that blood sugar is checked at home daily.: yes  Monitoring Method:: home glucometer  Home glucometer meter type:: onetouch  When you check what is your typical blood sugar range? : 135,140,139,165,148,146  Blood glucose logs:: yes, encouraged to keep logs, encouraged to bring logs to provider visits  Blood glucose logs reviewed today?: yes  Assessment indicates:: Adequate understanding  Area of need?: No    Acute Complications  Able to state the blood sugar range for hypoglycemia?: yes  Patient can identify general symptoms of hypoglycemia: yes  Patient identified:: dizziness, rapid heartbeat, shakiness  Able to state proper treatment of hypoglycemia?: yes  Patient identified:: 1/2 can soda/fruit juice, 5-6 pieces of hard candy  Patient is able to state the basic meaning of hyperglycemia?: Yes  Able to state the blood sugar range for hyperglycemia?: yes  Patient able to state proper treatment of hyperglycemia?: yes  Patient identified:: take medication as recommended, increase water intake, monitor blood sugar  Patient able to verbalize sick day plan?: yes  Patient-stated sick day plan:: drink sugar-free/calorie containing clear liquids if unable to take solid food, drink more fluids, check blood sugar every 2-3 hours, call provider if blood sugar does not improve, seek medical attention for nausea, diarrhea, vomiting, fever with high blood sugar  Assessment indicates:: Adequate understanding  Area of need?: No    Chronic Complications  Patient can identify major chronic complications of diabetes.: yes  Stated chronic complications:: heart disease/heart attack, kidney disease, neuropathy/nerve damage, retinopathy, sexual dysfunction, stroke  Patient can identify ways to prevent or delay diabetes complications.: yes  Stated ways to prevent complications:: controlling blood sugar, controlling cholesterol and triglycerides  Patient is aware that having diabetes increases risk of  heart disease?: Yes  Patient is aware that heart disease is the leading cause of death and disability in people with diabetes?: Yes  Patient able to state risk factors for heart disease?: Yes  Patient is taking statin?: Yes  Assessment indicates:: Adequate understanding  Area of need?: No    Psychosocial/Coping  Patient can identify ways of coping with chronic disease.: yes  Patient-stated ways of coping with chronic disease:: support from loved ones, spiritual/Confucianism practices  Assessment indicates:: Adequate understanding  Area of need?: No    Diabetes Self Support Plan    Assessment Summary and Plan    Based on today's diabetes care assessment, the following areas of need were identified:      Social 6/6/2023   Support No   Access to Mass Media/Tech No   Cognitive/Behavioral Health No   Culture/Sikh No   Communication No   Health Literacy No        Clinical 6/6/2023   Medication Adherence No   Lab Compliance No   Nutritional Status No        Diabetes Self-Management Skills 6/6/2023   Diabetes Disease Process/Treatment Options No   Nutrition/Healthy Eating Yes, see care plan.   Physical Activity/Exercise No   Medication No   Home Blood Glucose Monitoring No   Acute Complications No   Chronic Complications No   Psychosocial/Coping No      Today's interventions were provided through individual discussion, instruction, and written materials were provided.      Patient verbalized understanding of instruction and written materials.  Pt was able to return back demonstration of instructions today. Patient understood key points, needs reinforcement and further instruction.     Diabetes Self-Management Care Plan:    Today's Diabetes Self-Management Care Plan was developed with Holly's input. Holly has agreed to work toward the following goal(s) to improve his/her overall diabetes control.      Care Plan: Diabetes Management   Updates made since 5/7/2023 12:00 AM        Problem: Healthy Eating         Goal: Eat  dinner at home as a homecooked meal rather than take out at least 4 times a week for the next 3 months    Start Date: 3/22/2023   Expected End Date: 6/6/2023   This Visit's Progress: Deferred   Recent Progress: Deferred   Priority: High   Barriers: Other (comments)   Note:    Currently time is an issue due to work schedule    6/6/23: Pt reports increased appetite 2/2 Ozempic. NP changed Ozempic to Mounjaro.         Follow Up Plan     Follow up in about 3 months (around 9/6/2023) for 3-month F/U.    Today's care plan and follow up schedule was discussed with patient.  Holly verbalized understanding of the care plan, goals, and agrees to follow up plan.        The patient was encouraged to communicate with his/her health care provider/physician and care team regarding his/her condition(s) and treatment.  I provided the patient with my contact information today and encouraged to contact me via phone or [facility] Patient Portal as needed.     Length of Visit   Total Time: 30 Minutes

## 2023-06-06 NOTE — ASSESSMENT & PLAN NOTE
Body mass index is 34.07 kg/m².  Increases insulin resistance.   Discussed DM diet and exercise.   Encouraged continued weight loss

## 2023-06-07 ENCOUNTER — TELEPHONE (OUTPATIENT)
Dept: DIABETES | Facility: CLINIC | Age: 63
End: 2023-06-07
Payer: MEDICAID

## 2023-06-27 ENCOUNTER — TELEPHONE (OUTPATIENT)
Dept: OBSTETRICS AND GYNECOLOGY | Facility: CLINIC | Age: 63
End: 2023-06-27
Payer: MEDICAID

## 2023-06-27 NOTE — TELEPHONE ENCOUNTER
Returned pts call. Pt needed f/u scheduled. Vu and appt scheduled.     ----- Message from Germán Bear sent at 6/27/2023  2:16 PM CDT -----      Name of Who is Calling: SARANYA WEST [[id number]]      What is the request in detail: Pt called to schedule 6wk follow up.Please contact to further discuss and advise.          Can the clinic reply by [facility]: Y      What Number to Call Back if not in [facility]: [phone]

## 2023-07-06 ENCOUNTER — OFFICE VISIT (OUTPATIENT)
Dept: OBSTETRICS AND GYNECOLOGY | Facility: CLINIC | Age: 63
End: 2023-07-06
Payer: MEDICAID

## 2023-07-06 VITALS
WEIGHT: 214.31 LBS | BODY MASS INDEX: 33.56 KG/M2 | DIASTOLIC BLOOD PRESSURE: 60 MMHG | SYSTOLIC BLOOD PRESSURE: 125 MMHG

## 2023-07-06 DIAGNOSIS — N39.41 URGE INCONTINENCE OF URINE: Primary | ICD-10-CM

## 2023-07-06 PROCEDURE — 99999 PR PBB SHADOW E&M-EST. PATIENT-LVL IV: ICD-10-PCS | Mod: PBBFAC,,, | Performed by: STUDENT IN AN ORGANIZED HEALTH CARE EDUCATION/TRAINING PROGRAM

## 2023-07-06 PROCEDURE — 99999 PR PBB SHADOW E&M-EST. PATIENT-LVL IV: CPT | Mod: PBBFAC,,, | Performed by: STUDENT IN AN ORGANIZED HEALTH CARE EDUCATION/TRAINING PROGRAM

## 2023-07-06 PROCEDURE — 99024 PR POST-OP FOLLOW-UP VISIT: ICD-10-PCS | Mod: S$PBB,,, | Performed by: STUDENT IN AN ORGANIZED HEALTH CARE EDUCATION/TRAINING PROGRAM

## 2023-07-06 PROCEDURE — 3061F PR NEG MICROALBUMINURIA RESULT DOCUMENTED/REVIEW: ICD-10-PCS | Mod: CPTII,,, | Performed by: STUDENT IN AN ORGANIZED HEALTH CARE EDUCATION/TRAINING PROGRAM

## 2023-07-06 PROCEDURE — 4010F ACE/ARB THERAPY RXD/TAKEN: CPT | Mod: CPTII,,, | Performed by: STUDENT IN AN ORGANIZED HEALTH CARE EDUCATION/TRAINING PROGRAM

## 2023-07-06 PROCEDURE — 99024 POSTOP FOLLOW-UP VISIT: CPT | Mod: S$PBB,,, | Performed by: STUDENT IN AN ORGANIZED HEALTH CARE EDUCATION/TRAINING PROGRAM

## 2023-07-06 PROCEDURE — 3051F PR MOST RECENT HEMOGLOBIN A1C LEVEL 7.0 - < 8.0%: ICD-10-PCS | Mod: CPTII,,, | Performed by: STUDENT IN AN ORGANIZED HEALTH CARE EDUCATION/TRAINING PROGRAM

## 2023-07-06 PROCEDURE — 3008F PR BODY MASS INDEX (BMI) DOCUMENTED: ICD-10-PCS | Mod: CPTII,,, | Performed by: STUDENT IN AN ORGANIZED HEALTH CARE EDUCATION/TRAINING PROGRAM

## 2023-07-06 PROCEDURE — 3008F BODY MASS INDEX DOCD: CPT | Mod: CPTII,,, | Performed by: STUDENT IN AN ORGANIZED HEALTH CARE EDUCATION/TRAINING PROGRAM

## 2023-07-06 PROCEDURE — 3061F NEG MICROALBUMINURIA REV: CPT | Mod: CPTII,,, | Performed by: STUDENT IN AN ORGANIZED HEALTH CARE EDUCATION/TRAINING PROGRAM

## 2023-07-06 PROCEDURE — 3078F DIAST BP <80 MM HG: CPT | Mod: CPTII,,, | Performed by: STUDENT IN AN ORGANIZED HEALTH CARE EDUCATION/TRAINING PROGRAM

## 2023-07-06 PROCEDURE — 1160F PR REVIEW ALL MEDS BY PRESCRIBER/CLIN PHARMACIST DOCUMENTED: ICD-10-PCS | Mod: CPTII,,, | Performed by: STUDENT IN AN ORGANIZED HEALTH CARE EDUCATION/TRAINING PROGRAM

## 2023-07-06 PROCEDURE — 3074F SYST BP LT 130 MM HG: CPT | Mod: CPTII,,, | Performed by: STUDENT IN AN ORGANIZED HEALTH CARE EDUCATION/TRAINING PROGRAM

## 2023-07-06 PROCEDURE — 1160F RVW MEDS BY RX/DR IN RCRD: CPT | Mod: CPTII,,, | Performed by: STUDENT IN AN ORGANIZED HEALTH CARE EDUCATION/TRAINING PROGRAM

## 2023-07-06 PROCEDURE — 4010F PR ACE/ARB THEARPY RXD/TAKEN: ICD-10-PCS | Mod: CPTII,,, | Performed by: STUDENT IN AN ORGANIZED HEALTH CARE EDUCATION/TRAINING PROGRAM

## 2023-07-06 PROCEDURE — 1159F MED LIST DOCD IN RCRD: CPT | Mod: CPTII,,, | Performed by: STUDENT IN AN ORGANIZED HEALTH CARE EDUCATION/TRAINING PROGRAM

## 2023-07-06 PROCEDURE — 99214 OFFICE O/P EST MOD 30 MIN: CPT | Mod: PBBFAC,PN | Performed by: STUDENT IN AN ORGANIZED HEALTH CARE EDUCATION/TRAINING PROGRAM

## 2023-07-06 PROCEDURE — 1159F PR MEDICATION LIST DOCUMENTED IN MEDICAL RECORD: ICD-10-PCS | Mod: CPTII,,, | Performed by: STUDENT IN AN ORGANIZED HEALTH CARE EDUCATION/TRAINING PROGRAM

## 2023-07-06 PROCEDURE — 3074F PR MOST RECENT SYSTOLIC BLOOD PRESSURE < 130 MM HG: ICD-10-PCS | Mod: CPTII,,, | Performed by: STUDENT IN AN ORGANIZED HEALTH CARE EDUCATION/TRAINING PROGRAM

## 2023-07-06 PROCEDURE — 3066F PR DOCUMENTATION OF TREATMENT FOR NEPHROPATHY: ICD-10-PCS | Mod: CPTII,,, | Performed by: STUDENT IN AN ORGANIZED HEALTH CARE EDUCATION/TRAINING PROGRAM

## 2023-07-06 PROCEDURE — 3066F NEPHROPATHY DOC TX: CPT | Mod: CPTII,,, | Performed by: STUDENT IN AN ORGANIZED HEALTH CARE EDUCATION/TRAINING PROGRAM

## 2023-07-06 PROCEDURE — 3078F PR MOST RECENT DIASTOLIC BLOOD PRESSURE < 80 MM HG: ICD-10-PCS | Mod: CPTII,,, | Performed by: STUDENT IN AN ORGANIZED HEALTH CARE EDUCATION/TRAINING PROGRAM

## 2023-07-06 PROCEDURE — 3051F HG A1C>EQUAL 7.0%<8.0%: CPT | Mod: CPTII,,, | Performed by: STUDENT IN AN ORGANIZED HEALTH CARE EDUCATION/TRAINING PROGRAM

## 2023-07-06 NOTE — PROGRESS NOTES
History & Physical  Gynecology      SUBJECTIVE:     Chief Complaint: Follow-up       History of Present Illness:  Holly Mcclelland is a 62 y.o.  here for follow up. Doing well. Last visit we prescribed oxybutynin XL 5 mg for urinary urgency. She admit she forgot to start this. She has been taking her gabapentin more consistently and says her hot flashes are less often and less severe and her neuropathy is improving. No VB.       Review of patient's allergies indicates:   Allergen Reactions    Victoza [liraglutide] Other (See Comments)     Throat closed ? But not seen in the ER        Past Medical History:   Diagnosis Date    CHF (congestive heart failure)     Diabetes mellitus     Heart murmur     Hyperlipidemia     Hypertension     Sleep apnea     no cpap since weight loss    Stroke     remote hx TIA; no residual     Past Surgical History:   Procedure Laterality Date    ANTERIOR CERVICAL DISCECTOMY W/ FUSION N/A 2022    Procedure: DISCECTOMY, SPINE, CERVICAL, ANTERIOR APPROACH, WITH FUSION C3-4 Gareth PRADHAN notified;  Surgeon: Damian Arroyo MD;  Location: [facility] OR;  Service: Neurosurgery;  Laterality: N/A;  ASA1  TOR1  T&Cross x 2 units  EMG  SEP  MEP  RN PREOP ON 22,COVID---NEGATIVE- on -- T/S  done  ON   SPINEWAVE GARETH RUIZ  [phone] NOTIFIED (CALLED) GARETH ON 2022 @ 3:11PM-LO  NEURO MONIT    BREAST BIOPSY Left     CYSTOSCOPY N/A 2023    Procedure: CYSTOSCOPY;  Surgeon: Nella Marte MD;  [facility];  Service: OB/GYN;  Laterality: N/A;    HYSTERECTOMY, TOTAL, LAPAROSCOPIC, WITH SALPINGO-OOPHORECTOMY Bilateral 2023    Procedure: HYSTERECTOMY,TOTAL,LAPAROSCOPIC,WITH SALPINGO-OOPHORECTOMY;  Surgeon: Nella Marte MD;  Location: [facility] OR;  Service: OB/GYN;  Laterality: Bilateral;    LYSIS OF ADHESIONS N/A 2023    Procedure: LYSIS, ADHESIONS;  Surgeon: Nella Marte MD;  Location: [facility] OR;  Service: OB/GYN;  Laterality: N/A;     OB  History          2    Para   2    Term   1       1    AB        Living   1         SAB        IAB        Ectopic        Multiple        Live Births   1               Family History   Problem Relation Age of Onset    Diabetes Maternal Grandmother     Diabetes Mother     Hypertension Mother     Heart disease Mother     Diabetes Sister     Diabetes Father     Breast cancer Neg Hx     Colon cancer Neg Hx     Ovarian cancer Neg Hx      Social History     Tobacco Use    Smoking status: Former     Types: Cigarettes     Quit date:      Years since quittin.5    Smokeless tobacco: Never   Substance Use Topics    Alcohol use: No    Drug use: No       Current Outpatient Medications   Medication Sig    aspirin (ECOTRIN) 81 MG EC tablet Take 1 tablet by mouth once daily    beta-carotene,A,-vits C,E/mins (OCUVITE ORAL) Take by mouth Daily.    biotin 10,000 mcg Cap Take by mouth Daily.    blood sugar diagnostic (ONETOUCH ULTRA TEST) Strp 1 strip by Misc.(Non-Drug; Combo Route) route once daily.    cinnamon bark (CINNAMON ORAL) Take 1,000 mg by mouth Daily.    dapagliflozin (FARXIGA) 10 mg tablet Take 1 tablet (10 mg total) by mouth once daily.    diazePAM (VALIUM) 10 MG Tab Take 10 mg by mouth daily as needed.    furosemide (LASIX) 20 MG tablet TAKE 1 TABLET BY MOUTH ONCE DAILY AS NEEDED FOR  SWELLING    gabapentin (NEURONTIN) 300 MG capsule Take 300 mg by mouth 3 (three) times daily.    glucosamine-chondroitin 500-400 mg tablet Take 2 tablets by mouth Daily.    lisinopriL-hydrochlorothiazide (PRINZIDE,ZESTORETIC) 20-12.5 mg per tablet Take 1 tablet by mouth once daily.    ONETOUCH DELICA PLUS LANCET 33 gauge Misc USE 1  TO CHECK GLUCOSE 4 TIMES DAILY    ONETOUCH ULTRA2 METER [facility] USE TO CHECK GLUCOSE TWICE DAILY    pravastatin (PRAVACHOL) 40 MG tablet Take 1 tablet (40 mg total) by mouth every evening.    tirzepatide (MOUNJARO) 10 mg/0.5 mL PnIj Inject 10 mg into the skin every 7 days.    tirzepatide  (MOUNJARO) 5 mg/0.5 mL PnIj Inject 5 mg into the skin every 7 days.    tirzepatide (MOUNJARO) 7.5 mg/0.5 mL PnIj Inject 7.5 mg into the skin every 7 days.    hydrALAZINE (APRESOLINE) 25 MG tablet Take 1 tablet (25 mg total) by mouth 3 (three) times daily.    oxybutynin (DITROPAN-XL) 5 MG TR24 Take 1 tablet (5 mg total) by mouth once daily.    potassium gluconate 595 mg (99 mg) Tab Take by mouth Daily.     No current facility-administered medications for this visit.         Review of Systems:  Review of Systems   Constitutional:  Negative for chills and fever.   Respiratory:  Negative for cough, shortness of breath and wheezing.    Cardiovascular:  Negative for chest pain, palpitations and leg swelling.   Gastrointestinal:  Negative for abdominal pain, nausea and vomiting.   Endocrine: Positive for diabetes. Negative for hyperthyroidism and hypothyroidism.   Genitourinary:  Positive for urgency. Negative for dysuria, pelvic pain, vaginal bleeding and vaginal pain.   Musculoskeletal:  Negative for joint swelling and myalgias.   Integumentary:  Negative for rash.   Neurological:  Negative for vertigo, seizures, syncope and numbness.   Hematological:  Does not bruise/bleed easily.   Psychiatric/Behavioral:  Negative for depression. The patient is not nervous/anxious.       OBJECTIVE:     Physical Exam:  Physical Exam  Constitutional:       General: She is not in acute distress.     Appearance: She is well-developed.   HENT:      Head: Normocephalic and atraumatic.   Eyes:      Extraocular Movements: Extraocular movements intact.      Conjunctiva/sclera: Conjunctivae normal.   Pulmonary:      Effort: Pulmonary effort is normal. No respiratory distress.   Musculoskeletal:         General: Normal range of motion.      Right lower leg: No edema.      Left lower leg: No edema.   Skin:     General: Skin is warm and dry.   Neurological:      Mental Status: She is alert and oriented to person, place, and time.   Psychiatric:          Mood and Affect: Mood normal.         Behavior: Behavior normal.         ASSESSMENT:       ICD-10-CM ICD-9-CM    1. Urge incontinence of urine  N39.41 788.31              Plan:    Meds reviewed and updated.   Will proceed with the oxybutynin as prescribed which she is comfortable with  Side effects and mitigation strategies reviewed. Retention precautions.     RTC 3-4 months for UUI f/u    Nella Jiménez

## 2023-07-24 DIAGNOSIS — E11.42 TYPE 2 DIABETES MELLITUS WITH DIABETIC POLYNEUROPATHY, WITHOUT LONG-TERM CURRENT USE OF INSULIN: ICD-10-CM

## 2023-07-24 RX ORDER — TIRZEPATIDE 7.5 MG/.5ML
7.5 INJECTION, SOLUTION SUBCUTANEOUS
Qty: 4 PEN | Refills: 0 | Status: SHIPPED | OUTPATIENT
Start: 2023-07-24 | End: 2023-10-12

## 2023-07-24 RX ORDER — TIRZEPATIDE 5 MG/.5ML
INJECTION, SOLUTION SUBCUTANEOUS
Refills: 0 | OUTPATIENT
Start: 2023-07-24

## 2023-09-18 ENCOUNTER — PATIENT MESSAGE (OUTPATIENT)
Dept: PEDIATRICS | Facility: CLINIC | Age: 63
End: 2023-09-18
Payer: MEDICAID

## 2023-10-05 ENCOUNTER — TELEPHONE (OUTPATIENT)
Dept: DIABETES | Facility: CLINIC | Age: 63
End: 2023-10-05
Payer: MEDICAID

## 2023-10-05 ENCOUNTER — OFFICE VISIT (OUTPATIENT)
Dept: OBSTETRICS AND GYNECOLOGY | Facility: CLINIC | Age: 63
End: 2023-10-05
Payer: MEDICAID

## 2023-10-05 VITALS
WEIGHT: 217.13 LBS | HEART RATE: 66 BPM | BODY MASS INDEX: 34.01 KG/M2 | SYSTOLIC BLOOD PRESSURE: 123 MMHG | DIASTOLIC BLOOD PRESSURE: 73 MMHG

## 2023-10-05 DIAGNOSIS — Z79.899 FOLLOW-UP ENCOUNTER INVOLVING MEDICATION: Primary | ICD-10-CM

## 2023-10-05 PROCEDURE — 3008F BODY MASS INDEX DOCD: CPT | Mod: CPTII,,, | Performed by: NURSE PRACTITIONER

## 2023-10-05 PROCEDURE — 99999 PR PBB SHADOW E&M-EST. PATIENT-LVL III: ICD-10-PCS | Mod: PBBFAC,,, | Performed by: NURSE PRACTITIONER

## 2023-10-05 PROCEDURE — 1159F PR MEDICATION LIST DOCUMENTED IN MEDICAL RECORD: ICD-10-PCS | Mod: CPTII,,, | Performed by: NURSE PRACTITIONER

## 2023-10-05 PROCEDURE — 3078F PR MOST RECENT DIASTOLIC BLOOD PRESSURE < 80 MM HG: ICD-10-PCS | Mod: CPTII,,, | Performed by: NURSE PRACTITIONER

## 2023-10-05 PROCEDURE — 3078F DIAST BP <80 MM HG: CPT | Mod: CPTII,,, | Performed by: NURSE PRACTITIONER

## 2023-10-05 PROCEDURE — 3061F NEG MICROALBUMINURIA REV: CPT | Mod: CPTII,,, | Performed by: NURSE PRACTITIONER

## 2023-10-05 PROCEDURE — 3044F HG A1C LEVEL LT 7.0%: CPT | Mod: CPTII,,, | Performed by: NURSE PRACTITIONER

## 2023-10-05 PROCEDURE — 99999 PR PBB SHADOW E&M-EST. PATIENT-LVL III: CPT | Mod: PBBFAC,,, | Performed by: NURSE PRACTITIONER

## 2023-10-05 PROCEDURE — 99212 OFFICE O/P EST SF 10 MIN: CPT | Mod: S$PBB,,, | Performed by: NURSE PRACTITIONER

## 2023-10-05 PROCEDURE — 3074F SYST BP LT 130 MM HG: CPT | Mod: CPTII,,, | Performed by: NURSE PRACTITIONER

## 2023-10-05 PROCEDURE — 3074F PR MOST RECENT SYSTOLIC BLOOD PRESSURE < 130 MM HG: ICD-10-PCS | Mod: CPTII,,, | Performed by: NURSE PRACTITIONER

## 2023-10-05 PROCEDURE — 3044F PR MOST RECENT HEMOGLOBIN A1C LEVEL <7.0%: ICD-10-PCS | Mod: CPTII,,, | Performed by: NURSE PRACTITIONER

## 2023-10-05 PROCEDURE — 4010F PR ACE/ARB THEARPY RXD/TAKEN: ICD-10-PCS | Mod: CPTII,,, | Performed by: NURSE PRACTITIONER

## 2023-10-05 PROCEDURE — 3066F PR DOCUMENTATION OF TREATMENT FOR NEPHROPATHY: ICD-10-PCS | Mod: CPTII,,, | Performed by: NURSE PRACTITIONER

## 2023-10-05 PROCEDURE — 99213 OFFICE O/P EST LOW 20 MIN: CPT | Mod: PBBFAC,PN | Performed by: NURSE PRACTITIONER

## 2023-10-05 PROCEDURE — 4010F ACE/ARB THERAPY RXD/TAKEN: CPT | Mod: CPTII,,, | Performed by: NURSE PRACTITIONER

## 2023-10-05 PROCEDURE — 1160F RVW MEDS BY RX/DR IN RCRD: CPT | Mod: CPTII,,, | Performed by: NURSE PRACTITIONER

## 2023-10-05 PROCEDURE — 1159F MED LIST DOCD IN RCRD: CPT | Mod: CPTII,,, | Performed by: NURSE PRACTITIONER

## 2023-10-05 PROCEDURE — 3066F NEPHROPATHY DOC TX: CPT | Mod: CPTII,,, | Performed by: NURSE PRACTITIONER

## 2023-10-05 PROCEDURE — 99212 PR OFFICE/OUTPT VISIT, EST, LEVL II, 10-19 MIN: ICD-10-PCS | Mod: S$PBB,,, | Performed by: NURSE PRACTITIONER

## 2023-10-05 PROCEDURE — 3008F PR BODY MASS INDEX (BMI) DOCUMENTED: ICD-10-PCS | Mod: CPTII,,, | Performed by: NURSE PRACTITIONER

## 2023-10-05 PROCEDURE — 1160F PR REVIEW ALL MEDS BY PRESCRIBER/CLIN PHARMACIST DOCUMENTED: ICD-10-PCS | Mod: CPTII,,, | Performed by: NURSE PRACTITIONER

## 2023-10-05 PROCEDURE — 3061F PR NEG MICROALBUMINURIA RESULT DOCUMENTED/REVIEW: ICD-10-PCS | Mod: CPTII,,, | Performed by: NURSE PRACTITIONER

## 2023-10-09 ENCOUNTER — TELEPHONE (OUTPATIENT)
Dept: PODIATRY | Facility: CLINIC | Age: 63
End: 2023-10-09
Payer: MEDICAID

## 2023-10-12 ENCOUNTER — OFFICE VISIT (OUTPATIENT)
Dept: DIABETES | Facility: CLINIC | Age: 63
End: 2023-10-12
Payer: MEDICAID

## 2023-10-12 ENCOUNTER — CLINICAL SUPPORT (OUTPATIENT)
Dept: DIABETES | Facility: CLINIC | Age: 63
End: 2023-10-12
Payer: MEDICAID

## 2023-10-12 VITALS
OXYGEN SATURATION: 97 % | DIASTOLIC BLOOD PRESSURE: 72 MMHG | WEIGHT: 217.38 LBS | SYSTOLIC BLOOD PRESSURE: 123 MMHG | BODY MASS INDEX: 34.12 KG/M2 | HEIGHT: 67 IN | HEART RATE: 64 BPM

## 2023-10-12 VITALS — BODY MASS INDEX: 34.12 KG/M2 | HEIGHT: 67 IN | WEIGHT: 217.38 LBS

## 2023-10-12 DIAGNOSIS — E78.5 DYSLIPIDEMIA, GOAL LDL BELOW 70: ICD-10-CM

## 2023-10-12 DIAGNOSIS — E55.9 VITAMIN D DEFICIENCY: ICD-10-CM

## 2023-10-12 DIAGNOSIS — N18.31 TYPE 2 DIABETES MELLITUS WITH STAGE 3A CHRONIC KIDNEY DISEASE, WITHOUT LONG-TERM CURRENT USE OF INSULIN: ICD-10-CM

## 2023-10-12 DIAGNOSIS — G45.9 TIA (TRANSIENT ISCHEMIC ATTACK): ICD-10-CM

## 2023-10-12 DIAGNOSIS — Z71.9 HEALTH EDUCATION/COUNSELING: ICD-10-CM

## 2023-10-12 DIAGNOSIS — E11.42 TYPE 2 DIABETES MELLITUS WITH DIABETIC POLYNEUROPATHY, WITHOUT LONG-TERM CURRENT USE OF INSULIN: Primary | ICD-10-CM

## 2023-10-12 DIAGNOSIS — I10 ESSENTIAL HYPERTENSION: ICD-10-CM

## 2023-10-12 DIAGNOSIS — E11.22 TYPE 2 DIABETES MELLITUS WITH STAGE 3A CHRONIC KIDNEY DISEASE, WITHOUT LONG-TERM CURRENT USE OF INSULIN: ICD-10-CM

## 2023-10-12 DIAGNOSIS — E11.65 TYPE 2 DIABETES MELLITUS WITH HYPERGLYCEMIA, WITHOUT LONG-TERM CURRENT USE OF INSULIN: ICD-10-CM

## 2023-10-12 DIAGNOSIS — E66.09 CLASS 1 OBESITY DUE TO EXCESS CALORIES WITH SERIOUS COMORBIDITY AND BODY MASS INDEX (BMI) OF 34.0 TO 34.9 IN ADULT: ICD-10-CM

## 2023-10-12 PROCEDURE — 99999 PR PBB SHADOW E&M-EST. PATIENT-LVL III: CPT | Mod: PBBFAC,,, | Performed by: DIETITIAN, REGISTERED

## 2023-10-12 PROCEDURE — 3074F PR MOST RECENT SYSTOLIC BLOOD PRESSURE < 130 MM HG: ICD-10-PCS | Mod: CPTII,,, | Performed by: NURSE PRACTITIONER

## 2023-10-12 PROCEDURE — 3061F PR NEG MICROALBUMINURIA RESULT DOCUMENTED/REVIEW: ICD-10-PCS | Mod: CPTII,,, | Performed by: NURSE PRACTITIONER

## 2023-10-12 PROCEDURE — 99999 PR PBB SHADOW E&M-EST. PATIENT-LVL III: ICD-10-PCS | Mod: PBBFAC,,, | Performed by: NURSE PRACTITIONER

## 2023-10-12 PROCEDURE — 3061F NEG MICROALBUMINURIA REV: CPT | Mod: CPTII,,, | Performed by: NURSE PRACTITIONER

## 2023-10-12 PROCEDURE — 99999 PR PBB SHADOW E&M-EST. PATIENT-LVL III: CPT | Mod: PBBFAC,,, | Performed by: NURSE PRACTITIONER

## 2023-10-12 PROCEDURE — 99213 OFFICE O/P EST LOW 20 MIN: CPT | Mod: PBBFAC,PN | Performed by: NURSE PRACTITIONER

## 2023-10-12 PROCEDURE — 4010F ACE/ARB THERAPY RXD/TAKEN: CPT | Mod: CPTII,,, | Performed by: NURSE PRACTITIONER

## 2023-10-12 PROCEDURE — 3066F NEPHROPATHY DOC TX: CPT | Mod: CPTII,,, | Performed by: NURSE PRACTITIONER

## 2023-10-12 PROCEDURE — 99214 PR OFFICE/OUTPT VISIT, EST, LEVL IV, 30-39 MIN: ICD-10-PCS | Mod: S$PBB,,, | Performed by: NURSE PRACTITIONER

## 2023-10-12 PROCEDURE — 3074F SYST BP LT 130 MM HG: CPT | Mod: CPTII,,, | Performed by: NURSE PRACTITIONER

## 2023-10-12 PROCEDURE — 99999PBSHW PR PBB SHADOW TECHNICAL ONLY FILED TO HB: ICD-10-PCS | Mod: PBBFAC,,,

## 2023-10-12 PROCEDURE — 99214 OFFICE O/P EST MOD 30 MIN: CPT | Mod: S$PBB,,, | Performed by: NURSE PRACTITIONER

## 2023-10-12 PROCEDURE — 3078F PR MOST RECENT DIASTOLIC BLOOD PRESSURE < 80 MM HG: ICD-10-PCS | Mod: CPTII,,, | Performed by: NURSE PRACTITIONER

## 2023-10-12 PROCEDURE — 3008F BODY MASS INDEX DOCD: CPT | Mod: CPTII,,, | Performed by: NURSE PRACTITIONER

## 2023-10-12 PROCEDURE — 1160F RVW MEDS BY RX/DR IN RCRD: CPT | Mod: CPTII,,, | Performed by: NURSE PRACTITIONER

## 2023-10-12 PROCEDURE — 1159F MED LIST DOCD IN RCRD: CPT | Mod: CPTII,,, | Performed by: NURSE PRACTITIONER

## 2023-10-12 PROCEDURE — 3066F PR DOCUMENTATION OF TREATMENT FOR NEPHROPATHY: ICD-10-PCS | Mod: CPTII,,, | Performed by: NURSE PRACTITIONER

## 2023-10-12 PROCEDURE — 3078F DIAST BP <80 MM HG: CPT | Mod: CPTII,,, | Performed by: NURSE PRACTITIONER

## 2023-10-12 PROCEDURE — 99999PBSHW PR PBB SHADOW TECHNICAL ONLY FILED TO HB: Mod: PBBFAC,,,

## 2023-10-12 PROCEDURE — 99999 PR PBB SHADOW E&M-EST. PATIENT-LVL III: ICD-10-PCS | Mod: PBBFAC,,, | Performed by: DIETITIAN, REGISTERED

## 2023-10-12 PROCEDURE — 1159F PR MEDICATION LIST DOCUMENTED IN MEDICAL RECORD: ICD-10-PCS | Mod: CPTII,,, | Performed by: NURSE PRACTITIONER

## 2023-10-12 PROCEDURE — 4010F PR ACE/ARB THEARPY RXD/TAKEN: ICD-10-PCS | Mod: CPTII,,, | Performed by: NURSE PRACTITIONER

## 2023-10-12 PROCEDURE — G0108 DIAB MANAGE TRN  PER INDIV: HCPCS | Mod: PBBFAC,PN | Performed by: DIETITIAN, REGISTERED

## 2023-10-12 PROCEDURE — 3044F HG A1C LEVEL LT 7.0%: CPT | Mod: CPTII,,, | Performed by: NURSE PRACTITIONER

## 2023-10-12 PROCEDURE — 3008F PR BODY MASS INDEX (BMI) DOCUMENTED: ICD-10-PCS | Mod: CPTII,,, | Performed by: NURSE PRACTITIONER

## 2023-10-12 PROCEDURE — 99213 OFFICE O/P EST LOW 20 MIN: CPT | Mod: PBBFAC,27,PN | Performed by: DIETITIAN, REGISTERED

## 2023-10-12 PROCEDURE — 1160F PR REVIEW ALL MEDS BY PRESCRIBER/CLIN PHARMACIST DOCUMENTED: ICD-10-PCS | Mod: CPTII,,, | Performed by: NURSE PRACTITIONER

## 2023-10-12 PROCEDURE — 3044F PR MOST RECENT HEMOGLOBIN A1C LEVEL <7.0%: ICD-10-PCS | Mod: CPTII,,, | Performed by: NURSE PRACTITIONER

## 2023-10-12 RX ORDER — DAPAGLIFLOZIN 10 MG/1
10 TABLET, FILM COATED ORAL DAILY
Qty: 30 TABLET | Refills: 6 | Status: SHIPPED | OUTPATIENT
Start: 2023-10-12

## 2023-10-12 RX ORDER — TIRZEPATIDE 12.5 MG/.5ML
12.5 INJECTION, SOLUTION SUBCUTANEOUS
Qty: 4 PEN | Refills: 6 | Status: SHIPPED | OUTPATIENT
Start: 2023-10-12

## 2023-10-12 NOTE — ASSESSMENT & PLAN NOTE
Body mass index is 34.05 kg/m².  Increases insulin resistance.   Discussed DM diet and exercise.   Encouraged continued weight loss

## 2023-10-12 NOTE — ASSESSMENT & PLAN NOTE
LDL goal is <70--given history of TIA  She stopped the Lipitor due to nausea  She stopped the Crestor due to intolerance   She also tried Zetia and was unable to tolerate  Now on pravastatin 40 mg nightly and tolerating without any side effects  Check lipids with RTC

## 2023-10-12 NOTE — ASSESSMENT & PLAN NOTE
A1c trending down       -- Medication Changes:      Continue Farxiga 10 mg daily     Increase Mounjaro to 12.5 mg weekly   Unable to get 10 mg weekly dose due to national back order.   She would like to just increase to 12.5 mg weekly and she will let me know if she has issues          -- Reviewed goals of therapy are to get the best control we can without hypoglycemia.    -- Advised frequent self blood glucose monitoring.  Patient encouraged to document glucose results and bring them to every clinic visit.- daily at alternating times   -- Hypoglycemia precautions discussed. Instructed on precautions before driving.    -- Call for Bg repeatedly < 70 or > 180.   -- Close adherence to lifestyle changes recommended.   -- Periodic follow ups for eye evaluations, foot care and dental care suggested.      Regarding polyneuropathy:  Optimize BG readings.   See above.   On gabapentin BID     Educated patient to check feet daily for any foreign objects and/or wounds. Discussed with patient the importance of wearing appropriate footwear at all times, not to walk barefoot ever, and to check shoes before putting them on feet. Instructed patient to keep feet dry by regularly changing shoes and socks and drying feet after baths and exercises. Also, instructed patient to report any new lesions, discolorations, or swelling to a healthcare professional.

## 2023-10-12 NOTE — PROGRESS NOTES
"Diabetes Care Specialist Follow-up Note  Author: Arpita Sanchez RD, CDE  Date: 10/12/2023    Program Intake  Reason for Diabetes Program Visit:: Intervention  Type of Intervention:: Individual  Individual: Education  Education: Self-Management Skill Review, Nutrition and Meal Planning  Current diabetes risk level:: low (HgbA1c 6.7)  In the last 12 months, have you:: none  Permission to speak with others about care:: no    Lab Results   Component Value Date    HGBA1C 6.8 (H) 10/05/2023     A1c Pre Diabetes Care Specialist Intervention:  9.6%    Clinical    Weight: 98.6 kg (217 lb 6 oz)   Height: 5' 7" (170.2 cm)   Body mass index is 34.05 kg/m².  Wt increase of 6 lbs 6 oz since last visit on 03/22/2023    Medication Information  Medication adherence impacting ability to self-manage diabetes?: No    Labs  Lab Compliance Barriers: No    Nutritional Status  Meal Plan 24 Hour Recall: Breakfast, Lunch, Dinner, Snack  Meal Plan 24 Hour Recall - Breakfast: [place], biscuit, egg, tea  Meal Plan 24 Hour Recall - Lunch: Chips  Meal Plan 24 Hour Recall - Dinner: getting home really late, fell asleep and had a salad between 2 and 3 am.  Meal Plan 24 Hour Recall - Snack: sometimes will snack, beverages: water, green tea, hot tea, occassionally a regular soda  Current nutritional status an area of need that is impacting patient's ability to self-manage diabetes?: No    Physical activity/Exercise:   Very busy at work, working 4 jobs with lots of walking, climbing, and lifting    SMBG: testing once or twice a day  Reviewed levels in the glucometer 132, 111, 128, 122, 126, 108, 114, 134, 114, 141, 100, 116, 124, 108, 131, 154    Additional Social History    Support  Is Support an area impacting ability to self-manage diabetes?: No    Access to Mass Media & Technology  Media or technology needs impacting ability to self-manage diabetes?: No    Cognitive/Behavioral Health  Cognitive or behavioral barriers impacting ability to self-manage " diabetes?: No    Culture/Taoist  Culture or Adventist beliefs that may impact ability to access healthcare: No    Communication  Communication needs impacting ability to self-manage diabetes?: No    Health Literacy  Health literacy needs impacting ability to self-manage diabetes?: No      Diabetes Self-Management Skills Assessment     Diabetes Disease Process/Treatment Options  Diabetes Disease Process/Treatment Options: Skills Assessment Completed: No  Assessment indicates:: Adequate understanding  Deferred due to:: Other (comment) (previously completed, there has been no change and patient has adequate understanding)  Area of need?: No    Nutrition/Healthy Eating  Method of carbohydrate measurement:: plate method, carb counting/reading labels  Patient can identify foods that impact blood sugar.: yes  Patient-identified foods:: fruit/fruit juice, starches (bread, pasta, rice, cereal), milk, soda, starchy vegetables (corn, peas, beans), sweets, yogurt  Nutrition/Healthy Eating Skills Assessment Completed:: Yes  Assessment indicates:: Adequate understanding  Area of need?: No    Physical Activity/Exercise  Physical Activity/Exercise Skills Assessment Completed: : No  Assessment indicates:: Adequate understanding  Deffered due to:: Other (comment) (previously completed, there has been no change and patient has adequate understanding)  Area of need?: No    Medications  Medication Skills Assessment Completed:: No  Assessment indicates:: Adequate understanding  Deffered due to:: Other (comment) (previously completed, there has been no change and patient has adequate understanding)  Area of need?: No    Home Blood Glucose Monitoring  When you check what is your typical blood sugar range? : 132, 111, 128, 122, 126, 108, 114, 134, 114, 141, 100, 116, 124, 108, 131, 154  Blood glucose logs:: yes  Home Blood Glucose Monitoring Skills Assessment Completed: : No  Assessment indicates:: Adequate understanding  Deferred due  to:: Other (comment) (previously completed, there has been no change and patient has adequate understanding)  Area of need?: No    Acute Complications  Acute Complications Skills Assessment Completed: : No  Assessment indicates:: Adequate understanding  Deffered due to:: Other (comment) (previously completed, there has been no change and patient has adequate understanding)  Area of need?: No    Chronic Complications  Chronic Complications Skills Assessment Completed: : No  Assessment indicates:: Adequate understanding  Deferred due to:: Other (comment) (previously completed, there has been no change and patient has adequate understanding)  Area of need?: No    Psychosocial/Coping  Psychosocial/Coping Skills Assessment Completed: : No  Assessment indicates:: Adequate understanding  Deffered due to:: Other (comment) (previously completed, there has been no change and patient has adequate understanding)  Area of need?: No      During today's follow-up visit,  the following areas required further assessment and content was provided/reviewed.    Based on today's diabetes care assessment, the following areas of need were identified:          10/12/2023    12:02 AM   Social   Support No   Access to Mass Media/Tech No   Cognitive/Behavioral Health No   Culture/Judaism No   Communication No   Health Literacy No            10/12/2023    12:02 AM   Clinical   Medication Adherence No   Lab Compliance No   Nutritional Status No            10/12/2023    12:02 AM   Diabetes Self-Management Skills   Diabetes Disease Process/Treatment Options No   Nutrition/Healthy Eating No   Physical Activity/Exercise No   Medication No, current dose of mounjaro has patient eating more and gaining weight. Ozempic had the same effect, trulicity on the other hand had patient losing weight, provider is increasing the dose of the mounjaro to see if that helps   Home Blood Glucose Monitoring No   Acute Complications No   Chronic Complications No    Psychosocial/Coping No        Today's interventions were provided through individual discussion, instruction, and written materials were provided.    Patient verbalized understanding of instruction and written materials.  Pt was able to return back demonstration of instructions today. Patient understood key points, needs reinforcement and further instruction.     Diabetes Self-Management Care Plan Review and Evaluation of Progress:    During today's follow-up [place] Diabetes Self-Management Care Plan progress was reviewed and progress was evaluated including his/her input. Holly has agreed to continue his/her journey to improve/maintain overall diabetes control by continuing to set health goals. See care plan progress below.      Care Plan: Diabetes Management   Updates made since 9/12/2023 12:00 AM        Problem: Healthy Eating         Goal: Eat dinner at home as a homecooked meal rather than take out at least 4 times a week for the next 3 months    Start Date: 3/22/2023   Expected End Date: 6/6/2023   This Visit's Progress: On track   Recent Progress: Deferred   Priority: High   Barriers: Other (comments)   Note:    Currently time is an issue due to work schedule    6/6/23: Pt reports increased appetite 2/2 Ozempic. NP changed Ozempic to Mounjaro.       Task: The Diabetes Care Specialist identified the following barriers to self-manage their condition. Completed 10/12/2023   Note:    Barriers: No Barriers Identified       Task: Reviewed the sources and role of Carbohydrate, Protein, and Fat and how each nutrient impacts blood sugar. Completed 10/12/2023        Task: Reviewed Blood Sugar Goals after eating and explained how carbohydrate sources, nonstarchy vegetables, protein and fat affects blood sugar control. Completed 10/12/2023        Task: Discussed the importance of balancing carbohydrates with each meal using portion control techniques to count carbohydrate grams or servings, label reading to identify  servings size and amount of total carbohydrate per serving, the plate method, other Completed 10/12/2023        Task: Provided visual examples using dry measuring cups, food models, and other familiar objects such as computer mouse, deck or cards, tennis ball etc. to help with visualization of portions. Completed 10/12/2023        Task: Discussed strategies for choosing healthier menu options when dining out. Completed 10/12/2023        Task: Recommended and provided sample meal plan reflectin-30g and 30-45g (2-3 servings), 45-60g and 60-75g (3-4 servings) of carbohydrates per meal. Completed 10/12/2023        Task: Provided examples and planning techniques to help with healthy and balanced meal planning and timing of meals. Completed 10/12/2023        Task: Counseled on mindful eating practices Completed 10/12/2023        Task: Reviewed healthy (0-5g carbohydrate) snack options and provided list of examples. Completed 10/12/2023          Follow Up Plan     Follow up in about 3 months (around 2024) for General Follow-up, Blood Sugar Log Review and F/U MNT.    Today's care plan and follow up schedule was discussed with patient.  Holly verbalized understanding of the care plan, goals, and agrees to follow up plan.        The patient was encouraged to communicate with his/her health care provider/physician and care team regarding his/her condition(s) and treatment.  I provided the patient with my contact information today and encouraged to contact me via phone or [facility] Patient Portal as needed.     Length of Visit   Total Time: 30 Minutes

## 2023-10-12 NOTE — PROGRESS NOTES
CC:   Chief Complaint   Patient presents with    Diabetes Mellitus       HPI: Holly Mcclelland is a 62 y.o. female presents for a follow up visit today for the management of T2DM.     She was diagnosed with Type 2 diabetes around [id number] on routine lab work. She was initially started on Metformin. She started insulin therapy in 2020.     Family hx of diabetes: mother, grandmother, aunts, uncles - strong family hx, sister who is blind and has amputations.   Hospitalized for diabetes: denies      No personal or FH of thyroid cancer or personal of pancreatic cancer or pancreatitis.     Works in retail       Our last visit was in June of 2023  At that visit we discussed trying to change her Ozempic to Mounjaro  Denies GI upset   Titrate up to 7.5 mg weekly   She has had a hard time finding the 10 mg   She is not losing weight like she would like but her BG has improved   Occ dietary indiscretions and eating in the middle of the night   We continued her Farxiga  A1c went from 7.2% to 6.8%      DIABETES COMPLICATIONS: nephropathy, peripheral neuropathy, and cerebrovascular disease   Hx of TIA in 2019       Diabetes Management Status    ASA:  Yes - 81 mg ASA -- off the plavix     Statin: Taking Pravastatin 40 mg nightly with out issues   she stopped the Lipitor-- she reports that it was causing her nausea -- stopped the Crestor due to SE as well    She also tried Zetia     ACE/ARB: Taking-Lisinopril    Screening or Prevention Patient's value Goal Complete/Controlled?   HgA1C Testing and Control   Lab Results   Component Value Date    HGBA1C 6.8 (H) 10/05/2023      Annually/Less than 8% No   Lipid profile : 05/30/2023 Annually Yes   LDL control Lab Results   Component Value Date    LDLCALC 64.6 05/30/2023    Annually/Less than 100 mg/dl  Yes   Nephropathy screening Lab Results   Component Value Date    LABMICR 7.0 10/05/2023     Lab Results   Component Value Date    PROTEINUA Negative 09/25/2022    Annually Yes    Blood pressure BP Readings from Last 1 Encounters:   10/12/23 123/72    Less than 140/90 No   Dilated retinal exam : 12/19/2022-external- Dr. Ball  Annually No   Foot exam   : 06/20/2022 Annually No       CURRENT A1C:    Hemoglobin A1C   Date Value Ref Range Status   10/05/2023 6.8 (H) 4.0 - 5.6 % Final     Comment:     ADA Screening Guidelines:  5.7-6.4%  Consistent with prediabetes  >or=6.5%  Consistent with diabetes    High levels of fetal hemoglobin interfere with the HbA1C  assay. Heterozygous hemoglobin variants (HbS, HgC, etc)do  not significantly interfere with this assay.   However, presence of multiple variants may affect accuracy.     05/30/2023 7.2 (H) 4.0 - 5.6 % Final     Comment:     ADA Screening Guidelines:  5.7-6.4%  Consistent with prediabetes  >or=6.5%  Consistent with diabetes    High levels of fetal hemoglobin interfere with the HbA1C  assay. Heterozygous hemoglobin variants (HbS, HgC, etc)do  not significantly interfere with this assay.   However, presence of multiple variants may affect accuracy.     02/27/2023 6.7 (H) 4.0 - 5.6 % Final     Comment:     ADA Screening Guidelines:  5.7-6.4%  Consistent with prediabetes  >or=6.5%  Consistent with diabetes    High levels of fetal hemoglobin interfere with the HbA1C  assay. Heterozygous hemoglobin variants (HbS, HgC, etc)do  not significantly interfere with this assay.   However, presence of multiple variants may affect accuracy.     02/27/2023 6.7 (H) 4.0 - 5.6 % Final     Comment:     ADA Screening Guidelines:  5.7-6.4%  Consistent with prediabetes  >or=6.5%  Consistent with diabetes    High levels of fetal hemoglobin interfere with the HbA1C  assay. Heterozygous hemoglobin variants (HbS, HgC, etc)do  not significantly interfere with this assay.   However, presence of multiple variants may affect accuracy.         GOAL A1C: 7% without hypoglycemia      DM MEDICATIONS USED IN THE PAST:  Metformin, Lantus, glyburide, glipizide,  Januvia  Victoza- throat closed   Trulicity   Farxiga   Ozempic -- not effective       CURRENT DIABETES MEDICATIONS:  Mounjaro 7.5  mg weekly on Wednesdays and Farxiga 10 mg daily   Insulin: N/A   Missed doses:occ missing doses         BLOOD GLUCOSE MONITORING:  She checks her BG 1-2 times per day   Brought her meter for review   154, 131 108, 124, 116, 100, 141, 114, 134, 114, 108, 126, 122, 128, 111, 132, 138, 111, 135, 220, 127, 144, 125, 132, 114, 134, 142, 133      HYPOGLYCEMIA: denies   Carries mints       MEALS: eating 2 meals per day   Eating at odd hours due to work schedule   BF:  skips or leftovers or toast   Lunch: skips or trying to avoid fast food   Dinner: home cooked -- biggest meal    Snack: yogurt   Drinks: water   Green tea- it has sugar   occ juice       CURRENT EXERCISE:  None         Review of Systems  Review of Systems   Constitutional:  Negative for appetite change, fatigue and unexpected weight change.   HENT:  Negative for trouble swallowing.    Eyes:  Negative for visual disturbance.   Respiratory:  Negative for shortness of breath.         + sleep apnea- off CPAP machine -- she has it but has not been using it -- just sent her one    Cardiovascular:  Negative for chest pain.   Gastrointestinal:  Negative for nausea.   Endocrine: Negative for polydipsia, polyphagia and polyuria.   Genitourinary:         No Nocturia    Musculoskeletal:  Negative for neck pain.   Skin:  Negative for wound.   Neurological:  Negative for numbness.       Physical Exam   Physical Exam  Vitals and nursing note reviewed.   Constitutional:       Appearance: She is well-developed. She is obese.   HENT:      Head: Normocephalic and atraumatic.      Right Ear: External ear normal.      Left Ear: External ear normal.      Nose: Nose normal.   Neck:      Thyroid: No thyromegaly.      Trachea: No tracheal deviation.   Cardiovascular:      Rate and Rhythm: Normal rate and regular rhythm.      Heart sounds: No murmur  heard.  Pulmonary:      Effort: Pulmonary effort is normal. No respiratory distress.      Breath sounds: Normal breath sounds.   Abdominal:      Palpations: Abdomen is soft.      Tenderness: There is no abdominal tenderness.      Hernia: No hernia is present.   Musculoskeletal:      Cervical back: Normal range of motion and neck supple.   Skin:     General: Skin is warm and dry.      Capillary Refill: Capillary refill takes less than 2 seconds.      Findings: No rash.      Comments: Injection sites are normal appearing. No lipo hypertropthy or atrophy     Neurological:      Mental Status: She is alert and oriented to person, place, and time.      Cranial Nerves: No cranial nerve deficit.   Psychiatric:         Behavior: Behavior normal.         Judgment: Judgment normal.         FOOT EXAMINATION: appropriate footwear         Lab Results   Component Value Date    TSH 0.862 02/27/2023    TSH 0.862 02/27/2023           Type 2 diabetes mellitus with diabetic polyneuropathy, without long-term current use of insulin  A1c trending down       -- Medication Changes:      Continue Farxiga 10 mg daily     Increase Mounjaro to 12.5 mg weekly   Unable to get 10 mg weekly dose due to national back order.   She would like to just increase to 12.5 mg weekly and she will let me know if she has issues          -- Reviewed goals of therapy are to get the best control we can without hypoglycemia.    -- Advised frequent self blood glucose monitoring.  Patient encouraged to document glucose results and bring them to every clinic visit.- daily at alternating times   -- Hypoglycemia precautions discussed. Instructed on precautions before driving.    -- Call for Bg repeatedly < 70 or > 180.   -- Close adherence to lifestyle changes recommended.   -- Periodic follow ups for eye evaluations, foot care and dental care suggested.      Regarding polyneuropathy:  Optimize BG readings.   See above.   On gabapentin BID     Educated patient to check  feet daily for any foreign objects and/or wounds. Discussed with patient the importance of wearing appropriate footwear at all times, not to walk barefoot ever, and to check shoes before putting them on feet. Instructed patient to keep feet dry by regularly changing shoes and socks and drying feet after baths and exercises. Also, instructed patient to report any new lesions, discolorations, or swelling to a healthcare professional.        Type 2 diabetes mellitus with stage 3a chronic kidney disease, without long-term current use of insulin  On Farxiga for renal protection  Optimize BG readings.   See above.   Avoid insulin stacking and hypoglycemia  On lisinopril and Farxiga    Essential hypertension  BP goal is < 140/90.   Tolerating ACEi  Blood pressure goals discussed with patient  Controlled     Class 1 obesity due to excess calories with serious comorbidity and body mass index (BMI) of 34.0 to 34.9 in adult  Body mass index is 34.05 kg/m².  Increases insulin resistance.   Discussed DM diet and exercise.   Encouraged continued weight loss    TIA (transient ischemic attack)  Optimize BG readings.   See above.       Dyslipidemia, goal LDL below 70  LDL goal is <70--given history of TIA  She stopped the Lipitor due to nausea  She stopped the Crestor due to intolerance   She also tried Zetia and was unable to tolerate  Now on pravastatin 40 mg nightly and tolerating without any side effects  Check lipids with RTC     Vitamin D deficiency  No vitamins at this time   Check vit d level with RTC           I spent a total of 30 minutes on the day of the visit.This includes face to face time and non-face to face time preparing to see the patient (eg, review of tests), obtaining and/or reviewing separately obtained history, documenting clinical information in the electronic or other health record, independently interpreting results and communicating results to the patient/family/caregiver, or care  coordinator.        Follow up in about 6 months (around 4/12/2024).  Follow up with me in 6 months with labs prior   Schedule a follow up with Dr. Kahn         Orders Placed This Encounter   Procedures    CBC Auto Differential     Standing Status:   Future     Standing Expiration Date:   4/12/2025    Comprehensive Metabolic Panel     Standing Status:   Future     Standing Expiration Date:   4/12/2025    Hemoglobin A1C     Standing Status:   Future     Standing Expiration Date:   4/12/2025    Lipid Panel     Standing Status:   Future     Standing Expiration Date:   4/12/2025    TSH     Standing Status:   Future     Standing Expiration Date:   4/12/2025    Vitamin D     Standing Status:   Future     Standing Expiration Date:   4/12/2025         Recommendations were discussed with the patient in detail  The patient verbalized understanding and agrees with the plan outlined as above.     This note was partly generated with Pegg[employer] voice recognition software. I apologize for any possible typographical errors.

## 2023-10-12 NOTE — ASSESSMENT & PLAN NOTE
On Farxiga for renal protection  Optimize BG readings.   See above.   Avoid insulin stacking and hypoglycemia  On lisinopril and Farxiga

## 2023-10-16 ENCOUNTER — OFFICE VISIT (OUTPATIENT)
Dept: PODIATRY | Facility: CLINIC | Age: 63
End: 2023-10-16
Payer: MEDICAID

## 2023-10-16 VITALS
SYSTOLIC BLOOD PRESSURE: 141 MMHG | HEART RATE: 90 BPM | DIASTOLIC BLOOD PRESSURE: 70 MMHG | WEIGHT: 216.69 LBS | BODY MASS INDEX: 34.01 KG/M2 | HEIGHT: 67 IN

## 2023-10-16 DIAGNOSIS — M79.676 PAIN OF GREAT TOE, UNSPECIFIED LATERALITY: ICD-10-CM

## 2023-10-16 DIAGNOSIS — M72.2 PLANTAR FASCIITIS: ICD-10-CM

## 2023-10-16 DIAGNOSIS — E11.9 ENCOUNTER FOR DIABETIC FOOT EXAM: Primary | ICD-10-CM

## 2023-10-16 DIAGNOSIS — I87.2 EDEMA OF BOTH LOWER EXTREMITIES DUE TO PERIPHERAL VENOUS INSUFFICIENCY: ICD-10-CM

## 2023-10-16 DIAGNOSIS — Z86.73 HISTORY OF CEREBROVASCULAR ACCIDENT (CVA) GREATER THAN EIGHT WEEKS IN THE PAST: ICD-10-CM

## 2023-10-16 DIAGNOSIS — E11.42 TYPE 2 DIABETES MELLITUS WITH DIABETIC POLYNEUROPATHY, WITHOUT LONG-TERM CURRENT USE OF INSULIN: ICD-10-CM

## 2023-10-16 DIAGNOSIS — L60.0 ONYCHOMYCOSIS WITH INGROWN TOENAIL: ICD-10-CM

## 2023-10-16 DIAGNOSIS — R29.898 RIGHT LEG WEAKNESS: ICD-10-CM

## 2023-10-16 DIAGNOSIS — E66.09 CLASS 1 OBESITY DUE TO EXCESS CALORIES WITH SERIOUS COMORBIDITY AND BODY MASS INDEX (BMI) OF 34.0 TO 34.9 IN ADULT: ICD-10-CM

## 2023-10-16 DIAGNOSIS — B35.1 ONYCHOMYCOSIS WITH INGROWN TOENAIL: ICD-10-CM

## 2023-10-16 PROCEDURE — 3078F PR MOST RECENT DIASTOLIC BLOOD PRESSURE < 80 MM HG: ICD-10-PCS | Mod: CPTII,,, | Performed by: PODIATRIST

## 2023-10-16 PROCEDURE — 3008F PR BODY MASS INDEX (BMI) DOCUMENTED: ICD-10-PCS | Mod: CPTII,,, | Performed by: PODIATRIST

## 2023-10-16 PROCEDURE — 3078F DIAST BP <80 MM HG: CPT | Mod: CPTII,,, | Performed by: PODIATRIST

## 2023-10-16 PROCEDURE — 3061F NEG MICROALBUMINURIA REV: CPT | Mod: CPTII,,, | Performed by: PODIATRIST

## 2023-10-16 PROCEDURE — 4010F ACE/ARB THERAPY RXD/TAKEN: CPT | Mod: CPTII,,, | Performed by: PODIATRIST

## 2023-10-16 PROCEDURE — 3044F PR MOST RECENT HEMOGLOBIN A1C LEVEL <7.0%: ICD-10-PCS | Mod: CPTII,,, | Performed by: PODIATRIST

## 2023-10-16 PROCEDURE — 99999 PR PBB SHADOW E&M-EST. PATIENT-LVL III: CPT | Mod: PBBFAC,,, | Performed by: PODIATRIST

## 2023-10-16 PROCEDURE — 11721 PR DEBRIDEMENT OF NAILS, 6 OR MORE: ICD-10-PCS | Mod: S$PBB,,, | Performed by: PODIATRIST

## 2023-10-16 PROCEDURE — 3077F SYST BP >= 140 MM HG: CPT | Mod: CPTII,,, | Performed by: PODIATRIST

## 2023-10-16 PROCEDURE — 99213 OFFICE O/P EST LOW 20 MIN: CPT | Mod: PBBFAC,PN | Performed by: PODIATRIST

## 2023-10-16 PROCEDURE — 3044F HG A1C LEVEL LT 7.0%: CPT | Mod: CPTII,,, | Performed by: PODIATRIST

## 2023-10-16 PROCEDURE — 11721 DEBRIDE NAIL 6 OR MORE: CPT | Mod: S$PBB,,, | Performed by: PODIATRIST

## 2023-10-16 PROCEDURE — 3008F BODY MASS INDEX DOCD: CPT | Mod: CPTII,,, | Performed by: PODIATRIST

## 2023-10-16 PROCEDURE — 4010F PR ACE/ARB THEARPY RXD/TAKEN: ICD-10-PCS | Mod: CPTII,,, | Performed by: PODIATRIST

## 2023-10-16 PROCEDURE — 3066F NEPHROPATHY DOC TX: CPT | Mod: CPTII,,, | Performed by: PODIATRIST

## 2023-10-16 PROCEDURE — 99999 PR PBB SHADOW E&M-EST. PATIENT-LVL III: ICD-10-PCS | Mod: PBBFAC,,, | Performed by: PODIATRIST

## 2023-10-16 PROCEDURE — 11721 DEBRIDE NAIL 6 OR MORE: CPT | Mod: Q9,PBBFAC,PN | Performed by: PODIATRIST

## 2023-10-16 PROCEDURE — 3066F PR DOCUMENTATION OF TREATMENT FOR NEPHROPATHY: ICD-10-PCS | Mod: CPTII,,, | Performed by: PODIATRIST

## 2023-10-16 PROCEDURE — 99214 PR OFFICE/OUTPT VISIT, EST, LEVL IV, 30-39 MIN: ICD-10-PCS | Mod: 25,S$PBB,, | Performed by: PODIATRIST

## 2023-10-16 PROCEDURE — 99214 OFFICE O/P EST MOD 30 MIN: CPT | Mod: 25,S$PBB,, | Performed by: PODIATRIST

## 2023-10-16 PROCEDURE — 3061F PR NEG MICROALBUMINURIA RESULT DOCUMENTED/REVIEW: ICD-10-PCS | Mod: CPTII,,, | Performed by: PODIATRIST

## 2023-10-16 PROCEDURE — 3077F PR MOST RECENT SYSTOLIC BLOOD PRESSURE >= 140 MM HG: ICD-10-PCS | Mod: CPTII,,, | Performed by: PODIATRIST

## 2023-10-16 NOTE — PROCEDURES
Nail debridement    Date/Time: 10/16/2023 8:30 AM    Performed by: Mayelin Lovett DPM  Authorized by: Mayelin Lovett DPM    Consent Done?:  Yes (Verbal)    Nail Care Type:  Debride  Location(s): All  (Left 1st Toe, Left 3rd Toe, Left 2nd Toe, Left 4th Toe, Left 5th Toe, Right 1st Toe, Right 2nd Toe, Right 3rd Toe, Right 4th Toe and Right 5th Toe)  Patient tolerance:  Patient tolerated the procedure well with no immediate complications

## 2023-10-16 NOTE — PATIENT INSTRUCTIONS
PPT arch pad w/ adhesive - medium        Pick one & use for at least 3-6 months (for your fungal nails):    1. Listerine mouthwash (yellow/gold) - soak for 5-10minutes daily (may re-use solution up to a week)    2. Vicks Vaporub to nails daily after a bath/end of day/bedtime.    3. Lamisil (terbanafine) 1% antifungal cream daily to nails after shower.

## 2023-10-16 NOTE — PROGRESS NOTES
Subjective:      Patient ID: Holly Mcclelland is a 62 y.o. female.    Chief Complaint: Nail Care    Patient is here after an absence of a yr. Previously seen for ganglion cyst dorsal L midfoot w/ associated arthropathy.  Now noticed a 'big vein' indicating arch B/L w/out pain. Started wearing aqua shoes about a month ago.   Big toes hurt in work shoes (has arch pads in them) & indicates distal medial but denies IGTN pain. Nails are long & need trimming as thick & hurt in shoes - usually goes for pedicures but it has been some time.   9/26/22  Holly is a 62 y.o. female who presents for 1 month f/u of pain L foot x 2 days & worsening dorsal - lump. States medication helped w/ symptomatology as did pads, so the lump has decreased and she is barely getting any pain. Given gelstrap & to use Voltaren gel. Not using sandals/slippers.     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     DM mgmt: Em Diamond NP 10/12/23  PCP: Lasha Braga 3/1/23 due 10/30/23 for refills    Shoe gear: stretch casual    Dx DM 2015  Past Medical History:   Diagnosis Date    CHF (congestive heart failure)     Diabetes mellitus     Heart murmur     Hyperlipidemia     Hypertension     Sleep apnea     no cpap since weight loss    Stroke     remote hx TIA; no residual     Patient Active Problem List   Diagnosis    TIA (transient ischemic attack)    GERD (gastroesophageal reflux disease)    Class 1 obesity due to excess calories with serious comorbidity and body mass index (BMI) of 34.0 to 34.9 in adult    Onychogryphosis    Onychocryptosis    Onychomycosis due to dermatophyte    Type 2 diabetes mellitus with diabetic polyneuropathy, without long-term current use of insulin    Essential hypertension    Dyslipidemia, goal LDL below 70    Hypertensive heart disease with chronic diastolic congestive heart failure    Palpitations    Heart murmur    Right leg weakness    History of cerebrovascular  accident (CVA) greater than eight weeks in the past    Obstructive sleep apnea    Spinal stenosis of cervical region    S/P cervical spinal fusion    Neck pain    Lumbar radiculopathy    Orthostatic hypotension    Vitamin D deficiency    Intramural and subserous leiomyoma of uterus    Type 2 diabetes mellitus with stage 3a chronic kidney disease, without long-term current use of insulin    Post-menopausal bleeding    sp TLH/BSO on 4/12/23 for PMB at age 62      Hemoglobin A1C   Date Value Ref Range Status   10/05/2023 6.8 (H) 4.0 - 5.6 % Final     Comment:     ADA Screening Guidelines:  5.7-6.4%  Consistent with prediabetes  >or=6.5%  Consistent with diabetes    High levels of fetal hemoglobin interfere with the HbA1C  assay. Heterozygous hemoglobin variants (HbS, HgC, etc)do  not significantly interfere with this assay.   However, presence of multiple variants may affect accuracy.     05/30/2023 7.2 (H) 4.0 - 5.6 % Final     Comment:     ADA Screening Guidelines:  5.7-6.4%  Consistent with prediabetes  >or=6.5%  Consistent with diabetes    High levels of fetal hemoglobin interfere with the HbA1C  assay. Heterozygous hemoglobin variants (HbS, HgC, etc)do  not significantly interfere with this assay.   However, presence of multiple variants may affect accuracy.     02/27/2023 6.7 (H) 4.0 - 5.6 % Final     Comment:     ADA Screening Guidelines:  5.7-6.4%  Consistent with prediabetes  >or=6.5%  Consistent with diabetes    High levels of fetal hemoglobin interfere with the HbA1C  assay. Heterozygous hemoglobin variants (HbS, HgC, etc)do  not significantly interfere with this assay.   However, presence of multiple variants may affect accuracy.     02/27/2023 6.7 (H) 4.0 - 5.6 % Final     Comment:     ADA Screening Guidelines:  5.7-6.4%  Consistent with prediabetes  >or=6.5%  Consistent with diabetes    High levels of fetal hemoglobin interfere with the HbA1C  assay. Heterozygous hemoglobin variants (HbS, HgC, etc)do  not  significantly interfere with this assay.   However, presence of multiple variants may affect accuracy.        Objective:      Physical Exam  Vitals reviewed.   Constitutional:       Appearance: She is well-developed. She is obese.   Cardiovascular:      Pulses:           Dorsalis pedis pulses are 2+ on the right side and 2+ on the left side.   Musculoskeletal:      Right lower leg: No tenderness. Edema present.      Left lower leg: No tenderness. Edema present.        Feet:       Comments: No reproducible discomfort nor any tenderness to the calf bilaterally.   Feet:      Right foot:      Skin integrity: Callus (diffuse WB hyperkeratosis B/L ) and dry skin present. No fissure.      Toenail Condition: Right toenails are ingrown. Fungal disease present.     Left foot:      Skin integrity: Callus and dry skin present. No fissure.      Toenail Condition: Left toenails are ingrown. Fungal disease present.     Comments: Palpable prominence dorsal midfoot L consistent w/ saddle bone deformity; enthesophyte.   No residual fluctuance in area of previous cyst dorsal midfoot L.    Nails are cryptotic 1st through 5th B/L w/ dystrophic, thickened, discolored toenails including 2nd R>L, sparing 3rd & 4th B/L.   Skin:     General: Skin is warm and dry.      Capillary Refill: Capillary refill takes 2 to 3 seconds.      Nails: There is no clubbing.      Comments: WB diffuse hyperkeratotic tissue B/L w/out fissuring   Neurological:      Mental Status: She is alert and oriented to person, place, and time.      Sensory: Sensation is intact.      Motor: Motor function is intact.      Gait: Gait is intact.      Comments: Paresthesias including tingling & weakness RLE w/ no clearly identified trigger or source; no hyperemia (likely lumbar radiculopathy)   Psychiatric:         Mood and Affect: Mood and affect normal.         Behavior: Behavior normal. Behavior is cooperative.        Assessment:      Encounter Diagnoses   Name Primary?     Type 2 diabetes mellitus with diabetic polyneuropathy, without long-term current use of insulin     History of cerebrovascular accident (CVA) greater than eight weeks in the past     Class 1 obesity due to excess calories with serious comorbidity and body mass index (BMI) of 34.0 to 34.9 in adult     Right leg weakness     Encounter for diabetic foot exam Yes    Onychomycosis with ingrown toenail     Plantar fasciitis     Pain of great toe, unspecified laterality     Edema of both lower extremities due to peripheral venous insufficiency      Problem List Items Addressed This Visit          Neuro    History of cerebrovascular accident (CVA) greater than eight weeks in the past       Endocrine    Class 1 obesity due to excess calories with serious comorbidity and body mass index (BMI) of 34.0 to 34.9 in adult    Current Assessment & Plan     0.05 kg/M2-follows w/ PCP & DM mgmt.         Type 2 diabetes mellitus with diabetic polyneuropathy, without long-term current use of insulin    Relevant Orders    Nail debridement       Orthopedic    Right leg weakness     Other Visit Diagnoses       Encounter for diabetic foot exam    -  Primary    Onychomycosis with ingrown toenail        Relevant Orders    Nail debridement    Plantar fasciitis        Pain of great toe, unspecified laterality        Relevant Orders    Nail debridement    Edema of both lower extremities due to peripheral venous insufficiency                Plan:      Holly was seen today for nail care.    Diagnoses and all orders for this visit:    Encounter for diabetic foot exam    Type 2 diabetes mellitus with diabetic polyneuropathy, without long-term current use of insulin  -     Nail debridement    History of cerebrovascular accident (CVA) greater than eight weeks in the past    Class 1 obesity due to excess calories with serious comorbidity and body mass index (BMI) of 34.0 to 34.9 in adult    Right leg weakness    Onychomycosis with ingrown toenail  -      Nail debridement    Plantar fasciitis    Pain of great toe, unspecified laterality  -     Nail debridement    Edema of both lower extremities due to peripheral venous insufficiency    I counseled the patient on her conditions, their implications and medical management.    - Shoe inspection. Diabetic Foot Education. Patient reminded of the importance of good nutrition & blood sugar control to help prevent podiatric complications of diabetes. Patient instructed on proper foot hygeine. We discussed wearing proper shoe gear, daily foot inspections, never walking w/out protective shoe gear, annual DM foot exam, sooner prn.       Added PPT arch pads to current shoes; patient will purchase additional for other shoes.    With patient's permission, the toenails mentioned above were aggressively reduced & debrided using a nail nipper, removing all offending nail & debris.  Instructions provided on OTC topical antifungal tx options for nails, as requested.    F/U prn acute SSX BLE.        A total of 37 mins.was spent on chart review, patient visit & documentation.

## 2023-10-17 ENCOUNTER — PATIENT MESSAGE (OUTPATIENT)
Dept: PODIATRY | Facility: CLINIC | Age: 63
End: 2023-10-17
Payer: MEDICAID

## 2023-10-20 NOTE — PROGRESS NOTES
CC: Follow up     HPI: Pt is a 62 y.o.  female who presents for follow up exam after starting oxybutynin for urinary urge incontinence. She has been taking medication for about 3 months and doing well with it. Reports ymptoms are relieved. She is very pleased.       ROS:  GENERAL: Feeling well overall. Denies fever or chills.   ABDOMEN: No abdominal pain, constipation, diarrhea, nausea, vomiting or rectal bleeding.   URINARY: No dysuria, hematuria, or burning on urination.  REPRODUCTIVE: Denies vaginal itching, odor, discharge, lesions.  PSYCHIATRIC: Denies depression, anxiety or mood swings.    PE:   APPEARANCE: Well nourished, well developed, Black or  female in no acute distress.    Diagnosis:  1. Follow-up encounter involving medication        Plan:   Patient doing well. Continue medication. Follow up as needed and for annual.

## 2023-11-06 ENCOUNTER — TELEPHONE (OUTPATIENT)
Dept: DIABETES | Facility: CLINIC | Age: 63
End: 2023-11-06
Payer: MEDICAID

## 2023-11-06 NOTE — TELEPHONE ENCOUNTER
PA INITIATED THROUGH [facility]OVER MY MEDS FOR [facility]   ruddy , can you confirm a + UDS and a current psychiatrist?

## 2023-11-12 ENCOUNTER — PATIENT MESSAGE (OUTPATIENT)
Dept: DIABETES | Facility: CLINIC | Age: 63
End: 2023-11-12
Payer: MEDICAID

## 2023-12-21 DIAGNOSIS — E78.5 DYSLIPIDEMIA, GOAL LDL BELOW 70: ICD-10-CM

## 2023-12-21 RX ORDER — PRAVASTATIN SODIUM 40 MG/1
40 TABLET ORAL NIGHTLY
Qty: 90 TABLET | Refills: 2 | Status: SHIPPED | OUTPATIENT
Start: 2023-12-21

## 2024-02-14 ENCOUNTER — OFFICE VISIT (OUTPATIENT)
Dept: CARDIOLOGY | Facility: CLINIC | Age: 64
End: 2024-02-14
Payer: MEDICAID

## 2024-02-14 VITALS
HEART RATE: 66 BPM | DIASTOLIC BLOOD PRESSURE: 60 MMHG | BODY MASS INDEX: 31.09 KG/M2 | SYSTOLIC BLOOD PRESSURE: 119 MMHG | WEIGHT: 198.5 LBS | OXYGEN SATURATION: 98 %

## 2024-02-14 DIAGNOSIS — E11.42 TYPE 2 DIABETES MELLITUS WITH DIABETIC POLYNEUROPATHY, WITHOUT LONG-TERM CURRENT USE OF INSULIN: ICD-10-CM

## 2024-02-14 DIAGNOSIS — Z86.73 HISTORY OF CEREBROVASCULAR ACCIDENT (CVA) GREATER THAN EIGHT WEEKS IN THE PAST: ICD-10-CM

## 2024-02-14 DIAGNOSIS — M94.0 COSTOCHONDRITIS, ACUTE: Primary | ICD-10-CM

## 2024-02-14 DIAGNOSIS — E78.5 HYPERLIPIDEMIA, UNSPECIFIED HYPERLIPIDEMIA TYPE: ICD-10-CM

## 2024-02-14 DIAGNOSIS — R07.9 CHEST PAIN OF UNCERTAIN ETIOLOGY: ICD-10-CM

## 2024-02-14 DIAGNOSIS — I10 ESSENTIAL HYPERTENSION: ICD-10-CM

## 2024-02-14 DIAGNOSIS — E78.5 DYSLIPIDEMIA, GOAL LDL BELOW 70: ICD-10-CM

## 2024-02-14 DIAGNOSIS — K21.9 GASTROESOPHAGEAL REFLUX DISEASE, UNSPECIFIED WHETHER ESOPHAGITIS PRESENT: ICD-10-CM

## 2024-02-14 PROCEDURE — 99214 OFFICE O/P EST MOD 30 MIN: CPT | Mod: S$PBB,,, | Performed by: INTERNAL MEDICINE

## 2024-02-14 PROCEDURE — 99213 OFFICE O/P EST LOW 20 MIN: CPT | Mod: PBBFAC,PN | Performed by: INTERNAL MEDICINE

## 2024-02-14 PROCEDURE — 1159F MED LIST DOCD IN RCRD: CPT | Mod: CPTII,,, | Performed by: INTERNAL MEDICINE

## 2024-02-14 PROCEDURE — 3008F BODY MASS INDEX DOCD: CPT | Mod: CPTII,,, | Performed by: INTERNAL MEDICINE

## 2024-02-14 PROCEDURE — 3074F SYST BP LT 130 MM HG: CPT | Mod: CPTII,,, | Performed by: INTERNAL MEDICINE

## 2024-02-14 PROCEDURE — 99999 PR PBB SHADOW E&M-EST. PATIENT-LVL III: CPT | Mod: PBBFAC,,, | Performed by: INTERNAL MEDICINE

## 2024-02-14 PROCEDURE — 1160F RVW MEDS BY RX/DR IN RCRD: CPT | Mod: CPTII,,, | Performed by: INTERNAL MEDICINE

## 2024-02-14 PROCEDURE — 3078F DIAST BP <80 MM HG: CPT | Mod: CPTII,,, | Performed by: INTERNAL MEDICINE

## 2024-02-14 NOTE — PROGRESS NOTES
"  Subjective:      Patient ID: Holly Mcclelland is a 63 y.o. female.    Chief Complaint: Follow-up (Recent ED visit)    HPI:  Walks a lot at her job at [employer].  Pt cuts fruits and vegetables.    Went to ER 1/30/24 after waking up with anterior chest pain.  "I thought I was having a heart attack"  "It felt like an elephant on my chest"    There was no radiation of pain .  No vomiting or sweating.     In the ER pt was dx as "inflammation"    Ever now and then pt has a 5 second pain left inframammary area which "feels like a steel mandy" poking chest.    There is no hx of chest pain with exertion.    Review of Systems   Cardiovascular:  Positive for chest pain. Negative for claudication, dyspnea on exertion, irregular heartbeat, leg swelling, near-syncope, orthopnea, palpitations and syncope.        Past Medical History:   Diagnosis Date    CHF (congestive heart failure)     Diabetes mellitus     Heart murmur     Hyperlipidemia     Hypertension     Sleep apnea     no cpap since weight loss    Stroke     remote hx TIA; no residual        Past Surgical History:   Procedure Laterality Date    ANTERIOR CERVICAL DISCECTOMY W/ FUSION N/A 2/9/2022    Procedure: DISCECTOMY, SPINE, CERVICAL, ANTERIOR APPROACH, WITH FUSION C3-4 Gareth PRADHAN notified;  Surgeon: Damian Arroyo MD;  Location: [facility];  Service: Neurosurgery;  Laterality: N/A;  ASA1  TOR1  T&Cross x 2 units  EMG  SEP  MEP  RN PREOP ON 1/31/22,COVID---NEGATIVE- on 2/8-- T/S  done  ON 2/8  SPINEWAVE GARETH RUIZ  [phone] NOTIFIED (CALLED) GARETH ON 1/31/2022 @ 3:11PM-LO  NEURO MONIT    BREAST BIOPSY Left 2015    CYSTOSCOPY N/A 4/12/2023    Procedure: CYSTOSCOPY;  Surgeon: Nella Marte MD;  Location: [facility];  Service: OB/GYN;  Laterality: N/A;    HYSTERECTOMY, TOTAL, LAPAROSCOPIC, WITH SALPINGO-OOPHORECTOMY Bilateral 4/12/2023    Procedure: HYSTERECTOMY,TOTAL,LAPAROSCOPIC,WITH SALPINGO-OOPHORECTOMY;  Surgeon: Nella Marte MD;  Location: [facility]" OR;  Service: OB/GYN;  Laterality: Bilateral;    LYSIS OF ADHESIONS N/A 2023    Procedure: LYSIS, ADHESIONS;  Surgeon: Nella Marte MD;  Location: [facility] OR;  Service: OB/GYN;  Laterality: N/A;       Family History   Problem Relation Age of Onset    Diabetes Maternal Grandmother     Diabetes Mother     Hypertension Mother     Heart disease Mother     Diabetes Sister     Diabetes Father     Breast cancer Neg Hx     Colon cancer Neg Hx     Ovarian cancer Neg Hx        Social History     Socioeconomic History    Marital status:    Tobacco Use    Smoking status: Former     Current packs/day: 0.00     Types: Cigarettes     Quit date:      Years since quittin.1    Smokeless tobacco: Never   Substance and Sexual Activity    Alcohol use: No    Drug use: No    Sexual activity: Not Currently     Social Determinants of Health     Financial Resource Strain: Low Risk  (2024)    Overall Financial Resource Strain (CARDIA)     Difficulty of Paying Living Expenses: Not very hard   Food Insecurity: No Food Insecurity (2024)    Hunger Vital Sign     Worried About Running Out of Food in the Last Year: Never true     Ran Out of Food in the Last Year: Never true   Transportation Needs: No Transportation Needs (2024)    PRAPARE - Transportation     Lack of Transportation (Medical): No     Lack of Transportation (Non-Medical): No   Physical Activity: Sufficiently Active (2024)    Exercise Vital Sign     Days of Exercise per Week: 5 days     Minutes of Exercise per Session: 150+ min   Stress: No Stress Concern Present (2024)    [employer] [place] [employer] - Occupational Stress Questionnaire     Feeling of Stress : Not at all   Social Connections: Unknown (2024)    Social Connection and Isolation Panel [NHANES]     Frequency of Communication with Friends and Family: More than three times a week     Frequency of Social Gatherings with Friends and Family: Twice a week      Active Member of Clubs or Organizations: Yes     Attends Club or Organization Meetings: More than 4 times per year     Marital Status:    Housing Stability: Low Risk  (2/14/2024)    Housing Stability Vital Sign     Unable to Pay for Housing in the Last Year: No     Number of Places Lived in the Last Year: 1     Unstable Housing in the Last Year: No       Current Outpatient Medications on File Prior to Visit   Medication Sig Dispense Refill    aspirin (ECOTRIN) 81 MG EC tablet Take 1 tablet by mouth once daily 30 tablet 0    blood sugar diagnostic (ONETOUCH ULTRA TEST) Strp 1 strip by Misc.(Non-Drug; Combo Route) route once daily. 100 each 3    clopidogreL (PLAVIX) 75 mg tablet Take 1 tablet (75 mg total) by mouth once daily. 30 tablet 11    dapagliflozin propanediol (FARXIGA) 10 mg tablet Take 1 tablet (10 mg total) by mouth once daily. 30 tablet 6    diazePAM (VALIUM) 10 MG Tab Take 10 mg by mouth daily as needed.      furosemide (LASIX) 20 MG tablet TAKE 1 TABLET BY MOUTH ONCE DAILY AS NEEDED FOR  SWELLING 15 tablet 3    gabapentin (NEURONTIN) 300 MG capsule Take 300 mg by mouth 3 (three) times daily.      hydrALAZINE (APRESOLINE) 25 MG tablet Take 1 tablet (25 mg total) by mouth 3 (three) times daily. 90 tablet 11    lisinopriL-hydrochlorothiazide (PRINZIDE,ZESTORETIC) 20-12.5 mg per tablet Take 1 tablet by mouth once daily. 90 tablet 1    ONETOUCH DELICA PLUS LANCET 33 gauge Misc USE 1  TO CHECK GLUCOSE 4 TIMES DAILY 100 each 0    ONETOUCH ULTRA2 METER [facility] USE TO CHECK GLUCOSE TWICE DAILY      oxybutynin (DITROPAN-XL) 5 MG TR24 Take 1 tablet (5 mg total) by mouth once daily. 30 tablet 11    pravastatin (PRAVACHOL) 40 MG tablet TAKE 1 TABLET BY MOUTH ONCE DAILY IN THE EVENING 90 tablet 2    tirzepatide (MOUNJARO) 12.5 mg/0.5 mL PnIj Inject 12.5 mg into the skin every 7 days. 4 Pen 6    [DISCONTINUED] indomethacin (INDOCIN) 25 MG capsule Take 1 capsule (25 mg total) by mouth 3 (three) times daily. 20  capsule 0     No current facility-administered medications on file prior to visit.       Review of patient's allergies indicates:   Allergen Reactions    Victoza [liraglutide] Other (See Comments)     Throat closed ? But not seen in the ER      Objective:     Vitals:    02/14/24 1349   BP: 119/60   BP Location: Left arm   Patient Position: Sitting   BP Method: Large (Automatic)   Pulse: 66   SpO2: 98%   Weight: 90.1 kg (198 lb 8.4 oz)        Physical Exam  Constitutional:       Appearance: She is well-developed.   Eyes:      General: No scleral icterus.  Neck:      Vascular: No carotid bruit or JVD.   Cardiovascular:      Rate and Rhythm: Normal rate and regular rhythm.      Heart sounds: No murmur heard.     No gallop.   Pulmonary:      Breath sounds: Normal breath sounds.   Chest:      Comments: Pt is tender to palpation inferior to left clavicle    Abdominal:      Palpations: Abdomen is soft.      Tenderness: There is no abdominal tenderness.   Musculoskeletal:      Right lower leg: No edema.      Left lower leg: No edema.   Skin:     General: Skin is warm and dry.   Neurological:      Mental Status: She is alert and oriented to person, place, and time.   Psychiatric:         Behavior: Behavior normal.         Thought Content: Thought content normal.         Judgment: Judgment normal.              ECG done 1/30/24 reviewed by me:  ISI WNL    Admission on 01/30/2024, Discharged on 01/30/2024   Component Date Value Ref Range Status    WBC 01/30/2024 3.24 (L)  3.90 - 12.70 K/uL Final    RBC 01/30/2024 4.65  4.00 - 5.40 M/uL Final    Hemoglobin 01/30/2024 12.8  12.0 - 16.0 g/dL Final    Hematocrit 01/30/2024 42.1  37.0 - 48.5 % Final    MCV 01/30/2024 91  82 - 98 fL Final    MCH 01/30/2024 27.5  27.0 - 31.0 pg Final    MCHC 01/30/2024 30.4 (L)  32.0 - 36.0 g/dL Final    RDW 01/30/2024 12.4  11.5 - 14.5 % Final    Platelets 01/30/2024 213  150 - 450 K/uL Final    MPV 01/30/2024 10.7  9.2 - 12.9 fL Final    Immature  Granulocytes 01/30/2024 0.3  0.0 - 0.5 % Final    Gran # (ANC) 01/30/2024 2.1  1.8 - 7.7 K/uL Final    Immature Grans (Abs) 01/30/2024 0.01  0.00 - 0.04 K/uL Final    Lymph # 01/30/2024 0.9 (L)  1.0 - 4.8 K/uL Final    Mono # 01/30/2024 0.2 (L)  0.3 - 1.0 K/uL Final    Eos # 01/30/2024 0.0  0.0 - 0.5 K/uL Final    Baso # 01/30/2024 0.01  0.00 - 0.20 K/uL Final    nRBC 01/30/2024 0  0 /100 WBC Final    Gran % 01/30/2024 64.5  38.0 - 73.0 % Final    Lymph % 01/30/2024 27.2  18.0 - 48.0 % Final    Mono % 01/30/2024 7.1  4.0 - 15.0 % Final    Eosinophil % 01/30/2024 0.6  0.0 - 8.0 % Final    Basophil % 01/30/2024 0.3  0.0 - 1.9 % Final    Differential Method 01/30/2024 Automated   Final    Sodium 01/30/2024 143  136 - 145 mmol/L Final    Potassium 01/30/2024 4.0  3.5 - 5.1 mmol/L Final    Chloride 01/30/2024 108  95 - 110 mmol/L Final    CO2 01/30/2024 25  23 - 29 mmol/L Final    Glucose 01/30/2024 87  70 - 110 mg/dL Final    BUN 01/30/2024 15  8 - 23 mg/dL Final    Creatinine 01/30/2024 1.0  0.5 - 1.4 mg/dL Final    Calcium 01/30/2024 9.9  8.7 - 10.5 mg/dL Final    Total Protein 01/30/2024 7.2  6.0 - 8.4 g/dL Final    Albumin 01/30/2024 3.5  3.5 - 5.2 g/dL Final    Total Bilirubin 01/30/2024 0.5  0.1 - 1.0 mg/dL Final    Alkaline Phosphatase 01/30/2024 57  55 - 135 U/L Final    AST 01/30/2024 17  10 - 40 U/L Final    ALT 01/30/2024 25  10 - 44 U/L Final    eGFR 01/30/2024 >60.0  >60 mL/min/1.73 m^2 Final    Anion Gap 01/30/2024 10  8 - 16 mmol/L Final    Troponin I 01/30/2024 0.017  0.000 - 0.026 ng/mL Final    BNP 01/30/2024 76  0 - 99 pg/mL Final    D-Dimer 01/30/2024 0.84 (H)  <0.50 mg/L FEU Final   Lab Visit on 10/05/2023   Component Date Value Ref Range Status    Microalbumin, Urine 10/05/2023 7.0  ug/mL Final    Creatinine, Urine 10/05/2023 141.0  15.0 - 325.0 mg/dL Final    Microalb/Creat Ratio 10/05/2023 5.0  0.0 - 30.0 ug/mg Final    Hemoglobin A1C 10/05/2023 6.8 (H)  4.0 - 5.6 % Final    Estimated Avg Glucose  10/05/2023 148 (H)  68 - 131 mg/dL Final    Sodium 10/05/2023 143  136 - 145 mmol/L Final    Potassium 10/05/2023 4.1  3.5 - 5.1 mmol/L Final    Chloride 10/05/2023 107  95 - 110 mmol/L Final    CO2 10/05/2023 28  23 - 29 mmol/L Final    Glucose 10/05/2023 119 (H)  70 - 110 mg/dL Final    BUN 10/05/2023 18  8 - 23 mg/dL Final    Creatinine 10/05/2023 1.2  0.5 - 1.4 mg/dL Final    Calcium 10/05/2023 10.4  8.7 - 10.5 mg/dL Final    Anion Gap 10/05/2023 8  8 - 16 mmol/L Final    eGFR 10/05/2023 50.9 (A)  >60 mL/min/1.73 m^2 Final   (  Accession #: [id number]  Stress test only, [facility]    Height:  Not recorded   Weight:  Not recorded   Blood Pressure:  Not recorded    Date of Study:  6/2/20   Ordering Provider:  Order, Paper   Clinical Indications:  Chest pain, unspecified [R07.9 (ICD-10-CM)], Type II diabetes mellitus with peripheral circulatory disorder [E11.51 (ICD-10-CM)]       Interpreting Physicians  Performing Staff   Trey Knox MD Tech:  Luzma Billy        Reason for Exam  Priority: Routine  Dx: Chest pain, unspecified [R07.9 (ICD-10-CM)]; Type II diabetes mellitus with peripheral circulatory disorder [E11.51 (ICD-10-CM)]     Conclusion         ECG Stress Nuclear portion of this study will be reported separately.    The patient reported no chest pain during the stress test.    Test negative for ischemia electrocardiographically            Accession #: [id number]  Holter Monitor    Height:  Not recorded   Weight:  Not recorded   Blood Pressure:  Not recorded    Date of Study:  4/22/21   Ordering Provider:  Robert Kahn MD   Clinical Indications:  Hypertensive heart disease with chronic diastolic congestive heart failure [I11.0, I50.32 (ICD-10-CM)], Palpitations [R00.2 (ICD-10-CM)]       Interpreting Physicians  Performing Staff   Robert Kahn MD Tech:  Laura Bui        Reason for Exam  Priority: Routine  Dx: Hypertensive heart disease with chronic diastolic congestive heart failure  [I11.0, I50.32 (ICD-10-CM)]; Palpitations [R00.2 (ICD-10-CM)]     Conclusion    Normal sinus rhythm with a heart rate variable between 59 and 113 bpm, averaging 77 bpm  There are very rare, isolated PAC's and one PVC observed.     Performing Clinician    Laura Bui   US Carotid Bilateral  Status: Final result     MyChart Results Release    MyChart Status: Active  Results Release     PACS Images for [employer] [place] Viewer     Show images for US Carotid Bilateral  US Carotid Bilateral  Order: [id number]  Status: Final result     Visible to patient: Yes (seen)     Next appt: 03/19/2024 at 01:00 PM in Neurology (Carolina Tse MD)     0 Result Notes  Details    Reading Physician Reading Date Result Priority   Aaron Brewster MD  [phone] 11/16/2021 Routine     Narrative & Impression  EXAMINATION:  US CAROTID BILATERAL     CLINICAL HISTORY:  TIA;     TECHNIQUE:  Grayscale and color Doppler ultrasound examination of the carotid and vertebral artery systems bilaterally.  Stenosis estimates are per the NASCET measurement criteria.  Note technologist notes a tortuous vessels and patient inability to hold still limits evaluation.     COMPARISON:  None.     FINDINGS:  Right:     Internal Carotid Artery (ICA):     Peak systolic velocity 58 cm/sec     End diastolic velocity 18 cm/sec     ICA/CCA peak systolic ratio: 0.7     ICA/CCA end diastolic ratio: 1.0     Plaque formation: No significant     Vertebral artery: Antegrade flow and normal waveform.     Left:     Internal Carotid Artery (ICA):     Peak systolic velocity 57 cm/sec     End diastolic velocity 20.4 cm/sec     ICA/CCA peak systolic ratio: 0.8     ICA/CCA end diastolic ratio: 1.0     Plaque formation: No significant     Vertebral artery: Antegrade flow and normal waveform.     Impression:     No evidence of a hemodynamically significant carotid bifurcation stenosis.  Limited examination.        Electronically signed by: Aaron Brewster MD  Date:                                             11/16/2021  Time:                                           08:39           Exam Ended: 11/16/21 08:23 CST         X-Ray Chest 1 View  Status: Final result     MyChart Results Release    MyChart Status: Active  Results Release     PACS Images for [employer] Viewer     Show images for X-Ray Chest 1 View  X-Ray Chest 1 View  Order: [id number]  Status: Final result     Visible to patient: Yes (seen)     Next appt: 03/19/2024 at 01:00 PM in Neurology (Carolina Tse MD)     0 Result Notes  Details    Reading Physician Reading Date Result Priority   Sheng Polo MD  [phone] 1/30/2024 STAT     Narrative & Impression  EXAMINATION:  XR CHEST 1 VIEW     CLINICAL HISTORY:  Chest pain;     TECHNIQUE:  Single frontal view of the chest was performed.     COMPARISON:  Multiple prior exams, most recent from 02/27/2023     FINDINGS:  The lungs are clear, with normal appearance of pulmonary vasculature and no pleural effusion or pneumothorax.     The cardiac silhouette is is mildly enlarged, unchanged..  The hilar and mediastinal contours are unremarkable.     Bones are intact.     Impression:     No acute abnormality.        Electronically signed by: Sheng Polo MD  Date:                                            01/30/2024  Time:                                           13:32           Exam Ended: 01/30/24 13:24 CST Last Resulted: 01/30/24 13:32 CST            CTA Chest Non-Coronary (PE Studies)  Status: Final result     MyChart Results Release    MyChart Status: Active  Results Release     PACS Images for [employer] Viewer     Show images for CTA Chest Non-Coronary (PE Studies)  CTA Chest Non-Coronary (PE Studies)  Order: [id number]  Status: Final result     Visible to patient: Yes (seen)     Next appt: 03/19/2024 at 01:00 PM in Neurology (Carolina Tse MD)     0 Result Notes  Details    Reading Physician Reading Date Result Priority   Sheng Polo  MD  [phone] 1/30/2024 STAT     Narrative & Impression  EXAMINATION:  CTA CHEST NON CORONARY (PE STUDIES)     CLINICAL HISTORY:  Pulmonary embolism (PE) suspected, positive D-dimer;     TECHNIQUE:  Low dose axial images, sagittal and coronal reformations were obtained from the thoracic inlet to the lung bases following the IV administration of 80 mL of Omnipaque 350.  Contrast timing was optimized to evaluate the pulmonary arteries.  MIP images were performed.     COMPARISON:  None     FINDINGS:  Pulmonary Arteries: This study is adequate for the evaluation of pulmonary thromboembolism.  No evidence of pulmonary embolism to the level of the subsegmental arteries bilaterally.     Soft tissues of the chest: Visualized portion of the thyroid is normal in appearance.  2.1 cm soft tissue density within the left breast seen on previous mammograms.     Thoracic aorta:  Normal in caliber and contour.  No evidence of dissection.     Heart: No cardiomegaly.  No pericardial effusion.     Lymph nodes:  No evidence of mediastinal, hilar, or axillary lymphadenopathy.     Trachea and bronchi: Patent.     Lungs:  Lungs are clear.  No focal consolidation.  No pleural effusion.  No pneumothorax.  5 mm nodule in the right upper lobe (series 5, image 50).  Minimal dependent atelectasis of the lower lobes bilaterally, left-sided greater than right.     Limited evaluation of the upper abdomen:  No significant abnormality.     Osseous structures:  No evidence of fractures or suspicious osseous lesions.     Impression:     No evidence of pulmonary embolism.     5 mm pulmonary nodule in the right upper lobe.  For a solid nodule <6 mm, Fleischner Society 2017 guidelines recommend no routine follow up for a low risk patient, or follow-up with non-contrast chest CT at 12 months in a high risk patient.     Additional findings as described above.        Electronically signed by: Sheng Polo MD  Date:                                       "      01/30/2024  Time:                       Accession #: [id number]  Transthoracic echo (TTE) complete    Height:  5' 7" (1.702 m)   Weight:  99.6 kg (219 lb 9.2 oz)   Blood Pressure:  123/58    Date of Study:  11/16/21   Ordering Provider:  Rut Morris NP   Clinical Indications:  Stroke [I63.9 (ICD-10-CM)]       Interpreting Physicians  Performing Staff   Aguilar Fox MD Tech:  Luzma Billy        Reason for Exam  Priority: Routine  Dx: Stroke [I63.9 (ICD-10-CM)]     Result Image Hyperlink     Show images for Echo  Summary    The left ventricle is normal in size with concentric hypertrophy and normal systolic function.  The estimated ejection fraction is 70%.  Grade I left ventricular diastolic dysfunction.  Normal right ventricular size with normal right ventricular systolic function.  Normal central venous pressure (3 mmHg).  The estimated PA systolic pressure is 35 mmHg.     Vitals   Chol HDL LDL CHOLc) TG   03/03/22 1115 147 43 88.2 79   11/15/21 1722 99 Important  35 Important  48.0 Important  80   11/09/21 0844           Details    Reading Physician Reading Date Result Priority   Jan Baez MD  [phone]  [phone] 6/2/2020 Routine     Narrative & Impression  EXAMINATION:  NM MYOCARDIAL PERFUSION SPECT MULTI PHARM     CLINICAL HISTORY:  r07.9;e71.51;  Chest pain, unspecified     TECHNIQUE:  SPECT images were acquired after the injection of 10.2 mCi of Tc-99m tetrofosmin at rest and 29 mCi during a cardiac stress. The clinical stress and ECG portion of the study are to be read separately.     COMPARISON:  None.     FINDINGS:  The quality of the study is degraded by the patient's body habitus including significant anterior wall attenuation, more apparent on the rest component of the exam.     Stress SPECT images demonstrate homogenous distribution of the tracer throughout the left ventricle.  Diminished counts in the anterior walls likely related to attenuation artifact.  On the resting " images, there is matched homogenous distribution of the tracer throughout the left ventricle.     The gated post-stress images reveal normal wall motion and thickening with an estimated LVEF of 63 %. The LV cavity is not dilated with an end-diastolic volume of 139 ml (normal is less than 140 mL) and an end-systolic volume of 52 ml (normal is less than 70 mL).   Volume measurements may be underestimated and ejection fractions overestimated in patients with small hearts.     Impression:     1. No definite evidence of ischemia.  2. The global left ventricular systolic function is normal with an LV ejection fraction of 63 % and no evidence of LV dilatation. Wall motion is normal.        Electronically signed by: Jan Baez  Date:                                            06/02/2020  Time:                                           15:47             Exam Ended: 06/02/20 13:38           Status: Final result       [employer] Results Release    [employer] Status: Active  Results Release       PACS Images for [employer] Viewer     Show images for CT Head Without Contrast    CT Head Without Contrast  Order: [id number]  Status: Final result     Visible to patient: Yes (seen)      Next appt: 02/04/2022 at 08:20 AM in Pre-Admission Testing (COVID TESTING, [facility] PRE-ADMIT)      0 Result Notes      Details    Reading Physician Reading Date Result Priority   Martín Manzano MD  [phone]  [phone] 11/15/2021 STAT     Narrative & Impression  EXAMINATION:  CT HEAD WITHOUT CONTRAST     CLINICAL HISTORY:  Neuro deficit, acute, stroke suspected;     TECHNIQUE:  Low dose axial images were obtained through the head.  Coronal and sagittal reformations were also performed. Contrast was not administered.     COMPARISON:  MRI 09/25/2019     FINDINGS:  There is generalized cerebral volume loss.  There is hypoattenuation in a periventricular fashion, likely sequela of chronic microvascular ischemic change.There are scattered punctate  foci of low attenuation within the bilateral basal ganglia, may reflect sequela of remote infarcts.  There is no evidence of acute major vascular territory infarct, hemorrhage, or mass.  There is no hydrocephalus.  There are no abnormal extra-axial fluid collections.  The paranasal sinuses and mastoid air cells are clear, and there is no evidence of calvarial fracture.  The visualized soft tissues are unremarkable.     Impression:     1. Sequela of chronic microvascular ischemic change and senescent change.  Several punctate foci of low attenuation are noted within the basal ganglia bilaterally suggesting remote infarcts however could mask focus of acute ischemia.  Correlation with current symptomatology is recommended, further evaluation if warranted.        Electronically signed by: Martín Manzano MD  Date:                                            11/15/2021  Time:                                           16:45               Exam Ended: 11/15/21 16:38 Last Resulted: 11/15/21 16:45       Order Details       View Encounter       Lab and Collection Details       Routing      Result History              Result Care Coordination        Patient Communication     Add Comments              Assessment:     1. Costochondritis, acute    2. Chest pain of uncertain etiology    3. Essential hypertension    4. Hyperlipidemia, unspecified hyperlipidemia type    5. Type 2 diabetes mellitus with diabetic polyneuropathy, without long-term current use of insulin    6. History of cerebrovascular accident (CVA) greater than eight weeks in the past    7. Dyslipidemia, goal LDL below 70    8. Gastroesophageal reflux disease, unspecified whether esophagitis present      Plan:   Holly was seen today for follow-up.    Diagnoses and all orders for this visit:    Costochondritis, acute  -     Exercise Stress - EKG    Chest pain of uncertain etiology  -     Exercise Stress - EKG    Essential hypertension  -     Exercise Stress -  EKG    Hyperlipidemia, unspecified hyperlipidemia type  -     Exercise Stress - EKG    Type 2 diabetes mellitus with diabetic polyneuropathy, without long-term current use of insulin  -     Exercise Stress - EKG    History of cerebrovascular accident (CVA) greater than eight weeks in the past  -     Exercise Stress - EKG    Dyslipidemia, goal LDL below 70  -     Exercise Stress - EKG    Gastroesophageal reflux disease, unspecified whether esophagitis present  -     Exercise Stress - EKG       The chest discomfort was not typical for angina pectoris and was more likely chest wall pain (costochondritis) (or less likely GERD).  Pt reassured.    Prn famotidine counseled     Prn antacids counseled    Prn Tylenol counseled    Treadmill stress ECG    HBP and HLD and DM are controlled    Same meds    F/u with LATISHA Diamond    Follow up in about 1 year (around 2/14/2025).

## 2024-02-20 ENCOUNTER — TELEPHONE (OUTPATIENT)
Dept: CARDIOLOGY | Facility: CLINIC | Age: 64
End: 2024-02-20
Payer: MEDICAID

## 2024-02-20 LAB
CV STRESS BASE HR: 67 BPM
DIASTOLIC BLOOD PRESSURE: 76 MMHG
OHS CV CPX 1 MINUTE RECOVERY HEART RATE: 126 BPM
OHS CV CPX 85 PERCENT MAX PREDICTED HEART RATE MALE: 133
OHS CV CPX ESTIMATED METS: 7
OHS CV CPX MAX PREDICTED HEART RATE: 156
OHS CV CPX PATIENT IS FEMALE: 1
OHS CV CPX PATIENT IS MALE: 0
OHS CV CPX PEAK DIASTOLIC BLOOD PRESSURE: 88 MMHG
OHS CV CPX PEAK HEAR RATE: 137 BPM
OHS CV CPX PEAK RATE PRESSURE PRODUCT: NORMAL
OHS CV CPX PEAK SYSTOLIC BLOOD PRESSURE: 230 MMHG
OHS CV CPX PERCENT MAX PREDICTED HEART RATE ACHIEVED: 92
OHS CV CPX RATE PRESSURE PRODUCT PRESENTING: NORMAL
STRESS ECHO POST EXERCISE DUR MIN: 4 MINUTES
STRESS ECHO POST EXERCISE DUR SEC: 0 SECONDS
STRESS ST DEPRESSION: 1 MM
SYSTOLIC BLOOD PRESSURE: 154 MMHG

## 2024-02-27 ENCOUNTER — TELEPHONE (OUTPATIENT)
Dept: NEUROLOGY | Facility: CLINIC | Age: 64
End: 2024-02-27
Payer: MEDICAID

## 2024-02-27 NOTE — TELEPHONE ENCOUNTER
Book out---- Jury  Duty      Called and spoke with patient. 09;48 02/27/24   Ok to reschedule and or add to the wait list

## 2024-03-14 ENCOUNTER — TELEPHONE (OUTPATIENT)
Dept: NEUROLOGY | Facility: CLINIC | Age: 64
End: 2024-03-14
Payer: MEDICAID

## 2024-03-14 NOTE — TELEPHONE ENCOUNTER
Book out      Called and spoke with patient, I  offered her  03/19/24 pt declined due to work schedule.  I will schedule her for another day and mail it out to her.

## 2024-04-15 ENCOUNTER — TELEPHONE (OUTPATIENT)
Dept: NEUROLOGY | Facility: CLINIC | Age: 64
End: 2024-04-15
Payer: MEDICAID

## 2024-04-16 ENCOUNTER — NUTRITION (OUTPATIENT)
Dept: DIABETES | Facility: CLINIC | Age: 64
End: 2024-04-16
Payer: MEDICAID

## 2024-04-16 ENCOUNTER — OFFICE VISIT (OUTPATIENT)
Dept: DIABETES | Facility: CLINIC | Age: 64
End: 2024-04-16
Payer: MEDICAID

## 2024-04-16 VITALS
HEIGHT: 67 IN | DIASTOLIC BLOOD PRESSURE: 67 MMHG | HEART RATE: 72 BPM | BODY MASS INDEX: 31.53 KG/M2 | SYSTOLIC BLOOD PRESSURE: 128 MMHG | OXYGEN SATURATION: 98 % | BODY MASS INDEX: 31.39 KG/M2 | WEIGHT: 200.88 LBS | HEIGHT: 67 IN | WEIGHT: 200 LBS

## 2024-04-16 DIAGNOSIS — Z71.9 HEALTH EDUCATION/COUNSELING: ICD-10-CM

## 2024-04-16 DIAGNOSIS — G45.9 TIA (TRANSIENT ISCHEMIC ATTACK): ICD-10-CM

## 2024-04-16 DIAGNOSIS — E78.5 DYSLIPIDEMIA, GOAL LDL BELOW 70: ICD-10-CM

## 2024-04-16 DIAGNOSIS — E11.22 TYPE 2 DIABETES MELLITUS WITH STAGE 3A CHRONIC KIDNEY DISEASE, WITHOUT LONG-TERM CURRENT USE OF INSULIN: ICD-10-CM

## 2024-04-16 DIAGNOSIS — E11.42 TYPE 2 DIABETES MELLITUS WITH DIABETIC POLYNEUROPATHY, WITHOUT LONG-TERM CURRENT USE OF INSULIN: Primary | ICD-10-CM

## 2024-04-16 DIAGNOSIS — R74.01 ELEVATED ALANINE AMINOTRANSFERASE (ALT) LEVEL: ICD-10-CM

## 2024-04-16 DIAGNOSIS — E66.09 CLASS 1 OBESITY DUE TO EXCESS CALORIES WITH SERIOUS COMORBIDITY AND BODY MASS INDEX (BMI) OF 31.0 TO 31.9 IN ADULT: ICD-10-CM

## 2024-04-16 DIAGNOSIS — N18.31 TYPE 2 DIABETES MELLITUS WITH STAGE 3A CHRONIC KIDNEY DISEASE, WITHOUT LONG-TERM CURRENT USE OF INSULIN: ICD-10-CM

## 2024-04-16 DIAGNOSIS — I10 ESSENTIAL HYPERTENSION: ICD-10-CM

## 2024-04-16 DIAGNOSIS — L25.9 CONTACT DERMATITIS, UNSPECIFIED CONTACT DERMATITIS TYPE, UNSPECIFIED TRIGGER: ICD-10-CM

## 2024-04-16 DIAGNOSIS — E55.9 VITAMIN D DEFICIENCY: ICD-10-CM

## 2024-04-16 PROCEDURE — 99999 PR PBB SHADOW E&M-EST. PATIENT-LVL I: CPT | Mod: PBBFAC,,, | Performed by: DIETITIAN, REGISTERED

## 2024-04-16 PROCEDURE — 99213 OFFICE O/P EST LOW 20 MIN: CPT | Mod: PBBFAC,PN | Performed by: NURSE PRACTITIONER

## 2024-04-16 PROCEDURE — 3074F SYST BP LT 130 MM HG: CPT | Mod: CPTII,,, | Performed by: NURSE PRACTITIONER

## 2024-04-16 PROCEDURE — 1159F MED LIST DOCD IN RCRD: CPT | Mod: CPTII,,, | Performed by: NURSE PRACTITIONER

## 2024-04-16 PROCEDURE — 1160F RVW MEDS BY RX/DR IN RCRD: CPT | Mod: CPTII,,, | Performed by: NURSE PRACTITIONER

## 2024-04-16 PROCEDURE — 99214 OFFICE O/P EST MOD 30 MIN: CPT | Mod: S$PBB,,, | Performed by: NURSE PRACTITIONER

## 2024-04-16 PROCEDURE — 99999 PR PBB SHADOW E&M-EST. PATIENT-LVL III: CPT | Mod: PBBFAC,,, | Performed by: NURSE PRACTITIONER

## 2024-04-16 PROCEDURE — 3044F HG A1C LEVEL LT 7.0%: CPT | Mod: CPTII,,, | Performed by: NURSE PRACTITIONER

## 2024-04-16 PROCEDURE — 3078F DIAST BP <80 MM HG: CPT | Mod: CPTII,,, | Performed by: NURSE PRACTITIONER

## 2024-04-16 PROCEDURE — 3008F BODY MASS INDEX DOCD: CPT | Mod: CPTII,,, | Performed by: NURSE PRACTITIONER

## 2024-04-16 PROCEDURE — 99211 OFF/OP EST MAY X REQ PHY/QHP: CPT | Mod: PBBFAC,27,PN | Performed by: DIETITIAN, REGISTERED

## 2024-04-16 RX ORDER — SEMAGLUTIDE 0.68 MG/ML
0.5 INJECTION, SOLUTION SUBCUTANEOUS
Qty: 1 EACH | Refills: 6 | Status: SHIPPED | OUTPATIENT
Start: 2024-04-16

## 2024-04-16 RX ORDER — HYDRALAZINE HYDROCHLORIDE 25 MG/1
25 TABLET, FILM COATED ORAL 3 TIMES DAILY
Qty: 270 TABLET | Refills: 1 | Status: SHIPPED | OUTPATIENT
Start: 2024-04-16

## 2024-04-16 RX ORDER — FUROSEMIDE 20 MG/1
TABLET ORAL
Qty: 30 TABLET | Refills: 3 | Status: SHIPPED | OUTPATIENT
Start: 2024-04-16

## 2024-04-16 RX ORDER — CLOTRIMAZOLE AND BETAMETHASONE DIPROPIONATE 10; .64 MG/G; MG/G
CREAM TOPICAL 2 TIMES DAILY
Qty: 45 G | Refills: 1 | Status: SHIPPED | OUTPATIENT
Start: 2024-04-16 | End: 2024-04-26

## 2024-04-16 RX ORDER — PRAVASTATIN SODIUM 40 MG/1
40 TABLET ORAL NIGHTLY
Qty: 90 TABLET | Refills: 2 | Status: SHIPPED | OUTPATIENT
Start: 2024-04-16

## 2024-04-16 RX ORDER — LISINOPRIL AND HYDROCHLOROTHIAZIDE 12.5; 2 MG/1; MG/1
1 TABLET ORAL DAILY
Qty: 90 TABLET | Refills: 1 | Status: SHIPPED | OUTPATIENT
Start: 2024-04-16 | End: 2025-04-16

## 2024-04-16 RX ORDER — DAPAGLIFLOZIN 10 MG/1
10 TABLET, FILM COATED ORAL DAILY
Qty: 90 TABLET | Refills: 2 | Status: SHIPPED | OUTPATIENT
Start: 2024-04-16

## 2024-04-16 NOTE — ASSESSMENT & PLAN NOTE
LDL goal is <70--given history of TIA  She stopped the Lipitor due to nausea  She stopped the Crestor due to intolerance   She also tried Zetia and was unable to tolerate  Now on pravastatin 40 mg nightly and tolerating without any side effects  LDL at goal   Continue Pravastatin

## 2024-04-16 NOTE — PROGRESS NOTES
CC:   Chief Complaint   Patient presents with    Diabetes Mellitus       HPI: Holly Mcclelland is a 63 y.o. female presents for a follow up visit today for the management of T2DM.     She was diagnosed with Type 2 diabetes around [id number] on routine lab work. She was initially started on Metformin. She started insulin therapy in 2020.     Family hx of diabetes: mother, grandmother, aunts, uncles - strong family hx, sister who is blind and has amputations.   Hospitalized for diabetes: denies      No personal or FH of thyroid cancer or personal of pancreatic cancer or pancreatitis.     Works in retail       Our last visit was in October of 2023  At that visit we continued her Farxiga and increased her Mounjaro  Her recent A1c is down to 5.8%  She is having trouble getting the Mounjaro   She has been off the mounjaro for 3 weeks   When she could not get the 12.5 mg -- she used older 7.5 mg weekly dose that she had at home   The last shot she had was 7.5 mg weekly about 3 weeks ago   Denies GI upset   Ozempic made her crave CHO -- but no GI upset   Weight from 217# to 200# since last visit       DIABETES COMPLICATIONS: nephropathy, peripheral neuropathy, and cerebrovascular disease   Hx of TIA in 2019       Diabetes Management Status    ASA:  Yes - 81 mg ASA -on the plavix 75 mg daily     Statin: Taking Pravastatin 40 mg nightly with out issues   she stopped the Lipitor-- she reports that it was causing her nausea -- stopped the Crestor due to SE as well    She also tried Zetia     ACE/ARB: Taking-Lisinopril    Screening or Prevention Patient's value Goal Complete/Controlled?   HgA1C Testing and Control   Lab Results   Component Value Date    HGBA1C 5.8 (H) 04/09/2024      Annually/Less than 8% No   Lipid profile : 04/09/2024 Annually Yes   LDL control Lab Results   Component Value Date    LDLCALC 64.4 04/09/2024    Annually/Less than 100 mg/dl  Yes   Nephropathy screening Lab Results   Component Value Date     LABMICR 7.0 10/05/2023     Lab Results   Component Value Date    PROTEINUA Negative 09/25/2022    Annually Yes   Blood pressure BP Readings from Last 1 Encounters:   04/16/24 128/67    Less than 140/90 No   Dilated retinal exam : 12/19/2022-external- Dr. Ball  Annually No   Foot exam   : 10/16/2023 Annually No       CURRENT A1C:    Hemoglobin A1C   Date Value Ref Range Status   04/09/2024 5.8 (H) 4.0 - 5.6 % Final     Comment:     ADA Screening Guidelines:  5.7-6.4%  Consistent with prediabetes  >or=6.5%  Consistent with diabetes    High levels of fetal hemoglobin interfere with the HbA1C  assay. Heterozygous hemoglobin variants (HbS, HgC, etc)do  not significantly interfere with this assay.   However, presence of multiple variants may affect accuracy.     10/05/2023 6.8 (H) 4.0 - 5.6 % Final     Comment:     ADA Screening Guidelines:  5.7-6.4%  Consistent with prediabetes  >or=6.5%  Consistent with diabetes    High levels of fetal hemoglobin interfere with the HbA1C  assay. Heterozygous hemoglobin variants (HbS, HgC, etc)do  not significantly interfere with this assay.   However, presence of multiple variants may affect accuracy.     05/30/2023 7.2 (H) 4.0 - 5.6 % Final     Comment:     ADA Screening Guidelines:  5.7-6.4%  Consistent with prediabetes  >or=6.5%  Consistent with diabetes    High levels of fetal hemoglobin interfere with the HbA1C  assay. Heterozygous hemoglobin variants (HbS, HgC, etc)do  not significantly interfere with this assay.   However, presence of multiple variants may affect accuracy.         GOAL A1C: 7% without hypoglycemia      DM MEDICATIONS USED IN THE PAST:  Metformin, Lantus, glyburide, glipizide, Januvia  Victoza- throat closed   Trulicity   Farxiga   Ozempic -- not effective   Mounjaro       CURRENT DIABETES MEDICATIONS:  Mounjaro 7.5  mg weekly on Wednesdays ( she did increase to 12.5 mg weekly)  -- she was on 7.5 mg weekly last about 3 weeks ago.   and Farxiga 10 mg daily    Insulin: N/A   Missed doses:occ missing doses         BLOOD GLUCOSE MONITORING:  She checks her BG 1-2 times per day   No met or logs   Per oral recall:   Mostly are fasting ABG readings   78-98   Sometimes up to 120's   Not checking in the evening at all       HYPOGLYCEMIA: denies   Carries mints       MEALS: eating 2 meals per day   Eating at odd hours due to work schedule   Staying away from rice and pasta   Eating a lot of candy lately   BF:  skips or leftovers or toast   Lunch: skips or trying to avoid fast food   Dinner: home cooked -- biggest meal    Snack: grapes and pineapple   Drinks: water   Green tea- it has sugar   occ juice       CURRENT EXERCISE:  None         Review of Systems  Review of Systems   Constitutional:  Negative for appetite change, fatigue and unexpected weight change.   HENT:  Negative for trouble swallowing.    Eyes:  Negative for visual disturbance.   Respiratory:  Negative for shortness of breath.         + sleep apnea- off CPAP machine -- she has it but has not been using it -- just sent her one    Cardiovascular:  Negative for chest pain.   Gastrointestinal:  Negative for nausea.   Endocrine: Negative for polydipsia, polyphagia and polyuria.   Genitourinary:         No Nocturia    Musculoskeletal:  Negative for neck pain.   Skin:  Positive for rash (hyperpigmentation to arm and chst and neck - reports that is itchy). Negative for wound.   Neurological:  Negative for numbness.   Psychiatric/Behavioral:  Positive for sleep disturbance.        Physical Exam   Physical Exam  Vitals and nursing note reviewed.   Constitutional:       Appearance: She is well-developed. She is obese.   HENT:      Head: Normocephalic and atraumatic.      Right Ear: External ear normal.      Left Ear: External ear normal.      Nose: Nose normal.   Neck:      Thyroid: No thyromegaly.      Trachea: No tracheal deviation.   Cardiovascular:      Rate and Rhythm: Normal rate and regular rhythm.      Heart  sounds: No murmur heard.  Pulmonary:      Effort: Pulmonary effort is normal. No respiratory distress.      Breath sounds: Normal breath sounds.   Abdominal:      Palpations: Abdomen is soft.      Tenderness: There is no abdominal tenderness.      Hernia: No hernia is present.   Musculoskeletal:      Cervical back: Normal range of motion and neck supple.   Skin:     General: Skin is warm and dry.      Capillary Refill: Capillary refill takes less than 2 seconds.      Findings: Rash (hyperpigmentation and scaly) present.      Comments: Injection sites are normal appearing. No lipo hypertropthy or atrophy     Neurological:      Mental Status: She is alert and oriented to person, place, and time.      Cranial Nerves: No cranial nerve deficit.   Psychiatric:         Behavior: Behavior normal.         Judgment: Judgment normal.         FOOT EXAMINATION: appropriate footwear         Lab Results   Component Value Date    TSH 0.709 04/09/2024           Type 2 diabetes mellitus with diabetic polyneuropathy, without long-term current use of insulin  Well controlled   Doing VERY well on Mounjaro   Unfortunately having issues with back order -- national issues         -- Medication Changes:      Continue Farxiga 10 mg daily-- renal protection      When Mounjaro is no longer on back order-- will get back on Mounjaro -- was able to tolerate up to 12.5 mg weekly -- was also taking 7.5 mg weekly   While off the Mounjaro we will use the Ozempic- 0.5 mg weekly -- can titrate up ont he ozempic as needed and as tolerated       -- Reviewed goals of therapy are to get the best control we can without hypoglycemia.    -- Advised frequent self blood glucose monitoring.  Patient encouraged to document glucose results and bring them to every clinic visit.- daily at alternating times   -- Hypoglycemia precautions discussed. Instructed on precautions before driving.    -- Call for Bg repeatedly < 70 or > 180.   -- Close adherence to lifestyle  changes recommended.   -- Periodic follow ups for eye evaluations, foot care and dental care suggested.      Regarding polyneuropathy:  Optimize BG readings.   See above.   Off Gabapentin       Educated patient to check feet daily for any foreign objects and/or wounds. Discussed with patient the importance of wearing appropriate footwear at all times, not to walk barefoot ever, and to check shoes before putting them on feet. Instructed patient to keep feet dry by regularly changing shoes and socks and drying feet after baths and exercises. Also, instructed patient to report any new lesions, discolorations, or swelling to a healthcare professional.        Type 2 diabetes mellitus with stage 3a chronic kidney disease, without long-term current use of insulin  On Farxiga for renal protection  Optimize BG readings.   See above.   Avoid insulin stacking and hypoglycemia  On lisinopril and Farxiga    Essential hypertension  BP goal is < 140/90.   Tolerating ACEi  Blood pressure goals discussed with patient  Controlled     Class 1 obesity due to excess calories with serious comorbidity and body mass index (BMI) of 31.0 to 31.9 in adult  Body mass index is 31.32 kg/m².  Increases insulin resistance.   Discussed DM diet and exercise.   Encouraged continued weight loss    TIA (transient ischemic attack)  Optimize BG readings.   See above.       Dyslipidemia, goal LDL below 70  LDL goal is <70--given history of TIA  She stopped the Lipitor due to nausea  She stopped the Crestor due to intolerance   She also tried Zetia and was unable to tolerate  Now on pravastatin 40 mg nightly and tolerating without any side effects  LDL at goal   Continue Pravastatin     Vitamin D deficiency  No vitamins at this time   Vit D level WNL   Recommend general multivitamin       Lotrisone cream for rash         I spent a total of 30 minutes on the day of the visit.This includes face to face time and non-face to face time preparing to see the  patient (eg, review of tests), obtaining and/or reviewing separately obtained history, documenting clinical information in the electronic or other health record, independently interpreting results and communicating results to the patient/family/caregiver, or care coordinator.          Follow up in about 6 months (around 10/16/2024).  CMP in 3 months -- monitoring ALT level   See me in 6 months with labs prior       Orders Placed This Encounter   Procedures    Comprehensive Metabolic Panel     Standing Status:   Future     Standing Expiration Date:   10/16/2025    Hemoglobin A1C     Standing Status:   Future     Standing Expiration Date:   10/16/2025    Comprehensive Metabolic Panel     Standing Status:   Future     Standing Expiration Date:   10/16/2025    Lipid Panel     Standing Status:   Future     Standing Expiration Date:   10/16/2025    Microalbumin/Creatinine Ratio, Urine     Standing Status:   Future     Standing Expiration Date:   10/16/2025     Order Specific Question:   Specimen Source     Answer:   Urine         Recommendations were discussed with the patient in detail  The patient verbalized understanding and agrees with the plan outlined as above.     This note was partly generated with [employer] voice recognition software. I apologize for any possible typographical errors.

## 2024-04-16 NOTE — ASSESSMENT & PLAN NOTE
Body mass index is 31.32 kg/m².  Increases insulin resistance.   Discussed DM diet and exercise.   Encouraged continued weight loss

## 2024-04-16 NOTE — ASSESSMENT & PLAN NOTE
Well controlled   Doing VERY well on Mounjaro   Unfortunately having issues with back order -- national issues         -- Medication Changes:      Continue Farxiga 10 mg daily-- renal protection      When Mounjaro is no longer on back order-- will get back on Mounjaro -- was able to tolerate up to 12.5 mg weekly -- was also taking 7.5 mg weekly   While off the Mounjaro we will use the Ozempic- 0.5 mg weekly -- can titrate up ont he ozempic as needed and as tolerated       -- Reviewed goals of therapy are to get the best control we can without hypoglycemia.    -- Advised frequent self blood glucose monitoring.  Patient encouraged to document glucose results and bring them to every clinic visit.- daily at alternating times   -- Hypoglycemia precautions discussed. Instructed on precautions before driving.    -- Call for Bg repeatedly < 70 or > 180.   -- Close adherence to lifestyle changes recommended.   -- Periodic follow ups for eye evaluations, foot care and dental care suggested.      Regarding polyneuropathy:  Optimize BG readings.   See above.   Off Gabapentin       Educated patient to check feet daily for any foreign objects and/or wounds. Discussed with patient the importance of wearing appropriate footwear at all times, not to walk barefoot ever, and to check shoes before putting them on feet. Instructed patient to keep feet dry by regularly changing shoes and socks and drying feet after baths and exercises. Also, instructed patient to report any new lesions, discolorations, or swelling to a healthcare professional.

## 2024-04-18 NOTE — PROGRESS NOTES
"Diabetes Care Specialist Follow-up Note  Author: Arpita Sanchez RD, CDE  Date: 4/18/2024    Program Intake  Reason for Diabetes Program Visit:: Intervention  Type of Intervention:: Individual  Individual: Education  Education: Self-Management Skill Review, Nutrition and Meal Planning  Current diabetes risk level:: low (HgbA1c 6.7)  In the last 12 months, have you:: none  Permission to speak with others about care:: no    Lab Results   Component Value Date    HGBA1C 5.8 (H) 04/09/2024     A1c Pre Diabetes Care Specialist Intervention:  9.6%    Clinical    Weight: 91.1 kg (200 lb 14.4 oz)   Height: 5' 7" (170.2 cm)   Body mass index is 31.47 kg/m².  Weight decreased from 217# to 200# since last visit in October 2023    Patient Health Rating  Compared to other people your age, how would you rate your health?: Good    Problem Review  Reviewed Problem List with Patient: yes  Active comorbidities affecting diabetes self-care.: yes  Comorbidities: Stroke, Cardiovascular Disease, Hypertension, Neuropathy, Gastrointestinal Disorder  Reviewed health maintenance: yes    Clinical Assessment  Current Diabetes Treatment: Oral Medication, Injectable    Medication Information  Medication adherence impacting ability to self-manage diabetes?: No    Labs  Lab Compliance Barriers: No    Nutritional Status  Diet: Diabetic diet, Low cholesterol, Low sodium, Low fat  Meal Plan 24 Hour Recall: Breakfast, Lunch, Dinner, Snack  Meal Plan 24 Hour Recall - Breakfast: skips or leftovers or toast  Meal Plan 24 Hour Recall - Lunch: skips or trying to avoid fast food  Meal Plan 24 Hour Recall - Dinner: home cooked -- biggest meal something like Red beans with rice, cornbread, hot sausage  Meal Plan 24 Hour Recall - Snack: grapes and pineapple, Drinks: water, Green tea, and occassionally juice  Change in appetite?: No  Current nutritional status an area of need that is impacting patient's ability to self-manage diabetes?: No    Physical " activity/Exercise:   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    Her recent A1c is down to 5.8%  She is having trouble getting the Mounjaro so has been off the mounjaro for 3 weeks     Additional Social History    Support  Does anyone support you with your diabetes care?: yes  Who supports you?: self, son/daughter  Who takes you to your medical appointments?: self  Does the current support meet the patient's needs?: Yes  Is Support an area impacting ability to self-manage diabetes?: No    Access to Mass Media & Technology  Media or technology needs impacting ability to self-manage diabetes?: No    Cognitive/Behavioral Health  Cognitive or behavioral barriers impacting ability to self-manage diabetes?: No    Culture/Rastafarian  Culture or Congregation beliefs that may impact ability to access healthcare: No         Health Literacy  Health literacy needs impacting ability to self-manage diabetes?: No      Diabetes Self-Management Skills Assessment     Diabetes Disease Process/Treatment Options  Diabetes Disease Process/Treatment Options: Skills Assessment Completed: No  Assessment indicates:: Adequate understanding  Deferred due to:: Other (comment) (previously completed, there has been no change and patient has adequate understanding)  Area of need?: No    Nutrition/Healthy Eating  Nutrition/Healthy Eating Skills Assessment Completed:: No  Assessment indicates:: Adequate understanding  Deffered due to:: Other (comment)  Area of need?: No    Physical Activity/Exercise  Physical Activity/Exercise Skills Assessment Completed: : No  Assessment indicates:: Adequate understanding  Deffered due to:: Other (comment) (previously completed, there has been no change and patient has adequate understanding)  Area of need?: No    Medications  Medication Skills Assessment Completed:: No  Assessment indicates:: Adequate understanding  Deffered due to:: Other (comment) (previously completed, there has been no change and patient has adequate  understanding)  Area of need?: No    Home Blood Glucose Monitoring  Home Blood Glucose Monitoring Skills Assessment Completed: : No  Assessment indicates:: Adequate understanding  Deferred due to:: Other (comment) (previously completed, there has been no change and patient has adequate understanding)  Area of need?: No    Acute Complications  Acute Complications Skills Assessment Completed: : No  Assessment indicates:: Adequate understanding  Deffered due to:: Other (comment) (previously completed, there has been no change and patient has adequate understanding)  Area of need?: No    Chronic Complications  Chronic Complications Skills Assessment Completed: : No  Assessment indicates:: Adequate understanding  Deferred due to:: Other (comment) (previously completed, there has been no change and patient has adequate understanding)  Area of need?: No    Psychosocial/Coping  Psychosocial/Coping Skills Assessment Completed: : No  Assessment indicates:: Adequate understanding  Deffered due to:: Other (comment) (previously completed, there has been no change and patient has adequate understanding)  Area of need?: No      During today's follow-up visit,  the following areas required further assessment and content was provided/reviewed.    Based on today's diabetes care assessment, the following areas of need were identified:          4/16/2024    12:02 AM   Social   Support No   Access to Mass Media/Tech No   Cognitive/Behavioral Health No   Culture/Judaism No   Health Literacy No            4/16/2024    12:02 AM   Clinical   Medication Adherence No   Lab Compliance No   Nutritional Status No            4/16/2024    12:02 AM   Diabetes Self-Management Skills   Diabetes Disease Process/Treatment Options No   Nutrition/Healthy Eating No   Physical Activity/Exercise No   Medication No   Home Blood Glucose Monitoring No   Acute Complications No   Chronic Complications No   Psychosocial/Coping No        Today's interventions  were provided through individual discussion, instruction, and written materials were provided.    Patient verbalized understanding of instruction and written materials.  Pt was able to return back demonstration of instructions today. Patient understood key points, needs reinforcement and further instruction.     Diabetes Self-Management Care Plan Review and Evaluation of Progress:    During today's follow-up [place] Diabetes Self-Management Care Plan progress was reviewed and progress was evaluated including his/her input. Holly has agreed to continue his/her journey to improve/maintain overall diabetes control by continuing to set health goals. See care plan progress below.      Care Plan: Diabetes Management   Updates made since 3/19/2024 12:00 AM        Problem: Healthy Eating         Goal: Eat dinner at home as a homecooked meal rather than take out at least 4 times a week for the next 3 months    Start Date: 3/22/2023   Expected End Date: 6/6/2023   This Visit's Progress: On track   Recent Progress: On track   Priority: High   Barriers: Other (comments)   Note:    Currently time is an issue due to work schedule    6/6/23: Pt reports increased appetite 2/2 Ozempic. NP changed Ozempic to Mounjaro.       Task: Discussed strategies for choosing healthier menu options. Completed 4/18/2024        Task: Recommended carbohydrates per snack and provided samples of snack options. Completed 4/18/2024        Task: Encouraged to reduce or replace consumption of sugar-containing beverages with noncalorie or sugar-free versions and/or water; provided examples of beverage options. Completed 4/18/2024          Follow Up Plan     Follow up in about 3 months (around 7/16/2024) for General Follow-up.    Today's care plan and follow up schedule was discussed with patient.  Holly verbalized understanding of the care plan, goals, and agrees to follow up plan.        The patient was encouraged to communicate with his/her health care  provider/physician and care team regarding his/her condition(s) and treatment.  I provided the patient with my contact information today and encouraged to contact me via phone or [facility]'s Patient Portal as needed.     Length of Visit   Total Time: 15 Minutes

## 2024-04-19 ENCOUNTER — OFFICE VISIT (OUTPATIENT)
Dept: NEUROLOGY | Facility: CLINIC | Age: 64
End: 2024-04-19
Payer: MEDICAID

## 2024-04-19 ENCOUNTER — LAB VISIT (OUTPATIENT)
Dept: LAB | Facility: HOSPITAL | Age: 64
End: 2024-04-19
Attending: STUDENT IN AN ORGANIZED HEALTH CARE EDUCATION/TRAINING PROGRAM
Payer: MEDICAID

## 2024-04-19 DIAGNOSIS — E53.8 VITAMIN B12 DEFICIENCY: ICD-10-CM

## 2024-04-19 DIAGNOSIS — Z86.73 HISTORY OF CEREBROVASCULAR ACCIDENT (CVA) GREATER THAN EIGHT WEEKS IN THE PAST: ICD-10-CM

## 2024-04-19 DIAGNOSIS — R26.81 UNSTEADY GAIT: ICD-10-CM

## 2024-04-19 DIAGNOSIS — I10 ESSENTIAL HYPERTENSION: ICD-10-CM

## 2024-04-19 DIAGNOSIS — Z98.1 S/P CERVICAL SPINAL FUSION: ICD-10-CM

## 2024-04-19 DIAGNOSIS — R26.89 MULTIFACTORIAL GAIT DISORDER: Primary | ICD-10-CM

## 2024-04-19 LAB
FOLATE SERPL-MCNC: 8.4 NG/ML (ref 4–24)
VIT B12 SERPL-MCNC: 352 PG/ML (ref 210–950)

## 2024-04-19 PROCEDURE — 3044F HG A1C LEVEL LT 7.0%: CPT | Mod: CPTII,,, | Performed by: STUDENT IN AN ORGANIZED HEALTH CARE EDUCATION/TRAINING PROGRAM

## 2024-04-19 PROCEDURE — 84425 ASSAY OF VITAMIN B-1: CPT | Performed by: STUDENT IN AN ORGANIZED HEALTH CARE EDUCATION/TRAINING PROGRAM

## 2024-04-19 PROCEDURE — 82746 ASSAY OF FOLIC ACID SERUM: CPT | Performed by: STUDENT IN AN ORGANIZED HEALTH CARE EDUCATION/TRAINING PROGRAM

## 2024-04-19 PROCEDURE — 83921 ORGANIC ACID SINGLE QUANT: CPT | Performed by: STUDENT IN AN ORGANIZED HEALTH CARE EDUCATION/TRAINING PROGRAM

## 2024-04-19 PROCEDURE — 82607 VITAMIN B-12: CPT | Performed by: STUDENT IN AN ORGANIZED HEALTH CARE EDUCATION/TRAINING PROGRAM

## 2024-04-19 PROCEDURE — 36415 COLL VENOUS BLD VENIPUNCTURE: CPT | Performed by: STUDENT IN AN ORGANIZED HEALTH CARE EDUCATION/TRAINING PROGRAM

## 2024-04-19 PROCEDURE — 99999 PR PBB SHADOW E&M-EST. PATIENT-LVL I: CPT | Mod: PBBFAC,,, | Performed by: STUDENT IN AN ORGANIZED HEALTH CARE EDUCATION/TRAINING PROGRAM

## 2024-04-19 PROCEDURE — 4010F ACE/ARB THERAPY RXD/TAKEN: CPT | Mod: CPTII,,, | Performed by: STUDENT IN AN ORGANIZED HEALTH CARE EDUCATION/TRAINING PROGRAM

## 2024-04-19 PROCEDURE — G2211 COMPLEX E/M VISIT ADD ON: HCPCS | Mod: S$PBB,,, | Performed by: STUDENT IN AN ORGANIZED HEALTH CARE EDUCATION/TRAINING PROGRAM

## 2024-04-19 PROCEDURE — 99211 OFF/OP EST MAY X REQ PHY/QHP: CPT | Mod: PBBFAC | Performed by: STUDENT IN AN ORGANIZED HEALTH CARE EDUCATION/TRAINING PROGRAM

## 2024-04-19 PROCEDURE — 99204 OFFICE O/P NEW MOD 45 MIN: CPT | Mod: S$PBB,,, | Performed by: STUDENT IN AN ORGANIZED HEALTH CARE EDUCATION/TRAINING PROGRAM

## 2024-04-19 NOTE — PROGRESS NOTES
Name: Holly Mcclelland  MRN: [id number]   CSN: [id number]      Date: 04/19/2024    Referring physician:  Self     Chief Complaint / Interval History: Gait Disorder      History of Present Illness (HPI):    Ms. 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 Since making this appointment her DM is much better controlled. She has lost weight and is more active in her day to day life. She works 4 separate jobs.  Noticed since last year or so that she could not walk a straight line. She tends to veer towards the right. It was AFTER having her cervical spine surgery. She did do PT following her surgery. It was for her leg.   No dizziness unless she sits too long and gets up quickly.   No tinnitus or fullness in the ear. Never experienced vertigo.   Has fallen twice - both were mechanical falls.  No relevant family history.  No changes in speech or swallowing that is consistent. Swallow was mildly effected following the surgery which was done anteriorly.  No tremors elsewhere.     Current Mvmt Medications:    Prior Mvmt Medication Trials:  GBP - stopped and didn't think neuropathic pain was worse without it       Past Medical History: The patient  has a past medical history of CHF (congestive heart failure), Diabetes mellitus, Heart murmur, Hyperlipidemia, Hypertension, Sleep apnea, and Stroke.    Relevant Surgical History:   Past Surgical History:   Procedure Laterality Date    ANTERIOR CERVICAL DISCECTOMY W/ FUSION N/A 2/9/2022    Procedure: DISCECTOMY, SPINE, CERVICAL, ANTERIOR APPROACH, WITH FUSION C3-4 Gareth PRADHAN notified;  Surgeon: Damian Arroyo MD;  Location: [facility] OR;  Service: Neurosurgery;  Laterality: N/A;  ASA1  TOR1  T&Cross x 2 units  EMG  SEP  MEP  RN PREOP ON 1/31/22,COVID---NEGATIVE- on 2/8-- T/S  done  ON 2/8  SPINEWAVE GARETH RUIZ   [phone] NOTIFIED (CALLED) GARETH ON 2022 @ 3:11PM-LO  NEURO MONIT    BREAST BIOPSY Left     CYSTOSCOPY N/A 2023    Procedure: CYSTOSCOPY;  Surgeon: Nella Marte MD;  Location: [facility];  Service: OB/GYN;  Laterality: N/A;    HYSTERECTOMY, TOTAL, LAPAROSCOPIC, WITH SALPINGO-OOPHORECTOMY Bilateral 2023    Procedure: HYSTERECTOMY,TOTAL,LAPAROSCOPIC,WITH SALPINGO-OOPHORECTOMY;  Surgeon: Nella Marte MD;  Location: [facility] OR;  Service: OB/GYN;  Laterality: Bilateral;    LYSIS OF ADHESIONS N/A 2023    Procedure: LYSIS, ADHESIONS;  Surgeon: Nella Marte MD;  Location: [facility];  Service: OB/GYN;  Laterality: N/A;       Social History: The patient  reports that she quit smoking about 19 years ago. Her smoking use included cigarettes. She has never used smokeless tobacco. She reports that she does not drink alcohol and does not use drugs.     Family History: Their family history includes Diabetes in her father, maternal grandmother, mother, and sister; Heart disease in her mother; Hypertension in her mother. Sister  3 years ago - had strokes, severe DM    Allergies: Victoza [liraglutide]     Meds:   Current Outpatient Medications on File Prior to Visit   Medication Sig Dispense Refill    aspirin (ECOTRIN) 81 MG EC tablet Take 1 tablet by mouth once daily 30 tablet 0    blood sugar diagnostic (ONETOUCH ULTRA TEST) Strp 1 strip by Misc.(Non-Drug; Combo Route) route once daily. 100 each 3    clopidogreL (PLAVIX) 75 mg tablet Take 1 tablet (75 mg total) by mouth once daily. 30 tablet 11    clotrimazole-betamethasone 1-0.05% (LOTRISONE) cream Apply topically 2 (two) times daily. for 10 days 45 g 1    dapagliflozin propanediol (FARXIGA) 10 mg tablet Take 1 tablet (10 mg total) by mouth once daily. 90 tablet 2    diazePAM (VALIUM) 10 MG Tab Take 10 mg by mouth daily as needed.      furosemide (LASIX) 20 MG tablet TAKE 1 TABLET BY MOUTH ONCE DAILY AS NEEDED FOR  SWELLING 30 tablet  3    hydrALAZINE (APRESOLINE) 25 MG tablet Take 1 tablet (25 mg total) by mouth 3 (three) times daily. 270 tablet 1    lisinopriL-hydrochlorothiazide (PRINZIDE,ZESTORETIC) 20-12.5 mg per tablet Take 1 tablet by mouth once daily. 90 tablet 1    ONETOUCH DELICA PLUS LANCET 33 gauge Misc USE 1  TO CHECK GLUCOSE 4 TIMES DAILY 100 each 0    ONETOUCH ULTRA2 METER Misc USE TO CHECK GLUCOSE TWICE DAILY      oxybutynin (DITROPAN-XL) 5 MG TR24 Take 1 tablet (5 mg total) by mouth once daily. 30 tablet 11    pravastatin (PRAVACHOL) 40 MG tablet Take 1 tablet (40 mg total) by mouth every evening. 90 tablet 2    semaglutide (OZEMPIC) 0.25 mg or 0.5 mg (2 mg/3 mL) pen injector Inject 0.5 mg into the skin every 7 days. 1 each 6    tirzepatide (MOUNJARO) 12.5 mg/0.5 mL PnIj Inject 12.5 mg into the skin every 7 days. (Patient not taking: Reported on 4/16/2024) 4 Pen 6     No current facility-administered medications on file prior to visit.       Exam:  There were no vitals taken for this visit.    Constitutional  Well-developed, well-nourished, appears stated age   Cardiovascular  No LE edema bilaterally   Neurological    * Mental status  MOCA = not done during today's visit     - Orientation  Oriented to conversation     - Memory   Intact recent and remote     - Attention/concentration  Attentive, vigilant during exam     - Language  Intact to conversation.     - Fund of knowledge  Aware of current events     - Executive  Well-organized thoughts     - Other     * Cranial nerves       - CN II  Pupils equal, visual fields full to confrontation     - CN III, IV, VI  Extraocular movements full, normal pursuits and saccades   No nystagmus      - CN V  Sensation V1 - V3 intact     - CN VII  Face strong and symmetric bilaterally     - CN VIII  Hearing intact bilaterally     - CN IX, X  Palate raises midline and symmetric     - CN XI  SCM and trapezius 5/5 bilaterally     - CN XII  Tongue midline   * Motor  Muscle bulk normal, strength 5/5  throughout   * Sensory   Intact to light touch throughout  Vibration reduced at the ankles but proprioception ok    * Coordination  No dysmetria with finger-to-nose - mildly positive romberg    * Gait  See below.   * Deep tendon reflexes  3+ and symmetric throughout - owen on the left>right      * Specialized movement exam Gen: normal  Speech: normal  Tremor: no tremor   Bradykinesia: none   Tone: normal    Gait: slightly wide based     Medical Record Review:  Labs, imaging and prior notes reviewed independently.     MRI Brain 11/2021  No evidence of acute intracranial pathology. Moderate chronic microvascular ischemic change, predominantly periventricular distribution, out of proportion for patient's age. Correlation advised.         MRI C Spine 12/2021  Cervical spondylosis, most prominent at C3-4, as above. This results in severe left-sided spinal canal stenosis and increased cord signal, concerning for myelomalacia and/or cord edema.     MRI T Spine 12/2021  No acute findings.  Multilevel degenerative disc disease throughout the thoracic spine.  No spinal canal stenosis or abnormal cord signal identified.    MRI L Spine 3/2023  1. Stable lumbar spondylosis contributing to mild foraminal narrowing at L4-L5 and L5-S1 and mild spinal canal stenosis at L3-L4 and L4-L5.  2. Cholelithiasis.    Diagnoses:          1. Multifactorial gait disorder        2. Unsteady gait  Ambulatory referral/consult to Neurology    VITAMIN B12    METHYLMALONIC ACID, SERUM    FOLATE    VITAMIN B1      3. History of cerebrovascular accident (CVA) greater than eight weeks in the past        4. Essential hypertension        5. S/P cervical spinal fusion        6. Vitamin B12 deficiency            Assessment:  Ms. Mcclelland is a pleasant 64 yo RH woman who presents for abnormal gait and veering to the right. Her exam is consistent with multifactorial gait disorder in the setting of diabetic neuropathy, B12 deficiency and related  proprioceptive deficits, cervical spine disease and moderate/severe microvascular burden on MRI brain. She does not exhibit any evidence for parkinsonism or cerebellar pathology. We agreed to the following:     Plan:  - I will order some nutritional labs today. She will need B12 supplementation. Likely B12 injections.   - She is managing her medical co-morbidities well currently. Discussed the importance in tightly controlling her vascular risk factors. We could consider repeating MRI brain in the future especially if she begins noticing progression and changes in her cognition.   - She is not limited physically at the moment but should she begin to have more near falls or balance difficulties I would suggest doing PT for balance. She will let me know.     The patient has a documented history of DMII, hypertension, hyperlipidemia and/or hypercholesteremia with long-term complications such as cerebrovascular disease, carotid artery stenosis, peripheral vascular disease, and/or aortic atherosclerosis. Collectively these risk factors may contribute to cerebral atherosclerosis, and cerebral hypoperfusion compounded neurocognitive disorder. We discussed maximizing cerebrovascular-related medical therapy, including but not limited to cholesterol medications and antiplatelet agents. We have discussed the value of aggressively controlling vascular risk factors like hypertension, hyperlipidemia, and Diabetes SBP<130, LDL<100, and A1C<7.0. We discussed the need to optimize lifestyle choices, including a heart-healthy diet (e.g., Mediterranean or DASH), increased cardiovascular exercise (goal 150 minutes of moderate-intensity per week), and staying cognitively and socially active.    The visit today is associated with current or anticipated ongoing medical care related to this patients complex condition. Patient should RTC in 6 months to see me.     Total time: 50 minutes spent on the encounter, which includes face to face  time and non-face to face time preparing to see the patient (eg, review of tests), Obtaining and/or reviewing separately obtained history, Documenting clinical information in the electronic or other health record, Independently interpreting results (not separately reported) and communicating results to the patient/family/caregiver, or Care coordination (not separately reported).     Carolina Tse MD  Division of Movement and Memory Disorders  [facility]  [phone]

## 2024-04-23 LAB
METHYLMALONATE SERPL-SCNC: 0.23 UMOL/L
VIT B1 BLD-MCNC: 69 UG/L (ref 38–122)

## 2024-05-10 ENCOUNTER — TELEPHONE (OUTPATIENT)
Dept: DIABETES | Facility: CLINIC | Age: 64
End: 2024-05-10
Payer: MEDICAID

## 2024-05-13 ENCOUNTER — CLINICAL SUPPORT (OUTPATIENT)
Dept: DIABETES | Facility: CLINIC | Age: 64
End: 2024-05-13
Payer: MEDICAID

## 2024-05-13 DIAGNOSIS — E11.42 TYPE 2 DIABETES MELLITUS WITH DIABETIC POLYNEUROPATHY, WITHOUT LONG-TERM CURRENT USE OF INSULIN: Primary | ICD-10-CM

## 2024-05-13 DIAGNOSIS — E11.22 TYPE 2 DIABETES MELLITUS WITH STAGE 3A CHRONIC KIDNEY DISEASE, WITHOUT LONG-TERM CURRENT USE OF INSULIN: ICD-10-CM

## 2024-05-13 DIAGNOSIS — E11.42 TYPE 2 DIABETES MELLITUS WITH DIABETIC POLYNEUROPATHY, WITHOUT LONG-TERM CURRENT USE OF INSULIN: ICD-10-CM

## 2024-05-13 DIAGNOSIS — N18.31 TYPE 2 DIABETES MELLITUS WITH STAGE 3A CHRONIC KIDNEY DISEASE, WITHOUT LONG-TERM CURRENT USE OF INSULIN: ICD-10-CM

## 2024-05-13 PROCEDURE — 99211 OFF/OP EST MAY X REQ PHY/QHP: CPT | Mod: PBBFAC,PN | Performed by: DIETITIAN, REGISTERED

## 2024-05-13 PROCEDURE — 99999 PR PBB SHADOW E&M-EST. PATIENT-LVL I: CPT | Mod: PBBFAC,,, | Performed by: DIETITIAN, REGISTERED

## 2024-05-13 PROCEDURE — G0108 DIAB MANAGE TRN  PER INDIV: HCPCS | Mod: PBBFAC,PN | Performed by: DIETITIAN, REGISTERED

## 2024-05-13 PROCEDURE — 99999PBSHW PR PBB SHADOW TECHNICAL ONLY FILED TO HB: Mod: PBBFAC,,,

## 2024-05-13 RX ORDER — BLOOD SUGAR DIAGNOSTIC
1 STRIP MISCELLANEOUS 2 TIMES DAILY
Qty: 100 EACH | Refills: 3 | Status: SHIPPED | OUTPATIENT
Start: 2024-05-13

## 2024-05-13 RX ORDER — FLUCONAZOLE 150 MG/1
150 TABLET ORAL
COMMUNITY
Start: 2024-05-02

## 2024-05-13 RX ORDER — LANCETS 33 GAUGE
1 EACH MISCELLANEOUS 2 TIMES DAILY
Qty: 100 EACH | Refills: 3 | Status: SHIPPED | OUTPATIENT
Start: 2024-05-13

## 2024-05-13 NOTE — TELEPHONE ENCOUNTER
Patient needs a refill on testing supplies - pended in note.  Patient will also be losing insurance at the end of the month, referral for pharmacy assistance sent.

## 2024-05-16 ENCOUNTER — HOSPITAL ENCOUNTER (OUTPATIENT)
Dept: RADIOLOGY | Facility: HOSPITAL | Age: 64
Discharge: HOME | End: 2024-05-16
Attending: INTERNAL MEDICINE
Payer: MEDICAID

## 2024-05-16 DIAGNOSIS — E11.42 TYPE 2 DIABETES MELLITUS WITH DIABETIC POLYNEUROPATHY, WITHOUT LONG-TERM CURRENT USE OF INSULIN: ICD-10-CM

## 2024-05-16 PROCEDURE — 77067 SCR MAMMO BI INCL CAD: CPT | Mod: 26,,, | Performed by: RADIOLOGY

## 2024-05-16 PROCEDURE — 77063 BREAST TOMOSYNTHESIS BI: CPT | Mod: 26,,, | Performed by: RADIOLOGY

## 2024-05-16 PROCEDURE — 77063 BREAST TOMOSYNTHESIS BI: CPT | Mod: TC

## 2024-05-16 PROCEDURE — 77067 SCR MAMMO BI INCL CAD: CPT | Mod: TC

## 2024-05-17 NOTE — PROGRESS NOTES
Diabetes Care Specialist Follow-up Note  Author: Arpita Sanchez RD, CDE  Date: 5/17/2024    Program Intake  Reason for Diabetes Program Visit:: Intervention  Type of Intervention:: Individual  Individual: Education  Education: Other, Nutrition and Meal Planning (pharmacy assistance)  Current diabetes risk level:: low (HgbA1c 6.7)  In the last 12 months, have you:: none  Permission to speak with others about care:: no  Continuous Glucose Monitoring  Patient has CGM: No    Lab Results   Component Value Date    HGBA1C 5.8 (H) 04/09/2024     A1c Pre Diabetes Care Specialist Intervention:  9.6%    Clinical  Patient Health Rating  Compared to other people your age, how would you rate your health?: Good    Problem Review  Reviewed Problem List with Patient: yes  Active comorbidities affecting diabetes self-care.: yes  Comorbidities: Stroke, Cardiovascular Disease, Hypertension, Neuropathy, Gastrointestinal Disorder  Reviewed health maintenance: yes    Clinical Assessment  Current Diabetes Treatment: Oral Medication, Injectable  Have you ever experienced hypoglycemia (low blood sugar)?: yes  In the last month, how often have you experienced low blood sugar?: other (see comments) (less than once a week)  Are you able to tell when your blood sugar is low?: Yes  What symptoms do you experience?: Anxious/nervous, Shaky  Have you ever been hospitalized because your blood sugar was too low?: no  How do you treat hypoglycemia (low blood sugar)?: 1/2 can soda/fruit juice  Have you ever experienced hyperglycemia (high blood sugar)?: yes  In the last month, how often have you experienced high blood sugar?: once every other week  Are you able to tell when your blood sugar is high?: No (comment)  Have you ever been hospitalized because your blood sugar was high?: no    Medication Information  Medication adherence impacting ability to self-manage diabetes?: No    Labs  Lab Compliance Barriers: No    Nutritional Status  Diet: Diabetic  diet, Low cholesterol, Low sodium, Low fat  Meal Plan 24 Hour Recall: Breakfast, Lunch, Dinner, Snack  Meal Plan 24 Hour Recall - Breakfast: skips or leftovers or toast  Meal Plan 24 Hour Recall - Lunch: skips or trying to avoid fast food  Meal Plan 24 Hour Recall - Dinner: home cooked -- biggest meal something like Red beans with rice, cornbread, hot sausage  Meal Plan 24 Hour Recall - Snack: grapes and pineapple, Drinks: water, Green tea, and occassionally juice  Current nutritional status an area of need that is impacting patient's ability to self-manage diabetes?: No    Physical activity/Exercise:   No change    SMBG: testing blood sugars occasionally    Additional Social History    Support  Is Support an area impacting ability to self-manage diabetes?: No    Access to Mass Media & Technology  Media or technology needs impacting ability to self-manage diabetes?: No    Cognitive/Behavioral Health  Cognitive or behavioral barriers impacting ability to self-manage diabetes?: No    Culture/Restoration  Culture or Voodoo beliefs that may impact ability to access healthcare: No    Communication  Communication needs impacting ability to self-manage diabetes?: No    Health Literacy  Health literacy needs impacting ability to self-manage diabetes?: No    Patient is currently working 4 jobs and is making too much money to keep her medicaid. She will be losing insurance at the end of the month. She will likely pickup a marketplace plan until she is able to join medicare in November. Until thing get squared away patient needs help affording medications. Patient referral sent to pharmacy assistance but they responeded stating they cannot help patient at this time, send another referral once she loses the medicaid. [employer] $4 list and [employer] coupons    Diabetes Self-Management Skills Assessment     Diabetes Disease Process/Treatment Options  Diabetes Disease Process/Treatment Options: Skills Assessment Completed:  No  Assessment indicates:: Adequate understanding  Deferred due to:: Other (comment) (previously completed, there has been no change and patient has adequate understanding)  Area of need?: No    Nutrition/Healthy Eating  Nutrition/Healthy Eating Skills Assessment Completed:: No  Assessment indicates:: Adequate understanding  Deffered due to:: Other (comment)  Area of need?: No    Physical Activity/Exercise  Physical Activity/Exercise Skills Assessment Completed: : No  Assessment indicates:: Adequate understanding  Deffered due to:: Other (comment) (previously completed, there has been no change and patient has adequate understanding)  Area of need?: No    Medications  Medication Skills Assessment Completed:: No  Assessment indicates:: Adequate understanding  Deffered due to:: Other (comment) (previously completed, there has been no change and patient has adequate understanding)  Area of need?: No    Home Blood Glucose Monitoring  Home Blood Glucose Monitoring Skills Assessment Completed: : No  Assessment indicates:: Adequate understanding  Deferred due to:: Other (comment) (previously completed, there has been no change and patient has adequate understanding)  Area of need?: No    Acute Complications  Acute Complications Skills Assessment Completed: : No  Assessment indicates:: Adequate understanding  Deffered due to:: Other (comment) (previously completed, there has been no change and patient has adequate understanding)  Area of need?: No    Chronic Complications  Chronic Complications Skills Assessment Completed: : No  Assessment indicates:: Adequate understanding  Deferred due to:: Other (comment) (previously completed, there has been no change and patient has adequate understanding)  Area of need?: No    Psychosocial/Coping  Psychosocial/Coping Skills Assessment Completed: : No  Assessment indicates:: Adequate understanding  Deffered due to:: Other (comment) (previously completed, there has been no change and  patient has adequate understanding)  Area of need?: No      During today's follow-up visit,  the following areas required further assessment and content was provided/reviewed.    Based on today's diabetes care assessment, the following areas of need were identified:          5/13/2024    12:01 AM   Social   Support No   Access to Mass Media/Tech No   Cognitive/Behavioral Health No   Culture/Presybeterian No   Communication No   Health Literacy No            5/13/2024    12:01 AM   Clinical   Medication Adherence No   Lab Compliance No   Nutritional Status No            5/13/2024    12:01 AM   Diabetes Self-Management Skills   Diabetes Disease Process/Treatment Options No   Nutrition/Healthy Eating No   Physical Activity/Exercise No   Medication No   Home Blood Glucose Monitoring No   Acute Complications No   Chronic Complications No   Psychosocial/Coping No        Today's interventions were provided through individual discussion, instruction, and written materials were provided.    Patient verbalized understanding of instruction and written materials.  Pt was able to return back demonstration of instructions today. Patient understood key points, needs reinforcement and further instruction.     Diabetes Self-Management Care Plan Review and Evaluation of Progress:    During today's follow-up Holly's Diabetes Self-Management Care Plan progress was reviewed and progress was evaluated including his/her input. Holly has agreed to continue his/her journey to improve/maintain overall diabetes control by continuing to set health goals. See care plan progress below.      Care Plan: Diabetes Management   Updates made since 4/17/2024 12:00 AM        Problem: Healthy Eating         Goal: Eat dinner at home as a homecooked meal rather than take out at least 4 times a week for the next 3 months    Start Date: 3/22/2023   Expected End Date: 6/6/2023   This Visit's Progress: On track   Recent Progress: On track   Priority: High    Barriers: Other (comments)   Note:    Currently time is an issue due to work schedule    6/6/23: Pt reports increased appetite 2/2 Ozempic. NP changed Ozempic to Mounjaro.       Task: Provided Sample plate method and reviewed the use of the plate to estimate amounts of carbohydrate per meal. Completed 5/17/2024        Task: Reviewed sources of Carbohydrate: Starch, Milk, Fruit, Sugar, and nonstarchy vegetables Completed 5/17/2024        Task: Discussed strategies for choosing healthier menu options. Completed 4/18/2024        Task: Recommended carbohydrates per snack and provided samples of snack options. Completed 4/18/2024        Task: Encouraged to reduce or replace consumption of sugar-containing beverages with noncalorie or sugar-free versions and/or water; provided examples of beverage options. Completed 4/18/2024        Task: Provided information on low sodium, low saturated fat/low cholesterol Completed 5/17/2024        Task: Discussed guidelines for preventing, detecting and treating hypoglycemia and hyperglycemia and reviewed the importance of meal and medication timing with diabetes mediations for prevention of hypoglycemia and maximum drug benefit. Completed 5/17/2024          Follow Up Plan     Follow up in about 3 months (around 8/13/2024) for General Follow-up.    Today's care plan and follow up schedule was discussed with patient.  Holly verbalized understanding of the care plan, goals, and agrees to follow up plan.        The patient was encouraged to communicate with his/her health care provider/physician and care team regarding his/her condition(s) and treatment.  I provided the patient with my contact information today and encouraged to contact me via phone or [facility] Portal as needed.     Length of Visit   Total Time: 50 Minutes

## 2024-05-20 PROBLEM — M47.816: Status: ACTIVE | Noted: 2024-05-20

## 2024-05-27 ENCOUNTER — OFFICE VISIT (OUTPATIENT)
Dept: PODIATRY | Facility: CLINIC | Age: 64
End: 2024-05-27
Payer: MEDICAID

## 2024-05-27 VITALS
WEIGHT: 202.06 LBS | HEIGHT: 67 IN | BODY MASS INDEX: 31.71 KG/M2 | HEART RATE: 65 BPM | SYSTOLIC BLOOD PRESSURE: 121 MMHG | DIASTOLIC BLOOD PRESSURE: 76 MMHG

## 2024-05-27 DIAGNOSIS — E11.42 DM TYPE 2 WITH DIABETIC PERIPHERAL NEUROPATHY: Primary | ICD-10-CM

## 2024-05-27 DIAGNOSIS — M79.671 PAIN OF RIGHT HEEL: ICD-10-CM

## 2024-05-27 DIAGNOSIS — M72.2 PLANTAR FASCIITIS OF RIGHT FOOT: ICD-10-CM

## 2024-05-27 DIAGNOSIS — N39.41 URGE INCONTINENCE OF URINE: ICD-10-CM

## 2024-05-27 PROCEDURE — 3074F SYST BP LT 130 MM HG: CPT | Mod: CPTII,,, | Performed by: PODIATRIST

## 2024-05-27 PROCEDURE — 3008F BODY MASS INDEX DOCD: CPT | Mod: CPTII,,, | Performed by: PODIATRIST

## 2024-05-27 PROCEDURE — 99214 OFFICE O/P EST MOD 30 MIN: CPT | Mod: PBBFAC,PN | Performed by: PODIATRIST

## 2024-05-27 PROCEDURE — 4010F ACE/ARB THERAPY RXD/TAKEN: CPT | Mod: CPTII,,, | Performed by: PODIATRIST

## 2024-05-27 PROCEDURE — 99999 PR PBB SHADOW E&M-EST. PATIENT-LVL IV: CPT | Mod: PBBFAC,,, | Performed by: PODIATRIST

## 2024-05-27 PROCEDURE — 3078F DIAST BP <80 MM HG: CPT | Mod: CPTII,,, | Performed by: PODIATRIST

## 2024-05-27 PROCEDURE — 1159F MED LIST DOCD IN RCRD: CPT | Mod: CPTII,,, | Performed by: PODIATRIST

## 2024-05-27 PROCEDURE — 99213 OFFICE O/P EST LOW 20 MIN: CPT | Mod: S$PBB,,, | Performed by: PODIATRIST

## 2024-05-27 PROCEDURE — 3044F HG A1C LEVEL LT 7.0%: CPT | Mod: CPTII,,, | Performed by: PODIATRIST

## 2024-05-27 RX ORDER — OXYBUTYNIN CHLORIDE 5 MG/1
5 TABLET, EXTENDED RELEASE ORAL
Qty: 90 TABLET | Refills: 0 | Status: SHIPPED | OUTPATIENT
Start: 2024-05-27

## 2024-05-27 RX ORDER — DICLOFENAC SODIUM 10 MG/G
0.5 GEL TOPICAL 4 TIMES DAILY
Qty: 450 G | Refills: 2 | Status: SHIPPED | OUTPATIENT
Start: 2024-05-27

## 2024-05-27 RX ORDER — METHYLPREDNISOLONE 4 MG/1
TABLET ORAL
Qty: 21 EACH | Refills: 1 | Status: SHIPPED | OUTPATIENT
Start: 2024-05-27 | End: 2024-06-17

## 2024-05-27 NOTE — PATIENT INSTRUCTIONS
Visco-gel universal metatarsal strap - large/xlarge right & left    PPT arch pad w/ adhesive - medium

## 2024-05-27 NOTE — PROGRESS NOTES
Subjective:      Patient ID: Holly Mcclelland is a 63 y.o. female.    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 Been hurting since 5/10. Had arch pads in house sandals from before but change to different work shoe w/ pads as others too flat - helped a little.    10/16/23  Patient is here after an absence of a yr. Previously seen for ganglion cyst dorsal L midfoot w/ associated arthropathy.  Now noticed a 'big vein' indicating arch B/L w/out pain. Started wearing aqua shoes about a month ago.   Big toes hurt in work shoes (has arch pads in them) & indicates distal medial but denies IGTN pain. Nails are long & need trimming as thick & hurt in shoes - usually goes for pedicures but it has been some time.   9/26/22  Holly is a 63 y.o. female who presents for 1 month f/u of pain L foot x 2 days & worsening dorsal - lump. States medication helped w/ symptomatology as did pads, so the lump has decreased and she is barely getting any pain. Given gelstrap & to use Voltaren gel. Not using sandals/slippers.     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     DM mgmt: Em Diamond NP 10/12/23  PCP: Lasha Braga 3/1/23 due 10/30/23 for refills    Shoe gear: stretch casual    Dx DM 2015  Past Medical History:   Diagnosis Date    CHF (congestive heart failure)     Diabetes mellitus     Heart murmur     Hyperlipidemia     Hypertension     Sleep apnea     no cpap since weight loss    Stroke     remote hx TIA; no residual     Patient Active Problem List   Diagnosis    TIA (transient ischemic attack)    GERD (gastroesophageal reflux disease)    Class 1 obesity due to excess calories with serious comorbidity and body mass index (BMI) of 31.0 to 31.9 in adult    Onychogryphosis    Onychocryptosis    Onychomycosis due to dermatophyte    Type 2 diabetes mellitus with diabetic polyneuropathy, without long-term current use of insulin     Essential hypertension    Dyslipidemia, goal LDL below 70    Hypertensive heart disease with chronic diastolic congestive heart failure    Palpitations    Heart murmur    Right leg weakness    History of cerebrovascular accident (CVA) greater than eight weeks in the past    Obstructive sleep apnea    Spinal stenosis of cervical region    S/P cervical spinal fusion    Neck pain    Lumbar radiculopathy    Orthostatic hypotension    Vitamin D deficiency    Intramural and subserous leiomyoma of uterus    Type 2 diabetes mellitus with stage 3a chronic kidney disease, without long-term current use of insulin    Post-menopausal bleeding    sp TLH/BSO on 4/12/23 for PMB at age 62    Chest pain of uncertain etiology    Hyperlipidemia    Costochondritis, acute    Unsteady gait    Vitamin B12 deficiency    Osteoarthritis of lumbar spine without myelopathy or radiculopathy      Hemoglobin A1C   Date Value Ref Range Status   04/09/2024 5.8 (H) 4.0 - 5.6 % Final     Comment:     ADA Screening Guidelines:  5.7-6.4%  Consistent with prediabetes  >or=6.5%  Consistent with diabetes    High levels of fetal hemoglobin interfere with the HbA1C  assay. Heterozygous hemoglobin variants (HbS, HgC, etc)do  not significantly interfere with this assay.   However, presence of multiple variants may affect accuracy.     10/05/2023 6.8 (H) 4.0 - 5.6 % Final     Comment:     ADA Screening Guidelines:  5.7-6.4%  Consistent with prediabetes  >or=6.5%  Consistent with diabetes    High levels of fetal hemoglobin interfere with the HbA1C  assay. Heterozygous hemoglobin variants (HbS, HgC, etc)do  not significantly interfere with this assay.   However, presence of multiple variants may affect accuracy.     05/30/2023 7.2 (H) 4.0 - 5.6 % Final     Comment:     ADA Screening Guidelines:  5.7-6.4%  Consistent with prediabetes  >or=6.5%  Consistent with diabetes    High levels of fetal hemoglobin interfere with the HbA1C  assay. Heterozygous hemoglobin  variants (HbS, HgC, etc)do  not significantly interfere with this assay.   However, presence of multiple variants may affect accuracy.        Objective:      Physical Exam  Vitals reviewed.   Constitutional:       Appearance: She is well-developed. She is obese.   Cardiovascular:      Pulses:           Dorsalis pedis pulses are 2+ on the right side and 2+ on the left side.   Musculoskeletal:      Right lower leg: No tenderness. Edema present.      Left lower leg: No tenderness. Edema present.        Feet:       Comments: No reproducible discomfort nor any tenderness to the calf bilaterally.   Feet:      Right foot:      Skin integrity: Callus (diffuse WB hyperkeratosis B/L ) and dry skin present. No fissure.      Toenail Condition: Right toenails are ingrown. Fungal disease present.     Left foot:      Skin integrity: Callus and dry skin present. No fissure.      Toenail Condition: Left toenails are ingrown. Fungal disease present.     Comments: Palpable prominence dorsal midfoot L consistent w/ saddle bone deformity; enthesophyte.   No residual fluctuance in area of previous cyst dorsal midfoot L.    Nails are cryptotic 1st through 5th B/L w/ dystrophic, thickened, discolored toenails including 2nd R>L, sparing 3rd & 4th B/L.   Skin:     General: Skin is warm and dry.      Capillary Refill: Capillary refill takes 2 to 3 seconds.      Nails: There is no clubbing.      Comments: WB diffuse hyperkeratotic tissue B/L w/out fissuring   Neurological:      Mental Status: She is alert and oriented to person, place, and time.      Sensory: Sensation is intact.      Motor: Motor function is intact.      Gait: Gait is intact.      Comments: Paresthesias including tingling & weakness RLE w/ no clearly identified trigger or source; no hyperemia (likely lumbar radiculopathy)   Psychiatric:         Mood and Affect: Mood and affect normal.         Behavior: Behavior normal. Behavior is cooperative.        Assessment:       Encounter Diagnoses   Name Primary?    DM type 2 with diabetic peripheral neuropathy Yes    Pain of right heel     Plantar fasciitis of right foot      Problem List Items Addressed This Visit    None  Visit Diagnoses       DM type 2 with diabetic peripheral neuropathy    -  Primary    Pain of right heel        Plantar fasciitis of right foot        Relevant Medications    diclofenac sodium (VOLTAREN) 1 % Gel    methylPREDNISolone (MEDROL DOSEPACK) 4 mg tablet            Plan:      Holly was seen today for heel pain and foot pain.    Diagnoses and all orders for this visit:    DM type 2 with diabetic peripheral neuropathy    Pain of right heel    Plantar fasciitis of right foot  -     diclofenac sodium (VOLTAREN) 1 % Gel; Apply 0.5 g topically 4 (four) times daily.  -     methylPREDNISolone (MEDROL DOSEPACK) 4 mg tablet; use as directed    I counseled the patient on her conditions, their implications and medical management.    - Shoe inspection. Diabetic Foot Education. Patient reminded of the importance of good nutrition & blood sugar control to help prevent podiatric complications of diabetes. Patient instructed on proper foot hygeine. We discussed wearing proper shoe gear, daily foot inspections, never walking w/out protective shoe gear, annual DM foot exam, sooner prn.       Discussed general issues surrounding plantar fasciitis along w/ the advantages & disadvantages of various tx strategies.  -Discussed shoe choice.  -Discussed non-prescription inserts   OR MLA pads/ support including PPT arch pads (added current insoles in work shoes; Continue use of arch in his sandals, previously added)    OR Silopos gelstrap (dispensed for other shoes),  all times WB including at home.   Also dispensed Tulis heel cups for symptomatic relief.  -Rx/ OTC topical NSAID Voltaren gel up to qid prn (patient has generic OTC @ home & will start w/ that but additional Rx also sent in) vs PO NSAID.  Other options include PO Medrol  dosepak (Rx sent as patient would like to avoid injection since she has to go back to work today, and shoe will use the topical as well), prior to NSAID OR injection of LA & steroid w/ PO NSAID.     F/u 3-4 wks., sooner prn (patient may communicate by portal after June 1st when her Medicaid is  Dcd & prior to getting new insurance).        A total of 28 mins.was spent on chart review, patient visit & documentation.

## 2024-05-27 NOTE — TELEPHONE ENCOUNTER
Refill Decision Note   Holly Stas  is requesting a refill authorization.  Brief Assessment and Rationale for Refill:  Approve     Medication Therapy Plan:         Comments:     Note composed:12:48 PM 05/27/2024

## 2024-06-09 DIAGNOSIS — E11.42 TYPE 2 DIABETES MELLITUS WITH DIABETIC POLYNEUROPATHY, WITHOUT LONG-TERM CURRENT USE OF INSULIN: ICD-10-CM

## 2024-06-10 RX ORDER — TIRZEPATIDE 12.5 MG/.5ML
12.5 INJECTION, SOLUTION SUBCUTANEOUS
Qty: 4 PEN | Refills: 6 | Status: SHIPPED | OUTPATIENT
Start: 2024-06-10

## 2024-08-28 ENCOUNTER — PATIENT MESSAGE (OUTPATIENT)
Dept: DIABETES | Facility: CLINIC | Age: 64
End: 2024-08-28

## 2024-09-25 ENCOUNTER — PATIENT MESSAGE (OUTPATIENT)
Dept: DIABETES | Facility: CLINIC | Age: 64
End: 2024-09-25

## 2024-09-25 ENCOUNTER — TELEPHONE (OUTPATIENT)
Dept: DIABETES | Facility: CLINIC | Age: 64
End: 2024-09-25

## 2024-09-25 NOTE — TELEPHONE ENCOUNTER
----- Message from Lenka Guzman sent at 9/25/2024  2:04 PM CDT -----  Contact: Patient, [phone]  Calling to reschedule her appointment to November when her Medicare kicks in. Please call her. Thanks.

## 2024-10-09 ENCOUNTER — TELEPHONE (OUTPATIENT)
Dept: DIABETES | Facility: CLINIC | Age: 64
End: 2024-10-09

## 2024-10-09 NOTE — TELEPHONE ENCOUNTER
----- Message from Elliotzachary sent at 10/9/2024  4:04 PM CDT -----  Contact: [phone]  .[email or web address]     Patient is calling for Medical Advice regarding: Pt said she needs a call back about a boil she has and wants meds called in     How long has patient had these symptoms: 1-2 weeks    Pharmacy name and phone#:    Patient [facility] a call back or thru [facility]:  [address] [place] [facility] [address] [facility]  [address] [facility]  [place] [address]  Phone: [phone] Fax: [phone]      Comments:    Please advise patient replies from provider may take up to 48 hours.

## 2024-10-09 NOTE — TELEPHONE ENCOUNTER
----- Message from Elliotzachary sent at 10/9/2024  4:04 PM CDT -----  Contact: [phone]  .[email or web address]     Patient is calling for Medical Advice regarding: Pt said she needs a call back about a boil she has and wants meds called in     How long has patient had these symptoms: 1-2 weeks    Pharmacy name and phone#:    Patient wants a call back or thru [facility]:  [address] [place] [facility] [address] [facility]  [address] [facility]  [place] [address]  Phone: [phone] Fax: [phone]      Comments:    Please advise patient replies from provider may take up to 48 hours.

## 2024-10-15 ENCOUNTER — PATIENT MESSAGE (OUTPATIENT)
Dept: DIABETES | Facility: CLINIC | Age: 64
End: 2024-10-15

## 2024-10-28 ENCOUNTER — PATIENT MESSAGE (OUTPATIENT)
Dept: PRIMARY CARE CLINIC | Facility: CLINIC | Age: 64
End: 2024-10-28

## 2024-11-03 DIAGNOSIS — N39.41 URGE INCONTINENCE OF URINE: ICD-10-CM

## 2024-11-03 NOTE — TELEPHONE ENCOUNTER
Refill Routing Note   Medication(s) are not appropriate for processing by [facility] for the following reason(s):      Patient not seen by provider within 15 months    [facility] action(s):  Defer Care Due:  None identified            Appointments  past 12m or future 3m with PCP    Date Provider   Last Visit   7/6/2023 Nella Marte MD   Next Visit   Visit date not found Nella Marte MD   ED visits in past 90 days: 0        Note composed:11:53 AM 11/03/2024

## 2024-11-05 RX ORDER — OXYBUTYNIN CHLORIDE 5 MG/1
5 TABLET, EXTENDED RELEASE ORAL
Qty: 90 TABLET | Refills: 0 | Status: SHIPPED | OUTPATIENT
Start: 2024-11-05

## 2024-11-07 ENCOUNTER — TELEPHONE (OUTPATIENT)
Dept: DIABETES | Facility: CLINIC | Age: 64
End: 2024-11-07
Payer: MEDICARE

## 2024-11-08 ENCOUNTER — OFFICE VISIT (OUTPATIENT)
Dept: DIABETES | Facility: CLINIC | Age: 64
End: 2024-11-08
Payer: MEDICARE

## 2024-11-08 VITALS
DIASTOLIC BLOOD PRESSURE: 78 MMHG | WEIGHT: 213 LBS | OXYGEN SATURATION: 96 % | BODY MASS INDEX: 33.36 KG/M2 | SYSTOLIC BLOOD PRESSURE: 126 MMHG | HEART RATE: 73 BPM

## 2024-11-08 DIAGNOSIS — E11.22 TYPE 2 DIABETES MELLITUS WITH STAGE 3A CHRONIC KIDNEY DISEASE, WITHOUT LONG-TERM CURRENT USE OF INSULIN: ICD-10-CM

## 2024-11-08 DIAGNOSIS — E66.09 CLASS 1 OBESITY DUE TO EXCESS CALORIES WITH SERIOUS COMORBIDITY AND BODY MASS INDEX (BMI) OF 33.0 TO 33.9 IN ADULT: ICD-10-CM

## 2024-11-08 DIAGNOSIS — N18.31 TYPE 2 DIABETES MELLITUS WITH STAGE 3A CHRONIC KIDNEY DISEASE, WITHOUT LONG-TERM CURRENT USE OF INSULIN: ICD-10-CM

## 2024-11-08 DIAGNOSIS — E55.9 VITAMIN D DEFICIENCY: ICD-10-CM

## 2024-11-08 DIAGNOSIS — E78.5 DYSLIPIDEMIA, GOAL LDL BELOW 70: ICD-10-CM

## 2024-11-08 DIAGNOSIS — E11.42 TYPE 2 DIABETES MELLITUS WITH DIABETIC POLYNEUROPATHY, WITHOUT LONG-TERM CURRENT USE OF INSULIN: Primary | ICD-10-CM

## 2024-11-08 DIAGNOSIS — Z71.9 HEALTH EDUCATION/COUNSELING: ICD-10-CM

## 2024-11-08 DIAGNOSIS — E66.811 CLASS 1 OBESITY DUE TO EXCESS CALORIES WITH SERIOUS COMORBIDITY AND BODY MASS INDEX (BMI) OF 33.0 TO 33.9 IN ADULT: ICD-10-CM

## 2024-11-08 DIAGNOSIS — I10 ESSENTIAL HYPERTENSION: ICD-10-CM

## 2024-11-08 DIAGNOSIS — G45.9 TIA (TRANSIENT ISCHEMIC ATTACK): ICD-10-CM

## 2024-11-08 DIAGNOSIS — Z59.9 FINANCIAL DIFFICULTIES: ICD-10-CM

## 2024-11-08 PROCEDURE — 99999 PR PBB SHADOW E&M-EST. PATIENT-LVL IV: CPT | Mod: PBBFAC,,, | Performed by: NURSE PRACTITIONER

## 2024-11-08 RX ORDER — BLOOD SUGAR DIAGNOSTIC
1 STRIP MISCELLANEOUS 2 TIMES DAILY
Qty: 200 EACH | Refills: 3 | Status: SHIPPED | OUTPATIENT
Start: 2024-11-08

## 2024-11-08 RX ORDER — LISINOPRIL AND HYDROCHLOROTHIAZIDE 12.5; 2 MG/1; MG/1
1 TABLET ORAL DAILY
Qty: 90 TABLET | Refills: 2 | Status: SHIPPED | OUTPATIENT
Start: 2024-11-08 | End: 2025-11-08

## 2024-11-08 RX ORDER — DAPAGLIFLOZIN 10 MG/1
10 TABLET, FILM COATED ORAL DAILY
Qty: 90 TABLET | Refills: 2 | Status: SHIPPED | OUTPATIENT
Start: 2024-11-08

## 2024-11-08 RX ORDER — TIRZEPATIDE 10 MG/.5ML
10 INJECTION, SOLUTION SUBCUTANEOUS
Qty: 4 PEN | Refills: 6 | Status: SHIPPED | OUTPATIENT
Start: 2024-11-08

## 2024-11-08 RX ORDER — FUROSEMIDE 20 MG/1
TABLET ORAL
Qty: 30 TABLET | Refills: 3 | Status: SHIPPED | OUTPATIENT
Start: 2024-11-08

## 2024-11-08 RX ORDER — TIRZEPATIDE 7.5 MG/.5ML
7.5 INJECTION, SOLUTION SUBCUTANEOUS
Qty: 4 PEN | Refills: 0 | Status: SHIPPED | OUTPATIENT
Start: 2024-11-08

## 2024-11-08 RX ORDER — PRAVASTATIN SODIUM 40 MG/1
40 TABLET ORAL NIGHTLY
Qty: 90 TABLET | Refills: 2 | Status: SHIPPED | OUTPATIENT
Start: 2024-11-08

## 2024-11-08 RX ORDER — TIRZEPATIDE 5 MG/.5ML
5 INJECTION, SOLUTION SUBCUTANEOUS
Qty: 4 PEN | Refills: 0 | Status: SHIPPED | OUTPATIENT
Start: 2024-11-08

## 2024-11-08 RX ORDER — HYDRALAZINE HYDROCHLORIDE 25 MG/1
25 TABLET, FILM COATED ORAL 3 TIMES DAILY
Qty: 270 TABLET | Refills: 2 | Status: SHIPPED | OUTPATIENT
Start: 2024-11-08

## 2024-11-08 RX ORDER — FLUCONAZOLE 150 MG/1
150 TABLET ORAL
Qty: 2 TABLET | Refills: 3 | Status: SHIPPED | OUTPATIENT
Start: 2024-11-08

## 2024-11-08 RX ORDER — LANCETS 33 GAUGE
1 EACH MISCELLANEOUS 2 TIMES DAILY
Qty: 200 EACH | Refills: 3 | Status: SHIPPED | OUTPATIENT
Start: 2024-11-08

## 2024-11-08 SDOH — SOCIAL DETERMINANTS OF HEALTH (SDOH): PROBLEM RELATED TO HOUSING AND ECONOMIC CIRCUMSTANCES, UNSPECIFIED: Z59.9

## 2024-11-08 NOTE — ASSESSMENT & PLAN NOTE
No vitamins at this time   Vit D level WNL   Recommend general multivitamin     Vit D, 25-Hydroxy   Date Value Ref Range Status   04/09/2024 41 30 - 96 ng/mL Final     Comment:     Vitamin D deficiency.........<10 ng/mL                              Vitamin D insufficiency......10-29 ng/mL       Vitamin D sufficiency........> or equal to 30 ng/mL  Vitamin D toxicity............>100 ng/mL

## 2024-11-08 NOTE — PATIENT INSTRUCTIONS
Remember about the coverage gap that we discussed today   If the cost gets high -- let us know and we can apply for pharmacy assistance     Continue the Farxiga 10 mg daily     Get back on the mounjaro   We have to start low and work our way up   Start with Mounjaro 5 mg weekly x 4 weeks and then increase to 7.5 mg weekly for 4 weeks and then increase to 10 mg weekly

## 2024-11-08 NOTE — ASSESSMENT & PLAN NOTE
A1c is pending  Sounds like she has been off of GLP1 for a couple of months  She says that she never stopped taking the Farxiga though?  Had insurance issues  She wants to get back on the Mounjaro  She has [employer] Medicare  We discussed the coverage gap      -- Medication Changes:   Remember about the coverage gap that we discussed today   If the cost gets high -- let us know and we can apply for pharmacy assistance     Continue the Farxiga 10 mg daily     Get back on the mounjaro   We have to start low and work our way up   Start with Mounjaro 5 mg weekly x 4 weeks and then increase to 7.5 mg weekly for 4 weeks and then increase to 10 mg weekly           -- Reviewed goals of therapy are to get the best control we can without hypoglycemia.    -- Advised frequent self blood glucose monitoring.  Patient encouraged to document glucose results and bring them to every clinic visit.- daily at alternating times   -- Hypoglycemia precautions discussed. Instructed on precautions before driving.    -- Call for Bg repeatedly < 70 or > 180.   -- Close adherence to lifestyle changes recommended.   -- Periodic follow ups for eye evaluations, foot care and dental care suggested.      Regarding polyneuropathy:  Optimize BG readings.   See above.   Off Gabapentin       Educated patient to check feet daily for any foreign objects and/or wounds. Discussed with patient the importance of wearing appropriate footwear at all times, not to walk barefoot ever, and to check shoes before putting them on feet. Instructed patient to keep feet dry by regularly changing shoes and socks and drying feet after baths and exercises. Also, instructed patient to report any new lesions, discolorations, or swelling to a healthcare professional.

## 2024-11-08 NOTE — ASSESSMENT & PLAN NOTE
Body mass index is 33.36 kg/m².  Increases insulin resistance.   Discussed DM diet and exercise.   Encouraged continued weight loss

## 2024-11-08 NOTE — PROGRESS NOTES
CC:   Chief Complaint   Patient presents with    Diabetes Mellitus       HPI: Holly Mcclelland is a 64 y.o. female presents for a follow up visit today for the management of T2DM.     She was diagnosed with Type 2 diabetes around 5332-1131 on routine lab work. She was initially started on Metformin. She started insulin therapy in 2020.     Family hx of diabetes: mother, grandmother, aunts, uncles - strong family hx, sister who is blind and has amputations.   Hospitalized for diabetes: denies      No personal or FH of thyroid cancer or personal of pancreatic cancer or pancreatitis.     Works in retail       Our last visit was in April 2024   Since then she had insurance issues with medicaid -- loss it due to making too much money  She lost her insurance in May 2024 and was off her medications due to insurance issues   She had ozempic samples that she was using but now she is off of them too   She does not feel like her ozempic works   Now she has medicare --[employer]   She has been taking the Farxiga   She did her lab work today     We spent a long time discussing the Medicare coverage gaps  She is insisting on going back on the Mounjaro  She is aware that there is no pharmacy assistance program available right now for the Mounjaro    She believes that her job is going to close soon so she may be able to get Medicaid with her Medicare        DIABETES COMPLICATIONS: nephropathy, peripheral neuropathy, and cerebrovascular disease   Hx of TIA in 2019       Diabetes Management Status    ASA:  Yes - 81 mg ASA -on the plavix 75 mg daily     Statin: Taking Pravastatin 40 mg nightly with out issues   she stopped the Lipitor-- she reports that it was causing her nausea -- stopped the Crestor due to SE as well    She also tried Zetia     ACE/ARB: Taking-Lisinopril 20 mg daily     Screening or Prevention Patient's value Goal Complete/Controlled?   HgA1C Testing and Control   Lab Results   Component Value Date     HGBA1C 5.8 (H) 04/09/2024      Annually/Less than 8% No   Lipid profile : 11/08/2024 Annually Yes   LDL control Lab Results   Component Value Date    LDLCALC 63.8 11/08/2024    Annually/Less than 100 mg/dl  Yes   Nephropathy screening Lab Results   Component Value Date    LABMICR 7.0 10/05/2023     Lab Results   Component Value Date    PROTEINUA Negative 09/25/2022    Annually Yes   Blood pressure BP Readings from Last 1 Encounters:   11/08/24 126/78    Less than 140/90 No   Dilated retinal exam : 12/19/2022-external- Dr. Ball  Annually No   Foot exam   : 11/08/2024 Annually No       CURRENT A1C:    Hemoglobin A1C   Date Value Ref Range Status   04/09/2024 5.8 (H) 4.0 - 5.6 % Final     Comment:     ADA Screening Guidelines:  5.7-6.4%  Consistent with prediabetes  >or=6.5%  Consistent with diabetes    High levels of fetal hemoglobin interfere with the HbA1C  assay. Heterozygous hemoglobin variants (HbS, HgC, etc)do  not significantly interfere with this assay.   However, presence of multiple variants may affect accuracy.     10/05/2023 6.8 (H) 4.0 - 5.6 % Final     Comment:     ADA Screening Guidelines:  5.7-6.4%  Consistent with prediabetes  >or=6.5%  Consistent with diabetes    High levels of fetal hemoglobin interfere with the HbA1C  assay. Heterozygous hemoglobin variants (HbS, HgC, etc)do  not significantly interfere with this assay.   However, presence of multiple variants may affect accuracy.     05/30/2023 7.2 (H) 4.0 - 5.6 % Final     Comment:     ADA Screening Guidelines:  5.7-6.4%  Consistent with prediabetes  >or=6.5%  Consistent with diabetes    High levels of fetal hemoglobin interfere with the HbA1C  assay. Heterozygous hemoglobin variants (HbS, HgC, etc)do  not significantly interfere with this assay.   However, presence of multiple variants may affect accuracy.         GOAL A1C: 7% without hypoglycemia      DM MEDICATIONS USED IN THE PAST:  Metformin, Lantus, glyburide, glipizide, Januvia  Victoza-  throat closed   Trulicity   Farxiga   Ozempic -- not effective   Mounjaro         CURRENT DIABETES MEDICATIONS:  Farxiga 10 mg daily   Off ozempic and off mounjaro   Insulin: N/A   Missed doses:occ missing doses         BLOOD GLUCOSE MONITORING:  rarely checking her bg   Checked last night- 133         HYPOGLYCEMIA: denies   Carries mints         MEALS: eating 2 meals per day   Eating at odd hours due to work schedule   Staying away from rice and pasta   Eating a lot of candy lately   BF:  skips or leftovers or toast   Lunch: skips or trying to avoid fast food   Dinner: home cooked -- biggest meal    Snack: grapes and pineapple   Drinks: water   Green tea- it has sugar   occ juice         CURRENT EXERCISE:  None         Review of Systems  Review of Systems   Constitutional:  Negative for appetite change, fatigue and unexpected weight change.   HENT:  Negative for trouble swallowing.    Eyes:  Negative for visual disturbance.   Respiratory:  Negative for shortness of breath.         + sleep apnea- off CPAP machine -- she has it but has not been using it -- just sent her one    Cardiovascular:  Positive for leg swelling (ankle). Negative for chest pain.   Gastrointestinal:  Negative for nausea.   Endocrine: Negative for polydipsia, polyphagia and polyuria.   Genitourinary:         No Nocturia    Musculoskeletal:  Negative for neck pain.   Skin:  Negative for wound.   Neurological:  Negative for numbness.   Psychiatric/Behavioral:  Positive for sleep disturbance.        Physical Exam   Physical Exam  Vitals and nursing note reviewed.   Constitutional:       Appearance: She is well-developed. She is obese.   HENT:      Head: Normocephalic and atraumatic.      Right Ear: External ear normal.      Left Ear: External ear normal.      Nose: Nose normal.   Neck:      Thyroid: No thyromegaly.      Trachea: No tracheal deviation.   Cardiovascular:      Rate and Rhythm: Normal rate and regular rhythm.      Heart sounds: No  murmur heard.     Comments: Ankle edema   Pulmonary:      Effort: Pulmonary effort is normal. No respiratory distress.      Breath sounds: Normal breath sounds.   Abdominal:      Palpations: Abdomen is soft.      Tenderness: There is no abdominal tenderness.      Hernia: No hernia is present.   Musculoskeletal:      Cervical back: Normal range of motion and neck supple.      Right lower le+ Edema present.      Left lower le+ Edema present.   Skin:     General: Skin is warm and dry.      Capillary Refill: Capillary refill takes less than 2 seconds.      Comments: Injection sites are normal appearing. No lipo hypertropthy or atrophy     Neurological:      Mental Status: She is alert and oriented to person, place, and time.      Cranial Nerves: No cranial nerve deficit.   Psychiatric:         Behavior: Behavior normal.         Judgment: Judgment normal.         FOOT EXAMINATION: appropriate footwear       Protective Sensation (w/ 10 gram monofilament):  Right: Intact  Left: Intact    Visual Inspection:  Normal -  Bilateral and Nails Intact - without Evidence of Foot Deformity- Bilateral    Pedal Pulses:   Right: Present  Left: Present    Posterior Tibialis Pulses:   Right:Present  Left: Present          Lab Results   Component Value Date    TSH 0.709 2024           Type 2 diabetes mellitus with diabetic polyneuropathy, without long-term current use of insulin  A1c is pending  Sounds like she has been off of GLP1 for a couple of months  She says that she never stopped taking the Farxiga though?  Had insurance issues  She wants to get back on the Mounjaro  She has [employer] Medicare  We discussed the coverage gap      -- Medication Changes:   Remember about the coverage gap that we discussed today   If the cost gets high -- let us know and we can apply for pharmacy assistance     Continue the Farxiga 10 mg daily     Get back on the mounjaro   We have to start low and work our way up   Start with  Mounjaro 5 mg weekly x 4 weeks and then increase to 7.5 mg weekly for 4 weeks and then increase to 10 mg weekly           -- Reviewed goals of therapy are to get the best control we can without hypoglycemia.    -- Advised frequent self blood glucose monitoring.  Patient encouraged to document glucose results and bring them to every clinic visit.- daily at alternating times   -- Hypoglycemia precautions discussed. Instructed on precautions before driving.    -- Call for Bg repeatedly < 70 or > 180.   -- Close adherence to lifestyle changes recommended.   -- Periodic follow ups for eye evaluations, foot care and dental care suggested.      Regarding polyneuropathy:  Optimize BG readings.   See above.   Off Gabapentin       Educated patient to check feet daily for any foreign objects and/or wounds. Discussed with patient the importance of wearing appropriate footwear at all times, not to walk barefoot ever, and to check shoes before putting them on feet. Instructed patient to keep feet dry by regularly changing shoes and socks and drying feet after baths and exercises. Also, instructed patient to report any new lesions, discolorations, or swelling to a healthcare professional.        Type 2 diabetes mellitus with stage 3a chronic kidney disease, without long-term current use of insulin  On Farxiga for renal protection  Optimize BG readings.   See above.   Avoid insulin stacking and hypoglycemia  On lisinopril and Farxiga    Essential hypertension  BP goal is < 140/90.   Tolerating ACEi  Blood pressure goals discussed with patient  Controlled     Class 1 obesity due to excess calories with serious comorbidity and body mass index (BMI) of 33.0 to 33.9 in adult  Body mass index is 33.36 kg/m².  Increases insulin resistance.   Discussed DM diet and exercise.   Encouraged continued weight loss    TIA (transient ischemic attack)  Optimize BG readings.   See above.       Dyslipidemia, goal LDL below 70  LDL goal is <70--given  history of TIA  She stopped the Lipitor due to nausea  She stopped the Crestor due to intolerance   She also tried Zetia and was unable to tolerate  Now on pravastatin 40 mg nightly and tolerating without any side effects  LDL at goal   Continue Pravastatin     Vitamin D deficiency  No vitamins at this time   Vit D level WNL   Recommend general multivitamin     Vit D, 25-Hydroxy   Date Value Ref Range Status   04/09/2024 41 30 - 96 ng/mL Final     Comment:     Vitamin D deficiency.........<10 ng/mL                              Vitamin D insufficiency......10-29 ng/mL       Vitamin D sufficiency........> or equal to 30 ng/mL  Vitamin D toxicity............>100 ng/mL                 I spent a total of 30 minutes on the day of the visit.This includes face to face time and non-face to face time preparing to see the patient (eg, review of tests), obtaining and/or reviewing separately obtained history, documenting clinical information in the electronic or other health record, independently interpreting results and communicating results to the patient/family/caregiver, or care coordinator.          Follow up in about 3 months (around 2/8/2025).  Follow up with me in 3 months with labs prior         Orders Placed This Encounter   Procedures    Hemoglobin A1C     Standing Status:   Future     Standing Expiration Date:   5/8/2026    Basic Metabolic Panel     Standing Status:   Future     Standing Expiration Date:   5/8/2026         Recommendations were discussed with the patient in detail  The patient verbalized understanding and agrees with the plan outlined as above.     This note was partly generated with [employer] voice recognition software. I apologize for any possible typographical errors.

## 2024-12-02 DIAGNOSIS — N18.31 TYPE 2 DIABETES MELLITUS WITH STAGE 3A CHRONIC KIDNEY DISEASE, WITHOUT LONG-TERM CURRENT USE OF INSULIN: ICD-10-CM

## 2024-12-02 DIAGNOSIS — E11.42 TYPE 2 DIABETES MELLITUS WITH DIABETIC POLYNEUROPATHY, WITHOUT LONG-TERM CURRENT USE OF INSULIN: ICD-10-CM

## 2024-12-02 DIAGNOSIS — E11.22 TYPE 2 DIABETES MELLITUS WITH STAGE 3A CHRONIC KIDNEY DISEASE, WITHOUT LONG-TERM CURRENT USE OF INSULIN: ICD-10-CM

## 2024-12-02 RX ORDER — TIRZEPATIDE 5 MG/.5ML
INJECTION, SOLUTION SUBCUTANEOUS
Qty: 4 ML | Refills: 0 | OUTPATIENT
Start: 2024-12-02

## 2024-12-02 RX ORDER — TIRZEPATIDE 7.5 MG/.5ML
7.5 INJECTION, SOLUTION SUBCUTANEOUS
Qty: 4 PEN | Refills: 0 | Status: SHIPPED | OUTPATIENT
Start: 2024-12-02

## 2025-01-15 ENCOUNTER — TELEPHONE (OUTPATIENT)
Dept: DIABETES | Facility: CLINIC | Age: 65
End: 2025-01-15
Payer: MEDICARE

## 2025-01-22 DIAGNOSIS — N18.31 TYPE 2 DIABETES MELLITUS WITH STAGE 3A CHRONIC KIDNEY DISEASE, WITHOUT LONG-TERM CURRENT USE OF INSULIN: ICD-10-CM

## 2025-01-22 DIAGNOSIS — E11.42 TYPE 2 DIABETES MELLITUS WITH DIABETIC POLYNEUROPATHY, WITHOUT LONG-TERM CURRENT USE OF INSULIN: ICD-10-CM

## 2025-01-22 DIAGNOSIS — E11.22 TYPE 2 DIABETES MELLITUS WITH STAGE 3A CHRONIC KIDNEY DISEASE, WITHOUT LONG-TERM CURRENT USE OF INSULIN: ICD-10-CM

## 2025-01-22 RX ORDER — DAPAGLIFLOZIN 10 MG/1
10 TABLET, FILM COATED ORAL DAILY
Qty: 30 TABLET | Refills: 6 | Status: SHIPPED | OUTPATIENT
Start: 2025-01-22

## 2025-01-22 RX ORDER — TIRZEPATIDE 10 MG/.5ML
10 INJECTION, SOLUTION SUBCUTANEOUS
Qty: 4 PEN | Refills: 6 | Status: SHIPPED | OUTPATIENT
Start: 2025-01-22 | End: 2025-01-28

## 2025-01-22 RX ORDER — TIRZEPATIDE 10 MG/.5ML
INJECTION, SOLUTION SUBCUTANEOUS
OUTPATIENT
Start: 2025-01-22

## 2025-01-27 ENCOUNTER — TELEPHONE (OUTPATIENT)
Dept: DIABETES | Facility: CLINIC | Age: 65
End: 2025-01-27
Payer: MEDICARE

## 2025-01-27 NOTE — TELEPHONE ENCOUNTER
Patient is calling for Medical Advice regarding: Pt is calling stating the medication ( tirzepatide (MOUNJARO) 10 mg/0.5 mL PnIj) cost $279 and some change with her insurance. She can't afford that. However her insurance would cover the medication ( TRULICITY) & only pay $48. So she would like to get back on the TRULICITY. Now if she can't pt is asking for the strength amount to be knock down from 10 mg to 7.5 mg. Where its much cheaper and she can afford that.     Pharmacy name and phone#:   [facility] [id number]  [place] [facility] [address]   [address]   [place] [address]   Phone: [phone] Fax: [phone]      Message sent to provider

## 2025-01-28 ENCOUNTER — TELEPHONE (OUTPATIENT)
Dept: DIABETES | Facility: CLINIC | Age: 65
End: 2025-01-28
Payer: MEDICARE

## 2025-01-28 DIAGNOSIS — E11.42 TYPE 2 DIABETES MELLITUS WITH DIABETIC POLYNEUROPATHY, WITHOUT LONG-TERM CURRENT USE OF INSULIN: Primary | ICD-10-CM

## 2025-01-28 RX ORDER — TIRZEPATIDE 7.5 MG/.5ML
7.5 INJECTION, SOLUTION SUBCUTANEOUS
Qty: 4 PEN | Refills: 6 | Status: SHIPPED | OUTPATIENT
Start: 2025-01-28

## 2025-01-28 NOTE — TELEPHONE ENCOUNTER
Notified pt that mounjaro 7.5 mg was sent to pharmacy and that it was 47 dollars monthly pt stated she can afford medication

## 2025-01-28 NOTE — TELEPHONE ENCOUNTER
----- Message from Nurse Hallman sent at 1/27/2025  4:15 PM CST -----    Patient is calling for Medical Advice regarding: Pt is calling stating the medication ( tirzepatide (MOUNJARO) 10 mg/0.5 mL PnIj) cost $279 and some change with her insurance. She can't afford that. However her insurance would cover the medication ( TRULICITY) & only pay $48. So she would like to get back on the TRULICITY. Now if she can't pt is asking for the strength amount to be knock down from 10 mg to 7.5 mg. Where its much cheaper and she can afford that.     Pharmacy name and phone#:   [facility] [id number] [facility] [place] [address]   [id number]   [place] [address]   Phone: [phone] Fax: [phone]        Comments: pt states she did get the ( dapagliflozin propanediol (FARXIGA) 10 mg tablet). She is taking that.

## 2025-02-01 DIAGNOSIS — N39.41 URGE INCONTINENCE OF URINE: ICD-10-CM

## 2025-02-02 NOTE — TELEPHONE ENCOUNTER
Refill Routing Note   Medication(s) are not appropriate for processing by [facility] for the following reason(s):        Patient not seen by provider within 15 months    [facility] action(s):  Defer               Appointments  past 12m or future 3m with PCP    Date Provider   Last Visit   7/6/2023 Nella Marte MD   Next Visit   Visit date not found Nella Marte MD   ED visits in past 90 days: 0        Note composed:11:05 AM 02/02/2025

## 2025-02-12 RX ORDER — OXYBUTYNIN CHLORIDE 5 MG/1
5 TABLET, EXTENDED RELEASE ORAL
Qty: 90 TABLET | Refills: 0 | Status: SHIPPED | OUTPATIENT
Start: 2025-02-12

## 2025-02-14 ENCOUNTER — OFFICE VISIT (OUTPATIENT)
Dept: DIABETES | Facility: CLINIC | Age: 65
End: 2025-02-14
Payer: MEDICARE

## 2025-02-14 VITALS
HEART RATE: 62 BPM | OXYGEN SATURATION: 97 % | WEIGHT: 206.69 LBS | DIASTOLIC BLOOD PRESSURE: 68 MMHG | BODY MASS INDEX: 32.37 KG/M2 | SYSTOLIC BLOOD PRESSURE: 124 MMHG

## 2025-02-14 DIAGNOSIS — R52 PAIN: ICD-10-CM

## 2025-02-14 DIAGNOSIS — E66.09 CLASS 1 OBESITY DUE TO EXCESS CALORIES WITH SERIOUS COMORBIDITY AND BODY MASS INDEX (BMI) OF 32.0 TO 32.9 IN ADULT: ICD-10-CM

## 2025-02-14 DIAGNOSIS — E55.9 VITAMIN D DEFICIENCY: ICD-10-CM

## 2025-02-14 DIAGNOSIS — Z71.9 HEALTH EDUCATION/COUNSELING: ICD-10-CM

## 2025-02-14 DIAGNOSIS — E11.22 TYPE 2 DIABETES MELLITUS WITH STAGE 3A CHRONIC KIDNEY DISEASE, WITHOUT LONG-TERM CURRENT USE OF INSULIN: ICD-10-CM

## 2025-02-14 DIAGNOSIS — N18.31 TYPE 2 DIABETES MELLITUS WITH STAGE 3A CHRONIC KIDNEY DISEASE, WITHOUT LONG-TERM CURRENT USE OF INSULIN: ICD-10-CM

## 2025-02-14 DIAGNOSIS — G45.9 TIA (TRANSIENT ISCHEMIC ATTACK): ICD-10-CM

## 2025-02-14 DIAGNOSIS — E78.5 DYSLIPIDEMIA, GOAL LDL BELOW 70: ICD-10-CM

## 2025-02-14 DIAGNOSIS — E66.811 CLASS 1 OBESITY DUE TO EXCESS CALORIES WITH SERIOUS COMORBIDITY AND BODY MASS INDEX (BMI) OF 32.0 TO 32.9 IN ADULT: ICD-10-CM

## 2025-02-14 DIAGNOSIS — E11.42 TYPE 2 DIABETES MELLITUS WITH DIABETIC POLYNEUROPATHY, WITHOUT LONG-TERM CURRENT USE OF INSULIN: Primary | ICD-10-CM

## 2025-02-14 DIAGNOSIS — Z59.9 FINANCIAL DIFFICULTIES: ICD-10-CM

## 2025-02-14 DIAGNOSIS — I10 ESSENTIAL HYPERTENSION: ICD-10-CM

## 2025-02-14 DIAGNOSIS — M72.2 PLANTAR FASCIITIS OF RIGHT FOOT: ICD-10-CM

## 2025-02-14 PROCEDURE — 99999 PR PBB SHADOW E&M-EST. PATIENT-LVL III: CPT | Mod: PBBFAC,,, | Performed by: NURSE PRACTITIONER

## 2025-02-14 RX ORDER — FLUCONAZOLE 150 MG/1
150 TABLET ORAL
Qty: 2 TABLET | Refills: 3 | Status: SHIPPED | OUTPATIENT
Start: 2025-02-14

## 2025-02-14 RX ORDER — LISINOPRIL AND HYDROCHLOROTHIAZIDE 12.5; 2 MG/1; MG/1
1 TABLET ORAL DAILY
Qty: 90 TABLET | Refills: 2 | Status: SHIPPED | OUTPATIENT
Start: 2025-02-14 | End: 2026-02-14

## 2025-02-14 RX ORDER — DAPAGLIFLOZIN 10 MG/1
10 TABLET, FILM COATED ORAL DAILY
Qty: 30 TABLET | Refills: 6 | Status: SHIPPED | OUTPATIENT
Start: 2025-02-14

## 2025-02-14 RX ORDER — PRAVASTATIN SODIUM 40 MG/1
40 TABLET ORAL NIGHTLY
Qty: 90 TABLET | Refills: 2 | Status: SHIPPED | OUTPATIENT
Start: 2025-02-14

## 2025-02-14 RX ORDER — LANCETS 33 GAUGE
1 EACH MISCELLANEOUS 2 TIMES DAILY
Qty: 200 EACH | Refills: 3 | Status: SHIPPED | OUTPATIENT
Start: 2025-02-14

## 2025-02-14 RX ORDER — HYDRALAZINE HYDROCHLORIDE 25 MG/1
25 TABLET, FILM COATED ORAL EVERY 12 HOURS
Qty: 180 TABLET | Refills: 2 | Status: SHIPPED | OUTPATIENT
Start: 2025-02-14

## 2025-02-14 RX ORDER — MELOXICAM 7.5 MG/1
7.5 TABLET ORAL DAILY PRN
Qty: 20 TABLET | Refills: 0 | Status: SHIPPED | OUTPATIENT
Start: 2025-02-14

## 2025-02-14 RX ORDER — BLOOD SUGAR DIAGNOSTIC
1 STRIP MISCELLANEOUS 2 TIMES DAILY
Qty: 200 EACH | Refills: 3 | Status: SHIPPED | OUTPATIENT
Start: 2025-02-14

## 2025-02-14 RX ORDER — TIRZEPATIDE 7.5 MG/.5ML
7.5 INJECTION, SOLUTION SUBCUTANEOUS
Qty: 4 PEN | Refills: 6 | Status: SHIPPED | OUTPATIENT
Start: 2025-02-14

## 2025-02-14 SDOH — SOCIAL DETERMINANTS OF HEALTH (SDOH): PROBLEM RELATED TO HOUSING AND ECONOMIC CIRCUMSTANCES, UNSPECIFIED: Z59.9

## 2025-02-14 NOTE — ASSESSMENT & PLAN NOTE
A1c well controlled   Says she is ok with affording the Farxiga and Mounjaro right now   She has [employer]  We discussed the coverage gap  Reports the cost is high on the Mounjaro at the 10 mg weekly dose BUT NOT THE 7.5 MG weekly DOSE         -- Medication Changes:   If the cost gets high -- let us know and we can apply for pharmacy assistance     Continue the Farxiga 10 mg daily     Continue Mounjaro 7.5 mg weekly         -- Reviewed goals of therapy are to get the best control we can without hypoglycemia.    -- Advised frequent self blood glucose monitoring.  Patient encouraged to document glucose results and bring them to every clinic visit.- daily at alternating times   -- Hypoglycemia precautions discussed. Instructed on precautions before driving.    -- Call for Bg repeatedly < 70 or > 180.   -- Close adherence to lifestyle changes recommended.   -- Periodic follow ups for eye evaluations, foot care and dental care suggested.      Regarding polyneuropathy:  Optimize BG readings.   See above.   Off Gabapentin       Educated patient to check feet daily for any foreign objects and/or wounds. Discussed with patient the importance of wearing appropriate footwear at all times, not to walk barefoot ever, and to check shoes before putting them on feet. Instructed patient to keep feet dry by regularly changing shoes and socks and drying feet after baths and exercises. Also, instructed patient to report any new lesions, discolorations, or swelling to a healthcare professional.

## 2025-02-14 NOTE — PROGRESS NOTES
CC:   Chief Complaint   Patient presents with    Diabetes Mellitus       HPI: Holly Mcclelland is a 64 y.o. female presents for a follow up visit today for the management of T2DM.     She was diagnosed with Type 2 diabetes around 5294-8093 on routine lab work. She was initially started on Metformin. She started insulin therapy in 2020.     Family hx of diabetes: mother, grandmother, aunts, uncles - strong family hx, sister who is blind and has amputations.   Hospitalized for diabetes: denies      No personal or FH of thyroid cancer or personal of pancreatic cancer or pancreatitis.     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   Asking for pain medications   Not using the voltaren gel -- she has it but not using it   Has tried Ibuprofen and Aleve -- didn't help at all     Wants a new PCP       DIABETES COMPLICATIONS: nephropathy, peripheral neuropathy, and cerebrovascular disease   Hx of TIA in 2019         Diabetes Management Status    ASA:  Yes - 81 mg ASA -on the plavix 75 mg daily     Statin: Taking Pravastatin 40 mg nightly with out issues   she stopped the Lipitor-- she reports that it was causing her nausea -- stopped the Crestor due to SE as well    She also tried Zetia     ACE/ARB: Taking-Lisinopril 20 mg daily     Screening or Prevention Patient's value Goal Complete/Controlled?   HgA1C Testing and Control   Lab Results   Component Value Date    HGBA1C 6.3 (H) 02/07/2025      Annually/Less than 8% No   Lipid profile : 11/08/2024 Annually Yes   LDL control Lab Results   Component Value Date    LDLCALC 63.8 11/08/2024    Annually/Less than 100 mg/dl  Yes   Nephropathy screening Lab Results   Component Value  Date    LABMICR <5.0 11/08/2024     Lab Results   Component Value Date    PROTEINUA Negative 09/25/2022    Annually Yes   Blood pressure BP Readings from Last 1 Encounters:   02/14/25 124/68    Less than 140/90 No   Dilated retinal exam : 12/19/2022-external- Dr. Ball  Annually No   Foot exam   : 11/08/2024 Annually No       CURRENT A1C:    Hemoglobin A1C   Date Value Ref Range Status   02/07/2025 6.3 (H) 4.0 - 5.6 % Final     Comment:     ADA Screening Guidelines:  5.7-6.4%  Consistent with prediabetes  >or=6.5%  Consistent with diabetes    High levels of fetal hemoglobin interfere with the HbA1C  assay. Heterozygous hemoglobin variants (HbS, HgC, etc)do  not significantly interfere with this assay.   However, presence of multiple variants may affect accuracy.     11/08/2024 6.6 (H) 4.0 - 5.6 % Final     Comment:     ADA Screening Guidelines:  5.7-6.4%  Consistent with prediabetes  >or=6.5%  Consistent with diabetes    High levels of fetal hemoglobin interfere with the HbA1C  assay. Heterozygous hemoglobin variants (HbS, HgC, etc)do  not significantly interfere with this assay.   However, presence of multiple variants may affect accuracy.     04/09/2024 5.8 (H) 4.0 - 5.6 % Final     Comment:     ADA Screening Guidelines:  5.7-6.4%  Consistent with prediabetes  >or=6.5%  Consistent with diabetes    High levels of fetal hemoglobin interfere with the HbA1C  assay. Heterozygous hemoglobin variants (HbS, HgC, etc)do  not significantly interfere with this assay.   However, presence of multiple variants may affect accuracy.         GOAL A1C: 7% without hypoglycemia      DM MEDICATIONS USED IN THE PAST:  Metformin, Lantus, glyburide, glipizide, Januvia  Victoza- throat closed   Trulicity   Farxiga   Ozempic -- not effective   Mounjaro         CURRENT DIABETES MEDICATIONS:  Farxiga 10 mg daily   Mounjaro 7.5 mg weekly   Insulin: N/A   Missed doses:occ missing doses         BLOOD GLUCOSE MONITORING: not consistently  monitoring her BG   Usually less than 110       HYPOGLYCEMIA: denies   Carries mints         MEALS: eating 2 meals per day   Eating at odd hours due to work schedule   Staying away from rice and pasta   Eating a lot of candy lately   BF:  skips or leftovers or toast   Lunch: skips or trying to avoid fast food   Dinner: home cooked -- biggest meal    Snack: grapes and pineapple   Drinks: water   Green tea- it has sugar   occ juice         CURRENT EXERCISE:  None         Review of Systems  Review of Systems   Constitutional:  Negative for appetite change, fatigue and unexpected weight change.   HENT:  Negative for trouble swallowing.    Eyes:  Negative for visual disturbance.   Respiratory:  Negative for shortness of breath.         + sleep apnea- off CPAP machine -- she has it but has not been using it -- just sent her one    Cardiovascular:  Negative for chest pain and leg swelling (no swelling today).   Gastrointestinal:  Negative for nausea.   Endocrine: Negative for polydipsia, polyphagia and polyuria.   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   Skin:  Negative for wound.   Neurological:  Negative for numbness.   Psychiatric/Behavioral:  Positive for sleep disturbance. The patient is nervous/anxious.        Physical Exam   Physical Exam  Vitals and nursing note reviewed.   Constitutional:       Appearance: She is well-developed. She is obese.   HENT:      Head: Normocephalic and atraumatic.      Right Ear: External ear normal.      Left Ear: External ear normal.      Nose: Nose normal.   Neck:      Thyroid: No thyromegaly.      Trachea: No tracheal deviation.   Cardiovascular:      Rate and Rhythm: Normal rate and regular rhythm.      Heart sounds: No murmur heard.  Pulmonary:      Effort: Pulmonary effort is normal. No respiratory distress.      Breath sounds: Normal  breath sounds.   Abdominal:      Palpations: Abdomen is soft.      Tenderness: There is no abdominal tenderness.      Hernia: No hernia is present.   Musculoskeletal:      Cervical back: Normal range of motion and neck supple.      Right lower leg: No edema.      Left lower leg: No edema.   Skin:     General: Skin is warm and dry.      Capillary Refill: Capillary refill takes less than 2 seconds.      Comments: Injection sites are normal appearing. No lipo hypertropthy or atrophy     Neurological:      Mental Status: She is alert and oriented to person, place, and time.      Cranial Nerves: No cranial nerve deficit.   Psychiatric:         Behavior: Behavior normal.         Judgment: Judgment normal.         FOOT EXAMINATION: appropriate footwear           Lab Results   Component Value Date    TSH 0.709 04/09/2024           Type 2 diabetes mellitus with diabetic polyneuropathy, without long-term current use of insulin  A1c well controlled   Says she is ok with affording the Farxiga and Mounjaro right now   She has [employer] Medicare  We discussed the coverage gap  Reports the cost is high on the Mounjaro at the 10 mg weekly dose BUT NOT THE 7.5 MG weekly DOSE         -- Medication Changes:   If the cost gets high -- let us know and we can apply for pharmacy assistance     Continue the Farxiga 10 mg daily     Continue Mounjaro 7.5 mg weekly         -- Reviewed goals of therapy are to get the best control we can without hypoglycemia.    -- Advised frequent self blood glucose monitoring.  Patient encouraged to document glucose results and bring them to every clinic visit.- daily at alternating times   -- Hypoglycemia precautions discussed. Instructed on precautions before driving.    -- Call for Bg repeatedly < 70 or > 180.   -- Close adherence to lifestyle changes recommended.   -- Periodic follow ups for eye evaluations, foot care and dental care suggested.      Regarding polyneuropathy:  Optimize BG readings.    See above.   Off Gabapentin       Educated patient to check feet daily for any foreign objects and/or wounds. Discussed with patient the importance of wearing appropriate footwear at all times, not to walk barefoot ever, and to check shoes before putting them on feet. Instructed patient to keep feet dry by regularly changing shoes and socks and drying feet after baths and exercises. Also, instructed patient to report any new lesions, discolorations, or swelling to a healthcare professional.        Type 2 diabetes mellitus with stage 3a chronic kidney disease, without long-term current use of insulin  On Farxiga for renal protection  Optimize BG readings.   See above.   Avoid insulin stacking and hypoglycemia  On lisinopril and Farxiga    Essential hypertension  BP goal is < 140/90.   Tolerating ACEi  Blood pressure goals discussed with patient  Controlled     Class 1 obesity due to excess calories with serious comorbidity and body mass index (BMI) of 32.0 to 32.9 in adult  Body mass index is 32.37 kg/m².  Increases insulin resistance.   Discussed DM diet and exercise.   Encouraged continued weight loss    TIA (transient ischemic attack)  Optimize BG readings.   See above.       Dyslipidemia, goal LDL below 70  LDL goal is <70--given history of TIA  She stopped the Lipitor due to nausea  She stopped the Crestor due to intolerance   She also tried Zetia and was unable to tolerate  Now on pravastatin 40 mg nightly and tolerating without any side effects  LDL at goal   Continue Pravastatin     Vitamin D deficiency  No vitamins at this time   Vit D level WNL   Recommend general multivitamin     Vit D, 25-Hydroxy   Date Value Ref Range Status   04/09/2024 41 30 - 96 ng/mL Final     Comment:     Vitamin D deficiency.........<10 ng/mL                              Vitamin D insufficiency......10-29 ng/mL       Vitamin D sufficiency........> or equal to 30 ng/mL  Vitamin D toxicity............>100 ng/mL                Regarding pain- I gave her 20 Mobic pills   Use sparingly  Take with food   Watch renal function    Recommend the Voltaren gel       She wants to establish with a new PCP         I spent a total of 30 minutes on the day of the visit.This includes face to face time and non-face to face time preparing to see the patient (eg, review of tests), obtaining and/or reviewing separately obtained history, documenting clinical information in the electronic or other health record, independently interpreting results and communicating results to the patient/family/caregiver, or care coordinator.          Follow up in about 6 months (around 8/14/2025).  1. Schedule with Dr. Olmos-- or someone in the office- establish care -- wants a new PCP   2. Follow up with me in 6 months with labs prior       Orders Placed This Encounter   Procedures    CBC Auto Differential     Standing Status:   Future     Standing Expiration Date:   8/14/2026    Comprehensive Metabolic Panel     Standing Status:   Future     Standing Expiration Date:   8/14/2026    Hemoglobin A1C     Standing Status:   Future     Standing Expiration Date:   8/14/2026    Lipid Panel     Standing Status:   Future     Standing Expiration Date:   8/14/2026    Microalbumin/Creatinine Ratio, Urine     Standing Status:   Future     Standing Expiration Date:   8/14/2026     Order Specific Question:   Specimen Source     Answer:   Urine    TSH     Standing Status:   Future     Standing Expiration Date:   8/14/2026    Vitamin D     Standing Status:   Future     Standing Expiration Date:   8/14/2026         Recommendations were discussed with the patient in detail  The patient verbalized understanding and agrees with the plan outlined as above.     This note was partly generated with [employer] voice recognition software. I apologize for any possible typographical errors.

## 2025-02-14 NOTE — PATIENT INSTRUCTIONS
-- Medication Changes:   If the cost gets high -- let us know and we can apply for pharmacy assistance     Continue the Farxiga 10 mg daily     Continue Mounjaro 7.5 mg weekly

## 2025-02-14 NOTE — ASSESSMENT & PLAN NOTE
Body mass index is 32.37 kg/m².  Increases insulin resistance.   Discussed DM diet and exercise.   Encouraged continued weight loss

## 2025-02-18 ENCOUNTER — OFFICE VISIT (OUTPATIENT)
Dept: PODIATRY | Facility: CLINIC | Age: 65
End: 2025-02-18
Payer: MEDICARE

## 2025-02-18 VITALS
HEIGHT: 67 IN | DIASTOLIC BLOOD PRESSURE: 83 MMHG | BODY MASS INDEX: 32.6 KG/M2 | WEIGHT: 207.69 LBS | HEART RATE: 84 BPM | SYSTOLIC BLOOD PRESSURE: 144 MMHG

## 2025-02-18 DIAGNOSIS — M25.571 PAIN AND SWELLING OF RIGHT ANKLE: ICD-10-CM

## 2025-02-18 DIAGNOSIS — M25.471 PAIN AND SWELLING OF RIGHT ANKLE: ICD-10-CM

## 2025-02-18 DIAGNOSIS — M72.2 PLANTAR FASCIITIS OF RIGHT FOOT: ICD-10-CM

## 2025-02-18 DIAGNOSIS — E11.42 TYPE 2 DIABETES MELLITUS WITH DIABETIC POLYNEUROPATHY, WITHOUT LONG-TERM CURRENT USE OF INSULIN: ICD-10-CM

## 2025-02-18 DIAGNOSIS — M76.821 POSTERIOR TIBIAL TENDONITIS, RIGHT: Primary | ICD-10-CM

## 2025-02-18 PROCEDURE — 99999 PR PBB SHADOW E&M-EST. PATIENT-LVL III: CPT | Mod: PBBFAC,,, | Performed by: PODIATRIST

## 2025-02-18 RX ORDER — DICLOFENAC SODIUM 10 MG/G
0.5 GEL TOPICAL 4 TIMES DAILY
Qty: 450 G | Refills: 2 | Status: SHIPPED | OUTPATIENT
Start: 2025-02-18

## 2025-02-18 NOTE — PROGRESS NOTES
Subjective:      Patient ID: Holly Mcclelland is a 64 y.o. female.    Chief Complaint: Ankle Pain (Right )    Patient presents after an absence of several months.  Has shooting pain ankle & inside of arch especially end of day; pain level 10/10. Current pain level 4/10. 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 Pain for 2 months. Has an elastic strap & compression stockings as well as previous arch pads in other shoes but no relief.  Tried using Voltaren gel.   Previous H/0 plantar fasciitis R.  5/27/24  Patient is here after an absence of a yr. Previously seen for ganglion cyst dorsal L midfoot w/ associated arthropathy.  Now noticed a 'big vein' indicating arch B/L w/out pain. Started wearing aqua shoes about a month ago.   Big toes hurt in work shoes (has arch pads in them) & indicates distal medial but denies IGTN pain. Nails are long & need trimming as thick & hurt in shoes - usually goes for pedicures but it has been some time.   9/26/22  Holly is a 64 y.o. female who presents for 1 month f/u of pain L foot x 2 days & worsening dorsal - lump. States medication helped w/ symptomatology as did pads, so the lump has decreased and she is barely getting any pain. Given gelstrap & to use Voltaren gel. Not using sandals/slippers.     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     DM mgmt: Em Diamond NP 2/14/25   PCP: Lasha Braga 3/1/23.  05/17/2024 refills    Shoe gear: stretch casual    Dx DM 2015  Past Medical History:   Diagnosis Date    CHF (congestive heart failure)     Diabetes mellitus     Heart murmur     Hyperlipidemia     Hypertension     Sleep apnea     no cpap since weight loss    Stroke     remote hx TIA; no residual     Patient Active Problem List   Diagnosis    TIA (transient ischemic attack)    GERD (gastroesophageal reflux disease)    Class 1 obesity due to excess calories with serious  comorbidity and body mass index (BMI) of 32.0 to 32.9 in adult    Onychogryphosis    Onychocryptosis    Onychomycosis due to dermatophyte    Type 2 diabetes mellitus with diabetic polyneuropathy, without long-term current use of insulin    Essential hypertension    Dyslipidemia, goal LDL below 70    Hypertensive heart disease with chronic diastolic congestive heart failure    Palpitations    Heart murmur    Right leg weakness    History of cerebrovascular accident (CVA) greater than eight weeks in the past    Obstructive sleep apnea    Spinal stenosis of cervical region    S/P cervical spinal fusion    Neck pain    Lumbar radiculopathy    Orthostatic hypotension    Vitamin D deficiency    Intramural and subserous leiomyoma of uterus    Type 2 diabetes mellitus with stage 3a chronic kidney disease, without long-term current use of insulin    Post-menopausal bleeding    sp TLH/BSO on 4/12/23 for PMB at age 62    Chest pain of uncertain etiology    Hyperlipidemia    Costochondritis, acute    Unsteady gait    Vitamin B12 deficiency    Osteoarthritis of lumbar spine without myelopathy or radiculopathy      Hemoglobin A1C   Date Value Ref Range Status   02/07/2025 6.3 (H) 4.0 - 5.6 % Final     Comment:     ADA Screening Guidelines:  5.7-6.4%  Consistent with prediabetes  >or=6.5%  Consistent with diabetes    High levels of fetal hemoglobin interfere with the HbA1C  assay. Heterozygous hemoglobin variants (HbS, HgC, etc)do  not significantly interfere with this assay.   However, presence of multiple variants may affect accuracy.     11/08/2024 6.6 (H) 4.0 - 5.6 % Final     Comment:     ADA Screening Guidelines:  5.7-6.4%  Consistent with prediabetes  >or=6.5%  Consistent with diabetes    High levels of fetal hemoglobin interfere with the HbA1C  assay. Heterozygous hemoglobin variants (HbS, HgC, etc)do  not significantly interfere with this assay.   However, presence of multiple variants may affect accuracy.     04/09/2024  5.8 (H) 4.0 - 5.6 % Final     Comment:     ADA Screening Guidelines:  5.7-6.4%  Consistent with prediabetes  >or=6.5%  Consistent with diabetes    High levels of fetal hemoglobin interfere with the HbA1C  assay. Heterozygous hemoglobin variants (HbS, HgC, etc)do  not significantly interfere with this assay.   However, presence of multiple variants may affect accuracy.        Objective:      Physical Exam  Vitals reviewed.   Constitutional:       Appearance: She is well-developed. She is obese.   Cardiovascular:      Pulses:           Dorsalis pedis pulses are 2+ on the right side and 2+ on the left side.   Musculoskeletal:      Right lower leg: No tenderness. Edema present.      Left lower leg: No tenderness. Edema present.      Right foot: Deformity (PTTD) present.      Left foot: Deformity (Flexible low MLA w/ valgus on axial loading B/L) present.        Feet:       Comments: No reproducible discomfort nor any tenderness to the calf bilaterally.   Feet:      Right foot:      Skin integrity: Warmth, callus (diffuse WB hyperkeratosis B/L ) and dry skin present. No fissure.      Toenail Condition: Right toenails are ingrown. Fungal disease present.     Left foot:      Skin integrity: Callus and dry skin present. No fissure.      Toenail Condition: Left toenails are ingrown. Fungal disease present.     Comments: Palpable prominence dorsal midfoot L. No residual fluctuance in area of cyst.    MS strength of extrinsics to foot & ankle B/L + 5/5 in DF/PF/Inv/Ev to resistance w/ no reproduction of pain in any direction.   No TTP along insertion of fascia into the calcaneus R; TTP along PTT distal tip of medial malleolus extending to navicular insertion & into MLA.     Passive ROM of ankle & pedal joints is painless & w/out crepitation B/L.    Nails are cryptotic 1st through 5th B/L w/ dystrophic, thickened, discolored toenails including 2nd R>L, sparing 3rd & 4th B/L.  Skin:     General: Skin is warm and dry.       Capillary Refill: Capillary refill takes 2 to 3 seconds.      Findings: No bruising, ecchymosis or erythema.      Nails: There is no clubbing.      Comments: WB diffuse hyperkeratosis B/L w/out fissuring.   Neurological:      Mental Status: She is alert and oriented to person, place, and time.      Sensory: Sensation is intact.      Motor: Motor function is intact. No weakness or abnormal muscle tone.      Gait: Gait is intact.      Comments: Paresthesias including tingling & weakness RLE w/ no clearly identified trigger or source; no hyperemia (likely lumbar radiculopathy)   Psychiatric:         Mood and Affect: Mood and affect normal.         Behavior: Behavior normal. Behavior is cooperative.        Assessment:      Encounter Diagnoses   Name Primary?    Plantar fasciitis of right foot     Posterior tibial tendonitis, right Yes    Type 2 diabetes mellitus with diabetic polyneuropathy, without long-term current use of insulin     Pain and swelling of right ankle      Problem List Items Addressed This Visit          Endocrine    Type 2 diabetes mellitus with diabetic polyneuropathy, without long-term current use of insulin     Other Visit Diagnoses         Posterior tibial tendonitis, right    -  Primary    Relevant Medications    diclofenac sodium (VOLTAREN) 1 % Gel    Other Relevant Orders    IMMOBILIZER FOR HOME USE      Plantar fasciitis of right foot          Pain and swelling of right ankle                Plan:      Holly was seen today for ankle pain.    Diagnoses and all orders for this visit:    Posterior tibial tendonitis, right  -     diclofenac sodium (VOLTAREN) 1 % Gel; Apply 0.5 g topically 4 (four) times daily.  -     IMMOBILIZER FOR HOME USE    Plantar fasciitis of right foot    Type 2 diabetes mellitus with diabetic polyneuropathy, without long-term current use of insulin    Pain and swelling of right ankle    I counseled the patient on her conditions, their implications & medical mgmt.    - Shoe  inspection. Diabetic Foot Education. Patient reminded of the importance of good blood sugar control to help prevent podiatric complications of diabetes. Patient instructed on proper foot hygeine. We discussed wearing proper shoe gear, daily foot inspections, never walking w/out protective shoe gear, annual DM foot exam, sooner prn.       -Discussed shoe choice.   The importance of wearing shoes w/ adequate arch supports to alleviate abnormal pressure & improve stability of foot while walking.   Avoid flat shoes or barefoot walking as these will exacerbate or worsen symptoms.   -Discussed non-prescription inserts or supportive pads MLA:  PPT arch pads added to current shoe gear insert.  Dispensed Silopos gelstrap for other shoes,   all times WB including @ home.   Dispensed an ASO ankle brace for immobilization/stabilization, minimum 4-6 weeks until alleviation of SSX (& then on uneven surfaces or for prolonged activity).  -Rx/ OTC topical NSAID Voltaren gel up to qid prn (patient will start back on Voltaren gel for now) vs PO NSAID.  -Other options include PO Medrol dosepak OR injection of LA & steroid.   Injection proximal to insertion of PTT R of LA & steroid.    F/u 4-6 wks, sooner prn.        A total of 30 mins.was spent on chart review, patient visit & documentation.

## 2025-02-18 NOTE — LETTER
February 18, 2025      [facility]  [address]  [place] [address]  Phone: [phone]  Fax: [phone]       Patient: Holly Mcclelland   YOB: 1960  Date of Visit: 02/18/2025    To Whom It May Concern:    Sherry Mcclelland  was at [facility] on 02/18/2025. The patient may return to work/school on 2/19/2025 with no restrictions. If you have any questions or concerns, or if I can be of further assistance, please do not hesitate to contact me.    Sincerely,    Daphne Oleary MA

## 2025-02-20 ENCOUNTER — TELEPHONE (OUTPATIENT)
Dept: DIABETES | Facility: CLINIC | Age: 65
End: 2025-02-20
Payer: MEDICARE

## 2025-02-21 ENCOUNTER — TELEPHONE (OUTPATIENT)
Dept: DIABETES | Facility: CLINIC | Age: 65
End: 2025-02-21
Payer: MEDICARE

## 2025-02-24 ENCOUNTER — CLINICAL SUPPORT (OUTPATIENT)
Dept: DIABETES | Facility: CLINIC | Age: 65
End: 2025-02-24
Payer: MEDICARE

## 2025-02-24 DIAGNOSIS — E11.22 TYPE 2 DIABETES MELLITUS WITH STAGE 3A CHRONIC KIDNEY DISEASE, WITHOUT LONG-TERM CURRENT USE OF INSULIN: Primary | ICD-10-CM

## 2025-02-24 DIAGNOSIS — N18.31 TYPE 2 DIABETES MELLITUS WITH STAGE 3A CHRONIC KIDNEY DISEASE, WITHOUT LONG-TERM CURRENT USE OF INSULIN: Primary | ICD-10-CM

## 2025-02-24 DIAGNOSIS — N18.31 CHRONIC KIDNEY DISEASE (CKD) STAGE G3A/A1, MODERATELY DECREASED GLOMERULAR FILTRATION RATE (GFR) BETWEEN 45-59 ML/MIN/1.73 SQUARE METER AND ALBUMINURIA CREATININE RATIO LESS THAN 30 MG/G: ICD-10-CM

## 2025-02-28 ENCOUNTER — TELEPHONE (OUTPATIENT)
Dept: DIABETES | Facility: CLINIC | Age: 65
End: 2025-02-28
Payer: MEDICARE

## 2025-03-10 ENCOUNTER — TELEPHONE (OUTPATIENT)
Dept: DIABETES | Facility: CLINIC | Age: 65
End: 2025-03-10
Payer: MEDICARE

## 2025-03-11 DIAGNOSIS — E11.42 TYPE 2 DIABETES MELLITUS WITH DIABETIC POLYNEUROPATHY, WITHOUT LONG-TERM CURRENT USE OF INSULIN: ICD-10-CM

## 2025-03-11 RX ORDER — TIRZEPATIDE 7.5 MG/.5ML
7.5 INJECTION, SOLUTION SUBCUTANEOUS
Qty: 4 PEN | Refills: 6 | Status: SHIPPED | OUTPATIENT
Start: 2025-03-11

## 2025-03-12 ENCOUNTER — OFFICE VISIT (OUTPATIENT)
Dept: PRIMARY CARE CLINIC | Facility: CLINIC | Age: 65
End: 2025-03-12
Payer: MEDICARE

## 2025-03-12 ENCOUNTER — CLINICAL SUPPORT (OUTPATIENT)
Dept: PRIMARY CARE CLINIC | Facility: CLINIC | Age: 65
End: 2025-03-12
Attending: FAMILY MEDICINE
Payer: MEDICARE

## 2025-03-12 ENCOUNTER — RESULTS FOLLOW-UP (OUTPATIENT)
Dept: PRIMARY CARE CLINIC | Facility: CLINIC | Age: 65
End: 2025-03-12

## 2025-03-12 ENCOUNTER — TELEPHONE (OUTPATIENT)
Dept: GASTROENTEROLOGY | Facility: CLINIC | Age: 65
End: 2025-03-12
Payer: MEDICARE

## 2025-03-12 ENCOUNTER — PATIENT MESSAGE (OUTPATIENT)
Dept: GASTROENTEROLOGY | Facility: CLINIC | Age: 65
End: 2025-03-12
Payer: MEDICARE

## 2025-03-12 VITALS
RESPIRATION RATE: 18 BRPM | SYSTOLIC BLOOD PRESSURE: 100 MMHG | OXYGEN SATURATION: 96 % | BODY MASS INDEX: 33.2 KG/M2 | HEIGHT: 67 IN | DIASTOLIC BLOOD PRESSURE: 60 MMHG | WEIGHT: 211.56 LBS | TEMPERATURE: 97 F | HEART RATE: 71 BPM

## 2025-03-12 DIAGNOSIS — N18.31 STAGE 3A CHRONIC KIDNEY DISEASE: ICD-10-CM

## 2025-03-12 DIAGNOSIS — Z78.0 ASYMPTOMATIC MENOPAUSE: ICD-10-CM

## 2025-03-12 DIAGNOSIS — N18.31 TYPE 2 DIABETES MELLITUS WITH STAGE 3A CHRONIC KIDNEY DISEASE, WITHOUT LONG-TERM CURRENT USE OF INSULIN: ICD-10-CM

## 2025-03-12 DIAGNOSIS — Z00.00 ANNUAL PHYSICAL EXAM: Primary | ICD-10-CM

## 2025-03-12 DIAGNOSIS — Z12.31 ENCOUNTER FOR SCREENING MAMMOGRAM FOR BREAST CANCER: ICD-10-CM

## 2025-03-12 DIAGNOSIS — M54.16 LUMBAR RADICULOPATHY: ICD-10-CM

## 2025-03-12 DIAGNOSIS — E11.22 TYPE 2 DIABETES MELLITUS WITH STAGE 3A CHRONIC KIDNEY DISEASE, WITHOUT LONG-TERM CURRENT USE OF INSULIN: ICD-10-CM

## 2025-03-12 DIAGNOSIS — M13.0 POLYARTICULAR ARTHRITIS: ICD-10-CM

## 2025-03-12 DIAGNOSIS — I50.32 HYPERTENSIVE HEART DISEASE WITH CHRONIC DIASTOLIC CONGESTIVE HEART FAILURE: ICD-10-CM

## 2025-03-12 DIAGNOSIS — I11.0 HYPERTENSIVE HEART DISEASE WITH CHRONIC DIASTOLIC CONGESTIVE HEART FAILURE: ICD-10-CM

## 2025-03-12 DIAGNOSIS — Z86.73 HISTORY OF CVA (CEREBROVASCULAR ACCIDENT) WITHOUT RESIDUAL DEFICITS: ICD-10-CM

## 2025-03-12 DIAGNOSIS — F41.9 ANXIETY: ICD-10-CM

## 2025-03-12 DIAGNOSIS — Z12.11 ENCOUNTER FOR COLORECTAL CANCER SCREENING: ICD-10-CM

## 2025-03-12 DIAGNOSIS — Z12.12 ENCOUNTER FOR COLORECTAL CANCER SCREENING: ICD-10-CM

## 2025-03-12 PROBLEM — G45.9 TIA (TRANSIENT ISCHEMIC ATTACK): Status: RESOLVED | Noted: 2019-09-25 | Resolved: 2025-03-12

## 2025-03-12 PROBLEM — E53.8 VITAMIN B12 DEFICIENCY: Status: RESOLVED | Noted: 2024-04-19 | Resolved: 2025-03-12

## 2025-03-12 PROBLEM — M94.0 COSTOCHONDRITIS, ACUTE: Status: RESOLVED | Noted: 2024-02-14 | Resolved: 2025-03-12

## 2025-03-12 PROCEDURE — 3078F DIAST BP <80 MM HG: CPT | Mod: CPTII,S$GLB,, | Performed by: FAMILY MEDICINE

## 2025-03-12 PROCEDURE — 1125F AMNT PAIN NOTED PAIN PRSNT: CPT | Mod: CPTII,S$GLB,, | Performed by: FAMILY MEDICINE

## 2025-03-12 PROCEDURE — 1160F RVW MEDS BY RX/DR IN RCRD: CPT | Mod: CPTII,S$GLB,, | Performed by: FAMILY MEDICINE

## 2025-03-12 PROCEDURE — 3074F SYST BP LT 130 MM HG: CPT | Mod: CPTII,S$GLB,, | Performed by: FAMILY MEDICINE

## 2025-03-12 PROCEDURE — 3044F HG A1C LEVEL LT 7.0%: CPT | Mod: CPTII,S$GLB,, | Performed by: FAMILY MEDICINE

## 2025-03-12 PROCEDURE — 2025F 7 FLD RTA PHOTO W/O RTNOPTHY: CPT | Mod: CPTII,S$GLB,, | Performed by: OPTOMETRIST

## 2025-03-12 PROCEDURE — 3288F FALL RISK ASSESSMENT DOCD: CPT | Mod: CPTII,S$GLB,, | Performed by: FAMILY MEDICINE

## 2025-03-12 PROCEDURE — 99387 INIT PM E/M NEW PAT 65+ YRS: CPT | Mod: S$GLB,,, | Performed by: FAMILY MEDICINE

## 2025-03-12 PROCEDURE — 1101F PT FALLS ASSESS-DOCD LE1/YR: CPT | Mod: CPTII,S$GLB,, | Performed by: FAMILY MEDICINE

## 2025-03-12 PROCEDURE — 3008F BODY MASS INDEX DOCD: CPT | Mod: CPTII,S$GLB,, | Performed by: FAMILY MEDICINE

## 2025-03-12 PROCEDURE — 4010F ACE/ARB THERAPY RXD/TAKEN: CPT | Mod: CPTII,S$GLB,, | Performed by: FAMILY MEDICINE

## 2025-03-12 PROCEDURE — 99999 PR PBB SHADOW E&M-EST. PATIENT-LVL V: CPT | Mod: PBBFAC,,, | Performed by: FAMILY MEDICINE

## 2025-03-12 PROCEDURE — 1159F MED LIST DOCD IN RCRD: CPT | Mod: CPTII,S$GLB,, | Performed by: FAMILY MEDICINE

## 2025-03-12 PROCEDURE — 92228 IMG RTA DETC/MNTR DS PHY/QHP: CPT | Mod: 26,S$GLB,, | Performed by: OPTOMETRIST

## 2025-03-12 RX ORDER — DIAZEPAM 5 MG/1
5 TABLET ORAL DAILY PRN
Qty: 20 TABLET | Refills: 0 | Status: SHIPPED | OUTPATIENT
Start: 2025-03-12

## 2025-03-12 RX ORDER — SODIUM, POTASSIUM,MAG SULFATES 17.5-3.13G
1 SOLUTION, RECONSTITUTED, ORAL ORAL DAILY
Qty: 1 KIT | Refills: 0 | Status: SHIPPED | OUTPATIENT
Start: 2025-03-12 | End: 2025-03-14

## 2025-03-12 RX ORDER — MELOXICAM 7.5 MG/1
7.5 TABLET ORAL DAILY PRN
Qty: 90 TABLET | Refills: 0 | Status: SHIPPED | OUTPATIENT
Start: 2025-03-12

## 2025-03-12 NOTE — PROGRESS NOTES
Assessment:       1. Annual physical exam    2. Stage 3a chronic kidney disease    3. History of CVA (cerebrovascular accident) without residual deficits    4. Anxiety    5. Polyarticular arthritis    6. Lumbar radiculopathy    7. Hypertensive heart disease with chronic diastolic congestive heart failure    8. Type 2 diabetes mellitus with stage 3a chronic kidney disease, without long-term current use of insulin    9. Encounter for screening mammogram for breast cancer    10. Asymptomatic menopause    11. Encounter for colorectal cancer screening         Plan:       Annual physical exam    Stage 3a chronic kidney disease    History of CVA (cerebrovascular accident) without residual deficits    Anxiety  -     diazePAM (VALIUM) 5 MG tablet; Take 1 tablet (5 mg total) by mouth daily as needed (anxiety).  Dispense: 20 tablet; Refill: 0    Polyarticular arthritis  -     meloxicam (MOBIC) 7.5 MG tablet; Take 1 tablet (7.5 mg total) by mouth daily as needed for Pain.  Dispense: 90 tablet; Refill: 0    Lumbar radiculopathy  -     Ambulatory Referral/Consult to Physical/Occupational Therapy; Future; Expected date: 03/19/2025    Hypertensive heart disease with chronic diastolic congestive heart failure    Type 2 diabetes mellitus with stage 3a chronic kidney disease, without long-term current use of insulin  -     Diabetic Eye Screening Photo; Future; Expected date: 03/12/2025    Encounter for screening mammogram for breast cancer  -     Mammo Digital Screening Bilat w/ Basil (XPD); Future; Expected date: 05/16/2025    Asymptomatic menopause  -     DXA Bone Density Axial Skeleton 1 or more sites; Future; Expected date: 03/19/2025    Encounter for colorectal cancer screening  -     Case Request Endoscopy: COLONOSCOPY, SCREENING, LOW RISK PATIENT      Assessment & Plan    - Assessed chronic conditions: diabetes, chronic kidney disease (stage 3a), congestive heart failure, and arthritis  - Noted kidney function appropriate for  age; recommended continued management with blood pressure control, diabetes management, hydration, and kidney-protective medications  - Evaluated current medication regimen, including Farxiga for diabetes and kidney protection  - Considered history of TIA without residual deficits  - Addressed concern about leg weakness; likely related to previous cervical disc replacement surgery  - Reviewed vaccination history and recommended pneumonia vaccine  - Evaluated need for cancer screenings and diabetic retinopathy check    TYPE 2 DIABETES MELLITUS:   Explained protective effects of Farxiga on kidneys in relation to diabetes management.   Continued Farxiga for diabetes management and kidney protection.   Continued Mounjaro for diabetes management.   Performed diabetic retinopathy screening (retinal imaging) during current visit.   Noted patient's glucose level runs around 190.   Assessed that the patient's diabetes is looking pretty good.   Discussed kidney protection in relation to diabetes.   Instructed the patient to continue regular 6-month follow-ups with Dr. Crowell for diabetes management.    HEART FAILURE:   Confirmed patient's history of congestive heart failure.   Continued Lasix (furosemide) as needed for fluid retention.    HYPERTENSION:   Continued hydralazine twice daily.   Continued lisinopril/hydrochlorothiazide daily for blood pressure control.    HYPERLIPIDEMIA:   Continued Pravastatin at night for cholesterol management.    MUSCLE WEAKNESS:   Referred the patient to physical therapy for leg weakness.   Noted patient reports weakness in right leg, shuffling in the morning, and feeling like the right leg is getting shorter.   Acknowledged patient's concern about leg weakness and need for therapy.    OSTEOARTHRITIS AND JOINT PAIN:   Prescribed meloxicam (Mobic) for use as needed for joint pain.   Noted patient reports problems with wrists due to work as a .   Noted patient reports pain in ankle  and leg.   Continued use of ankle brace as prescribed by Dr. Dorsey.   Noted patient reports pain in hip joints.    VITREOUS OPACITIES:   Performed diabetic retinopathy screening (retinal imaging) during current visit.   Noted patient reports history of floaters, but currently does not notice them.   Assessed that patient is overdue for diabetic retinopathy screening.    OVERACTIVE BLADDER:   Continued Oxybutynin for overactive bladder.    ANXIETY DISORDER:   Prescribed Valium (diazepam) for occasional use as needed for anxiety.   Noted patient reports anxiety and neck pain.    FLUID OVERLOAD:   Continued Lasix (furosemide) as needed for fluid retention.    DENSE BREASTS:   Scheduled follow up in May for mammogram.   Noted patient reports having dense breasts.   Assessed that patient is due for a mammogram in 2 months.    HISTORY OF TRANSIENT ISCHEMIC ATTACK:   Continued low-dose aspirin daily.   Noted patient reports history of TIA without knowing about it at the time.   Confirmed no residual deficits from the TIA.   Continued daily baby aspirin.    KIDNEY HEALTH:   Discussed gradual decline of kidney function with age and importance of maintaining kidney health.   Educated on importance of staying hydrated for kidney health.   Patient to increase daily hydration.    FOLLOW-UP AND SCREENING:   Referred for colonoscopy for colon cancer screening.   Referred for bone density scan.   Follow up in 1 year for annual appointment.       Medication List with Changes/Refills   New Medications    SODIUM,POTASSIUM,MAG SULFATES (SUPREP BOWEL PREP KIT) 17.5-3.13-1.6 GRAM SOLR    Take 177 mLs by mouth once daily. for 2 days   Current Medications    ASPIRIN (ECOTRIN) 81 MG EC TABLET    Take 1 tablet by mouth once daily    DAPAGLIFLOZIN PROPANEDIOL (FARXIGA) 10 MG TABLET    Take 1 tablet (10 mg total) by mouth once daily.    DICLOFENAC SODIUM (VOLTAREN) 1 % GEL    Apply 0.5 g topically 4 (four) times daily.    FLUCONAZOLE (DIFLUCAN)  150 MG TAB    Take 1 tablet (150 mg total) by mouth every 72 hours.    FUROSEMIDE (LASIX) 20 MG TABLET    TAKE 1 TABLET BY MOUTH ONCE DAILY AS NEEDED FOR  SWELLING    HYDRALAZINE (APRESOLINE) 25 MG TABLET    Take 1 tablet (25 mg total) by mouth every 12 (twelve) hours.    LISINOPRIL-HYDROCHLOROTHIAZIDE (PRINZIDE,ZESTORETIC) 20-12.5 MG PER TABLET    Take 1 tablet by mouth once daily.    ONETOUCH DELICA PLUS LANCET 33 GAUGE MISC    Apply 1 lancet  topically 2 (two) times daily.    ONETOUCH ULTRA TEST STRP    1 strip by Misc.(Non-Drug; Combo Route) route 2 (two) times a day.    ONETOUCH ULTRA2 METER [facility]    USE TO CHECK GLUCOSE TWICE DAILY    OXYBUTYNIN (DITROPAN-XL) 5 MG TR24    Take 1 tablet by mouth once daily    PRAVASTATIN (PRAVACHOL) 40 MG TABLET    Take 1 tablet (40 mg total) by mouth every evening.    TIRZEPATIDE (MOUNJARO) 7.5 MG/0.5 ML PNIJ    Inject 7.5 mg into the skin every 7 days.   Changed and/or Refilled Medications    Modified Medication Previous Medication    DIAZEPAM (VALIUM) 5 MG TABLET diazePAM (VALIUM) 10 MG Tab       Take 1 tablet (5 mg total) by mouth daily as needed (anxiety).    Take 10 mg by mouth daily as needed.    MELOXICAM (MOBIC) 7.5 MG TABLET meloxicam (MOBIC) 7.5 MG tablet       Take 1 tablet (7.5 mg total) by mouth daily as needed for Pain.    Take 1 tablet (7.5 mg total) by mouth daily as needed for Pain.   Discontinued Medications    CLOPIDOGREL (PLAVIX) 75 MG TABLET    Take 1 tablet (75 mg total) by mouth once daily.         Subjective:    Patient ID: Holly Mcclelland is a 64 y.o. female.  Chief Complaint: Establish Care    HPI  History of Present Illness    CHIEF COMPLAINT:  Patient presents today for establishment of care as a new primary care patient    MEDICAL HISTORY:  She has a history of diabetes diagnosed at age 49, currently managed by Dr. Crowell, with blood sugars typically around 190. She also has a history of congestive heart failure and previous stroke/TIA without  "residual deficits. She reports a history of eye floaters, which were initially severe but have since improved.    MUSCULOSKELETAL:  She reports right leg weakness and pain in her leg and foot following cervical disc replacement in 2022. She experiences morning shuffling and feels her right leg is getting shorter. She expresses interest in returning to physical therapy for leg weakness. She has joint pain in her ankle, leg, hip, and hands, with arthritis in her wrists that is exacerbated by her work. She wears a brace throughout the day except while sleeping.    MEDICATIONS:  Current medications include Mounjaro, Farxiga, PRN Lasix, daily aspirin, hydralazine twice daily, lisinopril hydrochlorothiazide daily, pravastatin nightly, and oxybutynin. She takes biotin supplements for hair and green energy pills for fat burning. She denies taking Mobic, Plavix, or Valium.    SOCIAL HISTORY:  She has worked as a  at [employer] for over 30 years, which involves frequent use of hands for grabbing and manipulating objects.    PREVENTIVE CARE:  She declines flu vaccines and general vaccines. She has received COVID vaccine but is uncertain about additional COVID shots. She undergoes regular mammograms with dense breast tissue and reports normal results, with next mammogram due in a few months.       Review of Systems    Objective:      Vitals:    03/12/25 1240   BP: 100/60   BP Location: Left arm   Patient Position: Sitting   Pulse: 71   Resp: 18   Temp: 97.4 °F (36.3 °C)   TempSrc: Temporal   SpO2: 96%   Weight: 95.9 kg (211 lb 8.5 oz)   Height: 5' 7" (1.702 m)     BP Readings from Last 5 Encounters:   03/12/25 100/60   02/18/25 (!) 144/83   02/14/25 124/68   11/08/24 126/78   05/27/24 121/76     Wt Readings from Last 5 Encounters:   03/12/25 95.9 kg (211 lb 8.5 oz)   02/18/25 94.2 kg (207 lb 10.8 oz)   02/14/25 93.8 kg (206 lb 11.2 oz)   11/08/24 96.6 kg (213 lb)   05/27/24 91.7 kg (202 lb 0.8 oz)     Physical " Exam  Physical Exam    General: Well-developed. Well-nourished. No acute distress.  Eyes: EOMI. Sclerae anicteric.  HENT: Normocephalic. Atraumatic. Nares patent. Moist oral mucosa.  Cardiovascular: Regular rate. Regular rhythm. No murmurs. No rubs. No gallops. Normal S1, S2.  Respiratory: Normal respiratory effort. Clear to auscultation bilaterally. No rales. No rhonchi. No wheezing.  Musculoskeletal: No  obvious deformity.  Extremities: No lower extremity edema.  Neurological: Alert & oriented x3. No slurred speech. Normal gait.  Psychiatric: Normal mood. Normal affect. Good insight. Good judgment.  Skin: Warm. Dry. No rash.         Lab Results   Component Value Date    WBC 5.03 04/09/2024    HGB 13.2 04/09/2024    HCT 43.1 04/09/2024     04/09/2024    CHOL 130 11/08/2024    TRIG 41 11/08/2024    HDL 58 11/08/2024    ALT 19 11/08/2024    AST 18 11/08/2024     02/07/2025    K 4.2 02/07/2025     02/07/2025    CREATININE 1.1 02/07/2025    BUN 16 02/07/2025    CO2 29 02/07/2025    TSH 0.709 04/09/2024    INR 1.0 01/31/2022    HGBA1C 6.3 (H) 02/07/2025      This note was generated with the assistance of ambient listening technology. Verbal consent was obtained by the patient and accompanying visitor(s) for the recording of patient appointment to facilitate this note. I attest to having reviewed and edited the generated note for accuracy, though some syntax or spelling errors may persist. Please contact the author of this note for any clarification.

## 2025-03-12 NOTE — PROGRESS NOTES
Holly Mcclelland is a 64 y.o. female here for a diabetic eye screening with non-dilated fundus photos per Dr. Olmos.    Patient cooperative?: Yes  Small pupils?: No    For exam results, see Encounter Report.

## 2025-03-20 ENCOUNTER — PATIENT MESSAGE (OUTPATIENT)
Dept: ADMINISTRATIVE | Facility: HOSPITAL | Age: 65
End: 2025-03-20
Payer: MEDICARE

## 2025-03-25 ENCOUNTER — PATIENT OUTREACH (OUTPATIENT)
Dept: ADMINISTRATIVE | Facility: HOSPITAL | Age: 65
End: 2025-03-25
Payer: MEDICARE

## 2025-03-25 NOTE — PROGRESS NOTES
Health Maintenance Due   Topic Date Due    Mammogram  05/16/2025     Immunizations - reviewed and updated   Care Everywhere - triggered   Care Teams - updated   Outreach - Epic campaigns outreach done  Osteoporosis screening done on 3/21/2025  Colonoscopy scheduled for 3/31/2025  Mammogram screening scheduled for 5/19/2025

## 2025-03-26 ENCOUNTER — OFFICE VISIT (OUTPATIENT)
Dept: PODIATRY | Facility: CLINIC | Age: 65
End: 2025-03-26
Payer: MEDICARE

## 2025-03-26 VITALS
DIASTOLIC BLOOD PRESSURE: 67 MMHG | HEART RATE: 77 BPM | BODY MASS INDEX: 32.83 KG/M2 | HEIGHT: 67 IN | SYSTOLIC BLOOD PRESSURE: 130 MMHG | WEIGHT: 209.19 LBS

## 2025-03-26 DIAGNOSIS — M72.2 PLANTAR FASCIITIS OF RIGHT FOOT: ICD-10-CM

## 2025-03-26 DIAGNOSIS — M25.571 SINUS TARSITIS OF RIGHT FOOT: ICD-10-CM

## 2025-03-26 DIAGNOSIS — M76.821 POSTERIOR TIBIAL TENDONITIS, RIGHT: ICD-10-CM

## 2025-03-26 DIAGNOSIS — M25.471 PAIN AND SWELLING OF RIGHT ANKLE: Primary | ICD-10-CM

## 2025-03-26 DIAGNOSIS — E11.42 TYPE 2 DIABETES MELLITUS WITH DIABETIC POLYNEUROPATHY, WITHOUT LONG-TERM CURRENT USE OF INSULIN: ICD-10-CM

## 2025-03-26 DIAGNOSIS — M25.571 PAIN AND SWELLING OF RIGHT ANKLE: Primary | ICD-10-CM

## 2025-03-26 PROCEDURE — 3075F SYST BP GE 130 - 139MM HG: CPT | Mod: CPTII,S$GLB,, | Performed by: PODIATRIST

## 2025-03-26 PROCEDURE — 3044F HG A1C LEVEL LT 7.0%: CPT | Mod: CPTII,S$GLB,, | Performed by: PODIATRIST

## 2025-03-26 PROCEDURE — 3288F FALL RISK ASSESSMENT DOCD: CPT | Mod: CPTII,S$GLB,, | Performed by: PODIATRIST

## 2025-03-26 PROCEDURE — 3078F DIAST BP <80 MM HG: CPT | Mod: CPTII,S$GLB,, | Performed by: PODIATRIST

## 2025-03-26 PROCEDURE — 99214 OFFICE O/P EST MOD 30 MIN: CPT | Mod: S$GLB,,, | Performed by: PODIATRIST

## 2025-03-26 PROCEDURE — 3008F BODY MASS INDEX DOCD: CPT | Mod: CPTII,S$GLB,, | Performed by: PODIATRIST

## 2025-03-26 PROCEDURE — 1125F AMNT PAIN NOTED PAIN PRSNT: CPT | Mod: CPTII,S$GLB,, | Performed by: PODIATRIST

## 2025-03-26 PROCEDURE — 4010F ACE/ARB THERAPY RXD/TAKEN: CPT | Mod: CPTII,S$GLB,, | Performed by: PODIATRIST

## 2025-03-26 PROCEDURE — 1101F PT FALLS ASSESS-DOCD LE1/YR: CPT | Mod: CPTII,S$GLB,, | Performed by: PODIATRIST

## 2025-03-26 PROCEDURE — 99999 PR PBB SHADOW E&M-EST. PATIENT-LVL IV: CPT | Mod: PBBFAC,,, | Performed by: PODIATRIST

## 2025-03-26 NOTE — PROGRESS NOTES
Subjective:      Patient ID: Holly Mcclelland is a 64 y.o. female.    Chief Complaint: Foot Pain (Right foot) and Ankle Pain (Right ankle)    Patient is here for R foot & ankle pain. Had injection last visit R PTT & dispensed ASO ankle brace; states using it for work but not always @ home. Feels better but sore by end of day. Both retromalleolar med. & STJ R pain. Pain level up to 7/10.  2/18/25  Patient presents after an absence of several months.  Has shooting pain ankle & inside of arch especially end of day; pain level 10/10. Current pain level 4/10. 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 Pain for 2 months. Has an elastic strap & compression stockings as well as previous arch pads in other shoes but no relief.  Tried using Voltaren gel.   Previous H/0 plantar fasciitis R.  5/27/24  Patient is here after an absence of a yr. Previously seen for ganglion cyst dorsal L midfoot w/ associated arthropathy.  Now noticed a 'big vein' indicating arch B/L w/out pain. Started wearing aqua shoes about a month ago.   Big toes hurt in work shoes (has arch pads in them) & indicates distal medial but denies IGTN pain. Nails are long & need trimming as thick & hurt in shoes - usually goes for pedicures but it has been some time.   9/26/22  Holly is a 64 y.o. female who presents for 1 month f/u of pain L foot x 2 days & worsening dorsal - lump. States medication helped w/ symptomatology as did pads, so the lump has decreased and she is barely getting any pain. Given gelstrap & to use Voltaren gel. Not using sandals/slippers.     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     PCP: Kiran Olmos MD 3/12/25  DM mgmt: Em Diamond NP 2/14/25    Shoe gear: stretch casual    Dx DM 2015  Past Medical History:   Diagnosis Date    CHF (congestive heart failure)     Diabetes mellitus     Heart murmur     Hyperlipidemia      Hypertension     Sleep apnea     no cpap since weight loss    Stroke     remote hx TIA; no residual    TIA (transient ischemic attack) 09/25/2019     Patient Active Problem List   Diagnosis    GERD (gastroesophageal reflux disease)    Class 1 obesity due to excess calories with serious comorbidity and body mass index (BMI) of 32.0 to 32.9 in adult    Onychogryphosis    Onychocryptosis    Onychomycosis due to dermatophyte    Type 2 diabetes mellitus with diabetic polyneuropathy, without long-term current use of insulin    Essential hypertension    Dyslipidemia, goal LDL below 70    Hypertensive heart disease with chronic diastolic congestive heart failure    Palpitations    Heart murmur    Right leg weakness    History of CVA (cerebrovascular accident) without residual deficits    Obstructive sleep apnea    Spinal stenosis of cervical region    S/P cervical spinal fusion    Neck pain    Lumbar radiculopathy    Orthostatic hypotension    Vitamin D deficiency    Intramural and subserous leiomyoma of uterus    Type 2 diabetes mellitus with stage 3a chronic kidney disease, without long-term current use of insulin    Post-menopausal bleeding    sp TLH/BSO on 4/12/23 for PMB at age 62    Chest pain of uncertain etiology    Hyperlipidemia    Unsteady gait    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 Heterozygous hemoglobin variants (HbS, HgC, etc)do  not significantly interfere with this assay.   However, presence of multiple variants may affect accuracy.     11/08/2024 6.6 (H) 4.0 - 5.6 % Final     Comment:     ADA Screening Guidelines:  5.7-6.4%  Consistent with  prediabetes  >or=6.5%  Consistent with diabetes    High levels of fetal hemoglobin interfere with the HbA1C  assay. Heterozygous hemoglobin variants (HbS, HgC, etc)do  not significantly interfere with this assay.   However, presence of multiple variants may affect accuracy.     04/09/2024 5.8 (H) 4.0 - 5.6 % Final     Comment:     ADA Screening Guidelines:  5.7-6.4%  Consistent with prediabetes  >or=6.5%  Consistent with diabetes    High levels of fetal hemoglobin interfere with the HbA1C  assay. Heterozygous hemoglobin variants (HbS, HgC, etc)do  not significantly interfere with this assay.   However, presence of multiple variants may affect accuracy.        Objective:      Physical Exam  Vitals reviewed.   Constitutional:       Appearance: She is well-developed. She is obese.   Cardiovascular:      Pulses:           Dorsalis pedis pulses are 2+ on the right side and 2+ on the left side.   Musculoskeletal:      Right lower leg: No tenderness. Edema present.      Left lower leg: No tenderness. Edema present.      Right foot: Deformity (PTTD) present.      Left foot: Deformity (Flexible low MLA w/ valgus on axial loading B/L) present.        Feet:       Comments: No reproducible discomfort nor any tenderness to the calf bilaterally.   Feet:      Right foot:      Skin integrity: Warmth, callus (diffuse WB hyperkeratosis B/L ) and dry skin present. No fissure.      Toenail Condition: Right toenails are ingrown. Fungal disease present.     Left foot:      Skin integrity: Callus and dry skin present. No fissure.      Toenail Condition: Left toenails are ingrown. Fungal disease present.     Comments: Palpable prominence dorsal midfoot L. No residual fluctuance in area of cyst.    MS strength of extrinsics to foot & ankle B/L + 5/5 in DF/PF/Inv/Ev to resistance w/ no reproduction of pain in any direction.   No TTP along insertion of fascia into the calcaneus R.  TTP along PTT R retromalleolar to distal tip of medial  malleolus but less to navicular insertion & MLA.     Passive ROM of ankle & pedal joints is painless & w/out crepitation B/L including R STJ.    Nails are cryptotic 1st through 5th B/L w/ dystrophic, thickened, discolored toenails including 2nd R>L, sparing 3rd & 4th B/L.  Skin:     General: Skin is warm and dry.      Capillary Refill: Capillary refill takes 2 to 3 seconds.      Findings: No bruising, ecchymosis or erythema.      Nails: There is no clubbing.      Comments: WB diffuse hyperkeratosis B/L w/out fissuring.   Neurological:      Mental Status: She is alert and oriented to person, place, and time.      Sensory: Sensation is intact.      Motor: Weakness present. No abnormal muscle tone.      Gait: Gait is intact.      Comments: Paresthesias including tingling & weakness RLE w/ no clearly identified trigger or source; no hyperemia (likely lumbar radiculopathy)   Psychiatric:         Mood and Affect: Mood and affect normal.         Behavior: Behavior normal. Behavior is cooperative.        Assessment:      Encounter Diagnoses   Name Primary?    Pain and swelling of right ankle Yes    Posterior tibial tendonitis, right     Plantar fasciitis of right foot     Sinus tarsitis of right foot     Type 2 diabetes mellitus with diabetic polyneuropathy, without long-term current use of insulin      Problem List Items Addressed This Visit          Endocrine    Type 2 diabetes mellitus with diabetic polyneuropathy, without long-term current use of insulin     Other Visit Diagnoses         Pain and swelling of right ankle    -  Primary      Posterior tibial tendonitis, right          Plantar fasciitis of right foot          Sinus tarsitis of right foot              Plan:      Holly was seen today for foot pain and ankle pain.    Diagnoses and all orders for this visit:    Pain and swelling of right ankle    Posterior tibial tendonitis, right    Plantar fasciitis of right foot    Sinus tarsitis of right foot    Type 2  diabetes mellitus with diabetic polyneuropathy, without long-term current use of insulin    I counseled the patient on her conditions, their implications & medical mgmt.    - Shoe inspection. Diabetic Foot Education. Patient reminded of the importance of good blood sugar control to help prevent podiatric complications of diabetes. Patient instructed on proper foot hygeine. We discussed wearing proper shoe gear, daily foot inspections, never walking w/out protective shoe gear, annual DM foot exam, sooner prn.       -Discussed again importance of wearing shoes w/ adequate arch supports to alleviate abnormal pressure & improve stability of foot while walking.   Avoid flat shoes or barefoot walking as these will exacerbate or worsen symptoms.   -Continue OTC inserts or supportive pads MLA such as PPT arch pads OR dispensed Silopos gelstrap, all times WB including @ home.   Continue ASO ankle brace for immobilization/stabilization all times WB including @ home (instead of just @ work or out), until alleviation of SSX (& then on uneven surfaces or for prolonged activity).  -Continue topical Voltaren gel up to qid.  Continue Mobic 7.5 mg qd per PCP.  Declined injection for now.     F/u 4-6 wks, sooner prn.        A total of 33 mins.was spent on chart review, patient visit & documentation.

## 2025-04-01 DIAGNOSIS — N18.31 TYPE 2 DIABETES MELLITUS WITH STAGE 3A CHRONIC KIDNEY DISEASE, WITHOUT LONG-TERM CURRENT USE OF INSULIN: ICD-10-CM

## 2025-04-01 DIAGNOSIS — E11.42 TYPE 2 DIABETES MELLITUS WITH DIABETIC POLYNEUROPATHY, WITHOUT LONG-TERM CURRENT USE OF INSULIN: Primary | ICD-10-CM

## 2025-04-01 DIAGNOSIS — I10 ESSENTIAL HYPERTENSION: ICD-10-CM

## 2025-04-01 DIAGNOSIS — E11.22 TYPE 2 DIABETES MELLITUS WITH STAGE 3A CHRONIC KIDNEY DISEASE, WITHOUT LONG-TERM CURRENT USE OF INSULIN: ICD-10-CM

## 2025-04-02 RX ORDER — TIRZEPATIDE 10 MG/.5ML
10 INJECTION, SOLUTION SUBCUTANEOUS
Qty: 4 PEN | Refills: 6 | Status: SHIPPED | OUTPATIENT
Start: 2025-04-02

## 2025-04-02 RX ORDER — FUROSEMIDE 20 MG/1
TABLET ORAL
Qty: 30 TABLET | Refills: 3 | Status: SHIPPED | OUTPATIENT
Start: 2025-04-02

## 2025-04-02 NOTE — TELEPHONE ENCOUNTER
----- Message from Turnre sent at 4/2/2025 12:21 PM CDT -----  Contact: [phone]@[email or web address]  Type: Returning a callWho left a message? Patient When did the practice call?Does patient know what this is regarding:Would the patient rather a call back or a response via [facility]? Call back Comments:Pt would like a call back to discuss going up on her med tirzepatide (MOUNJARO) 7.5 mg/0.5 mL PnIj. Please call pt to advise [phone]

## 2025-04-25 ENCOUNTER — TELEPHONE (OUTPATIENT)
Dept: DIABETES | Facility: CLINIC | Age: 65
End: 2025-04-25
Payer: MEDICARE

## 2025-04-25 NOTE — TELEPHONE ENCOUNTER
----- Message from Lesly sent at 4/25/2025  2:25 PM CDT -----  Contact: [phone]  Type: Rx Clarification/ Additional Information/ QuestionsMedication:tirzepatide (MOUNJARO) 10 mg/0.5 mL PnIj or the 7.5 What questions do you have about the medication, if any?she states they need the PA for this What information is needed?she states she has been out of her medication for weeks and the pharmacy she says can not reach the  Pharmacy number:[facility] - [place], [place] - [id number] [address]Ig[place]: [phone] Fax: [phone] Pharmacy Contact Name:Comments:She states to please return call to her she is out of medication and is needing this ASAP

## 2025-04-28 ENCOUNTER — TELEPHONE (OUTPATIENT)
Dept: DIABETES | Facility: CLINIC | Age: 65
End: 2025-04-28
Payer: MEDICARE

## 2025-04-28 NOTE — TELEPHONE ENCOUNTER
Spoke to pt informed pt that mounjaro 7.5 was approved through 2025, pharmacy stated they will send out 1 box with 6 refills and pt has to call for refills

## 2025-04-28 NOTE — TELEPHONE ENCOUNTER
Informed pt that [facility] will send out mounjaro 7.5, and stated she has to call to get future refills , pt verbalized understanding

## 2025-05-10 DIAGNOSIS — N39.41 URGE INCONTINENCE OF URINE: ICD-10-CM

## 2025-05-10 RX ORDER — OXYBUTYNIN CHLORIDE 5 MG/1
5 TABLET, EXTENDED RELEASE ORAL
Qty: 90 TABLET | Refills: 0 | Status: SHIPPED | OUTPATIENT
Start: 2025-05-10

## 2025-05-10 NOTE — TELEPHONE ENCOUNTER
Refill Routing Note   Medication(s) are not appropriate for processing by [facility] for the following reason(s):        Patient not seen by provider within 15 months      [facility] action(s):  Defer         Per provider notes, no OV has been scheduled since last med order      Appointments  past 12m or future 3m with PCP    Date Provider   Last Visit   7/6/2023 Nella Marte MD   Next Visit   Visit date not found Nella Marte MD   ED visits in past 90 days: 0        Note composed:7:20 AM 05/10/2025

## 2025-05-12 NOTE — PROGRESS NOTES
Subjective:      Patient ID: Holly Mcclelland is a 64 y.o. female.    Chief Complaint: Foot Pain (Right ankle)    Patient is here for f/u ankle pain. Bought OTC ankle brace b/c ASO causes ankle to swell too much. Needs drLiviernote to not lift & stand for 5 hrs.Pain level up to 7/10.  3/26/25  Patient is here for R foot & ankle pain. Had injection last visit R PTT & dispensed ASO ankle brace; states using it for work but not always @ home. Feels better but sore by end of day. Both retromalleolar med. & STJ R pain. Pain level up to 7/10.  2/18/25  Patient presents after an absence of several months.  Has shooting pain ankle & inside of arch especially end of day; pain level 10/10. Current pain level 4/10. 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 Pain for 2 months. Has an elastic strap & compression stockings as well as previous arch pads in other shoes but no relief.  Tried using Voltaren gel.   Previous H/0 plantar fasciitis R.  5/27/24  Patient is here after an absence of a yr. Previously seen for ganglion cyst dorsal L midfoot w/ associated arthropathy.  Now noticed a 'big vein' indicating arch B/L w/out pain. Started wearing aqua shoes about a month ago.   Big toes hurt in work shoes (has arch pads in them) & indicates distal medial but denies IGTN pain. Nails are long & need trimming as thick & hurt in shoes - usually goes for pedicures but it has been some time.   9/26/22  Holly is a 64 y.o. female who presents for 1 month f/u of pain L foot x 2 days & worsening dorsal - lump. States medication helped w/ symptomatology as did pads, so the lump has decreased and she is barely getting any pain. Given gelstrap & to use Voltaren gel. Not using sandals/slippers.     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     PCP: Kiran Olmos MD 3/12/25  DM mgmt: Em Diamond NP 2/14/25    Shoe gear: stretch casual    Dx  DM 2015  Past Medical History:   Diagnosis Date    CHF (congestive heart failure)     Diabetes mellitus     Heart murmur     Hyperlipidemia     Hypertension     Sleep apnea     no cpap since weight loss    Stroke     remote hx TIA; no residual    TIA (transient ischemic attack) 09/25/2019     Patient Active Problem List   Diagnosis    GERD (gastroesophageal reflux disease)    Class 1 obesity due to excess calories with serious comorbidity and body mass index (BMI) of 32.0 to 32.9 in adult    Onychogryphosis    Onychocryptosis    Onychomycosis due to dermatophyte    Type 2 diabetes mellitus with diabetic polyneuropathy, without long-term current use of insulin    Essential hypertension    Dyslipidemia, goal LDL below 70    Hypertensive heart disease with chronic diastolic congestive heart failure    Palpitations    Heart murmur    Right leg weakness    History of CVA (cerebrovascular accident) without residual deficits    Obstructive sleep apnea    Spinal stenosis of cervical region    S/P cervical spinal fusion    Neck pain    Lumbar radiculopathy    Orthostatic hypotension    Vitamin D deficiency    Intramural and subserous leiomyoma of uterus    Type 2 diabetes mellitus with stage 3a chronic kidney disease, without long-term current use of insulin    Post-menopausal bleeding    sp TLH/BSO on 4/12/23 for PMB at age 62    Chest pain of uncertain etiology    Hyperlipidemia    Unsteady gait    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 Heterozygous hemoglobin variants (HbS, HgC, etc)do  not significantly interfere with this assay.   However, presence of multiple  variants may affect accuracy.     11/08/2024 6.6 (H) 4.0 - 5.6 % Final     Comment:     ADA Screening Guidelines:  5.7-6.4%  Consistent with prediabetes  >or=6.5%  Consistent with diabetes    High levels of fetal hemoglobin interfere with the HbA1C  assay. Heterozygous hemoglobin variants (HbS, HgC, etc)do  not significantly interfere with this assay.   However, presence of multiple variants may affect accuracy.     04/09/2024 5.8 (H) 4.0 - 5.6 % Final     Comment:     ADA Screening Guidelines:  5.7-6.4%  Consistent with prediabetes  >or=6.5%  Consistent with diabetes    High levels of fetal hemoglobin interfere with the HbA1C  assay. Heterozygous hemoglobin variants (HbS, HgC, etc)do  not significantly interfere with this assay.   However, presence of multiple variants may affect accuracy.        Objective:      Physical Exam  Vitals reviewed.   Constitutional:       Appearance: She is well-developed. She is obese.   Cardiovascular:      Pulses:           Dorsalis pedis pulses are 2+ on the right side and 2+ on the left side.   Musculoskeletal:      Right lower leg: No tenderness. Edema present.      Left lower leg: No tenderness. Edema present.      Right foot: Deformity (PTTD) present.      Left foot: Deformity (Flexible low MLA w/ valgus on axial loading B/L) present.        Feet:       Comments: No reproducible discomfort nor any tenderness to the calf bilaterally.   Feet:      Right foot:      Skin integrity: Warmth, callus (diffuse WB hyperkeratosis B/L ) and dry skin present. No fissure.      Toenail Condition: Right toenails are ingrown. Fungal disease present.     Left foot:      Skin integrity: Callus and dry skin present. No fissure.      Toenail Condition: Left toenails are ingrown. Fungal disease present.     Comments: Palpable prominence dorsal midfoot L. No residual fluctuance in area of cyst.    MS strength of extrinsics to foot & ankle B/L + 5/5 in DF/PF/Inv/Ev to resistance w/ no reproduction of  pain in any direction.   No TTP along insertion of fascia into the calcaneus R.  TTP along PTT R retromalleolar to distal tip of medial malleolus but less to navicular insertion & MLA.     Passive ROM of ankle & pedal joints is painless & w/out crepitation B/L including R STJ.    Nails are cryptotic 1st through 5th B/L w/ dystrophic, thickened, discolored toenails including 2nd R>L, sparing 3rd & 4th B/L.  Skin:     General: Skin is warm and dry.      Capillary Refill: Capillary refill takes 2 to 3 seconds.      Findings: Rash present. No bruising, ecchymosis or erythema. Rash is scaling.      Nails: There is no clubbing.      Comments: WB diffuse hyperkeratosis B/L w/out fissuring.   Neurological:      Mental Status: She is alert and oriented to person, place, and time.      Sensory: Sensation is intact.      Motor: Weakness present. No abnormal muscle tone.      Gait: Gait is intact.      Comments: Paresthesias including tingling & weakness RLE w/ no clearly identified trigger or source; no hyperemia (likely lumbar radiculopathy)   Psychiatric:         Mood and Affect: Mood and affect normal.         Behavior: Behavior normal. Behavior is cooperative.        Assessment:      Encounter Diagnoses   Name Primary?    Posterior tibial tendonitis, right Yes    Pain and swelling of right ankle      Problem List Items Addressed This Visit    None  Visit Diagnoses         Posterior tibial tendonitis, right    -  Primary    Relevant Orders    Walking Boot For Home Use      Pain and swelling of right ankle        Relevant Orders    Walking Boot For Home Use          Plan:      Holly was seen today for foot pain.    Diagnoses and all orders for this visit:    Posterior tibial tendonitis, right  -     Walking Boot For Home Use    Pain and swelling of right ankle  -     Walking Boot For Home Use    Other orders  -     meloxicam (MOBIC) 15 MG tablet; Take 1 tablet (15 mg total) by mouth once daily.    I counseled the patient on  her conditions, their implications & medical mgmt.    - Shoe inspection. Diabetic Foot Education. Patient reminded of the importance of good blood sugar control to help prevent podiatric complications of diabetes. Patient instructed on proper foot hygeine. We discussed wearing proper shoe gear, daily foot inspections, never walking w/out protective shoe gear, annual DM foot exam, sooner prn.       -Discussed again importance of wearing shoes w/ adequate arch supports to alleviate abnormal pressure & improve stability of foot while walking.   Avoid flat shoes or barefoot walking as these will exacerbate or worsen symptoms.   -Continue OTC inserts or supportive pads MLA such as PPT arch pads OR dispensed Silopos gelstrap, all times WB including @ home.   Continue ASO ankle brace for immobilization/stabilization all times WB including @ home (instead of just @ work or out), until alleviation of SSX (& then on uneven surfaces or for prolonged activity).  -Continue topical Voltaren gel up to qid.  Continue Mobic 7.5 mg qd per PCP.  Declined injection for now.     F/u 4-6 wks, sooner prn.        A total of 26 mins.was spent on chart review, patient visit & documentation.

## 2025-05-14 ENCOUNTER — TELEPHONE (OUTPATIENT)
Dept: DIABETES | Facility: CLINIC | Age: 65
End: 2025-05-14
Payer: MEDICARE

## 2025-05-14 ENCOUNTER — OFFICE VISIT (OUTPATIENT)
Dept: PODIATRY | Facility: CLINIC | Age: 65
End: 2025-05-14
Payer: MEDICARE

## 2025-05-14 VITALS
DIASTOLIC BLOOD PRESSURE: 79 MMHG | BODY MASS INDEX: 34.24 KG/M2 | HEART RATE: 74 BPM | HEIGHT: 67 IN | WEIGHT: 218.13 LBS | SYSTOLIC BLOOD PRESSURE: 157 MMHG

## 2025-05-14 DIAGNOSIS — M25.471 PAIN AND SWELLING OF RIGHT ANKLE: ICD-10-CM

## 2025-05-14 DIAGNOSIS — F41.9 ANXIETY: ICD-10-CM

## 2025-05-14 DIAGNOSIS — M76.821 POSTERIOR TIBIAL TENDONITIS, RIGHT: Primary | ICD-10-CM

## 2025-05-14 DIAGNOSIS — M25.571 PAIN AND SWELLING OF RIGHT ANKLE: ICD-10-CM

## 2025-05-14 PROCEDURE — 99999 PR PBB SHADOW E&M-EST. PATIENT-LVL III: CPT | Mod: PBBFAC,,, | Performed by: PODIATRIST

## 2025-05-14 PROCEDURE — 3044F HG A1C LEVEL LT 7.0%: CPT | Mod: CPTII,S$GLB,, | Performed by: PODIATRIST

## 2025-05-14 PROCEDURE — 3008F BODY MASS INDEX DOCD: CPT | Mod: CPTII,S$GLB,, | Performed by: PODIATRIST

## 2025-05-14 PROCEDURE — 4010F ACE/ARB THERAPY RXD/TAKEN: CPT | Mod: CPTII,S$GLB,, | Performed by: PODIATRIST

## 2025-05-14 PROCEDURE — 1101F PT FALLS ASSESS-DOCD LE1/YR: CPT | Mod: CPTII,S$GLB,, | Performed by: PODIATRIST

## 2025-05-14 PROCEDURE — 3288F FALL RISK ASSESSMENT DOCD: CPT | Mod: CPTII,S$GLB,, | Performed by: PODIATRIST

## 2025-05-14 PROCEDURE — 99213 OFFICE O/P EST LOW 20 MIN: CPT | Mod: S$GLB,,, | Performed by: PODIATRIST

## 2025-05-14 PROCEDURE — 3077F SYST BP >= 140 MM HG: CPT | Mod: CPTII,S$GLB,, | Performed by: PODIATRIST

## 2025-05-14 PROCEDURE — 3078F DIAST BP <80 MM HG: CPT | Mod: CPTII,S$GLB,, | Performed by: PODIATRIST

## 2025-05-14 PROCEDURE — 1125F AMNT PAIN NOTED PAIN PRSNT: CPT | Mod: CPTII,S$GLB,, | Performed by: PODIATRIST

## 2025-05-14 RX ORDER — MELOXICAM 15 MG/1
15 TABLET ORAL DAILY
Qty: 90 TABLET | Refills: 1 | Status: SHIPPED | OUTPATIENT
Start: 2025-05-14

## 2025-05-14 RX ORDER — DIAZEPAM 5 MG/1
5 TABLET ORAL DAILY PRN
Qty: 20 TABLET | Refills: 0 | Status: SHIPPED | OUTPATIENT
Start: 2025-05-14

## 2025-05-14 NOTE — TELEPHONE ENCOUNTER
Spoke to [facility] stated that mounjaro 10mg dose is out of stock, stated that the 7.5mg dose refill date is 5/18

## 2025-05-14 NOTE — TELEPHONE ENCOUNTER
No care due was identified.  [facility] Embedded Care Due Messages. Reference number: [id number].   5/14/2025 10:34:49 AM CDT

## 2025-05-14 NOTE — LETTER
May 14, 2025      [facility]  [address]  [place] [address]  Phone: [phone]  Fax: [phone]       Patient: Holly Mcclelland   YOB: 1960  Date of Visit: 05/14/2025    To Whom It May Concern:    Sherry Mcclelland  was at [facility] on 05/14/2025. The patient may return to work/school on 5/21/2025 with restrictions. Patient to wear brace/boot and should take 10 minute breaks after standing for long periods of time.If you have any questions or concerns, or if I can be of further assistance, please do not hesitate to contact me.    Sincerely,    Daphne Oleary MA

## 2025-05-14 NOTE — TELEPHONE ENCOUNTER
Spoke to [facility] stated that pt 7.5mg mounjaro injection will be mailed out by the 18th or 19th, pt verbalized understanding also stated she was on the phone with [facility] just to see what [facility] would tell her about medication

## 2025-05-18 DIAGNOSIS — I10 ESSENTIAL HYPERTENSION: ICD-10-CM

## 2025-05-19 RX ORDER — LISINOPRIL AND HYDROCHLOROTHIAZIDE 12.5; 2 MG/1; MG/1
1 TABLET ORAL DAILY
Qty: 90 TABLET | Refills: 3 | Status: SHIPPED | OUTPATIENT
Start: 2025-05-19 | End: 2026-05-19

## 2025-05-23 DIAGNOSIS — E11.42 TYPE 2 DIABETES MELLITUS WITH DIABETIC POLYNEUROPATHY, WITHOUT LONG-TERM CURRENT USE OF INSULIN: ICD-10-CM

## 2025-05-23 RX ORDER — DAPAGLIFLOZIN 10 MG/1
10 TABLET, FILM COATED ORAL DAILY
Qty: 100 TABLET | Refills: 1 | Status: SHIPPED | OUTPATIENT
Start: 2025-05-23

## 2025-05-26 ENCOUNTER — HOSPITAL ENCOUNTER (OUTPATIENT)
Dept: RADIOLOGY | Facility: HOSPITAL | Age: 65
Discharge: HOME | End: 2025-05-26
Attending: FAMILY MEDICINE
Payer: MEDICARE

## 2025-05-26 DIAGNOSIS — Z12.31 ENCOUNTER FOR SCREENING MAMMOGRAM FOR BREAST CANCER: ICD-10-CM

## 2025-05-26 PROCEDURE — 77063 BREAST TOMOSYNTHESIS BI: CPT | Mod: 26,,, | Performed by: RADIOLOGY

## 2025-05-26 PROCEDURE — 77067 SCR MAMMO BI INCL CAD: CPT | Mod: 26,,, | Performed by: RADIOLOGY

## 2025-05-26 PROCEDURE — 77063 BREAST TOMOSYNTHESIS BI: CPT | Mod: TC

## 2025-06-23 NOTE — PROGRESS NOTES
Subjective:      Patient ID: Holly Mcclelland is a 64 y.o. female.    Chief Complaint: Ankle Pain (Right), Diabetes Mellitus, and Nail Care    Patient is here for R ankle pain as well as diabetic foot care.  States having problem with balance.  Pain level up to 4/10 every now and then when walking.  Now using ASO ankle brace as does better in controlling swelling. Taking Mobic but wants to know if increase doseage possible; amenable to switching med.instead & see if provides her w/ more relief or we can increase that dose (Relafen)..    5/14/25  Patient is here for f/u ankle pain. Bought OTC ankle brace b/c ASO causes ankle to swell too much. Needs note to not lift & stand for 5 hrs.Pain level up to 7/10.  3/26/25  Patient is here for R foot & ankle pain. Had injection last visit R PTT & dispensed ASO ankle brace; states using it for work but not always @ home. Feels better but sore by end of day. Both retromalleolar med. & STJ R pain. Pain level up to 7/10.  2/18/25  Patient presents after an absence of several months.  Has shooting pain ankle & inside of arch especially end of day; pain level 10/10. Current pain level 4/10. 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 Pain for 2 months. Has an elastic strap & compression stockings as well as previous arch pads in other shoes but no relief.  Tried using Voltaren gel.   Previous H/0 plantar fasciitis R.  5/27/24  Patient is here after an absence of a yr. Previously seen for ganglion cyst dorsal L midfoot w/ associated arthropathy.  Now noticed a 'big vein' indicating arch B/L w/out pain. Started wearing aqua shoes about a month ago.   Big toes hurt in work shoes (has arch pads in them) & indicates distal medial but denies IGTN pain. Nails are long & need trimming as thick & hurt in shoes - usually goes for pedicures but it has been some time.   9/26/22  Holly is a 64 y.o. female who presents for 1  month f/u of pain L foot x 2 days & worsening dorsal - lump. States medication helped w/ symptomatology as did pads, so the lump has decreased and she is barely getting any pain. Given gelstrap & to use Voltaren gel. Not using sandals/slippers.     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     PCP: Kiran Olmos MD 3/12/25  DM mgmt: Em Diamond NP 2/14/25, due 8/14/25    Shoe gear: stretch casual    Dx DM 2015  Past Medical History:   Diagnosis Date    CHF (congestive heart failure)     Diabetes mellitus     Heart murmur     Hyperlipidemia     Hypertension     Sleep apnea     no cpap since weight loss    Stroke     remote hx TIA; no residual    TIA (transient ischemic attack) 09/25/2019     Patient Active Problem List   Diagnosis    GERD (gastroesophageal reflux disease)    Class 1 obesity due to excess calories with serious comorbidity and body mass index (BMI) of 32.0 to 32.9 in adult    Onychogryphosis    Onychocryptosis    Onychomycosis due to dermatophyte    Type 2 diabetes mellitus with diabetic polyneuropathy, without long-term current use of insulin    Essential hypertension    Dyslipidemia, goal LDL below 70    Hypertensive heart disease with chronic diastolic congestive heart failure    Palpitations    Heart murmur    Right leg weakness    History of CVA (cerebrovascular accident) without residual deficits    Obstructive sleep apnea    Spinal stenosis of cervical region    S/P cervical spinal fusion    Neck pain    Lumbar radiculopathy    Orthostatic hypotension    Vitamin D deficiency    Intramural and subserous leiomyoma of uterus    Type 2 diabetes mellitus with stage 3a chronic kidney disease, without long-term current use of insulin    Post-menopausal bleeding    sp TLH/BSO on 4/12/23 for PMB at age 62    Chest pain of uncertain etiology    Hyperlipidemia    Unsteady gait    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 Heterozygous hemoglobin variants (HbS, HgC, etc)do  not significantly interfere with this assay.   However, presence of multiple variants may affect accuracy.     11/08/2024 6.6 (H) 4.0 - 5.6 % Final     Comment:     ADA Screening Guidelines:  5.7-6.4%  Consistent with prediabetes  >or=6.5%  Consistent with diabetes    High levels of fetal hemoglobin interfere with the HbA1C  assay. Heterozygous hemoglobin variants (HbS, HgC, etc)do  not significantly interfere with this assay.   However, presence of multiple variants may affect accuracy.     04/09/2024 5.8 (H) 4.0 - 5.6 % Final     Comment:     ADA Screening Guidelines:  5.7-6.4%  Consistent with prediabetes  >or=6.5%  Consistent with diabetes    High levels of fetal hemoglobin interfere with the HbA1C  assay. Heterozygous hemoglobin variants (HbS, HgC, etc)do  not significantly interfere with this assay.   However, presence of multiple variants may affect accuracy.        Objective:      Physical Exam  Vitals reviewed.   Constitutional:       Appearance: She is well-developed. She is obese.   Cardiovascular:      Pulses:           Dorsalis pedis pulses are 2+ on the right side and 2+ on the left side.   Musculoskeletal:         General: Swelling and tenderness present.      Right lower leg: No tenderness. Edema present.      Left lower leg: No tenderness. Edema present.      Right foot: Deformity (PTTD) present.      Left foot: Deformity (Flexible low MLA w/ valgus on axial loading B/L) present.        Feet:       Comments: No reproducible discomfort nor any tenderness to the calf bilaterally.   Feet:      Right foot:      Skin integrity:  Warmth, callus (diffuse WB hyperkeratosis B/L ) and dry skin present. No fissure.      Toenail Condition: Right toenails are ingrown. Fungal disease present.     Left foot:      Skin integrity: Callus and dry skin present. No fissure.      Toenail Condition: Left toenails are ingrown. Fungal disease present.     Comments: Palpable prominence dorsal midfoot L. No residual fluctuance in area of cyst.    MS strength of extrinsics to foot & ankle B/L + 5/5 in DF/PF/Inv/Ev to resistance w/ no reproduction of pain in any direction.   No TTP along insertion of fascia into the calcaneus R.  TTP along PTT R retromalleolar to distal tip of medial malleolus but less to navicular insertion & MLA.     Passive ROM of ankle & pedal joints is painless & w/out crepitation B/L including R STJ.    Nails are cryptotic 1st through 5th B/L w/ dystrophic, thickened, discolored toenails including 2nd R>L, sparing 3rd & 4th B/L.  Skin:     General: Skin is warm and dry.      Capillary Refill: Capillary refill takes 2 to 3 seconds.      Findings: Rash present. No bruising, ecchymosis or erythema. Rash is scaling.      Nails: There is no clubbing.      Comments: WB diffuse hyperkeratosis B/L w/out fissuring.   Neurological:      Mental Status: She is alert and oriented to person, place, and time.      Sensory: Sensation is intact.      Motor: Weakness present. No abnormal muscle tone.      Gait: Gait abnormal.      Comments: Paresthesias including tingling & weakness RLE w/ no clearly identified trigger or source; no hyperemia (likely lumbar radiculopathy)   Psychiatric:         Mood and Affect: Mood and affect normal.         Behavior: Behavior normal. Behavior is cooperative.        Assessment:      Encounter Diagnoses   Name Primary?    Pain and swelling of ankle, right Yes    Balance problem     Posterior tibial tendonitis, right     Type 2 diabetes mellitus with diabetic polyneuropathy, without long-term current use of insulin       Problem List Items Addressed This Visit          Endocrine    Type 2 diabetes mellitus with diabetic polyneuropathy, without long-term current use of insulin     Other Visit Diagnoses         Pain and swelling of ankle, right    -  Primary    Relevant Medications    nabumetone (RELAFEN) 500 MG tablet      Balance problem          Posterior tibial tendonitis, right                Plan:      Holly was seen today for ankle pain, diabetes mellitus and nail care.    Diagnoses and all orders for this visit:    Pain and swelling of ankle, right  -     nabumetone (RELAFEN) 500 MG tablet; Take 2 tablets (1,000 mg total) by mouth once daily.    Balance problem    Posterior tibial tendonitis, right    Type 2 diabetes mellitus with diabetic polyneuropathy, without long-term current use of insulin    I counseled the patient on her conditions, their implications & medical mgmt.    - Shoe inspection. Diabetic Foot Education. Patient reminded of the importance of good blood sugar control to help prevent podiatric complications of diabetes. Patient instructed on proper foot hygeine. We discussed wearing proper shoe gear, daily foot inspections, never walking w/out protective shoe gear, annual DM foot exam, sooner prn.       -Discussed again importance of wearing shoes w/ adequate arch supports to alleviate abnormal pressure & improve stability of foot while walking.   Avoid flat shoes or barefoot walking as these will exacerbate or worsen symptoms.   -Continue OTC inserts or MLA support such as PPT arch pads OR dispensed Silopos gelstrap, all times WB including @ home.   Continue ASO ankle brace for immobilization/stabilization all times WB including @ home (instead of just @ work or out), until alleviation of SSX (& then on uneven surfaces or for prolonged activity).  -Continue topical Voltaren gel up to qid.  Switch from Mobic to Relafen 1000 mg q.d. (will taper down with resolving SSX).     F/u 4-6 wks, sooner prn.        A  total of 29 mins.was spent on chart review, patient visit & documentation.

## 2025-06-26 ENCOUNTER — OFFICE VISIT (OUTPATIENT)
Dept: PODIATRY | Facility: CLINIC | Age: 65
End: 2025-06-26
Payer: MEDICARE

## 2025-06-26 VITALS
WEIGHT: 221.13 LBS | DIASTOLIC BLOOD PRESSURE: 79 MMHG | BODY MASS INDEX: 34.71 KG/M2 | HEIGHT: 67 IN | SYSTOLIC BLOOD PRESSURE: 149 MMHG

## 2025-06-26 DIAGNOSIS — R26.89 BALANCE PROBLEM: ICD-10-CM

## 2025-06-26 DIAGNOSIS — E11.42 TYPE 2 DIABETES MELLITUS WITH DIABETIC POLYNEUROPATHY, WITHOUT LONG-TERM CURRENT USE OF INSULIN: ICD-10-CM

## 2025-06-26 DIAGNOSIS — M76.821 POSTERIOR TIBIAL TENDONITIS, RIGHT: ICD-10-CM

## 2025-06-26 DIAGNOSIS — M25.471 PAIN AND SWELLING OF ANKLE, RIGHT: Primary | ICD-10-CM

## 2025-06-26 DIAGNOSIS — M25.571 PAIN AND SWELLING OF ANKLE, RIGHT: Primary | ICD-10-CM

## 2025-06-26 PROCEDURE — 1125F AMNT PAIN NOTED PAIN PRSNT: CPT | Mod: CPTII,S$GLB,, | Performed by: PODIATRIST

## 2025-06-26 PROCEDURE — 1159F MED LIST DOCD IN RCRD: CPT | Mod: CPTII,S$GLB,, | Performed by: PODIATRIST

## 2025-06-26 PROCEDURE — 3288F FALL RISK ASSESSMENT DOCD: CPT | Mod: CPTII,S$GLB,, | Performed by: PODIATRIST

## 2025-06-26 PROCEDURE — 3078F DIAST BP <80 MM HG: CPT | Mod: CPTII,S$GLB,, | Performed by: PODIATRIST

## 2025-06-26 PROCEDURE — 4010F ACE/ARB THERAPY RXD/TAKEN: CPT | Mod: CPTII,S$GLB,, | Performed by: PODIATRIST

## 2025-06-26 PROCEDURE — 3008F BODY MASS INDEX DOCD: CPT | Mod: CPTII,S$GLB,, | Performed by: PODIATRIST

## 2025-06-26 PROCEDURE — 1101F PT FALLS ASSESS-DOCD LE1/YR: CPT | Mod: CPTII,S$GLB,, | Performed by: PODIATRIST

## 2025-06-26 PROCEDURE — 99214 OFFICE O/P EST MOD 30 MIN: CPT | Mod: S$GLB,,, | Performed by: PODIATRIST

## 2025-06-26 PROCEDURE — 3044F HG A1C LEVEL LT 7.0%: CPT | Mod: CPTII,S$GLB,, | Performed by: PODIATRIST

## 2025-06-26 PROCEDURE — 3077F SYST BP >= 140 MM HG: CPT | Mod: CPTII,S$GLB,, | Performed by: PODIATRIST

## 2025-06-26 PROCEDURE — 99999 PR PBB SHADOW E&M-EST. PATIENT-LVL III: CPT | Mod: PBBFAC,,, | Performed by: PODIATRIST

## 2025-06-26 RX ORDER — NABUMETONE 500 MG/1
1000 TABLET, FILM COATED ORAL DAILY
Qty: 60 TABLET | Refills: 1 | Status: SHIPPED | OUTPATIENT
Start: 2025-06-26

## 2025-06-26 RX ORDER — FLUTICASONE PROPIONATE 50 MCG
1 SPRAY, SUSPENSION (ML) NASAL
COMMUNITY
Start: 2025-03-17

## 2025-07-04 DIAGNOSIS — I10 ESSENTIAL HYPERTENSION: ICD-10-CM

## 2025-07-07 RX ORDER — FUROSEMIDE 20 MG/1
TABLET ORAL
Qty: 30 TABLET | Refills: 11 | Status: SHIPPED | OUTPATIENT
Start: 2025-07-07

## 2025-07-24 DIAGNOSIS — E11.42 TYPE 2 DIABETES MELLITUS WITH DIABETIC POLYNEUROPATHY, WITHOUT LONG-TERM CURRENT USE OF INSULIN: ICD-10-CM

## 2025-07-25 RX ORDER — DAPAGLIFLOZIN 10 MG/1
10 TABLET, FILM COATED ORAL
Qty: 30 TABLET | Refills: 11 | OUTPATIENT
Start: 2025-07-25

## 2025-08-01 ENCOUNTER — TELEPHONE (OUTPATIENT)
Dept: DIABETES | Facility: CLINIC | Age: 65
End: 2025-08-01
Payer: MEDICARE

## 2025-08-14 ENCOUNTER — OFFICE VISIT (OUTPATIENT)
Dept: DIABETES | Facility: CLINIC | Age: 65
End: 2025-08-14
Payer: MEDICARE

## 2025-08-14 VITALS
SYSTOLIC BLOOD PRESSURE: 118 MMHG | BODY MASS INDEX: 34.92 KG/M2 | DIASTOLIC BLOOD PRESSURE: 82 MMHG | HEART RATE: 71 BPM | HEIGHT: 67 IN | WEIGHT: 222.5 LBS | OXYGEN SATURATION: 98 %

## 2025-08-14 DIAGNOSIS — E78.5 DYSLIPIDEMIA, GOAL LDL BELOW 70: ICD-10-CM

## 2025-08-14 DIAGNOSIS — E66.811 CLASS 1 OBESITY DUE TO EXCESS CALORIES WITH SERIOUS COMORBIDITY AND BODY MASS INDEX (BMI) OF 34.0 TO 34.9 IN ADULT: ICD-10-CM

## 2025-08-14 DIAGNOSIS — E11.22 TYPE 2 DIABETES MELLITUS WITH STAGE 3A CHRONIC KIDNEY DISEASE, WITHOUT LONG-TERM CURRENT USE OF INSULIN: ICD-10-CM

## 2025-08-14 DIAGNOSIS — N18.31 TYPE 2 DIABETES MELLITUS WITH STAGE 3A CHRONIC KIDNEY DISEASE, WITHOUT LONG-TERM CURRENT USE OF INSULIN: ICD-10-CM

## 2025-08-14 DIAGNOSIS — Z71.9 HEALTH EDUCATION/COUNSELING: ICD-10-CM

## 2025-08-14 DIAGNOSIS — Z86.73 HISTORY OF CVA (CEREBROVASCULAR ACCIDENT) WITHOUT RESIDUAL DEFICITS: ICD-10-CM

## 2025-08-14 DIAGNOSIS — E66.09 CLASS 1 OBESITY DUE TO EXCESS CALORIES WITH SERIOUS COMORBIDITY AND BODY MASS INDEX (BMI) OF 34.0 TO 34.9 IN ADULT: ICD-10-CM

## 2025-08-14 DIAGNOSIS — Z59.9 FINANCIAL DIFFICULTIES: ICD-10-CM

## 2025-08-14 DIAGNOSIS — E11.42 TYPE 2 DIABETES MELLITUS WITH DIABETIC POLYNEUROPATHY, WITHOUT LONG-TERM CURRENT USE OF INSULIN: Primary | ICD-10-CM

## 2025-08-14 DIAGNOSIS — I10 ESSENTIAL HYPERTENSION: ICD-10-CM

## 2025-08-14 DIAGNOSIS — E55.9 VITAMIN D DEFICIENCY: ICD-10-CM

## 2025-08-14 PROCEDURE — 1159F MED LIST DOCD IN RCRD: CPT | Mod: CPTII,S$GLB,, | Performed by: NURSE PRACTITIONER

## 2025-08-14 PROCEDURE — 1125F AMNT PAIN NOTED PAIN PRSNT: CPT | Mod: CPTII,S$GLB,, | Performed by: NURSE PRACTITIONER

## 2025-08-14 PROCEDURE — 4010F ACE/ARB THERAPY RXD/TAKEN: CPT | Mod: CPTII,S$GLB,, | Performed by: NURSE PRACTITIONER

## 2025-08-14 PROCEDURE — 3074F SYST BP LT 130 MM HG: CPT | Mod: CPTII,S$GLB,, | Performed by: NURSE PRACTITIONER

## 2025-08-14 PROCEDURE — 3079F DIAST BP 80-89 MM HG: CPT | Mod: CPTII,S$GLB,, | Performed by: NURSE PRACTITIONER

## 2025-08-14 PROCEDURE — 3288F FALL RISK ASSESSMENT DOCD: CPT | Mod: CPTII,S$GLB,, | Performed by: NURSE PRACTITIONER

## 2025-08-14 PROCEDURE — 99214 OFFICE O/P EST MOD 30 MIN: CPT | Mod: S$GLB,,, | Performed by: NURSE PRACTITIONER

## 2025-08-14 PROCEDURE — 3061F NEG MICROALBUMINURIA REV: CPT | Mod: CPTII,S$GLB,, | Performed by: NURSE PRACTITIONER

## 2025-08-14 PROCEDURE — 1101F PT FALLS ASSESS-DOCD LE1/YR: CPT | Mod: CPTII,S$GLB,, | Performed by: NURSE PRACTITIONER

## 2025-08-14 PROCEDURE — 99999 PR PBB SHADOW E&M-EST. PATIENT-LVL IV: CPT | Mod: PBBFAC,,, | Performed by: NURSE PRACTITIONER

## 2025-08-14 PROCEDURE — 1160F RVW MEDS BY RX/DR IN RCRD: CPT | Mod: CPTII,S$GLB,, | Performed by: NURSE PRACTITIONER

## 2025-08-14 PROCEDURE — 3066F NEPHROPATHY DOC TX: CPT | Mod: CPTII,S$GLB,, | Performed by: NURSE PRACTITIONER

## 2025-08-14 PROCEDURE — 3008F BODY MASS INDEX DOCD: CPT | Mod: CPTII,S$GLB,, | Performed by: NURSE PRACTITIONER

## 2025-08-14 PROCEDURE — 3044F HG A1C LEVEL LT 7.0%: CPT | Mod: CPTII,S$GLB,, | Performed by: NURSE PRACTITIONER

## 2025-08-14 RX ORDER — BLOOD SUGAR DIAGNOSTIC
1 STRIP MISCELLANEOUS 2 TIMES DAILY
Qty: 200 EACH | Refills: 3 | Status: SHIPPED | OUTPATIENT
Start: 2025-08-14

## 2025-08-14 RX ORDER — HYDRALAZINE HYDROCHLORIDE 25 MG/1
25 TABLET, FILM COATED ORAL EVERY 12 HOURS
Qty: 180 TABLET | Refills: 2 | Status: SHIPPED | OUTPATIENT
Start: 2025-08-14

## 2025-08-14 RX ORDER — MELOXICAM 15 MG/1
15 TABLET ORAL DAILY
COMMUNITY

## 2025-08-14 RX ORDER — DAPAGLIFLOZIN 10 MG/1
10 TABLET, FILM COATED ORAL DAILY
Qty: 100 TABLET | Refills: 2 | Status: SHIPPED | OUTPATIENT
Start: 2025-08-14

## 2025-08-14 RX ORDER — LANCETS 33 GAUGE
1 EACH MISCELLANEOUS 2 TIMES DAILY
Qty: 200 EACH | Refills: 3 | Status: SHIPPED | OUTPATIENT
Start: 2025-08-14

## 2025-08-14 RX ORDER — LISINOPRIL AND HYDROCHLOROTHIAZIDE 12.5; 2 MG/1; MG/1
1 TABLET ORAL DAILY
Qty: 90 TABLET | Refills: 3 | Status: SHIPPED | OUTPATIENT
Start: 2025-08-14 | End: 2026-08-14

## 2025-08-14 RX ORDER — DULAGLUTIDE 4.5 MG/.5ML
4.5 INJECTION, SOLUTION SUBCUTANEOUS
Qty: 4 PEN | Refills: 6 | Status: SHIPPED | OUTPATIENT
Start: 2025-08-14 | End: 2026-08-14

## 2025-08-14 RX ORDER — PRAVASTATIN SODIUM 40 MG/1
40 TABLET ORAL NIGHTLY
Qty: 90 TABLET | Refills: 2 | Status: SHIPPED | OUTPATIENT
Start: 2025-08-14

## 2025-08-14 SDOH — SOCIAL DETERMINANTS OF HEALTH (SDOH): PROBLEM RELATED TO HOUSING AND ECONOMIC CIRCUMSTANCES, UNSPECIFIED: Z59.9

## 2025-08-27 ENCOUNTER — OFFICE VISIT (OUTPATIENT)
Dept: PODIATRY | Facility: CLINIC | Age: 65
End: 2025-08-27
Payer: MEDICARE

## 2025-08-27 VITALS
HEART RATE: 67 BPM | BODY MASS INDEX: 35.03 KG/M2 | DIASTOLIC BLOOD PRESSURE: 76 MMHG | WEIGHT: 223.19 LBS | SYSTOLIC BLOOD PRESSURE: 140 MMHG | HEIGHT: 67 IN

## 2025-08-27 DIAGNOSIS — R26.89 BALANCE PROBLEM: ICD-10-CM

## 2025-08-27 DIAGNOSIS — M25.471 PAIN AND SWELLING OF RIGHT ANKLE: ICD-10-CM

## 2025-08-27 DIAGNOSIS — E11.42 TYPE 2 DIABETES MELLITUS WITH DIABETIC POLYNEUROPATHY, WITHOUT LONG-TERM CURRENT USE OF INSULIN: ICD-10-CM

## 2025-08-27 DIAGNOSIS — M25.571 SINUS TARSI SYNDROME, RIGHT: Primary | ICD-10-CM

## 2025-08-27 DIAGNOSIS — M25.571 PAIN AND SWELLING OF RIGHT ANKLE: ICD-10-CM

## 2025-08-27 DIAGNOSIS — M76.821 POSTERIOR TIBIAL TENDONITIS, RIGHT: ICD-10-CM

## 2025-08-27 PROCEDURE — 3061F NEG MICROALBUMINURIA REV: CPT | Mod: CPTII,S$GLB,, | Performed by: PODIATRIST

## 2025-08-27 PROCEDURE — 3008F BODY MASS INDEX DOCD: CPT | Mod: CPTII,S$GLB,, | Performed by: PODIATRIST

## 2025-08-27 PROCEDURE — 99999 PR PBB SHADOW E&M-EST. PATIENT-LVL III: CPT | Mod: PBBFAC,,, | Performed by: PODIATRIST

## 2025-08-27 PROCEDURE — 1125F AMNT PAIN NOTED PAIN PRSNT: CPT | Mod: CPTII,S$GLB,, | Performed by: PODIATRIST

## 2025-08-27 PROCEDURE — 1101F PT FALLS ASSESS-DOCD LE1/YR: CPT | Mod: CPTII,S$GLB,, | Performed by: PODIATRIST

## 2025-08-27 PROCEDURE — 99214 OFFICE O/P EST MOD 30 MIN: CPT | Mod: S$GLB,,, | Performed by: PODIATRIST

## 2025-08-27 PROCEDURE — 3044F HG A1C LEVEL LT 7.0%: CPT | Mod: CPTII,S$GLB,, | Performed by: PODIATRIST

## 2025-08-27 PROCEDURE — 3077F SYST BP >= 140 MM HG: CPT | Mod: CPTII,S$GLB,, | Performed by: PODIATRIST

## 2025-08-27 PROCEDURE — 3066F NEPHROPATHY DOC TX: CPT | Mod: CPTII,S$GLB,, | Performed by: PODIATRIST

## 2025-08-27 PROCEDURE — 3288F FALL RISK ASSESSMENT DOCD: CPT | Mod: CPTII,S$GLB,, | Performed by: PODIATRIST

## 2025-08-27 PROCEDURE — 3078F DIAST BP <80 MM HG: CPT | Mod: CPTII,S$GLB,, | Performed by: PODIATRIST

## 2025-08-27 PROCEDURE — 4010F ACE/ARB THERAPY RXD/TAKEN: CPT | Mod: CPTII,S$GLB,, | Performed by: PODIATRIST

## 2025-08-27 RX ORDER — DICLOFENAC SODIUM 10 MG/G
0.5 GEL TOPICAL 4 TIMES DAILY
Qty: 450 G | Refills: 2 | Status: SHIPPED | OUTPATIENT
Start: 2025-08-27

## 2025-08-28 ENCOUNTER — OFFICE VISIT (OUTPATIENT)
Dept: PRIMARY CARE CLINIC | Facility: CLINIC | Age: 65
End: 2025-08-28
Payer: MEDICARE

## 2025-08-28 VITALS
BODY MASS INDEX: 35.16 KG/M2 | DIASTOLIC BLOOD PRESSURE: 60 MMHG | HEIGHT: 67 IN | WEIGHT: 224 LBS | RESPIRATION RATE: 15 BRPM | SYSTOLIC BLOOD PRESSURE: 122 MMHG | OXYGEN SATURATION: 97 % | HEART RATE: 71 BPM | TEMPERATURE: 98 F

## 2025-08-28 DIAGNOSIS — M54.6 ACUTE LEFT-SIDED THORACIC BACK PAIN: ICD-10-CM

## 2025-08-28 DIAGNOSIS — N39.41 URGE INCONTINENCE OF URINE: ICD-10-CM

## 2025-08-28 DIAGNOSIS — S29.019D THORACIC MYOFASCIAL STRAIN, SUBSEQUENT ENCOUNTER: Primary | ICD-10-CM

## 2025-08-28 DIAGNOSIS — F41.9 ANXIETY: ICD-10-CM

## 2025-08-28 DIAGNOSIS — I50.32 HYPERTENSIVE HEART DISEASE WITH CHRONIC DIASTOLIC CONGESTIVE HEART FAILURE: ICD-10-CM

## 2025-08-28 DIAGNOSIS — I11.0 HYPERTENSIVE HEART DISEASE WITH CHRONIC DIASTOLIC CONGESTIVE HEART FAILURE: ICD-10-CM

## 2025-08-28 DIAGNOSIS — E11.42 TYPE 2 DIABETES MELLITUS WITH DIABETIC POLYNEUROPATHY, WITHOUT LONG-TERM CURRENT USE OF INSULIN: ICD-10-CM

## 2025-08-28 DIAGNOSIS — I10 ESSENTIAL HYPERTENSION: ICD-10-CM

## 2025-08-28 LAB
BILIRUB SERPL-MCNC: ABNORMAL MG/DL
BLOOD URINE, POC: ABNORMAL
CLARITY, POC UA: CLEAR
COLOR, POC UA: YELLOW
GLUCOSE UR QL STRIP: ABNORMAL
KETONES UR QL STRIP: ABNORMAL
LEUKOCYTE ESTERASE URINE, POC: ABNORMAL
NITRITE, POC UA: ABNORMAL
PH, POC UA: 5
PROTEIN, POC: ABNORMAL
SPECIFIC GRAVITY, POC UA: 1.02
UROBILINOGEN, POC UA: ABNORMAL

## 2025-08-28 PROCEDURE — 1125F AMNT PAIN NOTED PAIN PRSNT: CPT | Mod: CPTII,S$GLB,, | Performed by: NURSE PRACTITIONER

## 2025-08-28 PROCEDURE — 99999 PR PBB SHADOW E&M-EST. PATIENT-LVL IV: CPT | Mod: PBBFAC,,, | Performed by: NURSE PRACTITIONER

## 2025-08-28 PROCEDURE — G2211 COMPLEX E/M VISIT ADD ON: HCPCS | Mod: S$GLB,,, | Performed by: NURSE PRACTITIONER

## 2025-08-28 PROCEDURE — 3288F FALL RISK ASSESSMENT DOCD: CPT | Mod: CPTII,S$GLB,, | Performed by: NURSE PRACTITIONER

## 2025-08-28 PROCEDURE — 3078F DIAST BP <80 MM HG: CPT | Mod: CPTII,S$GLB,, | Performed by: NURSE PRACTITIONER

## 2025-08-28 PROCEDURE — 3008F BODY MASS INDEX DOCD: CPT | Mod: CPTII,S$GLB,, | Performed by: NURSE PRACTITIONER

## 2025-08-28 PROCEDURE — 1101F PT FALLS ASSESS-DOCD LE1/YR: CPT | Mod: CPTII,S$GLB,, | Performed by: NURSE PRACTITIONER

## 2025-08-28 PROCEDURE — 99214 OFFICE O/P EST MOD 30 MIN: CPT | Mod: S$GLB,,, | Performed by: NURSE PRACTITIONER

## 2025-08-28 PROCEDURE — 3074F SYST BP LT 130 MM HG: CPT | Mod: CPTII,S$GLB,, | Performed by: NURSE PRACTITIONER

## 2025-08-28 PROCEDURE — 1159F MED LIST DOCD IN RCRD: CPT | Mod: CPTII,S$GLB,, | Performed by: NURSE PRACTITIONER

## 2025-08-28 PROCEDURE — 3066F NEPHROPATHY DOC TX: CPT | Mod: CPTII,S$GLB,, | Performed by: NURSE PRACTITIONER

## 2025-08-28 PROCEDURE — 4010F ACE/ARB THERAPY RXD/TAKEN: CPT | Mod: CPTII,S$GLB,, | Performed by: NURSE PRACTITIONER

## 2025-08-28 PROCEDURE — 81002 URINALYSIS NONAUTO W/O SCOPE: CPT | Mod: S$GLB,,, | Performed by: NURSE PRACTITIONER

## 2025-08-28 PROCEDURE — 3061F NEG MICROALBUMINURIA REV: CPT | Mod: CPTII,S$GLB,, | Performed by: NURSE PRACTITIONER

## 2025-08-28 PROCEDURE — 3044F HG A1C LEVEL LT 7.0%: CPT | Mod: CPTII,S$GLB,, | Performed by: NURSE PRACTITIONER

## 2025-08-28 RX ORDER — DIAZEPAM 5 MG/1
5 TABLET ORAL DAILY PRN
Qty: 20 TABLET | Refills: 0 | Status: SHIPPED | OUTPATIENT
Start: 2025-08-28

## 2025-08-28 RX ORDER — OXYBUTYNIN CHLORIDE 5 MG/1
5 TABLET, EXTENDED RELEASE ORAL DAILY
Qty: 90 TABLET | Refills: 3 | Status: SHIPPED | OUTPATIENT
Start: 2025-08-28

## 2025-08-28 RX ORDER — METHYLPREDNISOLONE 4 MG/1
TABLET ORAL
Qty: 1 EACH | Refills: 0 | Status: SHIPPED | OUTPATIENT
Start: 2025-08-28

## 2025-08-28 RX ORDER — TIZANIDINE 4 MG/1
4 TABLET ORAL EVERY 6 HOURS PRN
Qty: 30 TABLET | Refills: 0 | Status: SHIPPED | OUTPATIENT
Start: 2025-08-28 | End: 2025-09-07

## (undated) DEVICE — COVER OVERHEAD SURG LT BLUE

## (undated) DEVICE — DIFFUSER

## (undated) DEVICE — SEE MEDLINE ITEM 157194

## (undated) DEVICE — SYS LABLNG CORECT MED 4 FLG

## (undated) DEVICE — VIDEO RENTAL SERVICE

## (undated) DEVICE — NDL SPINAL 18GX3.5 SPINOCAN

## (undated) DEVICE — PENCIL EDGE SMOKE ROCKER

## (undated) DEVICE — DRAPE STERI LONG

## (undated) DEVICE — TOWEL OR DISP STRL BLUE 4/PK

## (undated) DEVICE — SYS SEE SHARP SCP ANTIFG LNG

## (undated) DEVICE — DRAPE OPMI STERILE

## (undated) DEVICE — ELECTRODE REM PLYHSV RETURN 9

## (undated) DEVICE — SUT BONE WAX 2.5 GRMS 12/BX

## (undated) DEVICE — SOL POVIDONE PREP IODINE 4 OZ

## (undated) DEVICE — SEALER LIGASURE LAP 37CM 5MM

## (undated) DEVICE — BNDG COFLEX FOAM LF2 ST 4X5YD

## (undated) DEVICE — SUT VICRYL PLUS 0 CT1 36IN

## (undated) DEVICE — Device

## (undated) DEVICE — ALCOHOL ISOPROPYL 4OZ

## (undated) DEVICE — PACK LAPAROSCOPY BAPTIST

## (undated) DEVICE — DRESSING TEGADERM 4.4X5IN

## (undated) DEVICE — SPONGE PATTY SURGICAL .5X3IN

## (undated) DEVICE — IRRIGATOR ENDOSCOPY DISP.

## (undated) DEVICE — GOWN NONREINF SET-IN SLV XL

## (undated) DEVICE — TROCAR KII FIOS 5MM X 100MM

## (undated) DEVICE — SUT MCRYL PLUS 4-0 PS2 27IN

## (undated) DEVICE — UNDERGLOVES BIOGEL PI SIZE 8

## (undated) DEVICE — BIT DRILL CERVICIAL 14MM

## (undated) DEVICE — ADHESIVE MASTISOL VIAL 48/BX

## (undated) DEVICE — TROCAR KII FIOS 5MM X 150MM

## (undated) DEVICE — TUBE FRAZIER 5MM 2FT SOFT TIP

## (undated) DEVICE — SOL BETADINE 5%

## (undated) DEVICE — SUPPORT ULNA NERVE PROTECTOR

## (undated) DEVICE — GLOVE BIOGEL PI MICRO SZ 7.5

## (undated) DEVICE — TROCAR KII FIOS 11MM X 100MM

## (undated) DEVICE — SUT STRATAFIX 0 PDS+ 22CM 9IN

## (undated) DEVICE — DRESSING TRANS 4X4 TEGADERM

## (undated) DEVICE — KIT WING PAD POSITIONING

## (undated) DEVICE — SUT VICRYL+ 2-0 SH 18IN

## (undated) DEVICE — SCISSOR 5MMX35CM DIRECT DRIVE

## (undated) DEVICE — DEVICE KOH EFCNT RUMI 3.0CM

## (undated) DEVICE — SEE MEDLINE ITEM 156905

## (undated) DEVICE — DRAPE STERI INSTRUMENT 1018

## (undated) DEVICE — SOL NORMAL USPCA 0.9%

## (undated) DEVICE — SYR ONLY LUER LOCK 20CC

## (undated) DEVICE — VIDEO RENTAL SERVICE CYSTO

## (undated) DEVICE — SUT MONOCRYL 4-0 PS-1 UND

## (undated) DEVICE — SOL IRR SOD CHL .9% POUR

## (undated) DEVICE — SOL 9P NACL IRR PIC IL

## (undated) DEVICE — JELLY SURGILUBE 5GR

## (undated) DEVICE — SPONGE LAP 18X18 PREWASHED

## (undated) DEVICE — COVER TABLE REINF 50X90IN

## (undated) DEVICE — DRAPE C-ARM FOR MOBILE XRAY

## (undated) DEVICE — SOL POVIDONE SCRUB IODINE 4 OZ

## (undated) DEVICE — CORD FOR BIPOLAR FORCEPS 12

## (undated) DEVICE — TIP RUMI BLUE DISPOSABLE 5/BX

## (undated) DEVICE — BLANKET LOWER BODY 55.9X40.2IN

## (undated) DEVICE — SYR 10CC LUER LOCK

## (undated) DEVICE — SEE MEDLINE ITEM 157110

## (undated) DEVICE — NDL HYPO REG 25G X 1 1/2

## (undated) DEVICE — SEE MEDLINE ITEM 146292

## (undated) DEVICE — BUR BONE CUT MICRO TPS 3X3.8MM

## (undated) DEVICE — DURAPREP SURG SCRUB 26ML

## (undated) DEVICE — SEE MEDLINE ITEM 157150

## (undated) DEVICE — CARTRIDGE OIL

## (undated) DEVICE — PAD ABD 8X10 STERILE

## (undated) DEVICE — GELATIN SURGIPOWDER ABSORBABLE

## (undated) DEVICE — PAD PREP CUFFED NS 24X48IN

## (undated) DEVICE — DRESSING SURGICAL 1/2X1/2

## (undated) DEVICE — SPONGE KITTNER 1/4X 5/8 L STRL

## (undated) DEVICE — GLOVE BIOGEL SKINSENSE PI 6.5

## (undated) DEVICE — SHEET THYROID W/ISO-BAC